# Patient Record
Sex: FEMALE | Race: WHITE | Employment: OTHER
[De-identification: names, ages, dates, MRNs, and addresses within clinical notes are randomized per-mention and may not be internally consistent; named-entity substitution may affect disease eponyms.]

---

## 2017-10-01 ENCOUNTER — HEALTH MAINTENANCE LETTER (OUTPATIENT)
Age: 57
End: 2017-10-01

## 2018-12-06 ENCOUNTER — TRANSFERRED RECORDS (OUTPATIENT)
Dept: HEALTH INFORMATION MANAGEMENT | Facility: CLINIC | Age: 58
End: 2018-12-06

## 2019-12-11 ENCOUNTER — TRANSFERRED RECORDS (OUTPATIENT)
Dept: HEALTH INFORMATION MANAGEMENT | Facility: CLINIC | Age: 59
End: 2019-12-11

## 2019-12-11 LAB
CREAT SERPL-MCNC: 0.8 MG/DL (ref 0.57–1.11)
GFR SERPL CREATININE-BSD FRML MDRD: >60 ML/MIN/1.73M2

## 2019-12-13 ENCOUNTER — TRANSFERRED RECORDS (OUTPATIENT)
Dept: HEALTH INFORMATION MANAGEMENT | Facility: CLINIC | Age: 59
End: 2019-12-13

## 2019-12-20 NOTE — TELEPHONE ENCOUNTER
Action    Action Taken December 20, 2019  Call to PT and she verified all records at [facility]     RECORDS STATUS - ALL OTHER DIAGNOSIS      RECORDS RECEIVED FROM: Massiel   DATE RECEIVED: Care Everywhere   NOTES STATUS DETAILS   OFFICE NOTE from referring provider     OFFICE NOTE from medical oncologist     DISCHARGE SUMMARY from hospital     DISCHARGE REPORT from the ER     OPERATIVE REPORT     MEDICATION LIST     CLINICAL TRIAL TREATMENTS TO DATE     LABS     PATHOLOGY REPORTS     ANYTHING RELATED TO DIAGNOSIS Slides requested 12/20/19  Received 1/6/20 [facility] Case: [id number]   GENONOMIC TESTING     TYPE:     IMAGING (NEED IMAGES & REPORT)     CT SCANS Requested 12/20/19  Merged to PACS 12/27/19 [facility] 12/13/19; 12/11/19; 12/6/2018; 8/1/17   MRI Requested 12/20/19  Merged to PACS 12/27/19 [facility]  12/18/19; 12/7/17; 8/1/17   NM bone Scan whole body Requested 12/20/19  Merged to PACS 12/27/19 [facility] 12/13/19; 8/1/17;   MAMMO Requested 12/20/19  Merged to PACS 12/27/19 9/8/18; 9/7/18   ULTRASOUND     PET

## 2020-01-03 ENCOUNTER — TRANSFERRED RECORDS (OUTPATIENT)
Dept: HEALTH INFORMATION MANAGEMENT | Facility: CLINIC | Age: 60
End: 2020-01-03

## 2020-01-06 ENCOUNTER — PRE VISIT (OUTPATIENT)
Dept: ONCOLOGY | Facility: CLINIC | Age: 60
End: 2020-01-06

## 2020-01-06 ENCOUNTER — ONCOLOGY VISIT (OUTPATIENT)
Dept: ONCOLOGY | Facility: CLINIC | Age: 60
End: 2020-01-06
Attending: PSYCHIATRY & NEUROLOGY
Payer: COMMERCIAL

## 2020-01-06 ENCOUNTER — TUMOR CONFERENCE (OUTPATIENT)
Dept: ONCOLOGY | Facility: CLINIC | Age: 60
End: 2020-01-06

## 2020-01-06 VITALS
OXYGEN SATURATION: 96 % | HEART RATE: 71 BPM | DIASTOLIC BLOOD PRESSURE: 80 MMHG | BODY MASS INDEX: 34.78 KG/M2 | TEMPERATURE: 98.3 F | WEIGHT: 189 LBS | SYSTOLIC BLOOD PRESSURE: 130 MMHG | HEIGHT: 62 IN

## 2020-01-06 DIAGNOSIS — D43.3 HEMANGIOPERICYTOMA OF CNS, GRADE II (H): Primary | ICD-10-CM

## 2020-01-06 DIAGNOSIS — R41.3 MEMORY LOSS: ICD-10-CM

## 2020-01-06 DIAGNOSIS — C78.7 METASTATIC CANCER TO LIVER (H): ICD-10-CM

## 2020-01-06 DIAGNOSIS — R41.89 COGNITIVE CHANGES: ICD-10-CM

## 2020-01-06 PROCEDURE — G0463 HOSPITAL OUTPT CLINIC VISIT: HCPCS | Mod: ZF

## 2020-01-06 PROCEDURE — 99205 OFFICE O/P NEW HI 60 MIN: CPT | Mod: ZP | Performed by: PSYCHIATRY & NEUROLOGY

## 2020-01-06 ASSESSMENT — PAIN SCALES - GENERAL: PAINLEVEL: NO PAIN (0)

## 2020-01-06 ASSESSMENT — MIFFLIN-ST. JEOR: SCORE: 1380.55

## 2020-01-06 NOTE — NURSING NOTE
"Oncology Rooming Note    January 6, 2020 7:05 AM   Leidy Ascencio is a 59 year old female who presents for:    Chief Complaint   Patient presents with     New Patient     UMP NEW; HAMGIOPERICYTIC MENINGIOMA     Initial Vitals: /80   Pulse 71   Temp 98.3  F (36.8  C) (Oral)   Ht 1.575 m (5' 2\")   Wt 85.7 kg (189 lb)   SpO2 96%   BMI 34.57 kg/m   Estimated body mass index is 34.57 kg/m  as calculated from the following:    Height as of this encounter: 1.575 m (5' 2\").    Weight as of this encounter: 85.7 kg (189 lb). Body surface area is 1.94 meters squared.  No Pain (0) Comment: Data Unavailable   No LMP recorded. Patient has had a hysterectomy.  Allergies reviewed: Yes   Medications reviewed: Yes    Medications: Medication refills not needed today.  Pharmacy name entered into [employer]: [facility] [place] - [address] [address]    Clinical concerns: No new concerns.  Dr. Zhang was notified.      Rebekah Everett, [facility]              "

## 2020-01-06 NOTE — LETTER
RE: Leidy Ascencio  [address] [place] [address]     Dear Colleague,    Thank you for referring your patient, Leidy Ascencio, to the [facility]. Please see a copy of my visit note below.    NEURO-ONCOLOGY INITIAL VISIT  Jan 6, 2020    CHIEF COMPLAINT: Ms. 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 She has not had any cancer-directed therapy to date. She is presenting to this initial clinic visit as a self-referral for evaluation and recommendations on treatment.     Accompanying her to this visit is Main ().       HISTORY OF PRESENT ILLNESS  A summary of the patient s oncologic history is as follows;   -2015 PRESENTATION: Headaches.  -2015 MR brain imaging with a right occipital mass   -4/22/2015 SURGERY: Resection by Dr. Lujan   PATHOLOGY: Hemangiopericytoma, grade 2.   -2015 CT chest, abdomen and pelvis negative for malignancy.  -2015 PET MRI negative for malignancy.   Conservative management per Dr. Herman Mayberry.    -8/1/2017 CT chest abdomen and pelvis and bone scan negative for malignancy.   -12/7/2017 MRB with no evidence of tumor recurrence.  -12/6/2018 MRB with no evidence of tumor recurrence.    -12/11/2019 MRB with a new nodule of enhancement within the superior sagittal sinus, concerning for tumor recurrence. Stable appearance of the right parieto-occipital craniotomy site.  -12/13/2019 Bone scan with no convincing evidence of skeletal metastases.  -12/18/2019 MR liver with a 9 mm lesion in the lateral aspect of the junction of segments 7 and 8 of the liver shows homogeneous delayed enhancement and is new since 2015. This could represent a metastatic lesion.     -1/6/2020 NEURO-ONC: Referral to radiation oncology and GI oncology surgery. Signed consent for next generation sequencing.   -1/14/2020 Planned GammaKnife to lesion (1 fraction).     Today in clinic;    -Priyanka does not complain of any neurological symptoms. Endorses a slight increase in fatigue.   -Denies any headaches, weakness, changes in sensation, abnormalities with vision or impairment in cognitive ability.  -Denies any episodes concerning for a seizure, including unexplained episodes of loss of consciousness, unexplained falls, unexplained loss of bowel/ bladder control or tongue biting, unexplained bruising/ myalgia, periods of loss of time, or witnessed shaking/ jerking movements.   -Off all steroids.  -Mood is good, on Zoloft.     REVIEW OF SYSTEMS  A comprehensive ROS negative except as in HPI.      MEDICATIONS   Current Outpatient Medications   Medication Sig Dispense Refill     ALBUTEROL 108 (90 BASE) MCG/ACT inhaler   2     fluticasone (FLONASE) 50 MCG/ACT nasal spray Spray 2 sprays into both nostrils daily       loratadine (CLARITIN) 10 MG tablet Take 10 mg by mouth daily       pravastatin (PRAVACHOL) 20 MG tablet Take 1 tablet (20 mg) by mouth daily 90 tablet 1     sertraline (ZOLOFT) 100 MG tablet Take 1 tablet (100 mg) by mouth daily 90 tablet 1     valACYclovir (VALTREX) 1000 mg tablet Take 2 tablets (2,000 mg) by mouth 2 times daily 12 tablet 6     VITAMIN D, CHOLECALCIFEROL, PO Take 1,000 Units by mouth daily       DRUG ALLERGIES   Allergies   Allergen Reactions     Atorvastatin      Penicillins      Sulfa Drugs        IMMUNIZATIONS   Immunization History   Administered Date(s) Administered     HepB 12/17/1997, 01/27/1998, 07/27/1998     Influenza (intradermal) 11/06/2006, 12/24/2008, 10/12/2009, 11/23/2010, 08/20/2012     Influenza Vaccine IM > 6 months Valent IIV4 10/21/2013, 11/03/2014, 10/13/2015, 09/19/2016     TD (ADULT, 7+) 12/08/1997     Tdap (Adacel,Boostrix) 01/03/2008     Tdap (Adult) Unspecified Formulation 01/03/2008, 06/05/2017     Zoster vaccine recombinant adjuvanted (SHINGRIX) 08/20/2018     PAST MEDICAL HISTORY   Past Medical History:   Diagnosis Date     Cancer (H)      "brain tumor     Depressive disorder      Hyperlipemia      PAST SURGICAL HISTORY   Past Surgical History:   Procedure Laterality Date     C EXCIS INFRATENT MENINGIOMA       GYN SURGERY      3 cesections     HYSTERECTOMY VAGINAL, BILATERAL SALPINGO-OOPHERECTOMY, COMBINED  1988    hysterectomy + ovaries      SOCIAL HISTORY   History   Smoking Status     Former Smoker     Packs/day: 1.00     Years: 27.00     Types: Cigarettes     Quit date: 1/19/2003   Smokeless Tobacco     Not on file    Social History    Substance and Sexual Activity      Alcohol use: Yes        Alcohol/week: 0.0 standard drinks     History   Drug Use No   . 2 daughter, 1 son.   Employment: [employer]; [employer] managers.      FAMILY HISTORY   Family History   Problem Relation Age of Onset     Coronary Artery Disease Mother      Arthritis Father      Alzheimer Disease Maternal Grandmother      Other Cancer Maternal Grandmother         lung     Other Cancer Paternal Grandfather         meloma       PHYSICAL EXAMINATION  /80   Pulse 71   Temp 98.3  F (36.8  C) (Oral)   Ht 1.575 m (5' 2\")   Wt 85.7 kg (189 lb)   SpO2 96%   BMI 34.57 kg/m     Wt Readings from Last 2 Encounters:   01/06/20 85.7 kg (189 lb)   10/19/16 81.6 kg (180 lb)      Ht Readings from Last 2 Encounters:   01/06/20 1.575 m (5' 2\")   10/04/16 1.575 m (5' 2\")     KPS: 90    -Generally well appearing.  -Throat: No oral thrush.  -Respiratory: Normal breath sounds, no audible wheezing.   -Skin: No rashes. Healed head incision.  -Hematologic/ lymphatic: No abnormal bruising. No leg swelling.  -Psychiatric: Normal mood and affect. Pleasant, talkative.  -Neurologic:   MENTAL STATUS:     Alert, oriented to date.    Recall: Immediate 3/3, delayed 3/3.    Speech fluent.    Comprehension intact to multi-step commands.   Good right-left orientation.     CRANIAL NERVES:     Discs flat on fundoscopy.    Pupils are equal, round, reactive to light.     Extraocular " movements full, patient denies diplopia.     Left inferior homonymous quadrantanopia.     Facial sensation intact to light touch.   Symmetric facial movements.   Hearing intact.   Palate moves symmetrically.     Sternocleidomastoid and trapezius strength intact.    Tongue midline.  MOTOR:    Normal and symmetric tone.   No pronation or drift. No orbiting.   Able to rise from a chair without use of arms.   On toe/ heel walk, equal distance from floor to heels/ toes.   SENSATION:    Intact to light touch throughout.  COORDINATION:   Intact finger-nose with eyes open and closed.   REFLEXES:    Normal and symmetric.    No Hoffmans.   No grasp.    GAIT:  Walks without assistance.   Good speed. Normal stride length and heel strike. Normal turns. Normal arm swing.   Able to toe, heel walk. Able to tandem walk.       MEDICAL RECORDS  Obtained and personally reviewed all available outside medical records in addition to reviewing any records available in our electronic system.     LABS  Personally reviewed all available lab results.     IMAGING  Personally reviewed MR brain imaging. The contrast enhancing lesion at the sagittal sinus appears to have been growing in size over the past 2 years. Also reviewed liver MRI.     Imaging and case reviewed and discussed at Brain Tumor Conference.       IMPRESSION  For the 60 minute appointment, more than 50% of the encounter was spent discussing in detail the nature of this tumor, providing emotional support, answering questions pertaining to my recommendations, and devising the treatment plan as outlined below.     Grade II hemangiopericytomas are considered malignant tumors. Standard treatment options include surgery and radiation therapy. As discussed at Brain Tumor Conference today, resection is not a good option. For radiation therapy, since the lesion has not been previously treated, fractionate radiation was recommended over SRS. Will refer to radiation oncology to discuss this  treatment option with Priyanka. For treatment-refractory tumors, Pazopanib, a broad-spectrum tyrosine kinase inhibitor, has been used with noted efficacy. As extracranial metastatic disease can occur, most commonly in bone, lung, and liver, the MR liver imaging is concerning. For the liver lesion, discussed case with Dr. Phan Fierro, surgical oncology, and will place a referral. Consented Priyanka for next generation sequencing and can consider sending pathology for mutational analysis.     PROBLEM LIST  Hemangiopericytoma, grade II  Liver lesion  Depressed mood    PLAN  -CANCER-DIRECTED THERAPY-  -As above; Discussed case at Brain Tumor Conference today.     -STEROIDS-  -Currently off dexamethasone.  -Dose may increase while undergoing radiation.    -SEIZURE MANAGEMENT-  -While this patient is at increased risk of having seizures, given the lack of seizure history, there is no indication to prescribe an antiepileptic at this time.     -Quality of life/ MOOD/ FATIGUE-  -Continue to monitor mood as untreated/ undertreated depression can worsen fatigue, dysorexia, and quality of life.  -Continue Zoloft.     -MEMORY LOSS/ WORD FINDING ISSUES-  -Referral to Dr. Phan Wright for neuro-psych testing.     Return to clinic pending treatment plan.     In the meantime, Priyanka knows to call with questions or concerns or to report new complaints and can be seen sooner if needed.    Lauren Zhang MD  Neuro-oncology

## 2020-01-06 NOTE — TUMOR CONFERENCE
Tumor Conference Information  Tumor Conference:  Brain  Specialties Present:  Medical oncology, Pathology, Radiology, Radiation oncology, Surgery  Patient Status:  A new patient  Pathology:  Not Discussed  Treatment to Date:  Surgical intervention(s)  Clinical Trial Eligibility:  Not discussed  Recommended Plan:  Post-operative radiation therapy, Referral to (see comment), Biopsy (Comment: plan for post-op radiation to recurrent brain lesion - will refer to rad onc here for second opinion; also refer to see Dr. Fierro to discuss liver biopsy and possible ablation to concerning liver lesion)  Did the review exceed 30 minutes?:  did not           Documentation / Disclaimer Cancer Tumor Board Note  Cancer tumor board recommendations do not override what is determined to be reasonable care and treatment, which is dependent on the circumstances of a patient's case; the patient's medical, social, and personal concerns; and the clinical judgment of the oncologist [physician].

## 2020-01-06 NOTE — PROGRESS NOTES
NEURO-ONCOLOGY INITIAL VISIT  Jan 6, 2020    CHIEF COMPLAINT: Ms. 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 She has not had any cancer-directed therapy to date. She is presenting to this initial clinic visit as a self-referral for evaluation and recommendations on treatment.     Accompanying her to this visit is Main ().       HISTORY OF PRESENT ILLNESS  A summary of the patient s oncologic history is as follows;   -2015 PRESENTATION: Headaches.  -2015 MR brain imaging with a right occipital mass   -4/22/2015 SURGERY: Resection by Dr. Lujan   PATHOLOGY: Hemangiopericytoma, grade 2.   -2015 CT chest, abdomen and pelvis negative for malignancy.  -2015 PET MRI negative for malignancy.   Conservative management per Dr. Herman Mayberry.    -8/1/2017 CT chest abdomen and pelvis and bone scan negative for malignancy.   -12/7/2017 MRB with no evidence of tumor recurrence.  -12/6/2018 MRB with no evidence of tumor recurrence.    -12/11/2019 MRB with a new nodule of enhancement within the superior sagittal sinus, concerning for tumor recurrence. Stable appearance of the right parieto-occipital craniotomy site.  -12/13/2019 Bone scan with no convincing evidence of skeletal metastases.  -12/18/2019 MR liver with a 9 mm lesion in the lateral aspect of the junction of segments 7 and 8 of the liver shows homogeneous delayed enhancement and is new since 2015. This could represent a metastatic lesion.     -1/6/2020 NEURO-ONC: Referral to radiation oncology and GI oncology surgery. Signed consent for next generation sequencing.   -1/14/2020 Planned GammaKnife to lesion (1 fraction).     Today in clinic;   -Priyanka does not complain of any neurological symptoms. Endorses a slight increase in fatigue.   -Denies any headaches, weakness, changes in sensation, abnormalities with vision or impairment in cognitive  ability.  -Denies any episodes concerning for a seizure, including unexplained episodes of loss of consciousness, unexplained falls, unexplained loss of bowel/ bladder control or tongue biting, unexplained bruising/ myalgia, periods of loss of time, or witnessed shaking/ jerking movements.   -Off all steroids.  -Mood is good, on Zoloft.     REVIEW OF SYSTEMS  A comprehensive ROS negative except as in HPI.      MEDICATIONS   Current Outpatient Medications   Medication Sig Dispense Refill     ALBUTEROL 108 (90 BASE) MCG/ACT inhaler   2     fluticasone (FLONASE) 50 MCG/ACT nasal spray Spray 2 sprays into both nostrils daily       loratadine (CLARITIN) 10 MG tablet Take 10 mg by mouth daily       pravastatin (PRAVACHOL) 20 MG tablet Take 1 tablet (20 mg) by mouth daily 90 tablet 1     sertraline (ZOLOFT) 100 MG tablet Take 1 tablet (100 mg) by mouth daily 90 tablet 1     valACYclovir (VALTREX) 1000 mg tablet Take 2 tablets (2,000 mg) by mouth 2 times daily 12 tablet 6     VITAMIN D, CHOLECALCIFEROL, PO Take 1,000 Units by mouth daily       DRUG ALLERGIES   Allergies   Allergen Reactions     Atorvastatin      Penicillins      Sulfa Drugs        IMMUNIZATIONS   Immunization History   Administered Date(s) Administered     HepB 12/17/1997, 01/27/1998, 07/27/1998     Influenza (intradermal) 11/06/2006, 12/24/2008, 10/12/2009, 11/23/2010, 08/20/2012     Influenza Vaccine IM > 6 months Valent IIV4 10/21/2013, 11/03/2014, 10/13/2015, 09/19/2016     TD (ADULT, 7+) 12/08/1997     Tdap (Adacel,Boostrix) 01/03/2008     Tdap (Adult) Unspecified Formulation 01/03/2008, 06/05/2017     Zoster vaccine recombinant adjuvanted (SHINGRIX) 08/20/2018     PAST MEDICAL HISTORY   Past Medical History:   Diagnosis Date     Cancer (H)     brain tumor     Depressive disorder      Hyperlipemia      PAST SURGICAL HISTORY   Past Surgical History:   Procedure Laterality Date     C EXCIS INFRATENT MENINGIOMA       GYN SURGERY      3 cesections      "HYSTERECTOMY VAGINAL, BILATERAL SALPINGO-OOPHERECTOMY, COMBINED  1988    hysterectomy + ovaries      SOCIAL HISTORY   History   Smoking Status     Former Smoker     Packs/day: 1.00     Years: 27.00     Types: Cigarettes     Quit date: 1/19/2003   Smokeless Tobacco     Not on file    Social History    Substance and Sexual Activity      Alcohol use: Yes        Alcohol/week: 0.0 standard drinks     History   Drug Use No   . 2 daughter, 1 son.   Employment: [employer]; [employer] managers.      FAMILY HISTORY   Family History   Problem Relation Age of Onset     Coronary Artery Disease Mother      Arthritis Father      Alzheimer Disease Maternal Grandmother      Other Cancer Maternal Grandmother         lung     Other Cancer Paternal Grandfather         meloma       PHYSICAL EXAMINATION  /80   Pulse 71   Temp 98.3  F (36.8  C) (Oral)   Ht 1.575 m (5' 2\")   Wt 85.7 kg (189 lb)   SpO2 96%   BMI 34.57 kg/m    Wt Readings from Last 2 Encounters:   01/06/20 85.7 kg (189 lb)   10/19/16 81.6 kg (180 lb)      Ht Readings from Last 2 Encounters:   01/06/20 1.575 m (5' 2\")   10/04/16 1.575 m (5' 2\")     KPS: 90    -Generally well appearing.  -Throat: No oral thrush.  -Respiratory: Normal breath sounds, no audible wheezing.   -Skin: No rashes. Healed head incision.  -Hematologic/ lymphatic: No abnormal bruising. No leg swelling.  -Psychiatric: Normal mood and affect. Pleasant, talkative.  -Neurologic:   MENTAL STATUS:     Alert, oriented to date.    Recall: Immediate 3/3, delayed 3/3.    Speech fluent.    Comprehension intact to multi-step commands.   Good right-left orientation.     CRANIAL NERVES:     Discs flat on fundoscopy.    Pupils are equal, round, reactive to light.     Extraocular movements full, patient denies diplopia.     Left inferior homonymous quadrantanopia.     Facial sensation intact to light touch.   Symmetric facial movements.   Hearing intact.   Palate moves symmetrically.  "    Sternocleidomastoid and trapezius strength intact.    Tongue midline.  MOTOR:    Normal and symmetric tone.   No pronation or drift. No orbiting.   Able to rise from a chair without use of arms.   On toe/ heel walk, equal distance from floor to heels/ toes.   SENSATION:    Intact to light touch throughout.  COORDINATION:   Intact finger-nose with eyes open and closed.   REFLEXES:    Normal and symmetric.    No Hoffmans.   No grasp.    GAIT:  Walks without assistance.   Good speed. Normal stride length and heel strike. Normal turns. Normal arm swing.   Able to toe, heel walk. Able to tandem walk.       MEDICAL RECORDS  Obtained and personally reviewed all available outside medical records in addition to reviewing any records available in our electronic system.     LABS  Personally reviewed all available lab results.     IMAGING  Personally reviewed MR brain imaging. The contrast enhancing lesion at the sagittal sinus appears to have been growing in size over the past 2 years. Also reviewed liver MRI.     Imaging and case reviewed and discussed at Brain Tumor Conference.       IMPRESSION  For the 60 minute appointment, more than 50% of the encounter was spent discussing in detail the nature of this tumor, providing emotional support, answering questions pertaining to my recommendations, and devising the treatment plan as outlined below.     Grade II hemangiopericytomas are considered malignant tumors. Standard treatment options include surgery and radiation therapy. As discussed at Brain Tumor Conference today, resection is not a good option. For radiation therapy, since the lesion has not been previously treated, fractionate radiation was recommended over SRS. Will refer to radiation oncology to discuss this treatment option with Priyanka. For treatment-refractory tumors, Pazopanib, a broad-spectrum tyrosine kinase inhibitor, has been used with noted efficacy. As extracranial metastatic disease can occur, most  commonly in bone, lung, and liver, the MR liver imaging is concerning. For the liver lesion, discussed case with Dr. Phan Fierro, surgical oncology, and will place a referral. Consented Priyanka for next generation sequencing and can consider sending pathology for mutational analysis.     PROBLEM LIST  Hemangiopericytoma, grade II  Liver lesion  Depressed mood    PLAN  -CANCER-DIRECTED THERAPY-  -As above; Discussed case at Brain Tumor Conference today.     -STEROIDS-  -Currently off dexamethasone.  -Dose may increase while undergoing radiation.    -SEIZURE MANAGEMENT-  -While this patient is at increased risk of having seizures, given the lack of seizure history, there is no indication to prescribe an antiepileptic at this time.     -Quality of life/ MOOD/ FATIGUE-  -Continue to monitor mood as untreated/ undertreated depression can worsen fatigue, dysorexia, and quality of life.  -Continue Zoloft.     -MEMORY LOSS/ WORD FINDING ISSUES-  -Referral to Dr. Phan Wright for neuro-psych testing.     Return to clinic pending treatment plan.     In the meantime, Priyanka knows to call with questions or concerns or to report new complaints and can be seen sooner if needed.    Lauren Zhang MD  Neuro-oncology

## 2020-01-06 NOTE — PATIENT INSTRUCTIONS
Agree with need for radiation oncology.   I will discuss your case at Brain Tumor Conference today. If there is a differing opinion, I will call and let you know.   I will inquire about management of the liver lesion with the GI oncologist.     Referral to Dr. Phan Wright for neuro-psych testing.     Signed consent for next generation sequencing.     Return to clinic in 7/2020 + repeat imaging.     Lauren Zhang MD  Neuro-oncology  1/6/2020

## 2020-01-07 ENCOUNTER — TELEPHONE (OUTPATIENT)
Dept: SURGERY | Facility: CLINIC | Age: 60
End: 2020-01-07

## 2020-01-07 ASSESSMENT — ENCOUNTER SYMPTOMS
EYE IRRITATION: 0
NAUSEA: 0
SORE THROAT: 0
PANIC: 1
SINUS CONGESTION: 1
DOUBLE VISION: 0
HALLUCINATIONS: 0
SINUS PAIN: 1
EYE WATERING: 1
COUGH: 1
SHORTNESS OF BREATH: 0
MYALGIAS: 1
SNORES LOUDLY: 1
VOMITING: 0
BOWEL INCONTINENCE: 0
BLOOD IN STOOL: 0
FATIGUE: 1
TROUBLE SWALLOWING: 1
CONSTIPATION: 0
POLYDIPSIA: 0
MUSCLE CRAMPS: 0
EYE PAIN: 0
HOARSE VOICE: 0
NIGHT SWEATS: 1
ABDOMINAL PAIN: 0
ALTERED TEMPERATURE REGULATION: 1
WEIGHT LOSS: 0
SMELL DISTURBANCE: 0
WEIGHT GAIN: 0
MUSCLE WEAKNESS: 1
DECREASED CONCENTRATION: 1
DIARRHEA: 1
HEARTBURN: 0
COUGH DISTURBING SLEEP: 0
WHEEZING: 0
SPUTUM PRODUCTION: 0
JAUNDICE: 0
POSTURAL DYSPNEA: 0
INCREASED ENERGY: 1
BLOATING: 0
DYSPNEA ON EXERTION: 1
EYE REDNESS: 0
DECREASED APPETITE: 0
HEMOPTYSIS: 0
NECK PAIN: 0
TASTE DISTURBANCE: 0
RECTAL PAIN: 0
STIFFNESS: 1
NECK MASS: 0
FEVER: 0
POLYPHAGIA: 0
ARTHRALGIAS: 1
NERVOUS/ANXIOUS: 1
INSOMNIA: 1
JOINT SWELLING: 0
DEPRESSION: 1

## 2020-01-07 NOTE — TELEPHONE ENCOUNTER
Surgical Oncology RN Care Coordination Note:     Message sent to scheduling to contact patient with appointment info with Dr. Fierro on 1/10/2020 at 930 am.     Noni Richardson, RN, BSN  Care Coordinator   [phone]

## 2020-01-07 NOTE — TELEPHONE ENCOUNTER
[facility] Call Center    Phone Message    May a detailed message be left on voicemail: yes    Reason for Call: Other: Please review epic referral for consult      Action Taken: Message routed to:  [facility] ([facility] surg onc

## 2020-01-08 PROCEDURE — 00000346 ZZHCL STATISTIC REVIEW OUTSIDE SLIDES TC 88321: Performed by: PSYCHIATRY & NEUROLOGY

## 2020-01-08 NOTE — TELEPHONE ENCOUNTER
RECORDS STATUS - ALL OTHER DIAGNOSIS      RECORDS RECEIVED FROM: [facility] - Internal Referral   DATE RECEIVED: 1/8/20   NOTES STATUS DETAILS   OFFICE NOTE from referring provider Lauren Talamantes MD - OV 1/6/20    Tumor Conference: 1/6/20    [facility] Radiation - 1/3/20   OFFICE NOTE from medical oncologist Stefan Zhang   DISCHARGE SUMMARY from hospital     DISCHARGE REPORT from the ER     OPERATIVE REPORT [facility] - [facility] 4/22/15: Right Occipital Craniotomy    MEDICATION LIST [facility]   CLINICAL TRIAL TREATMENTS TO DATE     LABS     PATHOLOGY REPORTS [facility] Slides from [facility] rec'd 1/6/20   ANYTHING RELATED TO DIAGNOSIS Epic/ 12/19/19   GENONOMIC TESTING     TYPE:     IMAGING (NEED IMAGES & REPORT)     NM Whole Body Scan PACS 12/18/19 - [facility], Report in CE   CT SCANS PACS 12/13/19 -  - [facility], Report in CE   MRI PACS 12/18/19, 12/11/19  - [facility], Report in CE   MAMMO     ULTRASOUND     PET

## 2020-01-10 ENCOUNTER — OFFICE VISIT (OUTPATIENT)
Dept: RADIATION ONCOLOGY | Facility: CLINIC | Age: 60
End: 2020-01-10
Attending: PSYCHIATRY & NEUROLOGY
Payer: COMMERCIAL

## 2020-01-10 ENCOUNTER — TELEPHONE (OUTPATIENT)
Dept: SURGERY | Facility: CLINIC | Age: 60
End: 2020-01-10

## 2020-01-10 ENCOUNTER — PRE VISIT (OUTPATIENT)
Dept: SURGERY | Facility: CLINIC | Age: 60
End: 2020-01-10

## 2020-01-10 ENCOUNTER — OFFICE VISIT (OUTPATIENT)
Dept: SURGERY | Facility: CLINIC | Age: 60
End: 2020-01-10
Attending: SURGERY
Payer: COMMERCIAL

## 2020-01-10 VITALS — WEIGHT: 190 LBS | DIASTOLIC BLOOD PRESSURE: 76 MMHG | SYSTOLIC BLOOD PRESSURE: 120 MMHG | BODY MASS INDEX: 34.75 KG/M2

## 2020-01-10 VITALS
SYSTOLIC BLOOD PRESSURE: 117 MMHG | BODY MASS INDEX: 35.24 KG/M2 | DIASTOLIC BLOOD PRESSURE: 75 MMHG | HEART RATE: 79 BPM | OXYGEN SATURATION: 96 % | TEMPERATURE: 98.1 F | RESPIRATION RATE: 16 BRPM | WEIGHT: 191.5 LBS | HEIGHT: 62 IN

## 2020-01-10 DIAGNOSIS — D43.3 HEMANGIOPERICYTOMA OF CNS, GRADE II (H): ICD-10-CM

## 2020-01-10 DIAGNOSIS — C78.7 METASTATIC CANCER TO LIVER (H): ICD-10-CM

## 2020-01-10 DIAGNOSIS — D43.3 HEMANGIOPERICYTOMA OF CNS, GRADE II (H): Primary | ICD-10-CM

## 2020-01-10 LAB — COPATH REPORT: NORMAL

## 2020-01-10 PROCEDURE — G0463 HOSPITAL OUTPT CLINIC VISIT: HCPCS | Performed by: RADIOLOGY

## 2020-01-10 PROCEDURE — G0463 HOSPITAL OUTPT CLINIC VISIT: HCPCS | Mod: 27

## 2020-01-10 ASSESSMENT — LIFESTYLE VARIABLES: SUBSTANCE_ABUSE: 0

## 2020-01-10 ASSESSMENT — PAIN SCALES - GENERAL: PAINLEVEL: NO PAIN (0)

## 2020-01-10 ASSESSMENT — ENCOUNTER SYMPTOMS
BRUISES/BLEEDS EASILY: 0
BACK PAIN: 0
NERVOUS/ANXIOUS: 1
MEMORY LOSS: 0
MYALGIAS: 0
BLOOD IN STOOL: 0
NAUSEA: 0
ABDOMINAL PAIN: 0
INSOMNIA: 1
COUGH: 0
FREQUENCY: 0
SHORTNESS OF BREATH: 0
HALLUCINATIONS: 0
ORTHOPNEA: 0
HEMOPTYSIS: 0
TREMORS: 0
BLURRED VISION: 0
EYE PAIN: 0
SORE THROAT: 0
SEIZURES: 1
NECK PAIN: 1
SPUTUM PRODUCTION: 0
HEADACHES: 1
SPEECH CHANGE: 0
CLAUDICATION: 0
WEAKNESS: 0
LOSS OF CONSCIOUSNESS: 0
CONSTIPATION: 0
SINUS PAIN: 0
FEVER: 0
POLYDIPSIA: 0
DEPRESSION: 1
HEARTBURN: 0
STRIDOR: 0
VOMITING: 0
EYE DISCHARGE: 0
PALPITATIONS: 0
PHOTOPHOBIA: 0
DYSURIA: 0
FLANK PAIN: 0
WHEEZING: 0
FALLS: 0
HEMATURIA: 0
WEIGHT LOSS: 0
FOCAL WEAKNESS: 0
SENSORY CHANGE: 0
DOUBLE VISION: 0
TINGLING: 0
DIARRHEA: 0
DIZZINESS: 0
PND: 0
EYE REDNESS: 0
CHILLS: 0
DIAPHORESIS: 0

## 2020-01-10 ASSESSMENT — MIFFLIN-ST. JEOR: SCORE: 1391.89

## 2020-01-10 NOTE — LETTER
1/10/2020       RE: Leidy Ascencio  [address] [place] [address]     Dear Colleague,    Thank you for referring your patient, Leidy Ascencio, to the [facility]. Please see a copy of my visit note below.    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      HISTORY OF PRESENT ILLNESS:  Ms. Ascencio is a 59-year-old female who is cared for by Dr. Lauren Zhang from Neuro[facility]Oncology.  She had a right parietooccipital hemangiopericytoma diagnosed in 2015.  She had not had any cancer-directed therapy to date.  In 12/2019, she had an MRI of the brain which showed a new nodule concerning for tumor recurrence.  She subsequently underwent a bone scan as well as an MRI of the liver.  The bone scan did not reveal any evidence of convincing disease.  However, the liver did show a 9 mm lesion in the lateral aspect of the junction of segments 7 and 8 which is new since 2015, which was concerning for metastatic lesion.  I was asked to see Ms. Ascencio for consideration of liver-directed therapy.      Ms. Ascencio is otherwise doing well and has no specific GI complaints for me today.      EXAMINATION:  Deferred today.      I reviewed Ms. Ascencio's imaging with her and her  today.  She has a lesion in the liver which is certainly approachable by minimally invasive means.  Given the rarity of this tumor, the benefit of liver-directed therapy is somewhat uncertain.  Therefore, I recommended laparoscopic ultrasound-guided microwave ablation as a low risk option which is very successful in controlling other tumor types, particularly given that this tumor measures only approximately 1 cm in size.      I discussed the risks of surgery including but not limited to bleeding, infection, unintentional injury to other organs.  Overall, I think she would tolerate this procedure very well.      Ms. Ascencio was in agreement and wishes to move forward.  We will get her on the operating room  schedule in the coming weeks for a planned laparoscopic ultrasound-guided microwave ablation of liver tumor.      A total of 20 minutes was spent on this case; more than half that time was in face-to-face time with Ms. Ascencio and her .     Again, thank you for allowing me to participate in the care of your patient.      Sincerely,    Phan Fierro MD

## 2020-01-10 NOTE — PROGRESS NOTES
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      HISTORY OF PRESENT ILLNESS:  Ms. Ascencio is a 59-year-old female who is cared for by Dr. Lauren Zhang from Neuro[facility].  She had a right parietooccipital hemangiopericytoma diagnosed in 2015.  She had not had any cancer-directed therapy to date.  In 12/2019, she had an MRI of the brain which showed a new nodule concerning for tumor recurrence.  She subsequently underwent a bone scan as well as an MRI of the liver.  The bone scan did not reveal any evidence of convincing disease.  However, the liver did show a 9 mm lesion in the lateral aspect of the junction of segments 7 and 8 which is new since 2015, which was concerning for metastatic lesion.  I was asked to see Ms. Ascencio for consideration of liver-directed therapy.      Ms. Ascencio is otherwise doing well and has no specific GI complaints for me today.      EXAMINATION:  Deferred today.      I reviewed Ms. Ascencio's imaging with her and her  today.  She has a lesion in the liver which is certainly approachable by minimally invasive means.  Given the rarity of this tumor, the benefit of liver-directed therapy is somewhat uncertain.  Therefore, I recommended laparoscopic ultrasound-guided microwave ablation as a low risk option which is very successful in controlling other tumor types, particularly given that this tumor measures only approximately 1 cm in size.      I discussed the risks of surgery including but not limited to bleeding, infection, unintentional injury to other organs.  Overall, I think she would tolerate this procedure very well.      Ms. Ascencio was in agreement and wishes to move forward.  We will get her on the operating room schedule in the coming weeks for a planned laparoscopic ultrasound-guided microwave ablation of liver tumor.      A total of 20 minutes was spent on this case; more than half that time was in face-to-face time with Ms. Ascencio and  her .

## 2020-01-10 NOTE — TELEPHONE ENCOUNTER
I scheduled surgery for this patient with Dr. Fierro on 1/15 at [facility].   Scheduled per orders.    2/10:   MRI: at 1 PM  Post-op: Vadim at 2:30 PM    I called the patient and was able to confirm the scheduled dates.     I sent a surgery packet to them via [employer], per their preference. This contains education and directions for the surgery.     They are aware that they will EITHER receive a call  ~2 days prior to the scheduled procedure and will be given an exact arrival/start time OR get their exact times at their appointment with PAC. They are also aware that more education regarding the surgery will be provided at their consult.

## 2020-01-10 NOTE — LETTER
1/10/2020      RE: Leidy Ascencio  [address] [place] [address]       Attending addendum:   I saw and examined the patient with the resident and agree with the documented plan of care.    Ms. Ascencio is a 59 year old female with a history of a grade II hemangiopericytoma of the right parieto-occipital lobe s/p surgical resection in 2015. She now presents with recurrent, unresectable disease within the previous surgical cavity along the sagittal sinus as well as a suspected hepatic metastasis.    She has previously been seen by Dr. Song for consideration of radiotherapy for her CNS disease and is scheduled to undergo treatment next week. I concurred with the plan for CNS-directed radiotherapy as well as ablative therapy to the suspected hepatic metastasis. She was provided with my contact information and instructed to follow-up with me on an as-needed basis.    Giles Lang MD/PhD    Dept of Radiation Oncology  [facility]             Department of Radiation Oncology  [address] [address] [phone]       Consultation Note    Name: Leidy Ascencio MRN: [id number]   : 1960   Date of Service: Daljit 10, 2020 Referring: Dr. Lauren Zhang / neuro-oncology     Reason for consultation: Consideration of radiotherapy for management of a recurrent grade 2 intracranial hemangiopericytoma.     History of Present Illness   Ms. Ascencio is a 59 year old female with a diagnosis of recurrent grade II right parieto-occipital hemangiopericytoma status post gross total resection in . She now presents with imaging evidence of recurrent disease.    Ms. Ascencio's oncologic history started back in early  when she presented with headaches. She was worked up with an MRI of the brain in 2015 which demonstrated a 3.3 x 3.7 x 4.4 cm right parieto-occipital mass. She underwent a gross total resection of the tumor by Dr. Lujan  on 2015. The pathology ([id number]) was consistent with grade 2 hemangiopericytoma. She had a staging CT chest, abdomen and pelvis which was negative for metastatic disease.     Ms. Ascencio received routine surveillance with her 2019 brain MRI demonstrating a new enhancing nodule within the superior sagittal sinus measuring 1.3 x 1.4 x 1.5 cm, concerning for tumor recurrence. In addition, she had a MRI of the liver on  which demonstrated a new 9 mm lesion in the lateral aspect of the segment 7 and 8 junction, concerning for metastatic disease.     On interview today, she is doing very well. She denies any acute alarming neurologic symptoms.  She denied headaches, nausea, vomiting, visual or auditory changes, weakness, numbness, loss of urinary or bowel control, urinary or bowel symptoms, weight or appetite changes. She was seen by Dr. Fierro earlier this morning for further evaluation of her liver lesion. The decision was made to proceed with ultrasound-guided microwave ablation. She has also met with Dr. Song with the plan to proceed with salvage radiotherapy to the recurrent intracranial tumor.    Past Medical History:  Depressive disorder  Hyperlipidemia    Past Surgical History:  Vaginal hysterectomy   section x3    Chemotherapy History:  None     Radiation History:  None    Pregnant: No     Implanted Cardiac Devices: No    Medications:  Albuterol  Flonase  Loratadine  Pravastatin  Vitamin D  Valtrex  Zoloft    Allergies:  Atorvastatin  Penicillins  Several drugs    Social History:  Tobacco: Former smoker, 27 years, quit on 2003  Alcohol: Yes  Employment: Works in [place] for [employer]    Family History:  Maternal grandmother: Lung cancer  Paternal grandfather: Multiple myeloma    Review of Systems   A 10-point review of systems was performed. Pertinent findings are noted in the HPI.    Physical Exam   ECOG Status: 0    Vitals:  /76   Wt 86.2 kg (190 lb)   BMI  34.75 kg/m     Gen: 59 year old female seated comfortably in an examination chair in NAD  Head: NC/AT  Eyes: PERRL, EOMI, sclera anicteric  Ears: No external auricular lesions  Nose/sinus: No rhinorrhea or epistaxis  Oral cavity/oropharynx: MMM, no visible oral cavity lesions  Pulmonary: No wheezing, stridor or respiratory distress  Musculoskeletal: Normal bulk and tone  Skin: Normal color and turgor  Neuro: A/Ox3, CN II-XII intact, normal gait    Imaging/Path/Labs   Imaging:   MRI BRAIN 12/11/19:  Impression: A new nodule of enhancement within the superior sagittal sinus compatible with tumor recurrence measuring 1.3 x 1.4 x 1.5 cm.  Stable appearance of the right parieto-occipital craniotomy site.    Path ([id number]): grade 2 hemangiopericytoma.    Assessment    Ms. Ascencio is a 59 year old female with a recurrent grade 2 hemangiopericytoma.     Plan   We discussed at length with the patient and her  the rationale for the use of radiotherapy in the treatment of her disease including conventionally fractionated radiotherapy and stereotactic radiosurgery. We discussed the logistics, rationale, and side effects associated with radiotherapy. She has already met with Dr. Song and has undergone a simulation for radiotherapy planning. She is scheduled to start treatment on 1/14/2020. We told the patient that we agree with this plan. In regard to the concerning liver lesion, she has met with Dr. Fierro with the plan to proceed with biopsy and ablative therapy.    The patient was seen and discussed with staff, Dr. Lang.     Ty Guardado MD  PGY-3 Resident, Radiation Oncology  [facility]          HPI    INITIAL PATIENT ASSESSMENT    Diagnosis: Cancer    Prior radiation therapy: None    Prior chemotherapy: None    Prior hormonal therapy:No    Pain Eval:  Denies    Psychosocial  Living arrangements: Lives with   Fall Risk: independent   referral needs: Not  needed    Advanced Directive: No  Implantable Cardiac Device? No    Nurse face-to-face time: Level 5:  over 15 min face to face time  Review of Systems   Constitutional: Positive for malaise/fatigue. Negative for chills, diaphoresis, fever and weight loss.   HENT: Positive for hearing loss. Negative for congestion, ear discharge, ear pain, nosebleeds, sinus pain, sore throat and tinnitus.    Eyes: Negative for blurred vision, double vision, photophobia, pain, discharge and redness.   Respiratory: Negative for cough, hemoptysis, sputum production, shortness of breath, wheezing and stridor.    Cardiovascular: Negative for chest pain, palpitations, orthopnea, claudication, leg swelling and PND.   Gastrointestinal: Negative for abdominal pain, blood in stool, constipation, diarrhea, heartburn, melena, nausea and vomiting.   Genitourinary: Negative for dysuria, flank pain, frequency, hematuria and urgency.   Musculoskeletal: Positive for neck pain. Negative for back pain, falls, joint pain and myalgias.   Skin: Negative for itching and rash.   Neurological: Positive for seizures and headaches. Negative for dizziness, tingling, tremors, sensory change, speech change, focal weakness, loss of consciousness and weakness.   Endo/Heme/Allergies: Negative for environmental allergies and polydipsia. Does not bruise/bleed easily.   Psychiatric/Behavioral: Positive for depression. Negative for hallucinations, memory loss, substance abuse and suicidal ideas. The patient is nervous/anxious and has insomnia.            Giles Lang MD

## 2020-01-10 NOTE — PROGRESS NOTES
Attending addendum:   I saw and examined the patient with the resident and agree with the documented plan of care.    Ms. Ascencio is a 59 year old female with a history of a grade II hemangiopericytoma of the right parieto-occipital lobe s/p surgical resection in 2015. She now presents with recurrent, unresectable disease within the previous surgical cavity along the sagittal sinus as well as a suspected hepatic metastasis.    She has previously been seen by Dr. Song for consideration of radiotherapy for her CNS disease and is scheduled to undergo treatment next week. I concurred with the plan for CNS-directed radiotherapy as well as ablative therapy to the suspected hepatic metastasis. She was provided with my contact information and instructed to follow-up with me on an as-needed basis.    Giles Lang MD/PhD    Dept of Radiation Oncology  [facility]             Department of Radiation Oncology  [address] [address]  [phone]       Consultation Note    Name: Leidy Ascencio MRN: [id number]   : 1960   Date of Service: Daljit 10, 2020 Referring: Dr. Lauren Zhang / neuro-oncology     Reason for consultation: Consideration of radiotherapy for management of a recurrent grade 2 intracranial hemangiopericytoma.     History of Present Illness   Ms. Ascencio is a 59 year old female with a diagnosis of recurrent grade II right parieto-occipital hemangiopericytoma status post gross total resection in . She now presents with imaging evidence of recurrent disease.    Ms. Ascencio's oncologic history started back in early  when she presented with headaches. She was worked up with an MRI of the brain in 2015 which demonstrated a 3.3 x 3.7 x 4.4 cm right parieto-occipital mass. She underwent a gross total resection of the tumor by Dr. Lujan on 2015. The pathology ([id number]) was consistent with grade 2  hemangiopericytoma. She had a staging CT chest, abdomen and pelvis which was negative for metastatic disease.     Ms. Ascencio received routine surveillance with her 2019 brain MRI demonstrating a new enhancing nodule within the superior sagittal sinus measuring 1.3 x 1.4 x 1.5 cm, concerning for tumor recurrence. In addition, she had a MRI of the liver on  which demonstrated a new 9 mm lesion in the lateral aspect of the segment 7 and 8 junction, concerning for metastatic disease.     On interview today, she is doing very well. She denies any acute alarming neurologic symptoms.  She denied headaches, nausea, vomiting, visual or auditory changes, weakness, numbness, loss of urinary or bowel control, urinary or bowel symptoms, weight or appetite changes. She was seen by Dr. Fierro earlier this morning for further evaluation of her liver lesion. The decision was made to proceed with ultrasound-guided microwave ablation. She has also met with Dr. Song with the plan to proceed with salvage radiotherapy to the recurrent intracranial tumor.    Past Medical History:  Depressive disorder  Hyperlipidemia    Past Surgical History:  Vaginal hysterectomy   section x3    Chemotherapy History:  None     Radiation History:  None    Pregnant: No     Implanted Cardiac Devices: No    Medications:  Albuterol  Flonase  Loratadine  Pravastatin  Vitamin D  Valtrex  Zoloft    Allergies:  Atorvastatin  Penicillins  Several drugs    Social History:  Tobacco: Former smoker, 27 years, quit on 2003  Alcohol: Yes  Employment: Works in [employer] for [employer]    Family History:  Maternal grandmother: Lung cancer  Paternal grandfather: Multiple myeloma    Review of Systems   A 10-point review of systems was performed. Pertinent findings are noted in the HPI.    Physical Exam   ECOG Status: 0    Vitals:  /76   Wt 86.2 kg (190 lb)   BMI 34.75 kg/m    Gen: 59 year old female seated comfortably in an examination  chair in NAD  Head: NC/AT  Eyes: PERRL, EOMI, sclera anicteric  Ears: No external auricular lesions  Nose/sinus: No rhinorrhea or epistaxis  Oral cavity/oropharynx: MMM, no visible oral cavity lesions  Pulmonary: No wheezing, stridor or respiratory distress  Musculoskeletal: Normal bulk and tone  Skin: Normal color and turgor  Neuro: A/Ox3, CN II-XII intact, normal gait    Imaging/Path/Labs   Imaging:   MRI BRAIN 12/11/19:  Impression: A new nodule of enhancement within the superior sagittal sinus compatible with tumor recurrence measuring 1.3 x 1.4 x 1.5 cm.  Stable appearance of the right parieto-occipital craniotomy site.    Path ([id number]): grade 2 hemangiopericytoma.    Assessment    Ms. Ascencio is a 59 year old female with a recurrent grade 2 hemangiopericytoma.     Plan   We discussed at length with the patient and her  the rationale for the use of radiotherapy in the treatment of her disease including conventionally fractionated radiotherapy and stereotactic radiosurgery. We discussed the logistics, rationale, and side effects associated with radiotherapy. She has already met with Dr. Song and has undergone a simulation for radiotherapy planning. She is scheduled to start treatment on 1/14/2020. We told the patient that we agree with this plan. In regard to the concerning liver lesion, she has met with Dr. Fierro with the plan to proceed with biopsy and ablative therapy.    The patient was seen and discussed with staff, Dr. Lang.     Ty Guardado MD  PGY-3 Resident, Radiation Oncology  [facility]

## 2020-01-10 NOTE — PROGRESS NOTES
HPI    INITIAL PATIENT ASSESSMENT    Diagnosis: Cancer    Prior radiation therapy: None    Prior chemotherapy: None    Prior hormonal therapy:No    Pain Eval:  Denies    Psychosocial  Living arrangements: Lives with   Fall Risk: independent   referral needs: Not needed    Advanced Directive: No  Implantable Cardiac Device? No    Nurse face-to-face time: Level 5:  over 15 min face to face time  Review of Systems   Constitutional: Positive for malaise/fatigue. Negative for chills, diaphoresis, fever and weight loss.   HENT: Positive for hearing loss. Negative for congestion, ear discharge, ear pain, nosebleeds, sinus pain, sore throat and tinnitus.    Eyes: Negative for blurred vision, double vision, photophobia, pain, discharge and redness.   Respiratory: Negative for cough, hemoptysis, sputum production, shortness of breath, wheezing and stridor.    Cardiovascular: Negative for chest pain, palpitations, orthopnea, claudication, leg swelling and PND.   Gastrointestinal: Negative for abdominal pain, blood in stool, constipation, diarrhea, heartburn, melena, nausea and vomiting.   Genitourinary: Negative for dysuria, flank pain, frequency, hematuria and urgency.   Musculoskeletal: Positive for neck pain. Negative for back pain, falls, joint pain and myalgias.   Skin: Negative for itching and rash.   Neurological: Positive for seizures and headaches. Negative for dizziness, tingling, tremors, sensory change, speech change, focal weakness, loss of consciousness and weakness.   Endo/Heme/Allergies: Negative for environmental allergies and polydipsia. Does not bruise/bleed easily.   Psychiatric/Behavioral: Positive for depression. Negative for hallucinations, memory loss, substance abuse and suicidal ideas. The patient is nervous/anxious and has insomnia.

## 2020-01-10 NOTE — NURSING NOTE
"Oncology Rooming Note    January 10, 2020 8:46 AM   Leidy Ascencio is a 59 year old female who presents for:    Chief Complaint   Patient presents with     New Patient     NEW HEMANGIOPERICYTOMA; VITALS TAKEN      Initial Vitals: /75   Pulse 79   Temp 98.1  F (36.7  C) (Oral)   Resp 16   Ht 1.575 m (5' 2\")   Wt 86.9 kg (191 lb 8 oz)   SpO2 96%   BMI 35.03 kg/m   Estimated body mass index is 35.03 kg/m  as calculated from the following:    Height as of this encounter: 1.575 m (5' 2\").    Weight as of this encounter: 86.9 kg (191 lb 8 oz). Body surface area is 1.95 meters squared.  No Pain (0) Comment: Data Unavailable   No LMP recorded. Patient has had a hysterectomy.  Allergies reviewed: Yes  Medications reviewed: Yes    Medications: Medication refills not needed today.  Pharmacy name entered into [facility]: [facility] [place] [place] - [address] [address]    Clinical concerns: No new concerns today  Dr Fierro was NOT notified.      Loli Campo              "

## 2020-01-13 ENCOUNTER — DOCUMENTATION ONLY (OUTPATIENT)
Dept: ONCOLOGY | Facility: CLINIC | Age: 60
End: 2020-01-13

## 2020-01-13 NOTE — PROGRESS NOTES
Form Request Documentation    Date Received in Clinic:  1/6/20  Name/Type of Form: [employer], critical illness claim  Date Completed: 1/13/2020  Copy Mailed to patient: Yes on 1/13/2020  Disposition of Form: [facility] at [phone] on 1/13/2020

## 2020-01-14 ENCOUNTER — ANESTHESIA EVENT (OUTPATIENT)
Dept: SURGERY | Facility: CLINIC | Age: 60
End: 2020-01-14
Payer: COMMERCIAL

## 2020-01-14 ENCOUNTER — TRANSFERRED RECORDS (OUTPATIENT)
Dept: HEALTH INFORMATION MANAGEMENT | Facility: CLINIC | Age: 60
End: 2020-01-14

## 2020-01-14 RX ORDER — ASPIRIN 81 MG/1
81 TABLET ORAL AT BEDTIME
COMMUNITY
End: 2022-08-25

## 2020-01-14 NOTE — OR NURSING
Sheila Denise PA-C Kramer, Marie, RN; Melanie Navarro; Rowan Wright, SUSANNE; Phan Fierro MD             Yes, keep surgery as scheduled.     Sheila    Previous Messages      ----- Message -----   From: Geetha Mcnair RN   Sent: 1/14/2020  11:21 AM CST   To: Sheila Denise PA-C, Phan Fierro MD, *   Subject: RE: Aspirin use                                   Sheila,   Could you respond to this?  I assume its fine.   Terrence Iniguez   ----- Message -----   From: Melanie Navarro   Sent: 1/14/2020  11:13 AM CST   To: Phan Fierro MD, Rowan Wright, RN, *   Subject: RE: Aspirin use                                   Are we still set for surgery tomorrow, 1/15?     Melanie   ----- Message -----   From: Rowan Wright RN   Sent: 1/14/2020   8:25 AM CST   To: Phan Fierro MD, Noni Richardson RN, *   Subject: Aspirin use                                       Hello all,     I just completed the pre-op phone call for this patient and she reported that she has been taking baby aspirin for quite a long time. It was not on her med list. Her last dose was 1/13/20.     Any contraindications for surgery tomorrow 1/15/20?     Please advise     Thank-you

## 2020-01-15 ENCOUNTER — ANESTHESIA (OUTPATIENT)
Dept: SURGERY | Facility: CLINIC | Age: 60
End: 2020-01-15
Payer: COMMERCIAL

## 2020-01-15 ENCOUNTER — HOSPITAL ENCOUNTER (OUTPATIENT)
Facility: CLINIC | Age: 60
Discharge: HOME | End: 2020-01-15
Attending: SURGERY | Admitting: SURGERY
Payer: COMMERCIAL

## 2020-01-15 VITALS
OXYGEN SATURATION: 98 % | HEART RATE: 73 BPM | BODY MASS INDEX: 34.69 KG/M2 | SYSTOLIC BLOOD PRESSURE: 118 MMHG | DIASTOLIC BLOOD PRESSURE: 71 MMHG | TEMPERATURE: 98.4 F | RESPIRATION RATE: 20 BRPM | WEIGHT: 188.49 LBS | HEIGHT: 62 IN

## 2020-01-15 DIAGNOSIS — C78.7 METASTATIC CANCER TO LIVER (H): ICD-10-CM

## 2020-01-15 DIAGNOSIS — D43.3 HEMANGIOPERICYTOMA OF CNS, GRADE II (H): ICD-10-CM

## 2020-01-15 LAB
ABO + RH BLD: NORMAL
ABO + RH BLD: NORMAL
BLD GP AB SCN SERPL QL: NORMAL
BLOOD BANK CMNT PATIENT-IMP: NORMAL
BLOOD BANK CMNT PATIENT-IMP: NORMAL
GLUCOSE BLDC GLUCOMTR-MCNC: 114 MG/DL (ref 70–99)
SPECIMEN EXP DATE BLD: NORMAL

## 2020-01-15 PROCEDURE — 36000070 ZZH SURGERY LEVEL 5 EA 15 ADDTL MIN - UMMC: Performed by: SURGERY

## 2020-01-15 PROCEDURE — 71000014 ZZH RECOVERY PHASE 1 LEVEL 2 FIRST HR: Performed by: SURGERY

## 2020-01-15 PROCEDURE — 36415 COLL VENOUS BLD VENIPUNCTURE: CPT | Performed by: ANESTHESIOLOGY

## 2020-01-15 PROCEDURE — 86850 RBC ANTIBODY SCREEN: CPT | Performed by: ANESTHESIOLOGY

## 2020-01-15 PROCEDURE — 25800030 ZZH RX IP 258 OP 636: Performed by: NURSE ANESTHETIST, CERTIFIED REGISTERED

## 2020-01-15 PROCEDURE — 88307 TISSUE EXAM BY PATHOLOGIST: CPT | Performed by: SURGERY

## 2020-01-15 PROCEDURE — 25000128 H RX IP 250 OP 636: Performed by: SURGERY

## 2020-01-15 PROCEDURE — 25000132 ZZH RX MED GY IP 250 OP 250 PS 637: Performed by: STUDENT IN AN ORGANIZED HEALTH CARE EDUCATION/TRAINING PROGRAM

## 2020-01-15 PROCEDURE — 25000128 H RX IP 250 OP 636: Performed by: ANESTHESIOLOGY

## 2020-01-15 PROCEDURE — 25000125 ZZHC RX 250: Performed by: NURSE ANESTHETIST, CERTIFIED REGISTERED

## 2020-01-15 PROCEDURE — C1888 ENDOVAS NON-CARDIAC ABL CATH: HCPCS | Performed by: SURGERY

## 2020-01-15 PROCEDURE — 25000132 ZZH RX MED GY IP 250 OP 250 PS 637: Performed by: NURSE ANESTHETIST, CERTIFIED REGISTERED

## 2020-01-15 PROCEDURE — 25000125 ZZHC RX 250: Performed by: STUDENT IN AN ORGANIZED HEALTH CARE EDUCATION/TRAINING PROGRAM

## 2020-01-15 PROCEDURE — 88313 SPECIAL STAINS GROUP 2: CPT | Performed by: SURGERY

## 2020-01-15 PROCEDURE — 86901 BLOOD TYPING SEROLOGIC RH(D): CPT | Performed by: ANESTHESIOLOGY

## 2020-01-15 PROCEDURE — 25800030 ZZH RX IP 258 OP 636: Performed by: STUDENT IN AN ORGANIZED HEALTH CARE EDUCATION/TRAINING PROGRAM

## 2020-01-15 PROCEDURE — 25000128 H RX IP 250 OP 636: Performed by: NURSE ANESTHETIST, CERTIFIED REGISTERED

## 2020-01-15 PROCEDURE — 86905 BLOOD TYPING RBC ANTIGENS: CPT | Performed by: ANESTHESIOLOGY

## 2020-01-15 PROCEDURE — 25000128 H RX IP 250 OP 636: Performed by: STUDENT IN AN ORGANIZED HEALTH CARE EDUCATION/TRAINING PROGRAM

## 2020-01-15 PROCEDURE — 71000027 ZZH RECOVERY PHASE 2 EACH 15 MINS: Performed by: SURGERY

## 2020-01-15 PROCEDURE — 82962 GLUCOSE BLOOD TEST: CPT

## 2020-01-15 PROCEDURE — 37000008 ZZH ANESTHESIA TECHNICAL FEE, 1ST 30 MIN: Performed by: SURGERY

## 2020-01-15 PROCEDURE — 36000068 ZZH SURGERY LEVEL 5 1ST 30 MIN - UMMC: Performed by: SURGERY

## 2020-01-15 PROCEDURE — 40000171 ZZH STATISTIC PRE-PROCEDURE ASSESSMENT III: Performed by: SURGERY

## 2020-01-15 PROCEDURE — 76940 US GUIDE TISSUE ABLATION: CPT

## 2020-01-15 PROCEDURE — 27210794 ZZH OR GENERAL SUPPLY STERILE: Performed by: SURGERY

## 2020-01-15 PROCEDURE — 37000009 ZZH ANESTHESIA TECHNICAL FEE, EACH ADDTL 15 MIN: Performed by: SURGERY

## 2020-01-15 PROCEDURE — 71000015 ZZH RECOVERY PHASE 1 LEVEL 2 EA ADDTL HR: Performed by: SURGERY

## 2020-01-15 PROCEDURE — 76499 UNLISTED DX RADIOGRAPHIC PX: CPT

## 2020-01-15 PROCEDURE — 25000125 ZZHC RX 250: Performed by: SURGERY

## 2020-01-15 PROCEDURE — 86900 BLOOD TYPING SEROLOGIC ABO: CPT | Performed by: ANESTHESIOLOGY

## 2020-01-15 PROCEDURE — 25000565 ZZH ISOFLURANE, EA 15 MIN: Performed by: SURGERY

## 2020-01-15 RX ORDER — ONDANSETRON 4 MG/1
4 TABLET, ORALLY DISINTEGRATING ORAL EVERY 30 MIN PRN
Status: DISCONTINUED | OUTPATIENT
Start: 2020-01-15 | End: 2020-01-15 | Stop reason: HOSPADM

## 2020-01-15 RX ORDER — FENTANYL CITRATE 50 UG/ML
25-50 INJECTION, SOLUTION INTRAMUSCULAR; INTRAVENOUS
Status: DISCONTINUED | OUTPATIENT
Start: 2020-01-15 | End: 2020-01-15 | Stop reason: HOSPADM

## 2020-01-15 RX ORDER — DEXAMETHASONE SODIUM PHOSPHATE 4 MG/ML
INJECTION, SOLUTION INTRA-ARTICULAR; INTRALESIONAL; INTRAMUSCULAR; INTRAVENOUS; SOFT TISSUE PRN
Status: DISCONTINUED | OUTPATIENT
Start: 2020-01-15 | End: 2020-01-15

## 2020-01-15 RX ORDER — LABETALOL 20 MG/4 ML (5 MG/ML) INTRAVENOUS SYRINGE
10
Status: DISCONTINUED | OUTPATIENT
Start: 2020-01-15 | End: 2020-01-15 | Stop reason: HOSPADM

## 2020-01-15 RX ORDER — EPHEDRINE SULFATE 50 MG/ML
INJECTION, SOLUTION INTRAMUSCULAR; INTRAVENOUS; SUBCUTANEOUS PRN
Status: DISCONTINUED | OUTPATIENT
Start: 2020-01-15 | End: 2020-01-15

## 2020-01-15 RX ORDER — CLINDAMYCIN PHOSPHATE 900 MG/50ML
900 INJECTION, SOLUTION INTRAVENOUS SEE ADMIN INSTRUCTIONS
Status: DISCONTINUED | OUTPATIENT
Start: 2020-01-15 | End: 2020-01-15 | Stop reason: HOSPADM

## 2020-01-15 RX ORDER — ROSUVASTATIN CALCIUM 10 MG/1
10 TABLET, COATED ORAL DAILY
COMMUNITY
End: 2021-03-01

## 2020-01-15 RX ORDER — CEFAZOLIN SODIUM 1 G/3ML
1 INJECTION, POWDER, FOR SOLUTION INTRAMUSCULAR; INTRAVENOUS SEE ADMIN INSTRUCTIONS
Status: CANCELLED | OUTPATIENT
Start: 2020-01-15

## 2020-01-15 RX ORDER — NALOXONE HYDROCHLORIDE 0.4 MG/ML
.1-.4 INJECTION, SOLUTION INTRAMUSCULAR; INTRAVENOUS; SUBCUTANEOUS
Status: DISCONTINUED | OUTPATIENT
Start: 2020-01-15 | End: 2020-01-15 | Stop reason: HOSPADM

## 2020-01-15 RX ORDER — ONDANSETRON 2 MG/ML
4 INJECTION INTRAMUSCULAR; INTRAVENOUS EVERY 30 MIN PRN
Status: DISCONTINUED | OUTPATIENT
Start: 2020-01-15 | End: 2020-01-15 | Stop reason: HOSPADM

## 2020-01-15 RX ORDER — FLUMAZENIL 0.1 MG/ML
0.2 INJECTION, SOLUTION INTRAVENOUS
Status: DISCONTINUED | OUTPATIENT
Start: 2020-01-15 | End: 2020-01-15 | Stop reason: HOSPADM

## 2020-01-15 RX ORDER — ALBUTEROL SULFATE 90 UG/1
AEROSOL, METERED RESPIRATORY (INHALATION) PRN
Status: DISCONTINUED | OUTPATIENT
Start: 2020-01-15 | End: 2020-01-15

## 2020-01-15 RX ORDER — CEFAZOLIN SODIUM 2 G/100ML
2 INJECTION, SOLUTION INTRAVENOUS
Status: CANCELLED | OUTPATIENT
Start: 2020-01-15

## 2020-01-15 RX ORDER — LIDOCAINE 40 MG/G
CREAM TOPICAL
Status: DISCONTINUED | OUTPATIENT
Start: 2020-01-15 | End: 2020-01-15 | Stop reason: HOSPADM

## 2020-01-15 RX ORDER — CLINDAMYCIN PHOSPHATE 900 MG/50ML
900 INJECTION, SOLUTION INTRAVENOUS
Status: COMPLETED | OUTPATIENT
Start: 2020-01-15 | End: 2020-01-15

## 2020-01-15 RX ORDER — MEPERIDINE HYDROCHLORIDE 25 MG/ML
12.5 INJECTION INTRAMUSCULAR; INTRAVENOUS; SUBCUTANEOUS
Status: DISCONTINUED | OUTPATIENT
Start: 2020-01-15 | End: 2020-01-15 | Stop reason: HOSPADM

## 2020-01-15 RX ORDER — OXYCODONE HYDROCHLORIDE 5 MG/1
5-10 TABLET ORAL EVERY 4 HOURS PRN
Qty: 10 TABLET | Refills: 0 | Status: SHIPPED | OUTPATIENT
Start: 2020-01-15 | End: 2020-02-10

## 2020-01-15 RX ORDER — BUPIVACAINE HYDROCHLORIDE 2.5 MG/ML
INJECTION, SOLUTION EPIDURAL; INFILTRATION; INTRACAUDAL PRN
Status: DISCONTINUED | OUTPATIENT
Start: 2020-01-15 | End: 2020-01-15

## 2020-01-15 RX ORDER — LIDOCAINE HYDROCHLORIDE 20 MG/ML
INJECTION, SOLUTION INFILTRATION; PERINEURAL PRN
Status: DISCONTINUED | OUTPATIENT
Start: 2020-01-15 | End: 2020-01-15

## 2020-01-15 RX ORDER — SODIUM CHLORIDE, SODIUM LACTATE, POTASSIUM CHLORIDE, CALCIUM CHLORIDE 600; 310; 30; 20 MG/100ML; MG/100ML; MG/100ML; MG/100ML
INJECTION, SOLUTION INTRAVENOUS CONTINUOUS PRN
Status: DISCONTINUED | OUTPATIENT
Start: 2020-01-15 | End: 2020-01-15

## 2020-01-15 RX ORDER — OXYCODONE HYDROCHLORIDE 5 MG/1
5-10 TABLET ORAL
Status: COMPLETED | OUTPATIENT
Start: 2020-01-15 | End: 2020-01-15

## 2020-01-15 RX ORDER — SODIUM CHLORIDE, SODIUM LACTATE, POTASSIUM CHLORIDE, CALCIUM CHLORIDE 600; 310; 30; 20 MG/100ML; MG/100ML; MG/100ML; MG/100ML
INJECTION, SOLUTION INTRAVENOUS CONTINUOUS
Status: DISCONTINUED | OUTPATIENT
Start: 2020-01-15 | End: 2020-01-15 | Stop reason: HOSPADM

## 2020-01-15 RX ORDER — ONDANSETRON 2 MG/ML
INJECTION INTRAMUSCULAR; INTRAVENOUS PRN
Status: DISCONTINUED | OUTPATIENT
Start: 2020-01-15 | End: 2020-01-15

## 2020-01-15 RX ORDER — PROPOFOL 10 MG/ML
INJECTION, EMULSION INTRAVENOUS PRN
Status: DISCONTINUED | OUTPATIENT
Start: 2020-01-15 | End: 2020-01-15

## 2020-01-15 RX ORDER — ALBUTEROL SULFATE 0.83 MG/ML
2.5 SOLUTION RESPIRATORY (INHALATION) EVERY 4 HOURS PRN
Status: DISCONTINUED | OUTPATIENT
Start: 2020-01-15 | End: 2020-01-15 | Stop reason: HOSPADM

## 2020-01-15 RX ORDER — ESMOLOL HYDROCHLORIDE 10 MG/ML
INJECTION INTRAVENOUS PRN
Status: DISCONTINUED | OUTPATIENT
Start: 2020-01-15 | End: 2020-01-15

## 2020-01-15 RX ORDER — HYDRALAZINE HYDROCHLORIDE 20 MG/ML
2.5-5 INJECTION INTRAMUSCULAR; INTRAVENOUS EVERY 10 MIN PRN
Status: DISCONTINUED | OUTPATIENT
Start: 2020-01-15 | End: 2020-01-15 | Stop reason: HOSPADM

## 2020-01-15 RX ORDER — ACETAMINOPHEN 325 MG/1
650 TABLET ORAL
Status: DISCONTINUED | OUTPATIENT
Start: 2020-01-15 | End: 2020-01-15 | Stop reason: HOSPADM

## 2020-01-15 RX ORDER — HYDROMORPHONE HYDROCHLORIDE 1 MG/ML
.3-.5 INJECTION, SOLUTION INTRAMUSCULAR; INTRAVENOUS; SUBCUTANEOUS EVERY 10 MIN PRN
Status: DISCONTINUED | OUTPATIENT
Start: 2020-01-15 | End: 2020-01-15 | Stop reason: HOSPADM

## 2020-01-15 RX ADMIN — DEXMEDETOMIDINE HYDROCHLORIDE 40 MCG: 100 INJECTION, SOLUTION INTRAVENOUS at 09:22

## 2020-01-15 RX ADMIN — PHENYLEPHRINE HYDROCHLORIDE 100 MCG: 10 INJECTION INTRAVENOUS at 10:08

## 2020-01-15 RX ADMIN — OXYCODONE HYDROCHLORIDE 5 MG: 5 TABLET ORAL at 14:02

## 2020-01-15 RX ADMIN — FENTANYL CITRATE 50 MCG: 50 INJECTION, SOLUTION INTRAMUSCULAR; INTRAVENOUS at 10:30

## 2020-01-15 RX ADMIN — ALBUTEROL SULFATE 2 PUFF: 90 AEROSOL, METERED RESPIRATORY (INHALATION) at 09:48

## 2020-01-15 RX ADMIN — SUGAMMADEX 200 MG: 100 INJECTION, SOLUTION INTRAVENOUS at 10:58

## 2020-01-15 RX ADMIN — SODIUM CHLORIDE, POTASSIUM CHLORIDE, SODIUM LACTATE AND CALCIUM CHLORIDE: 600; 310; 30; 20 INJECTION, SOLUTION INTRAVENOUS at 09:44

## 2020-01-15 RX ADMIN — BUPIVACAINE HYDROCHLORIDE 60 ML: 2.5 INJECTION, SOLUTION EPIDURAL; INFILTRATION; INTRACAUDAL; PERINEURAL at 09:22

## 2020-01-15 RX ADMIN — LIDOCAINE HYDROCHLORIDE 80 MG: 20 INJECTION, SOLUTION INFILTRATION; PERINEURAL at 09:57

## 2020-01-15 RX ADMIN — FENTANYL CITRATE 50 MCG: 50 INJECTION, SOLUTION INTRAMUSCULAR; INTRAVENOUS at 11:56

## 2020-01-15 RX ADMIN — Medication 5 MG: at 10:17

## 2020-01-15 RX ADMIN — FENTANYL CITRATE 100 MCG: 50 INJECTION, SOLUTION INTRAMUSCULAR; INTRAVENOUS at 09:56

## 2020-01-15 RX ADMIN — PROPOFOL 140 MG: 10 INJECTION, EMULSION INTRAVENOUS at 09:57

## 2020-01-15 RX ADMIN — ESMOLOL HYDROCHLORIDE 10 MG: 10 INJECTION, SOLUTION INTRAVENOUS at 10:33

## 2020-01-15 RX ADMIN — MIDAZOLAM 1 MG: 1 INJECTION INTRAMUSCULAR; INTRAVENOUS at 09:48

## 2020-01-15 RX ADMIN — PHENYLEPHRINE HYDROCHLORIDE 100 MCG: 10 INJECTION INTRAVENOUS at 10:12

## 2020-01-15 RX ADMIN — HYDROMORPHONE HYDROCHLORIDE 0.5 MG: 1 INJECTION, SOLUTION INTRAMUSCULAR; INTRAVENOUS; SUBCUTANEOUS at 12:20

## 2020-01-15 RX ADMIN — ONDANSETRON 4 MG: 2 INJECTION INTRAMUSCULAR; INTRAVENOUS at 10:51

## 2020-01-15 RX ADMIN — Medication 5 MG: at 09:58

## 2020-01-15 RX ADMIN — PROPOFOL 60 MG: 10 INJECTION, EMULSION INTRAVENOUS at 11:00

## 2020-01-15 RX ADMIN — HYDROMORPHONE HYDROCHLORIDE 0.5 MG: 1 INJECTION, SOLUTION INTRAMUSCULAR; INTRAVENOUS; SUBCUTANEOUS at 12:02

## 2020-01-15 RX ADMIN — PHENYLEPHRINE HYDROCHLORIDE 100 MCG: 10 INJECTION INTRAVENOUS at 09:59

## 2020-01-15 RX ADMIN — ENOXAPARIN SODIUM 40 MG: 40 INJECTION SUBCUTANEOUS at 09:36

## 2020-01-15 RX ADMIN — FENTANYL CITRATE 50 MCG: 50 INJECTION, SOLUTION INTRAMUSCULAR; INTRAVENOUS at 09:23

## 2020-01-15 RX ADMIN — FENTANYL CITRATE 50 MCG: 50 INJECTION, SOLUTION INTRAMUSCULAR; INTRAVENOUS at 11:51

## 2020-01-15 RX ADMIN — DEXAMETHASONE SODIUM PHOSPHATE 2 MG: 4 INJECTION, SOLUTION INTRA-ARTICULAR; INTRALESIONAL; INTRAMUSCULAR; INTRAVENOUS; SOFT TISSUE at 09:24

## 2020-01-15 RX ADMIN — HYDROMORPHONE HYDROCHLORIDE 0.5 MG: 1 INJECTION, SOLUTION INTRAMUSCULAR; INTRAVENOUS; SUBCUTANEOUS at 12:35

## 2020-01-15 RX ADMIN — OXYCODONE HYDROCHLORIDE 5 MG: 5 TABLET ORAL at 13:12

## 2020-01-15 RX ADMIN — FENTANYL CITRATE 50 MCG: 50 INJECTION, SOLUTION INTRAMUSCULAR; INTRAVENOUS at 10:23

## 2020-01-15 RX ADMIN — FENTANYL CITRATE 50 MCG: 50 INJECTION, SOLUTION INTRAMUSCULAR; INTRAVENOUS at 12:47

## 2020-01-15 RX ADMIN — ROCURONIUM BROMIDE 50 MG: 10 INJECTION INTRAVENOUS at 09:58

## 2020-01-15 RX ADMIN — ACETAMINOPHEN 650 MG: 325 TABLET, FILM COATED ORAL at 13:28

## 2020-01-15 RX ADMIN — MIDAZOLAM 1 MG: 1 INJECTION INTRAMUSCULAR; INTRAVENOUS at 09:23

## 2020-01-15 RX ADMIN — CLINDAMYCIN PHOSPHATE 900 MG: 900 INJECTION, SOLUTION INTRAVENOUS at 10:08

## 2020-01-15 ASSESSMENT — MIFFLIN-ST. JEOR: SCORE: 1378.25

## 2020-01-15 ASSESSMENT — PAIN DESCRIPTION - DESCRIPTORS: DESCRIPTORS: SORE

## 2020-01-15 NOTE — OR NURSING
PABLO Andrade at bedside in PACU. Will place sign out note when able. Patient can continue to phase II recovery.

## 2020-01-15 NOTE — BRIEF OP NOTE
[facility], [place]    Brief Operative Note    Pre-operative diagnosis: Hemangiopericytoma of CNS, grade II [D48.1]  Metastatic cancer to liver (H) [C78.7]  Post-operative diagnosis Same as pre-operative diagnosis    Procedure: Procedure(s):  Laparoscopic ultrasound guided percutaneous microwave abalation of tumor x1; ultrasound guided Liver Biopsy x 4; Hepatic Ultrasound Intraoperatively  Laparoscopy diagnostic (general)  Surgeon: Surgeon(s) and Role:     * Phan Fierro MD - Primary     * Isaias Coelho MD - Resident - Assisting  Anesthesia: Combined General with Block   Estimated blood loss: 5 mL  Drains: None  Specimens:   ID Type Source Tests Collected by Time Destination   A : Liver mass Segment 7/8 Biopsy Liver SURGICAL PATHOLOGY EXAM Phan Fierro MD 1/15/2020 10:41 AM      Findings:   Laparoscopic biopsy and microwave ablation of segment 7/8 liver lesion.  Complications: None.  Implants: None    Isaias Coelho, PGY-3  General Surgery

## 2020-01-15 NOTE — ANESTHESIA POSTPROCEDURE EVALUATION
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       Anesthesia Type:  Not documented  General    Reportable Event: NO     PAIN: Uncomplicated   Sign Out status: Comfortable, Well controlled pain     PONV: No PONV   Sign Out status:  No Nausea or Vomiting     Neuro/Psych: Uneventful perioperative course   Sign Out Status: Preoperative baseline; Age appropriate mentation     Airway/Resp.: Uneventful perioperative course   Sign Out Status: Non labored breathing, age appropriate RR; Resp. Status within EXPECTED Parameters     CV: Uneventful perioperative course   Sign Out status: Appropriate BP and perfusion indices; Appropriate HR/Rhythm     Disposition:   Sign Out in:  PACU  Disposition:  Phase II; Home  Recovery Course: Uneventful  Follow-Up: Not required     Comments/Narrative:  No noted anesthetic complications.  Patient satisfied with anesthetic.             Last Anesthesia Record Vitals:  CRNA VITALS  1/15/2020 1039 - 1/15/2020 1139      1/15/2020             Resp Rate (set):  10          Last PACU Vitals:  Vitals Value Taken Time   /62 1/15/2020  1:50 PM   Temp 36.7  C (98  F) 1/15/2020  1:15 PM   Pulse 76 1/15/2020  1:50 PM   Resp 18 1/15/2020  1:30 PM   SpO2 93 % 1/15/2020  1:52 PM   Temp src     NIBP     Pulse     SpO2     Resp     Temp     Ht Rate     Temp 2     Vitals shown include unvalidated device data.      Electronically Signed By: Hermann Andrade MD, January 15, 2020, 3:02 PM

## 2020-01-15 NOTE — DISCHARGE INSTRUCTIONS
REMEMBER: SAME DAY SURGERY IS NOT SAME DAY RECOVERY. TAKE IT EASY, GET PLENTY OF REST, AND HAVE SOMEONE WITH YOU FOR 24 HOURS. FOLLOW THESE INSTRUCTIONS AND PLEASE DO NOT HESITATE TO CALL WITH ANY QUESTIONS.   [facility], [place]  Same-Day Surgery   Adult Discharge Orders & Instructions     For 24 hours after surgery    1. Get plenty of rest.  A responsible adult must stay with you for at least 24 hours after you leave the hospital.   2. Do not drive or use heavy equipment.  If you have weakness or tingling, don't drive or use heavy equipment until this feeling goes away.  3. Do not drink alcohol.  4. Avoid strenuous or risky activities.  Ask for help when climbing stairs.   5. You may feel lightheaded.  IF so, sit for a few minutes before standing.  Have someone help you get up.   6. If you have nausea (feel sick to your stomach): Drink only clear liquids such as apple juice, ginger ale, broth or 7-Up.  Rest may also help.  Be sure to drink enough fluids.  Move to a regular diet as you feel able.  7. You may have a slight fever. Call the doctor if your fever is over 100 F (37.7 C) (taken under the tongue) or lasts longer than 24 hours.  8. You may have a dry mouth, a sore throat, muscle aches or trouble sleeping.  These should go away after 24 hours.  9. Do not make important or legal decisions.   Call your doctor for any of the followin.  Signs of infection (fever, growing tenderness at the surgery site, a large amount of drainage or bleeding, severe pain, foul-smelling drainage, redness, swelling).    2. It has been over 8 to 10 hours since surgery and you are still not able to urinate (pass water).    To contact a doctor, call Dr. Fierro's office at [phone] (clinic) or:      [phone] and ask for the resident on call for Surgery/Oncology (answered 24 hours a day)      Emergency Department: [facility]: [phone]

## 2020-01-15 NOTE — ANESTHESIA CARE TRANSFER NOTE
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    Anesthesia Type:   General     Note:  Airway :Face Mask  Patient transferred to:PACU  Comments: Sleepy, awakens easily to verbal stimuli. Stable, report to RN. Handoff Report: Identifed the Patient, Identified the Reponsible Provider, Reviewed the pertinent medical history, Discussed the surgical course, Reviewed Intra-OP anesthesia mangement and issues during anesthesia, Set expectations for post-procedure period and Allowed opportunity for questions and acknowledgement of understanding      Vitals: (Last set prior to Anesthesia Care Transfer)    CRNA VITALS  1/15/2020 1039 - 1/15/2020 1115      1/15/2020             Resp Rate (set):  10                Electronically Signed By: CRISTA Friedman CRNA  January 15, 2020  11:15 AM

## 2020-01-15 NOTE — OP NOTE
Procedure Date: 01/15/2020      SURGEON:  Phan Fierro MD      FIRST ASSISTANT:  Isaias Coelho MD      PREOPERATIVE DIAGNOSIS:  Metastatic hemangiopericytoma to the liver.      POSTOPERATIVE DIAGNOSIS:  Metastatic hemangiopericytoma to the liver.      PROCEDURES:   1.  Diagnostic laparoscopy.   2.  Intraoperative hepatic ultrasound.   3.  Ultrasound-guided liver biopsy x 4.   4.  Ultrasound-guided percutaneous microwave ablation of liver tumor x 1.      ANESTHESIA:  General.      ESTIMATED BLOOD LOSS:  Less than 10 mL.      SPECIMENS:  Liver mass segment 7/8.      COMPLICATIONS:  None.      DESCRIPTION OF PROCEDURE:  Ms. Ascencio was brought to the operating room, placed supine on the operating table, put to sleep by Anesthesia, prepped and draped sterilely and pause for the cause was performed and was accurate.  We entered the abdomen through a supraumbilical incision using the Veress technique without difficulty and placed a 5 mm trocar in that position.  We inspected the serosal surfaces of the abdomen and did not find any evidence of peritoneal metastases.  We then placed a 12 mm port in the patient's right lateral position and an additional 5 mm port in the left mid abdomen.  We performed an intraoperative ultrasound of the liver which revealed findings of hyperechogenicity consistent with fatty liver disease.  We were able to fairly easily identify the lesion of concern seen on preoperative MRI in segment 7/8.  This mass was suspicious for hemangiopericytoma metastases but had not been previously biopsied and, therefore, we did perform an ultrasound-guided core biopsy of the lesion with 4 separate biopsies being taken.  These were sent for permanent pathologic evaluation.  Following the biopsies, we used a 25 cm LK probe and used ultrasound guidance to percutaneously access this lesion and we performed a 95 watt 5-minute burn which had excellent micro bubbling around the tumor which measured less than 1 cm  in size.  The ablation needle was removed.  Bovie electrocautery was used to coagulate a small area of bleeding on the surface of the liver after which hemostasis was excellent.  The 12 mm port site fascia was closed using a #1 Vicryl suture.  The abdomen was then desufflated and the 5 mm ports were removed.  The skin incisions were irrigated and the skin was closed using 4-0 Monocryl followed by Dermabond.  The patient tolerated the procedure well, was extubated in the operating room and transferred to recovery room in stable condition.  I was present throughout the entire procedure as described.         SEBASTIAN RIVAS MD             D: 01/15/2020   T: 01/15/2020   MT: EULALIO      Name:     BENTON ZALDIVAR   MRN:      [id number]        Account:        [id number]   :      1960           Procedure Date: 01/15/2020      Document: [id number]

## 2020-01-15 NOTE — ANESTHESIA PROCEDURE NOTES
Peripheral Nerve Block Procedure Note  Date/Time: 1/15/2020 9:15 AM    Staff:     Anesthesiologist:  Chase Maldonado MD    Resident/CRNA:  Celestino Calderon MD    Block performed by resident/CRNA in the presence of a teaching physician    Location: Pre-op  Procedure Start/Stop TImes:      1/15/2020 9:00 AM     1/15/2020 9:32 AM    Correct Patient: Yes      Correct Position: Yes      Correct Site: Yes      Correct Procedure: Yes      Correct Laterality:  Yes    Site Marked:  Yes  Procedure details:     Procedure:  TAP    ASA:  3    Laterality:  Bilateral    Position:  Supine    Sterile Prep: chloraprep, mask and sterile gloves      Needle:  Short bevel    Needle gauge:  21    Needle length (mm):  110    Catheter threaded easily: Yes      Ultrasound: Yes      Ultrasound used to identify targeted nerve, plexus, or vascular structure and placed a needle adjacent to it      Permanent Image entered into patiient's record      Abnormal pain on injection: No      Blood Aspirated: No      Paresthesias:  No    Bleeding at site: No      Bolus via:  Needle    Infusion Method:  Single Shot    Blood aspirated via catheter: No      Complications:  None  Assessment/Narrative:      Bilateral subcostal TAP block

## 2020-01-15 NOTE — ANESTHESIA PREPROCEDURE EVALUATION
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 Pt has had prior anesthetic. Type: Regional and General           ROS/MED HX    ENT/Pulmonary:     (+)Intermittent asthma Treatment: Inhaler prn,  , . .    Neurologic:       Cardiovascular:     (+) Dyslipidemia, ----. : . . . :. .       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 Rate: Normal     Resp. Effort: Normal      CV: Rhythm: Regular  Rate: Age appropriate  Heart: Normal Sounds  Edema: None   Comments:      Dental: Normal Dentition                Labs from [facility]  reviewed, in H&P    LABS:  CBC: No results found for: WBC, HGB, HCT, PLT  BMP:   Lab Results   Component Value Date    POTASSIUM 4.3  "06/30/2015    CR 0.80 12/11/2019    CR 0.73 06/30/2015     (H) 01/19/2016     (A) 06/30/2015     COAGS: No results found for: PTT, INR, FIBR  POC:   Lab Results   Component Value Date     (H) 01/15/2020     OTHER:   Lab Results   Component Value Date    A1C 5.9 02/03/2016    ALBUMIN 4.4 02/03/2016    PROTTOTAL 7.8 02/03/2016    ALT 32 02/03/2016    AST 23 02/03/2016    ALKPHOS 86 02/03/2016    BILITOTAL 0.4 02/03/2016    TSH 1.54 06/30/2015        Preop Vitals    BP Readings from Last 3 Encounters:   01/15/20 139/88   01/10/20 120/76   01/10/20 117/75    Pulse Readings from Last 3 Encounters:   01/15/20 67   01/10/20 79   01/06/20 71      Resp Readings from Last 3 Encounters:   01/15/20 16   01/10/20 16    SpO2 Readings from Last 3 Encounters:   01/15/20 95%   01/10/20 96%   01/06/20 96%      Temp Readings from Last 1 Encounters:   01/15/20 36.8  C (98.3  F) (Oral)    Ht Readings from Last 1 Encounters:   01/15/20 1.575 m (5' 2\")      Wt Readings from Last 1 Encounters:   01/15/20 85.5 kg (188 lb 7.9 oz)    Estimated body mass index is 34.48 kg/m  as calculated from the following:    Height as of this encounter: 1.575 m (5' 2\").    Weight as of this encounter: 85.5 kg (188 lb 7.9 oz).     LDA:        Assessment:   ASA SCORE: 3    H&P: History and physical reviewed and following examination; no interval change.    NPO Status: NPO Appropriate     Plan:   Anes. Type:  General   Pre-Medication: None   Induction:  IV (Standard)   Airway: ETT; Oral   Access/Monitoring: PIV; 2nd PIV   Maintenance: Balanced     Postop Plan:   Postop Pain: Opioids  Postop Sedation/Airway: Not planned  Disposition: Outpatient     PONV Management:   Adult Risk Factors: Female, Postop Opioids   Prevention: Ondansetron, Dexamethasone     CONSENT: Direct conversation   Plan and risks discussed with: Patient   Blood Products: Consented (ALL Blood Products)       Comments for " Plan/Consent:  ______________________________________________________________________  I discussed the risks and benefits of general anesthesia with the patient.  Questions were sought and answered.      Hermann Andrade MD  Attending Anesthesiologist                   Hermann Andrade MD

## 2020-01-15 NOTE — OR NURSING
TAP procedure consent signed; patient placed on cardiac monitor, NIPB, and oximetry probe.  All questions addressed.

## 2020-01-17 ENCOUNTER — CARE COORDINATION (OUTPATIENT)
Dept: GASTROENTEROLOGY | Facility: CLINIC | Age: 60
End: 2020-01-17

## 2020-01-17 ENCOUNTER — TELEPHONE (OUTPATIENT)
Dept: SURGERY | Facility: CLINIC | Age: 60
End: 2020-01-17

## 2020-01-17 DIAGNOSIS — G89.18 ACUTE POST-OPERATIVE PAIN: Primary | ICD-10-CM

## 2020-01-17 LAB — COPATH REPORT: NORMAL

## 2020-01-17 RX ORDER — OXYCODONE HYDROCHLORIDE 5 MG/1
5-10 TABLET ORAL EVERY 4 HOURS PRN
Qty: 20 TABLET | Refills: 0 | Status: SHIPPED | OUTPATIENT
Start: 2020-01-17 | End: 2020-02-10

## 2020-01-17 NOTE — PROGRESS NOTES
"RN Care Coordination Post Operative Note  Surgical Oncology    Called patient in f/u to recent     Support: Patient able to care for self independently    Health Status:    Fevers or Chills:  no    Nausea no  Vomiting no  Diet: regular    Pain: 5 on a scale of one to ten  Description: incisional  Medication: Oxycodone one and Ibuprofen 800mg every 6 hours  Relief:\"some\"  I took the last pain pill now    Incision: clean dry and intact without edema    Bowels: formed brown stool    Activity/Restrictions:  \"Will take a shower this morning.\"    Discussion:  Talked with Dr. Fierro.  Will have chief resident refill prescription for 20 Oxycodone.      Post op visit has been scheduled and patient is aware.  Patient has our contact information and I have asked that they call with any further questions or concerns.    Plan:  [place] [facility]     Geetha GARCIAN, HNBC, STAR-T  RN Care Coordinator  Surgical Oncology  Ph: [phone]  FAX: [phone]        "

## 2020-02-06 ASSESSMENT — ENCOUNTER SYMPTOMS
NERVOUS/ANXIOUS: 1
NAIL CHANGES: 0
DEPRESSION: 1
TINGLING: 0
SPEECH CHANGE: 0
POOR WOUND HEALING: 0
SKIN CHANGES: 0
DOUBLE VISION: 0
TREMORS: 0
PANIC: 1
SEIZURES: 0
WEAKNESS: 0
DIZZINESS: 0
NUMBNESS: 1
DISTURBANCES IN COORDINATION: 0
INSOMNIA: 1
HEADACHES: 1
EYE PAIN: 0
EYE REDNESS: 0
PARALYSIS: 0
DECREASED CONCENTRATION: 1
MEMORY LOSS: 0
EYE WATERING: 1
EYE IRRITATION: 0
LOSS OF CONSCIOUSNESS: 0

## 2020-02-10 ENCOUNTER — HOSPITAL ENCOUNTER (OUTPATIENT)
Dept: MRI IMAGING | Facility: CLINIC | Age: 60
Discharge: HOME | End: 2020-02-10
Attending: SURGERY | Admitting: SURGERY
Payer: COMMERCIAL

## 2020-02-10 ENCOUNTER — OFFICE VISIT (OUTPATIENT)
Dept: SURGERY | Facility: CLINIC | Age: 60
End: 2020-02-10
Attending: SURGERY
Payer: COMMERCIAL

## 2020-02-10 VITALS
OXYGEN SATURATION: 100 % | RESPIRATION RATE: 16 BRPM | SYSTOLIC BLOOD PRESSURE: 120 MMHG | HEART RATE: 88 BPM | WEIGHT: 193 LBS | BODY MASS INDEX: 35.3 KG/M2 | TEMPERATURE: 98.7 F | DIASTOLIC BLOOD PRESSURE: 80 MMHG

## 2020-02-10 DIAGNOSIS — D43.3 HEMANGIOPERICYTOMA OF CNS, GRADE II (H): ICD-10-CM

## 2020-02-10 DIAGNOSIS — C78.7 METASTATIC CANCER TO LIVER (H): ICD-10-CM

## 2020-02-10 DIAGNOSIS — D43.3 HEMANGIOPERICYTOMA OF CNS, GRADE II (H): Primary | ICD-10-CM

## 2020-02-10 PROCEDURE — 74183 MRI ABD W/O CNTR FLWD CNTR: CPT

## 2020-02-10 PROCEDURE — G0463 HOSPITAL OUTPT CLINIC VISIT: HCPCS | Mod: 25

## 2020-02-10 PROCEDURE — A9585 GADOBUTROL INJECTION: HCPCS | Performed by: SURGERY

## 2020-02-10 PROCEDURE — 25500064 ZZH RX 255 OP 636: Performed by: SURGERY

## 2020-02-10 RX ORDER — GADOBUTROL 604.72 MG/ML
10 INJECTION INTRAVENOUS ONCE
Status: COMPLETED | OUTPATIENT
Start: 2020-02-10 | End: 2020-02-10

## 2020-02-10 RX ADMIN — GADOBUTROL 8.5 ML: 604.72 INJECTION INTRAVENOUS at 12:24

## 2020-02-10 ASSESSMENT — PAIN SCALES - GENERAL: PAINLEVEL: NO PAIN (0)

## 2020-02-10 NOTE — LETTER
2/10/2020     RE: Leidy Ascencio  [address] [place] [address]     Dear Colleague,    Thank you for referring your patient, Leidy Ascencio, to the [facility]. Please see a copy of my visit note below.    Looks great  Incisions healed  Path was benign hemangioma - reviewed with patient    Follow-up with medical Oncology  No further Follow-up with me unless problem or concern in future    Again, thank you for allowing me to participate in the care of your patient.      Sincerely,    Phan Fierro MD

## 2020-02-10 NOTE — PROGRESS NOTES
Looks great  Incisions healed  Path was benign hemangioma - reviewed with patient    Follow-up with medical Oncology  No further Follow-up with me unless problem or concern in future

## 2020-02-13 DIAGNOSIS — D48.19 HEMANGIOPERICYTOMA: Primary | ICD-10-CM

## 2020-02-23 ENCOUNTER — HEALTH MAINTENANCE LETTER (OUTPATIENT)
Age: 60
End: 2020-02-23

## 2020-02-27 NOTE — PROGRESS NOTES
NEURO-ONCOLOGY VISIT  Feb 28, 2020    CHIEF COMPLAINT: Ms. 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 She completed GammaKnife treatment in 1/2020. She is currently managed on imaging surveillance.     She is presenting in follow-up for continued evaluation and recommendations on management accompanied by Main ().       HISTORY OF PRESENT ILLNESS  -Priyanka does not complain of any new neurological symptoms.   -She is continuing to experience headaches; Occurring daily. Located in th right temporal region, dull and throbbing in nature, severity of pain ranges from 3-7 lasting for 30 minutes to hours/ all day. Taking ibuprofen, but this dose not work. Essential oils work. No squeezing pain. Mild neck pain. + photo- and phonophobia. Pain worsens with activity.   -Endorses continued slight increase in fatigue. Energy has improved over the past month.   -Stable visional field cut.   -Arthritis in the finger tips of both hands.   -Denies any weakness, changes in sensation, or impairment in cognitive ability. Continues to work.   -Denies any episodes concerning for a seizure.  -Off all steroids.  -Mood is good, on Zoloft.     REVIEW OF SYSTEMS  A comprehensive ROS negative except as in HPI.      MEDICATIONS   Current Outpatient Medications   Medication Sig Dispense Refill     ALBUTEROL 108 (90 BASE) MCG/ACT inhaler Inhale 1-2 puffs into the lungs every 4 hours as needed   2     aspirin 81 MG EC tablet Take 81 mg by mouth At Bedtime       fluticasone (FLONASE) 50 MCG/ACT nasal spray Spray 2 sprays into both nostrils daily       loratadine (CLARITIN) 10 MG tablet Take 10 mg by mouth daily       rosuvastatin (CRESTOR) 10 MG tablet Take 10 mg by mouth daily       sertraline (ZOLOFT) 100 MG tablet Take 1 tablet (100 mg) by mouth daily 90 tablet 1     valACYclovir (VALTREX) 1000 mg tablet Take 2 tablets (2,000 mg)  by mouth 2 times daily (Patient taking differently: Take 2,000 mg by mouth 2 times daily as needed ) 12 tablet 6     VITAMIN D, CHOLECALCIFEROL, PO Take 1,000 Units by mouth daily       DRUG ALLERGIES   Allergies   Allergen Reactions     Atorvastatin      Penicillins      Sulfa Drugs      Sulfamethoxazole-Trimethoprim Rash       ONCOLOGIC HISTORY  -2015 PRESENTATION: Headaches.  -2015 MR brain imaging with a right occipital mass   -4/22/2015 SURGERY: Resection by Dr. Lujan   PATHOLOGY: Hemangiopericytoma, grade 2.   -2015 CT chest, abdomen and pelvis negative for malignancy.  -2015 PET MRI negative for malignancy.   Conservative management per Dr. Herman Mayberry.    -8/1/2017 CT chest abdomen and pelvis and bone scan negative for malignancy.   -12/7/2017 MRB with no evidence of tumor recurrence.  -12/6/2018 MRB with no evidence of tumor recurrence.    -12/11/2019 MRB with a new nodule of enhancement within the superior sagittal sinus, concerning for tumor recurrence. Stable appearance of the right parieto-occipital craniotomy site.  -12/13/2019 Bone scan with no convincing evidence of skeletal metastases.  -12/18/2019 MR liver with a 9 mm lesion in the lateral aspect of the junction of segments 7 and 8 of the liver shows homogeneous delayed enhancement and is new since 2015. This could represent a metastatic lesion.     -1/6/2020 NEURO-ONC: Referral to radiation oncology and GI oncology surgery. Signed consent for next generation sequencing.   -1/14/2020 RADS: GammaKnife to lesion (1 fraction).   -1/15/2020 LIVER LESION:  Laparoscopic ultrasound-guided microwave ablation of liver tumor by Dr. Phan Fierro.   PATHOLOGY: Cavernous hemangioma of the liver, benign:    -No metastatic hemangiopericytoma or other malignancy identified    -Underlying steatohepatitis, no significant fibrosis   -2/28/2020 NEURO-ONC/ MRB: Clinically stable. Headaches with migraine features; increasing Zoloft. Imaging with positive response to  "radiation.     SOCIAL HISTORY   Tobacco use: Former smoker; quit in 2003. 27 pack years.  Alcohol use: Social use.   Drug use: Denies marijuana use.  . 2 daughter, 1 son.   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        PHYSICAL EXAMINATION  /82   Pulse 74   Temp 97.7  F (36.5  C) (Oral)   Resp 14   Ht 1.575 m (5' 2\")   Wt 88.9 kg (196 lb)   SpO2 96%   BMI 35.85 kg/m    Wt Readings from Last 2 Encounters:   02/28/20 88.9 kg (196 lb)   02/10/20 87.5 kg (193 lb)      Ht Readings from Last 2 Encounters:   02/28/20 1.575 m (5' 2\")   01/15/20 1.575 m (5' 2\")     KPS: 90    -Generally well appearing.  -Respiratory: Normal breath sounds, no audible wheezing.   -Skin: No rashes. Healed head incision.  -Hematologic/ lymphatic: No abnormal bruising. No leg swelling.  -Psychiatric: Normal mood and affect. Pleasant, talkative.  -Neurologic:   MENTAL STATUS:     Alert, oriented to date.    Recall: Intact.    Speech fluent.    Comprehension intact to multi-step commands.   Good right-left orientation.     CRANIAL NERVES:     Pupils are equal, round, reactive to light.     Extraocular movements full, patient denies diplopia.     Left inferior homonymous quadrantanopia.     Facial sensation intact to light touch.   Symmetric facial movements.   Hearing intact.   Palate moves symmetrically.     Tongue midline.  MOTOR:    No pronation or drift. No orbiting.   Able to rise from a chair without use of arms.   On toe/ heel walk, equal distance from floor to heels/ toes.   SENSATION:    Intact to light touch throughout.  COORDINATION:   Intact finger-nose with eyes open and closed.   GAIT:  Walks without assistance.   Good speed. Normal stride length and heel strike. Normal turns. Normal arm swing.   Able to toe, heel walk. Able to tandem walk.       MEDICAL RECORDS  Obtained and personally reviewed all available outside medical records in addition to reviewing any records available in our electronic " system.     LABS  Personally reviewed all available lab results.     IMAGING  Personally reviewed MR brain imaging from today. The contrast enhancing lesion at the sagittal sinus appears stable to slightly decreased (by 2mm) as compared to pre-radiation imaging. No new lesions.     Imaging shown to Priyanka and Main.     Imaging and case reviewed and discussed at Brain Tumor Conference.       IMPRESSION  Clinic was spent discussing in detail the nature of this tumor, providing emotional support, answering questions pertaining to my recommendations, and devising the treatment plan as outlined below.     Grade II hemangiopericytomas are considered malignant tumors. Standard treatment options include surgery and radiation therapy. With no good surgical option, Priyanka completed stereotactic radiation therapy. Imaging from today with no new concerns with positive treatment effect from the radiation. Will repeat imaging in 3 months.     For treatment-refractory tumors, Pazopanib, a broad-spectrum tyrosine kinase inhibitor, has been used with noted efficacy. Of note, Priyanka has been consented for next generation sequencing. At disease recurrence, can consider sending pathology for mutational analysis to potentially identify targeted theray options.     Extracranial metastatic disease can occur; most commonly in bone, lung, and liver. Prior imaging of the MR liver showed a lesion. Priyanka was referred to Dr. Phan Fierro with surgical oncology. Dr. Fierro performed a  laparoscopic ultrasound-guided microwave ablation of liver tumor on 1/15/2020. Pathology was consistent with a benign cavernous hemangioma with no metastatic hemangiopericytoma or other malignancy identified. The plan will be to continue annual systemic surveillance imaging. As these tumors have great variably on PET, will scan with CT C/A/P with contrast.     PROBLEM LIST  Hemangiopericytoma, grade II  Liver lesion  Depressed mood    PLAN  -CANCER-DIRECTED  THERAPY-  -As above; Continue imaging surveillance. Repeat MR brain imaging in 3 months.     -LIVER LESION-  -Treated.   -Will continue with systemic surveillance imaging with a CT c/a/p with contrast annually starting in late-fall 2020.     -STEROIDS-  -Currently off dexamethasone.    -SEIZURE MANAGEMENT-  -While this patient is at increased risk of having seizures, given the lack of seizure history, there is no indication to prescribe an antiepileptic at this time.     -Quality of life/ MOOD/ FATIGUE-  -Continue to monitor mood as untreated/ undertreated depression can worsen fatigue, dysorexia, and quality of life.  -On Zoloft.     -HEADACHES-  -Migrainous features of throbbing pain, high severity, photo- and phonophobia, and worsening pain with activity.   -Increasing Zoloft.   -Encouraged remaining hydrated.   -With > 15 headache days/ month, if pain is not improved, can consider a referral for Botox.     -MEMORY LOSS/ WORD FINDING ISSUES-  -Evaluation by Dr. Phan Wright for neuro-psych testing scheduled for 3/2020.     Return to clinic in late May/ early June 2020 to review repeat imaging.     In the meantime, Priyanka knows to call with questions or concerns or to report new complaints and can be seen sooner if needed.    Lauren Zhang MD  Neuro-oncology

## 2020-02-27 NOTE — PATIENT INSTRUCTIONS
Imaging reviewed; Positive response to radiation.   Repeat imaging in 3 months.    Increase Zoloft to 150mg daily x 1 week. If headaches not improved, can further increase to 200mg daily.   Remain well hydrated to combat migraine headaches.    Call the clinic if headaches are not improved by late March.     Neuropsych testing scheduled for March.     Liver lesion with benign pathology.   Body imaging surveillance to be arranged annually; next due in late fall 2020.     Return to clinic in 6/2020 to review new imaging.     Lauren Zhang MD  Neuro-oncology  2/28/2020

## 2020-02-28 ENCOUNTER — ANCILLARY PROCEDURE (OUTPATIENT)
Dept: MRI IMAGING | Facility: CLINIC | Age: 60
End: 2020-02-28
Attending: PSYCHIATRY & NEUROLOGY
Payer: COMMERCIAL

## 2020-02-28 ENCOUNTER — ONCOLOGY VISIT (OUTPATIENT)
Dept: ONCOLOGY | Facility: CLINIC | Age: 60
End: 2020-02-28
Attending: PSYCHIATRY & NEUROLOGY
Payer: COMMERCIAL

## 2020-02-28 VITALS
OXYGEN SATURATION: 96 % | BODY MASS INDEX: 36.07 KG/M2 | RESPIRATION RATE: 14 BRPM | WEIGHT: 196 LBS | SYSTOLIC BLOOD PRESSURE: 132 MMHG | HEIGHT: 62 IN | TEMPERATURE: 97.7 F | DIASTOLIC BLOOD PRESSURE: 82 MMHG | HEART RATE: 74 BPM

## 2020-02-28 DIAGNOSIS — D48.19 HEMANGIOPERICYTOMA: ICD-10-CM

## 2020-02-28 DIAGNOSIS — D48.19 HEMANGIOPERICYTOMA: Primary | ICD-10-CM

## 2020-02-28 PROCEDURE — G0463 HOSPITAL OUTPT CLINIC VISIT: HCPCS | Mod: ZF

## 2020-02-28 PROCEDURE — 99215 OFFICE O/P EST HI 40 MIN: CPT | Mod: ZP | Performed by: PSYCHIATRY & NEUROLOGY

## 2020-02-28 RX ORDER — GADOBUTROL 604.72 MG/ML
10 INJECTION INTRAVENOUS ONCE
Status: COMPLETED | OUTPATIENT
Start: 2020-02-28 | End: 2020-02-28

## 2020-02-28 RX ADMIN — GADOBUTROL 9 ML: 604.72 INJECTION INTRAVENOUS at 07:37

## 2020-02-28 ASSESSMENT — MIFFLIN-ST. JEOR: SCORE: 1412.3

## 2020-02-28 ASSESSMENT — PAIN SCALES - GENERAL: PAINLEVEL: NO PAIN (0)

## 2020-02-28 NOTE — DISCHARGE INSTRUCTIONS
MRI Contrast Discharge Instructions    The IV contrast you received today will pass out of your body in your  urine. This will happen in the next 24 hours. You will not feel this process.  Your urine will not change color.    Drink at least 4 extra glasses of water or juice today (unless your doctor  has restricted your fluids). This reduces the stress on your kidneys.  You may take your regular medicines.    If you are on dialysis: It is best to have dialysis today.    If you have a reaction: Most reactions happen right away. If you have  any new symptoms after leaving the hospital (such as hives or swelling),  call your hospital at the correct number below. Or call your family doctor.  If you have breathing distress or wheezing, call 911.    Special instructions: ***    I have read and understand the above information.    Signature:______________________________________ Date:___________    Staff:__________________________________________ Date:___________     Time:__________    [facility] Departments:    ___[place]: [phone]  ___[facility]: [phone]  ___[place]: [phone] ___[facility]: [phone]  ___[facility]: [phone]  ___[place] [facility]: [phone]  ___[phone]  ___[facility]: [phone]  ___[phone]

## 2020-02-28 NOTE — NURSING NOTE
"Oncology Rooming Note    February 28, 2020 10:12 AM   Leidy Ascencio is a 59 year old female who presents for:    Chief Complaint   Patient presents with     Oncology Clinic Visit     Return - Hemangiopericytoma of CNS, grade II      Initial Vitals: Pulse 74   Temp 97.7  F (36.5  C) (Oral)   Resp 14   Ht 1.575 m (5' 2\")   Wt 88.9 kg (196 lb)   SpO2 96%   BMI 35.85 kg/m   Estimated body mass index is 35.85 kg/m  as calculated from the following:    Height as of this encounter: 1.575 m (5' 2\").    Weight as of this encounter: 88.9 kg (196 lb). Body surface area is 1.97 meters squared.  No Pain (0) Comment: Data Unavailable   No LMP recorded. Patient has had a hysterectomy.  Allergies reviewed: Yes  Medications reviewed: Yes    Medications: Medication refills not needed today.  Pharmacy name entered into "[employer]":    [facility] [place] - [address] [place] [address] [facility] [place], [place] - [phone]    Clinical concerns: Patient has no new concerns.         Geovany Coelho              "

## 2020-02-28 NOTE — LETTER
RE: Leidy Ascencio  [address] [place] [address]     Dear Colleague,    Thank you for referring your patient, Leidy Ascencio, to the [facility]. Please see a copy of my visit note below.    NEURO-ONCOLOGY VISIT  Feb 28, 2020    CHIEF COMPLAINT: Ms. 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 She completed GammaKnife treatment in 1/2020. She is currently managed on imaging surveillance.     She is presenting in follow-up for continued evaluation and recommendations on management accompanied by Main ().       HISTORY OF PRESENT ILLNESS  -Priyanka does not complain of any new neurological symptoms.   -She is continuing to experience headaches; Occurring daily. Located in th right temporal region, dull and throbbing in nature, severity of pain ranges from 3-7 lasting for 30 minutes to hours/ all day. Taking ibuprofen, but this dose not work. Essential oils work. No squeezing pain. Mild neck pain. + photo- and phonophobia. Pain worsens with activity.   -Endorses continued slight increase in fatigue. Energy has improved over the past month.   -Stable visional field cut.   -Arthritis in the finger tips of both hands.   -Denies any weakness, changes in sensation, or impairment in cognitive ability. Continues to work.   -Denies any episodes concerning for a seizure.  -Off all steroids.  -Mood is good, on Zoloft.     REVIEW OF SYSTEMS  A comprehensive ROS negative except as in HPI.      MEDICATIONS   Current Outpatient Medications   Medication Sig Dispense Refill     ALBUTEROL 108 (90 BASE) MCG/ACT inhaler Inhale 1-2 puffs into the lungs every 4 hours as needed   2     aspirin 81 MG EC tablet Take 81 mg by mouth At Bedtime       fluticasone (FLONASE) 50 MCG/ACT nasal spray Spray 2 sprays into both nostrils daily       loratadine (CLARITIN) 10 MG tablet Take 10 mg by mouth daily        rosuvastatin (CRESTOR) 10 MG tablet Take 10 mg by mouth daily       sertraline (ZOLOFT) 100 MG tablet Take 1 tablet (100 mg) by mouth daily 90 tablet 1     valACYclovir (VALTREX) 1000 mg tablet Take 2 tablets (2,000 mg) by mouth 2 times daily (Patient taking differently: Take 2,000 mg by mouth 2 times daily as needed ) 12 tablet 6     VITAMIN D, CHOLECALCIFEROL, PO Take 1,000 Units by mouth daily       DRUG ALLERGIES   Allergies   Allergen Reactions     Atorvastatin      Penicillins      Sulfa Drugs      Sulfamethoxazole-Trimethoprim Rash       ONCOLOGIC HISTORY  -2015 PRESENTATION: Headaches.  -2015 MR brain imaging with a right occipital mass   -4/22/2015 SURGERY: Resection by Dr. Lujan   PATHOLOGY: Hemangiopericytoma, grade 2.   -2015 CT chest, abdomen and pelvis negative for malignancy.  -2015 PET MRI negative for malignancy.   Conservative management per Dr. Herman Mayberry.    -8/1/2017 CT chest abdomen and pelvis and bone scan negative for malignancy.   -12/7/2017 MRB with no evidence of tumor recurrence.  -12/6/2018 MRB with no evidence of tumor recurrence.    -12/11/2019 MRB with a new nodule of enhancement within the superior sagittal sinus, concerning for tumor recurrence. Stable appearance of the right parieto-occipital craniotomy site.  -12/13/2019 Bone scan with no convincing evidence of skeletal metastases.  -12/18/2019 MR liver with a 9 mm lesion in the lateral aspect of the junction of segments 7 and 8 of the liver shows homogeneous delayed enhancement and is new since 2015. This could represent a metastatic lesion.     -1/6/2020 NEURO-ONC: Referral to radiation oncology and GI oncology surgery. Signed consent for next generation sequencing.   -1/14/2020 RADS: GammaKnife to lesion (1 fraction).   -1/15/2020 LIVER LESION:  Laparoscopic ultrasound-guided microwave ablation of liver tumor by Dr. Phan Fierro.   PATHOLOGY: Cavernous hemangioma of the liver, benign:    -No metastatic hemangiopericytoma or  "other malignancy identified    -Underlying steatohepatitis, no significant fibrosis   -2/28/2020 NEURO-ONC/ MRB: Clinically stable. Headaches with migraine features; increasing Zoloft. Imaging with positive response to radiation.     SOCIAL HISTORY   Tobacco use: Former smoker; quit in 2003. 27 pack years.  Alcohol use: Social use.   Drug use: Denies marijuana use.  . 2 daughter, 1 son.   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        PHYSICAL EXAMINATION  /82   Pulse 74   Temp 97.7  F (36.5  C) (Oral)   Resp 14   Ht 1.575 m (5' 2\")   Wt 88.9 kg (196 lb)   SpO2 96%   BMI 35.85 kg/m     Wt Readings from Last 2 Encounters:   02/28/20 88.9 kg (196 lb)   02/10/20 87.5 kg (193 lb)      Ht Readings from Last 2 Encounters:   02/28/20 1.575 m (5' 2\")   01/15/20 1.575 m (5' 2\")     KPS: 90    -Generally well appearing.  -Respiratory: Normal breath sounds, no audible wheezing.   -Skin: No rashes. Healed head incision.  -Hematologic/ lymphatic: No abnormal bruising. No leg swelling.  -Psychiatric: Normal mood and affect. Pleasant, talkative.  -Neurologic:   MENTAL STATUS:     Alert, oriented to date.    Recall: Intact.    Speech fluent.    Comprehension intact to multi-step commands.   Good right-left orientation.     CRANIAL NERVES:     Pupils are equal, round, reactive to light.     Extraocular movements full, patient denies diplopia.     Left inferior homonymous quadrantanopia.     Facial sensation intact to light touch.   Symmetric facial movements.   Hearing intact.   Palate moves symmetrically.     Tongue midline.  MOTOR:    No pronation or drift. No orbiting.   Able to rise from a chair without use of arms.   On toe/ heel walk, equal distance from floor to heels/ toes.   SENSATION:    Intact to light touch throughout.  COORDINATION:   Intact finger-nose with eyes open and closed.   GAIT:  Walks without assistance.   Good speed. Normal stride length and heel strike. Normal turns. " Normal arm swing.   Able to toe, heel walk. Able to tandem walk.       MEDICAL RECORDS  Obtained and personally reviewed all available outside medical records in addition to reviewing any records available in our electronic system.     LABS  Personally reviewed all available lab results.     IMAGING  Personally reviewed MR brain imaging from today. The contrast enhancing lesion at the sagittal sinus appears stable to slightly decreased (by 2mm) as compared to pre-radiation imaging. No new lesions.     Imaging shown to Marlin.     Imaging and case reviewed and discussed at Brain Tumor Conference.       IMPRESSION  Clinic was spent discussing in detail the nature of this tumor, providing emotional support, answering questions pertaining to my recommendations, and devising the treatment plan as outlined below.     Grade II hemangiopericytomas are considered malignant tumors. Standard treatment options include surgery and radiation therapy. With no good surgical option, Priyanka completed stereotactic radiation therapy. Imaging from today with no new concerns with positive treatment effect from the radiation. Will repeat imaging in 3 months.     For treatment-refractory tumors, Pazopanib, a broad-spectrum tyrosine kinase inhibitor, has been used with noted efficacy. Of note, Priyanka has been consented for next generation sequencing. At disease recurrence, can consider sending pathology for mutational analysis to potentially identify targeted theray options.     Extracranial metastatic disease can occur; most commonly in bone, lung, and liver. Prior imaging of the MR liver showed a lesion. Priyanka was referred to Dr. Phan Fierro with surgical oncology. Dr. Fierro performed a  laparoscopic ultrasound-guided microwave ablation of liver tumor on 1/15/2020. Pathology was consistent with a benign cavernous hemangioma with no metastatic hemangiopericytoma or other malignancy identified. The plan will be to continue annual  systemic surveillance imaging. As these tumors have great variably on PET, will scan with CT C/A/P with contrast.     PROBLEM LIST  Hemangiopericytoma, grade II  Liver lesion  Depressed mood    PLAN  -CANCER-DIRECTED THERAPY-  -As above; Continue imaging surveillance. Repeat MR brain imaging in 3 months.     -LIVER LESION-  -Treated.   -Will continue with systemic surveillance imaging with a CT c/a/p with contrast annually starting in late-fall 2020.     -STEROIDS-  -Currently off dexamethasone.    -SEIZURE MANAGEMENT-  -While this patient is at increased risk of having seizures, given the lack of seizure history, there is no indication to prescribe an antiepileptic at this time.     -Quality of life/ MOOD/ FATIGUE-  -Continue to monitor mood as untreated/ undertreated depression can worsen fatigue, dysorexia, and quality of life.  -On Zoloft.     -HEADACHES-  -Migrainous features of throbbing pain, high severity, photo- and phonophobia, and worsening pain with activity.   -Increasing Zoloft.   -Encouraged remaining hydrated.   -With > 15 headache days/ month, if pain is not improved, can consider a referral for Botox.     -MEMORY LOSS/ WORD FINDING ISSUES-  -Evaluation by Dr. Phan Wright for neuro-psych testing scheduled for 3/2020.     Return to clinic in late May/ early June 2020 to review repeat imaging.     In the meantime, Priyanka knows to call with questions or concerns or to report new complaints and can be seen sooner if needed.    Lauren Zhang MD  Neuro-oncology

## 2020-03-02 ENCOUNTER — TUMOR CONFERENCE (OUTPATIENT)
Dept: ONCOLOGY | Facility: CLINIC | Age: 60
End: 2020-03-02
Payer: COMMERCIAL

## 2020-03-06 ENCOUNTER — OFFICE VISIT (OUTPATIENT)
Dept: NEUROPSYCHOLOGY | Facility: CLINIC | Age: 60
End: 2020-03-06
Attending: PSYCHIATRY & NEUROLOGY
Payer: COMMERCIAL

## 2020-03-06 DIAGNOSIS — C71.9 MALIGNANT NEOPLASM OF BRAIN, UNSPECIFIED LOCATION (H): Primary | ICD-10-CM

## 2020-03-06 DIAGNOSIS — F09 MENTAL DISORDER DUE TO GENERAL MEDICAL CONDITION: ICD-10-CM

## 2020-03-06 DIAGNOSIS — R41.842 VISUOSPATIAL DEFICIT: ICD-10-CM

## 2020-03-06 DIAGNOSIS — F41.9 ANXIETY: ICD-10-CM

## 2020-03-06 NOTE — NURSING NOTE
Pt was seen for neuropsychological evaluation at the request of Dr. Lauren Zhang for the purposes of diagnostic clarification and treatment planning. 162 minutes of test administration and scoring were provided by this writer. Please see Dr. Phan Wright's report for a full interpretation of the findings.    Sid Mason  Psychometrist

## 2020-03-06 NOTE — TUMOR CONFERENCE
Tumor Conference Information  Tumor Conference:  Brain  Specialties Present:  Medical oncology, Radiology, Radiation oncology, Surgery  Patient Status:  A current patient  Pathology:  Not Discussed  Treatment to Date:  Adjuvant chemotherapy, Chemoradiation, Surgical intervention(s)  Clinical Trial Eligibility:  Not discussed  Recommended Plan:  Observation (see comment) (Comment: Imaging stable. Follow up and MRI in 3 months)  Did the review exceed 30 minutes?:  did not           Documentation / Disclaimer Cancer Tumor Board Note  Cancer tumor board recommendations do not override what is determined to be reasonable care and treatment, which is dependent on the circumstances of a patient's case; the patient's medical, social, and personal concerns; and the clinical judgment of the oncologist [physician].

## 2020-03-09 NOTE — PROGRESS NOTES
Name: Leidy Ascencio   MR#: [id number]  YOB: 1960  Date of Exam: 03/06/2020    Neuropsychology Laboratory  [address]  [phone]    NEUROPSYCHOLOGICAL EVALUATION    IDENTIFYING INFORMATION  Leidy Ascencio is a 59 year old, right handed, , with 10 years of formal education + GED. She was accompanied to the evaluation by her , Main.     BACKGROUND INFORMATION / INTERVIEW FINDINGS    Records indicate that Ms. Ascencio developed a headache in 2015. An MRI of her brain documented a right occipital mass. 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 An MRI of her liver in December, 2019 documented a 9 mm lesion in the lateral aspect of the junction of segments seven and eight of the liver that was possibly felt to be representative of a metastatic lesion. In January, 2020, she underwent one fraction of gamma knife to her brain lesion. A laparoscopic ultrasound-guided microwave ablation of her liver tumor, was also completed in January, with pathology consistent with a cavernous hemangioma. The most recent MRI of her brain on February 28, 2020 documented  Enhancing lesion within the superior sagittal sinus medial to the right parietal occipital resection bed, unchanged from 12/11/2019 but enlarged since 2018. This is consistent with local recurrence of hemangiopericytoma.  Her other medical history includes insomnia, depression, left inferior quadrantanopia, recurrent cold sores, plantar fasciitis, vitamin D deficiency, and atopic rhinitis. Concerns have been expressed about her cognition, and in particular with memory. The current evaluation was requested by Dr. Lauren Zhang, in this context.    On interview, Miss Ascencio reported that she initially had headaches, which brought her in for medical evaluation in 2015. She stated that she is currently under  observation for her brain lesion. She reported that she developed the quadrantanopia prior to the surgery, but it was pointed out to her when she sought medical attention. She stated that she is not sure if the quadrantanopia was worsened by the surgery. She indicated that these visual issues have been monitored serially, and have not worsened on exam.    Regarding cognition, Ms. Ascencio reported that she began noticing changes in her thinking a couple of weeks after the 2015 surgery. She said that she went back to work, and began noticing these issues. She stated that she has troubles with word finding and spelling. She also noted that her thinking is slower. She stated that others may not even notice the issues, but she is able to identify them. She reported that these cognitive issues have been stable in severity. She noted that if she is looking up a name at work, she may have to go back and forth a few times in order to remember the name. Her  reported that her thinking does seem to be a little bit slower, but he otherwise denied having identified changes in her thinking.    With respect to mental health, Ms. Ascencio reported that her mood is pretty good. She noted some ups and downs. She stated that she was first treated for depression about 10 years ago. She noted that these issues were precipitated by some addiction struggles for a family member. She indicated that she is still taking an antidepressant medication. She noted that she has learned to cope with her family member s addiction issues better than she did in the past. She otherwise denied mental health diagnoses or treatments. She denied suicidal ideation or past suicide attempts.    With regard to other medical background, Ms. Ascencio denied prior TBI, stroke, or seizure. She stated that her sleep comes and goes, and she noted that she has been tired lately. She stated that there was a time last week when she slept for almost 2 days straight. She  indicated that she averages seven hours of sleep per night, and slept normally the night before the exam. She did note that she is sometimes rested when she wakes up. She naps more now than she used to. She reported that she sometimes has arthritis in her knees and hands, but denied pain at the time of the interview. Per records, her current medications include albuterol, aspirin, fluticasone, loratadine, rosuvastatin, sertraline, valacyclovir, and vitamin D. She stated that she drinks a glass of wine and daily basis, but otherwise denied substance use.    Regarding family medical history, Ms. Ascencio reported that her father had Alzheimer s disease. She also noted that her paternal grandmother had mental health issues.    Ms. Ascencio lives at home with her , her daughter, her son-in-law, and two of her grandchildren. She manages her own basic and instrumental daily activities. She and her  share meal preparation responsibility. She drives. By way of background, the patient and her  have been  for 39 years. They have two daughters and one son. They have 8 grandchildren. Regarding educational background, she completed the 10th grade, and then later earned her GED. She stated that she was an average student. She did note that she had a stutter and speech therapy for a couple of years in elementary school. Professionally, she works as a  on a full-time basis for the [employer]. 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 She reported that her work is going well, and stated that her supervisor has been very supportive. She noted that some of her colleagues have helped her put in little  workarounds  for some of her cognitive issues.    BEHAVIORAL OBSERVATIONS  Ms. Ascencio was pleasant and cooperative with the exam. Her speech was normal. Her comprehension was normal. Her thought processes were notable for mild  carelessness, and mild slowing. Her mood was mildly anxious with congruent affect. She had mildly reduced confidence in her ability on testing. Her effort was good. The current results are felt to be an accurate representation of her cognitive functioning.     RESULTS OF EXAM  Her performances on standardized measures of neuropsychological functioning were as follows.    She was fully oriented to time, place, and various aspects of personal information. She was able to provide the names of the five most recent presidents in order. Performance on a measure of single word reading was low average. She obtained passing scores on stand-alone and embedded metrics cognitive performance validity. Auditory attention for digits was high average. Mental calculations were average. Visual attention for spatial sequences was superior. Learning of words in a list format was low average. Delayed recall of list words was average. Percent retention of list words was average. Delayed recognition of list words was high average. Learning of simple geometric shapes and their spatial locations was average. Delayed recall of the shapes and their locations was average. Percent retention of the shapes was normal. Delayed recognition of the shapes was low average. Line bisection was broadly normal, with one error suggesting left-sided visual inattention. Letter cancellation was notable for one omission error, with the omitted letter in the center of the page. Visuospatial judgments for variably oriented lines were average. Visual problem-solving with blocks was superior. Nonverbal reasoning for incomplete matrices was average. Her drawing of a complicated geometric figure was borderline impaired, and was notable for a piecemeal approach with slight inattention to the figure s details. Comprehension of phrases and short stories was average. Verbal associative fluency was average. Semantic verbal fluency was average. Naming to confrontation was  performed in the average to high average range. Verbal abstract reasoning was average. Speeded visual sequencing under focused attention was average. A similar measure with a divided attention component was average. Speeded word reading was borderline impaired. Speeded color naming was average. Speeded inhibition of an over-learned response was performed in the low average to average range. Speeded matching and cancellation was average. Speeded visuomotor coding was low average. Speeded fine motor dexterity was low average, bilaterally.    She endorsed items consistent with minimal symptoms of depression, and mild symptoms of anxiety on self-report measures.    IMPRESSIONS  Ms. Ascencio demonstrated a pattern of subtle weaknesses and variability that is largely consistent with the location of her lesion. Of course, she has the already documented left inferior quadrantanopia. In additiona, in the current exam, mild weaknesses were noted in aspects of speeded visual processing, and general cognitive speed. The remainder of her cognitive abilities were intact and performed in keeping with her above average range cognitive baseline. She is also reporting mild symptoms of anxiety. It may be the case that these anxiety symptoms contributed to some degree of variability in her cognition.    RECOMMENDATIONS  Preliminary results and recommendations were provided to the patient over the telephone on March 9, 2020, and all questions were answered.    1. In spite of treatment, she remains mildly anxious. Continued treatment of her mental health is recommended.    2. In the workplace setting, she will benefit from being provided with additional time to complete her work.    3. Follow-up neuropsychological evaluation is recommended in one year in order to assess and update recommendations as appropriate. The current results can be used as a baseline at that time.    Phan Wright, Ph.D., L.P., ABPP-CN   /  Licensed Psychologist [id number]  Department of Rehabilitation Medicine  Division of Adult Neuropsychology  [facility]    Time spent:  One unit (40 minutes) neurobehavioral status exam including interview and clinical assessment by licensed and board-certified neuropsychologist (CPT 52878). One unit (60 minutes) neuropsychological testing evaluation by licensed and board-certified neuropsychologist, including integration of patient data, interpretation of standardized test results and clinical data, clinical decision-making, treatment planning, and report, first hour (CPT 19604). Two unit(s) (100 minutes) of neuropsychological testing evaluation by licensed and board-certified neuropsychologist, including integration of patient data, interpretation of standardized test results and clinical data, clinical decision-making, treatment planning, and report, subsequent hours (CPT 46966). One unit (30 minutes) of psychological and neuropsychological test administration and scoring by technician, first 30 minutes (CPT 67842). Four units (132 minutes) psychological or neuropsychological test administration and scoring by technician, subsequent 30 minutes (CPT 80424). Diagnoses: C71.9, R41.842, F06.8, F41.9.

## 2020-03-09 NOTE — PROGRESS NOTES
Name: Leidy Ascencio MRN: [id number]  : 1960 RANDLE: 2020  Staff: BRITTA Tech: SH Age: 59  Sex: Female Hand: Right Educ: -  Vision: 20/30 ?with correction / ?without correction    ORIENTATION     Time  0     Place       Personal info          Presidents     WAIS-IV   WILMAR: (117)          WMI: 108    PSI: 94       Raw SSa     Similarities  23 9     Block Design  55 15     Matrix Reasoning 16 11     Digit Span  30 12 RDS= 11     Arithmetic  15 11     Symbol Search  30 11     Coding  46 7    KEN-O    Raw  T %ile     Copy    29     2-5     Time to Copy  233        WRAT4   SS %ile Grade Equiv.     Word Reading  81 10 7.5       COWAT (FAS)   Raw: 42  T: 54  Animals   Raw:  17 T-score:  48    BOSTON NAMING TEST   Raw: 55  %ile 62-79    COMPLEX IDEATIONAL MATERIAL   Raw:  T: 46    TRAILS  Raw  Err %ile    A 41  0 28   B 88  0 34-52    STROOP Raw %ile   Word 64 7   Color  60 28-41       Color/Word  28 14-28         REBECCA   Raw: 18 %ile: 26-32    GROOVED PEGBOARD    Raw  T Drops   RH  82  41 0   LH 89  41 1    BDI-II  Raw:  9 Interpretation: minimal    VÍCTOR  Raw:  8 Interpretation: mild    WMS-III  Raw SS / Z        Spatial Span 19 14    HVLT-R     Trial 1 2 3 Total      5 8 10  23  Raw T     Total Learning (1-3) 23 39     Delayed Recall  10 51     Percent Retention 100 55     Recognition Hits/FP 12/0 60    BVMT-R     Trial 1 2 3 Total      8 6 9  23  Raw T / %ile     Total Learning (1-3) 23 51     Delayed Recall  9 52     Percent Retention 100 >16th     Recognition Hits/FP 5/0 11th-16th    LINE BISECTION   # Missed Lines: 0 Errors: R: 0 L: 1    LETTER CANCELLATION   Time: 124  Err: 1    DCT E-score: 6

## 2020-03-13 ENCOUNTER — MYC MEDICAL ADVICE (OUTPATIENT)
Dept: ONCOLOGY | Facility: CLINIC | Age: 60
End: 2020-03-13

## 2020-03-13 DIAGNOSIS — G44.229 CHRONIC TENSION-TYPE HEADACHE, NOT INTRACTABLE: Primary | ICD-10-CM

## 2020-03-13 NOTE — TELEPHONE ENCOUNTER
Called pt to discuss symptomatic mychart: she stated she has daily headaches where it feels like a band squeezing her head. She has more sensitivity at the back of her head she contributes to radiation. Has intermittent sharp pains that occurs every couple of hours, at random spots on her head and not consistent. She has not noticed relief whether she is lying down or up. If she moves too fast sometimes the room does spin but this only lasts a few seconds. Denied any nausea, vomiting, vision changes, neck pain, ringing in ears. She increased her zoloft to 150 mg for one week and now on 2nd week of taking 200 mg daily. Also takes Excedrin Migraine 2 tabs around mid-morning and Ibuprofen 600 mg around 5-6 pm.     Did relay to pt that Dr. Zhang noted a referral to botox if she was having more than 15 headaches a month, pt stated she is willing to try anything. Paged Dr. Zhang for any other advice. Per Dr. Zhang: will refer to Headache clinic, if pt is told first available apt is more than 4-6 weeks ago she should call back and let us know. Above information relayed to pt, she verbalized understanding.

## 2020-05-11 ENCOUNTER — MYC MEDICAL ADVICE (OUTPATIENT)
Dept: ONCOLOGY | Facility: CLINIC | Age: 60
End: 2020-05-11

## 2020-05-11 DIAGNOSIS — F33.0 MAJOR DEPRESSIVE DISORDER, RECURRENT EPISODE, MILD (H): ICD-10-CM

## 2020-05-11 RX ORDER — SERTRALINE HYDROCHLORIDE 100 MG/1
100 TABLET, FILM COATED ORAL DAILY
Qty: 90 TABLET | Refills: 1 | Status: SHIPPED | OUTPATIENT
Start: 2020-05-11 | End: 2020-05-19

## 2020-05-11 NOTE — TELEPHONE ENCOUNTER
"Dr Zhang's 2/28 visit notes state  \"-Quality of life/ MOOD/ FATIGUE-  -Continue to monitor mood as untreated/ undertreated depression can worsen fatigue, dysorexia, and quality of life.  -On Zoloft.      -HEADACHES-  -Migrainous features of throbbing pain, high severity, photo- and phonophobia, and worsening pain with activity.   -Increasing Zoloft.   -Encouraged remaining hydrated.   -With > 15 headache days/ month, if pain is not improved, can consider a referral for Botox. \"    Our record show this was Rx'd in 2016 by another provider. Routing to Dr Zhang.  "

## 2020-05-19 DIAGNOSIS — F33.0 MAJOR DEPRESSIVE DISORDER, RECURRENT EPISODE, MILD (H): ICD-10-CM

## 2020-05-19 RX ORDER — SERTRALINE HYDROCHLORIDE 100 MG/1
100 TABLET, FILM COATED ORAL DAILY
Qty: 180 TABLET | Refills: 1 | Status: SHIPPED | OUTPATIENT
Start: 2020-05-19 | End: 2021-03-01

## 2020-05-31 NOTE — PROGRESS NOTES
"Leidy Ascencio is a 59 year old female who is being evaluated via a billable video visit.      The patient has been notified of following:     \"This video visit will be conducted via a call between you and your physician/provider. We have found that certain health care needs can be provided without the need for an in-person physical exam.  This service lets us provide the care you need with a video conversation.  If a prescription is necessary we can send it directly to your pharmacy.  If lab work is needed we can place an order for that and you can then stop by our lab to have the test done at a later time.    Video visits are billed at different rates depending on your insurance coverage.  Please reach out to your insurance provider with any questions.    If during the course of the call the physician/provider feels a video visit is not appropriate, you will not be charged for this service.\"    Patient has given verbal consent for Video visit? Yes    How would you like to obtain your AVS? MyChart    Patient would like the video invitation sent by: Send to e-mail at: [email or web address]    Will anyone else be joining your video visit? No     Secondary Video Option (Doximity), send text message to:  [phone]    I have reviewed and updated the patient's allergies and medication list.    Concerns: Patient has no new concerns.    Refills: None      Geovany Coelho, GURU    _____________________________________________________________    NEURO-ONCOLOGY VISIT  Jun 1, 2020    CHIEF COMPLAINT: Ms. 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 She completed GammaKnife treatment in 1/2020. She is currently managed on imaging surveillance.     I met with Priyanka today for this follow-up visit.       HISTORY OF PRESENT ILLNESS  -Priyanka does not have any new neurological symptoms.   -Headaches are actually less frequent and " less severe; Occurring 1-2x a week. Triggered by a lot of screen time. Now with dull and throbbing throughout the head, severity of about a 3. Taking ibuprofen as needed. Improved with increase in Zoloft to 150mg.   -Energy is good, but about 1 day of week is very fatigued. Difficulty falling asleep most nights and she does use electronics right before bed. Interested in melatonin.   -Stable visional field cut.   -Arthritis in the finger tips of both hands still present; uses ibuprofen as needed.    -Denies any weakness, changes in sensation, or impairment in cognitive ability. Continues to work, now from home.   -Denies any episodes concerning for a seizure.  -Mood is good, on Zoloft.     REVIEW OF SYSTEMS  A comprehensive ROS negative except as in HPI.      MEDICATIONS   Current Outpatient Medications   Medication Sig Dispense Refill     ALBUTEROL 108 (90 BASE) MCG/ACT inhaler Inhale 1-2 puffs into the lungs every 4 hours as needed   2     aspirin 81 MG EC tablet Take 81 mg by mouth At Bedtime       fluticasone (FLONASE) 50 MCG/ACT nasal spray Spray 2 sprays into both nostrils daily       loratadine (CLARITIN) 10 MG tablet Take 10 mg by mouth daily       rosuvastatin (CRESTOR) 10 MG tablet Take 10 mg by mouth daily       sertraline (ZOLOFT) 100 MG tablet Take 1 tablet (100 mg) by mouth daily Take 1-2 tablets (100-200mg) by mouth daily 180 tablet 1     valACYclovir (VALTREX) 1000 mg tablet Take 2 tablets (2,000 mg) by mouth 2 times daily (Patient taking differently: Take 2,000 mg by mouth 2 times daily as needed ) 12 tablet 6     VITAMIN D, CHOLECALCIFEROL, PO Take 1,000 Units by mouth daily       DRUG ALLERGIES   Allergies   Allergen Reactions     Atorvastatin      Penicillins      Sulfa Drugs      Sulfamethoxazole-Trimethoprim Rash       ONCOLOGIC HISTORY  -2015 PRESENTATION: Headaches.  -2015 MR brain imaging with a right occipital mass   -4/22/2015 SURGERY: Resection by Dr. Lujan   PATHOLOGY:  Hemangiopericytoma, grade 2.   -2015 CT chest, abdomen and pelvis negative for malignancy.  -2015 PET MRI negative for malignancy.   Conservative management per Dr. Herman Mayberry.    -8/1/2017 CT chest abdomen and pelvis and bone scan negative for malignancy.   -12/7/2017 MRB with no evidence of tumor recurrence.  -12/6/2018 MRB with no evidence of tumor recurrence.    -12/11/2019 MRB with a new nodule of enhancement within the superior sagittal sinus, concerning for tumor recurrence. Stable appearance of the right parieto-occipital craniotomy site.  -12/13/2019 Bone scan with no convincing evidence of skeletal metastases.  -12/18/2019 MR liver with a 9 mm lesion in the lateral aspect of the junction of segments 7 and 8 of the liver shows homogeneous delayed enhancement and is new since 2015. This could represent a metastatic lesion.     -1/6/2020 NEURO-ONC: Referral to radiation oncology and GI oncology surgery. Signed consent for next generation sequencing.   -1/14/2020 RADS: GammaKnife to lesion (1 fraction).   -1/15/2020 LIVER LESION:  Laparoscopic ultrasound-guided microwave ablation of liver tumor by Dr. Phan Fierro.   PATHOLOGY: Cavernous hemangioma of the liver, benign:    -No metastatic hemangiopericytoma or other malignancy identified    -Underlying steatohepatitis, no significant fibrosis    -No follow-up needed.  -2/28/2020 NEURO-ONC/ MRB: Clinically stable. Headaches with migraine features; increasing Zoloft. Imaging with positive response to radiation.   -3/9/2020 Evaluated by Dr. Wright; Neuro-psych testing with mild weaknesses were noted in aspects of speeded visual processing, and general cognitive speed. Repeat in 3/2021.  -6/1/2020 NEURO-ONC/ MRB: Clinically well; for insomnia trial of melatonin. Imaging with positive treatment effect.     SOCIAL HISTORY   Tobacco use: Former smoker; quit in 2003. 27 pack years.  Alcohol use: Social use.   Drug use: Denies marijuana use.  . 2 daughter, 1  son.   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        PHYSICAL EXAMINATION  -Generally well appearing. Good energy today.   -Respiratory: No audible wheezing.   -Skin: No facial rashes. Healed head incision.  -Psychiatric: Normal mood and affect. Pleasant, talkative. Laughing a lot.   -Neurologic:   MENTAL STATUS:     Alert, oriented to date.    Recall: Intact.    Speech fluent.    Comprehension intact to multi-step commands.     CRANIAL NERVES:     Pupils are equal, round, reactive to light.     Extraocular movements full, patient denies diplopia.     Left inferior homonymous quadrantanopia.     Symmetric facial movements.   Hearing intact.   With normal phonation, no dysfunction of the palate or tongue.   MOTOR:    Antigravity in arms.     The rest of a comprehensive physical examination is deferred due to PHE (public health emergency) video visit restrictions.        MEDICAL RECORDS  Obtained and personally reviewed all available outside medical records in addition to reviewing any records available in our electronic system.     LABS  Personally reviewed all available lab results.     IMAGING  Personally reviewed MR brain imaging from today. The contrast enhancing lesion at the sagittal sinus appears to be slightly decreased in size as compared to post-radiation imaging. No new lesions. No increase in T2 FLAIR changes about the cavity.    Imaging discussed with Marlin.     Imaging and case will be reviewed and discussed at Brain Tumor Conference later today.       IMPRESSION  Clinic was spent discussing in detail the nature of this tumor in light of repeat imaging. This was in addition to providing emotional support, answering questions pertaining to my recommendations, and devising the treatment plan as outlined below.     Grade II hemangiopericytomas are considered malignant tumors. Standard treatment options include surgery and radiation therapy. With no good surgical option, Priyanka  completed stereotactic radiation therapy in 1/2020. Imaging from today was again with no new concerns and continued positive treatment effect from the radiation was noted. Will repeat imaging in 3-4 months.     For radiation-refractory tumors, Pazopanib, a broad-spectrum tyrosine kinase inhibitor, has been used with noted efficacy. Of note, Priyanka has been consented for next generation sequencing. At disease recurrence, can consider sending pathology for mutational analysis to potentially identify targeted theray options.     Extracranial metastatic disease can occur; most commonly in bone, lung, and liver. Prior imaging of the MR liver showed a lesion. Priyanka was referred to Dr. Phan Fierro with surgical oncology. Dr. Fierro performed a  laparoscopic ultrasound-guided microwave ablation of liver tumor on 1/15/2020. Pathology was consistent with a benign cavernous hemangioma with no metastatic hemangiopericytoma or other malignancy identified. The plan will be to continue annual systemic surveillance imaging. As these tumors have great variably on PET, will scan with CT C/A/P with contrast.     Clinically, Priyanka is doing well with a reduction in the frequency and severity of headaches with an increase in Zoloft. Notes some daytime fatigue with difficulty in falling asleep at night. I recommended no devices 1 hour prior to bedtime and a trial of melatonin.     PROBLEM LIST  Hemangiopericytoma, grade II  Liver lesion  Depressed mood    PLAN  -CANCER-DIRECTED THERAPY-  -As above; Continue imaging surveillance. Repeat MR brain imaging in 3 months.     -LIVER LESION-  -Treated. No lesion-specific follow-up needed.   -Will continue with systemic surveillance imaging with a CT c/a/p with contrast annually at next visit.     -STEROIDS-  -Currently off dexamethasone.    -SEIZURE MANAGEMENT-  -While this patient is at increased risk of having seizures, given the lack of seizure history, there is no indication to prescribe an  antiepileptic at this time.     -Quality of life/ MOOD/ FATIGUE-  -Continue to monitor mood as untreated/ undertreated depression can worsen fatigue, dysorexia, and quality of life.  -Continue Zoloft 150mg daily.   -Insomnia/ daytime fatigue; trial of melatonin and no devices 1 hour prior to bedtime.    -HEADACHES-  -Less severe and frequent; Migrainous features of throbbing pain, high severity, photo- and phonophobia, and worsening pain with activity.   -Continue Zoloft.   -Encouraged remaining hydrated.   -With > 15 headache days/ month, if pain is not improved, can consider a referral for Botox.     -MEMORY LOSS/ WORD FINDING ISSUES-  -Evaluated by Dr. Phan Wright in 3/2020. Neuro-psych testing with mild weaknesses were noted in aspects of speeded visual processing, and general cognitive speed.  -Recommended repeating testing in 3/2021.     Return to clinic in 9/2020 to review repeat imaging.     In the meantime, Priyanka knows to call with questions or concerns or to report new complaints and can be seen sooner if needed.    Lauren Zhang MD  Neuro-oncology      Video-Visit Details  Type of service:  Video Visit    Video Start Time: 11:16 AM  Video End Time: 11:36 AM    Originating Location (pt. Location): Home    Distant Location (provider location):  [facility]     Platform used for Video Visit: [facility]    Lauren Zhang MD

## 2020-05-31 NOTE — PATIENT INSTRUCTIONS
Imaging reviewed; Positive response to radiation.   Repeat imaging in 3 months.    Body imaging surveillance due at next visit.     Continue Zoloft at 150mg daily.     Melatonin trial: Start at 3mg by mouth taken 30-45 minutes prior to bedtime, then increase by 3mg weekly as needed for insomnia/ sleep disturbance to a max dose of 12mg.   No devices 1 hour prior to bedtime     Return to clinic in mid-9/2020 to review new imaging.     Laruen Zhang MD  Neuro-oncology  06/01/20

## 2020-06-01 ENCOUNTER — VIRTUAL VISIT (OUTPATIENT)
Dept: ONCOLOGY | Facility: CLINIC | Age: 60
End: 2020-06-01
Attending: PSYCHIATRY & NEUROLOGY
Payer: COMMERCIAL

## 2020-06-01 ENCOUNTER — ANCILLARY PROCEDURE (OUTPATIENT)
Dept: MRI IMAGING | Facility: CLINIC | Age: 60
End: 2020-06-01
Attending: PSYCHIATRY & NEUROLOGY
Payer: COMMERCIAL

## 2020-06-01 DIAGNOSIS — D43.3 HEMANGIOPERICYTOMA OF CNS, GRADE II (H): Primary | ICD-10-CM

## 2020-06-01 DIAGNOSIS — C78.7 METASTATIC CANCER TO LIVER (H): ICD-10-CM

## 2020-06-01 DIAGNOSIS — D48.19 HEMANGIOPERICYTOMA: ICD-10-CM

## 2020-06-01 PROCEDURE — 40001009 ZZH VIDEO/TELEPHONE VISIT; NO CHARGE

## 2020-06-01 PROCEDURE — 99214 OFFICE O/P EST MOD 30 MIN: CPT | Mod: 95 | Performed by: PSYCHIATRY & NEUROLOGY

## 2020-06-01 RX ORDER — GADOBUTROL 604.72 MG/ML
10 INJECTION INTRAVENOUS ONCE
Status: COMPLETED | OUTPATIENT
Start: 2020-06-01 | End: 2020-06-01

## 2020-06-01 RX ADMIN — GADOBUTROL 9 ML: 604.72 INJECTION INTRAVENOUS at 08:36

## 2020-06-01 NOTE — LETTER
"    6/1/2020         RE: Leidy Ascencio  [address] [place] [address]        Dear Colleague,    Thank you for referring your patient, Leidy Ascencio, to the [facility]. Please see a copy of my visit note below.    Leidy Ascencio is a 59 year old female who is being evaluated via a billable video visit.      The patient has been notified of following:     \"This video visit will be conducted via a call between you and your physician/provider. We have found that certain health care needs can be provided without the need for an in-person physical exam.  This service lets us provide the care you need with a video conversation.  If a prescription is necessary we can send it directly to your pharmacy.  If lab work is needed we can place an order for that and you can then stop by our lab to have the test done at a later time.    Video visits are billed at different rates depending on your insurance coverage.  Please reach out to your insurance provider with any questions.    If during the course of the call the physician/provider feels a video visit is not appropriate, you will not be charged for this service.\"    Patient has given verbal consent for Video visit? Yes    How would you like to obtain your AVS? MyChart    Patient would like the video invitation sent by: Send to e-mail at: [email or web address]    Will anyone else be joining your video visit? No     Secondary Video Option (Doximity), send text message to:  [phone]    I have reviewed and updated the patient's allergies and medication list.    Concerns: Patient has no new concerns.    Refills: None      Geovany Coelho, GURU    _____________________________________________________________    NEURO-ONCOLOGY VISIT  Jun 1, 2020    CHIEF COMPLAINT: Ms. 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 She completed GammaKnife treatment in " 1/2020. She is currently managed on imaging surveillance.     I met with Priyanka today for this follow-up visit.       HISTORY OF PRESENT ILLNESS  -Priyanka does not have any new neurological symptoms.   -Headaches are actually less frequent and less severe; Occurring 1-2x a week. Triggered by a lot of screen time. Now with dull and throbbing throughout the head, severity of about a 3. Taking ibuprofen as needed. Improved with increase in Zoloft to 150mg.   -Energy is good, but about 1 day of week is very fatigued. Difficulty falling asleep most nights and she does use electronics right before bed. Interested in melatonin.   -Stable visional field cut.   -Arthritis in the finger tips of both hands still present; uses ibuprofen as needed.    -Denies any weakness, changes in sensation, or impairment in cognitive ability. Continues to work, now from home.   -Denies any episodes concerning for a seizure.  -Mood is good, on Zoloft.     REVIEW OF SYSTEMS  A comprehensive ROS negative except as in HPI.      MEDICATIONS   Current Outpatient Medications   Medication Sig Dispense Refill     ALBUTEROL 108 (90 BASE) MCG/ACT inhaler Inhale 1-2 puffs into the lungs every 4 hours as needed   2     aspirin 81 MG EC tablet Take 81 mg by mouth At Bedtime       fluticasone (FLONASE) 50 MCG/ACT nasal spray Spray 2 sprays into both nostrils daily       loratadine (CLARITIN) 10 MG tablet Take 10 mg by mouth daily       rosuvastatin (CRESTOR) 10 MG tablet Take 10 mg by mouth daily       sertraline (ZOLOFT) 100 MG tablet Take 1 tablet (100 mg) by mouth daily Take 1-2 tablets (100-200mg) by mouth daily 180 tablet 1     valACYclovir (VALTREX) 1000 mg tablet Take 2 tablets (2,000 mg) by mouth 2 times daily (Patient taking differently: Take 2,000 mg by mouth 2 times daily as needed ) 12 tablet 6     VITAMIN D, CHOLECALCIFEROL, PO Take 1,000 Units by mouth daily       DRUG ALLERGIES   Allergies   Allergen Reactions     Atorvastatin       Penicillins      Sulfa Drugs      Sulfamethoxazole-Trimethoprim Rash       ONCOLOGIC HISTORY  -2015 PRESENTATION: Headaches.  -2015 MR brain imaging with a right occipital mass   -4/22/2015 SURGERY: Resection by Dr. Lujan   PATHOLOGY: Hemangiopericytoma, grade 2.   -2015 CT chest, abdomen and pelvis negative for malignancy.  -2015 PET MRI negative for malignancy.   Conservative management per Dr. Herman Mayberry.    -8/1/2017 CT chest abdomen and pelvis and bone scan negative for malignancy.   -12/7/2017 MRB with no evidence of tumor recurrence.  -12/6/2018 MRB with no evidence of tumor recurrence.    -12/11/2019 MRB with a new nodule of enhancement within the superior sagittal sinus, concerning for tumor recurrence. Stable appearance of the right parieto-occipital craniotomy site.  -12/13/2019 Bone scan with no convincing evidence of skeletal metastases.  -12/18/2019 MR liver with a 9 mm lesion in the lateral aspect of the junction of segments 7 and 8 of the liver shows homogeneous delayed enhancement and is new since 2015. This could represent a metastatic lesion.     -1/6/2020 NEURO-ONC: Referral to radiation oncology and GI oncology surgery. Signed consent for next generation sequencing.   -1/14/2020 RADS: GammaKnife to lesion (1 fraction).   -1/15/2020 LIVER LESION:  Laparoscopic ultrasound-guided microwave ablation of liver tumor by Dr. Phan Fierro.   PATHOLOGY: Cavernous hemangioma of the liver, benign:    -No metastatic hemangiopericytoma or other malignancy identified    -Underlying steatohepatitis, no significant fibrosis    -No follow-up needed.  -2/28/2020 NEURO-ONC/ MRB: Clinically stable. Headaches with migraine features; increasing Zoloft. Imaging with positive response to radiation.   -3/9/2020 Evaluated by Dr. Wright; Neuro-psych testing with mild weaknesses were noted in aspects of speeded visual processing, and general cognitive speed. Repeat in 3/2021.  -6/1/2020 NEURO-ONC/ MRB: Clinically well;  for insomnia trial of melatonin. Imaging with positive treatment effect.     SOCIAL HISTORY   Tobacco use: Former smoker; quit in 2003. 27 pack years.  Alcohol use: Social use.   Drug use: Denies marijuana use.  . 2 daughter, 1 son.   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        PHYSICAL EXAMINATION  -Generally well appearing. Good energy today.   -Respiratory: No audible wheezing.   -Skin: No facial rashes. Healed head incision.  -Psychiatric: Normal mood and affect. Pleasant, talkative. Laughing a lot.   -Neurologic:   MENTAL STATUS:     Alert, oriented to date.    Recall: Intact.    Speech fluent.    Comprehension intact to multi-step commands.     CRANIAL NERVES:     Pupils are equal, round, reactive to light.     Extraocular movements full, patient denies diplopia.     Left inferior homonymous quadrantanopia.     Symmetric facial movements.   Hearing intact.   With normal phonation, no dysfunction of the palate or tongue.   MOTOR:    Antigravity in arms.     The rest of a comprehensive physical examination is deferred due to PHE (public health emergency) video visit restrictions.        MEDICAL RECORDS  Obtained and personally reviewed all available outside medical records in addition to reviewing any records available in our electronic system.     LABS  Personally reviewed all available lab results.     IMAGING  Personally reviewed MR brain imaging from today. The contrast enhancing lesion at the sagittal sinus appears to be slightly decreased in size as compared to post-radiation imaging. No new lesions. No increase in T2 FLAIR changes about the cavity.    Imaging discussed with Marlin.     Imaging and case will be reviewed and discussed at Brain Tumor Conference later today.       IMPRESSION  Clinic was spent discussing in detail the nature of this tumor in light of repeat imaging. This was in addition to providing emotional support, answering questions pertaining to my  recommendations, and devising the treatment plan as outlined below.     Grade II hemangiopericytomas are considered malignant tumors. Standard treatment options include surgery and radiation therapy. With no good surgical option, Priyanka completed stereotactic radiation therapy in 1/2020. Imaging from today was again with no new concerns and continued positive treatment effect from the radiation was noted. Will repeat imaging in 3-4 months.     For radiation-refractory tumors, Pazopanib, a broad-spectrum tyrosine kinase inhibitor, has been used with noted efficacy. Of note, Priyanka has been consented for next generation sequencing. At disease recurrence, can consider sending pathology for mutational analysis to potentially identify targeted theray options.     Extracranial metastatic disease can occur; most commonly in bone, lung, and liver. Prior imaging of the MR liver showed a lesion. Priyanka was referred to Dr. Pahn Fierro with surgical oncology. Dr. Fierro performed a  laparoscopic ultrasound-guided microwave ablation of liver tumor on 1/15/2020. Pathology was consistent with a benign cavernous hemangioma with no metastatic hemangiopericytoma or other malignancy identified. The plan will be to continue annual systemic surveillance imaging. As these tumors have great variably on PET, will scan with CT C/A/P with contrast.     Clinically, Priyanka is doing well with a reduction in the frequency and severity of headaches with an increase in Zoloft. Notes some daytime fatigue with difficulty in falling asleep at night. I recommended no devices 1 hour prior to bedtime and a trial of melatonin.     PROBLEM LIST  Hemangiopericytoma, grade II  Liver lesion  Depressed mood    PLAN  -CANCER-DIRECTED THERAPY-  -As above; Continue imaging surveillance. Repeat MR brain imaging in 3 months.     -LIVER LESION-  -Treated. No lesion-specific follow-up needed.   -Will continue with systemic surveillance imaging with a CT c/a/p with  contrast annually at next visit.     -STEROIDS-  -Currently off dexamethasone.    -SEIZURE MANAGEMENT-  -While this patient is at increased risk of having seizures, given the lack of seizure history, there is no indication to prescribe an antiepileptic at this time.     -Quality of life/ MOOD/ FATIGUE-  -Continue to monitor mood as untreated/ undertreated depression can worsen fatigue, dysorexia, and quality of life.  -Continue Zoloft 150mg daily.   -Insomnia/ daytime fatigue; trial of melatonin and no devices 1 hour prior to bedtime.    -HEADACHES-  -Less severe and frequent; Migrainous features of throbbing pain, high severity, photo- and phonophobia, and worsening pain with activity.   -Continue Zoloft.   -Encouraged remaining hydrated.   -With > 15 headache days/ month, if pain is not improved, can consider a referral for Botox.     -MEMORY LOSS/ WORD FINDING ISSUES-  -Evaluated by Dr. Phan Wright in 3/2020. Neuro-psych testing with mild weaknesses were noted in aspects of speeded visual processing, and general cognitive speed.  -Recommended repeating testing in 3/2021.     Return to clinic in 9/2020 to review repeat imaging.     In the meantime, Priyanka knows to call with questions or concerns or to report new complaints and can be seen sooner if needed.    Lauren Zhang MD  Neuro-oncology      Video-Visit Details  Type of service:  Video Visit    Video Start Time: 11:16 AM  Video End Time: 11:36 AM    Originating Location (pt. Location): Home    Distant Location (provider location):  [facility]     Platform used for Video Visit: [facility]    Lauren Zhang MD

## 2020-06-01 NOTE — DISCHARGE INSTRUCTIONS
MRI Contrast Discharge Instructions    The IV contrast you received today will pass out of your body in your  urine. This will happen in the next 24 hours. You will not feel this process.  Your urine will not change color.    Drink at least 4 extra glasses of water or juice today (unless your doctor  has restricted your fluids). This reduces the stress on your kidneys.  You may take your regular medicines.    If you are on dialysis: It is best to have dialysis today.    If you have a reaction: Most reactions happen right away. If you have  any new symptoms after leaving the hospital (such as hives or swelling),  call your hospital at the correct number below. Or call your family doctor.  If you have breathing distress or wheezing, call 911.    Special instructions: ***    I have read and understand the above information.    Signature:______________________________________ Date:___________    Staff:__________________________________________ Date:___________     Time:__________    [facility] Departments:    ___[place]: [phone]  ___[facility]: [phone]  ___[place]: [phone] ___[facility]: [phone]  ___[facility]: [phone]  ___[facility]: [phone]  ___[phone]  ___[facility]: [phone]  ___[phone]

## 2020-06-08 ENCOUNTER — TUMOR CONFERENCE (OUTPATIENT)
Dept: ONCOLOGY | Facility: CLINIC | Age: 60
End: 2020-06-08
Payer: COMMERCIAL

## 2020-07-14 ENCOUNTER — VIRTUAL VISIT (OUTPATIENT)
Dept: FAMILY MEDICINE | Facility: OTHER | Age: 60
End: 2020-07-14
Payer: COMMERCIAL

## 2020-07-14 DIAGNOSIS — Z20.822 SUSPECTED COVID-19 VIRUS INFECTION: Primary | ICD-10-CM

## 2020-07-14 PROCEDURE — 99421 OL DIG E/M SVC 5-10 MIN: CPT | Performed by: PHYSICIAN ASSISTANT

## 2020-07-14 NOTE — PROGRESS NOTES
"Date: 2020 07:53:45  Clinician: Star Sanchez  Clinician NPI: [id number]  Patient: Leidy Ascencio  Patient : 1960  Patient Address: [address] [place], [place] [address]  Patient Phone: [phone]  Visit Protocol: URI  Patient Summary:  Leidy is a 60 year old ( : 1960 ) female who initiated a Visit for COVID-19 (Coronavirus) evaluation and screening. When asked the question \"Please sign me up to receive news, health information and promotions from [employer].\", Leidy responded \"Yes\".    Leidy states her symptoms started 1-2 days ago.   Her symptoms consist of rhinitis, malaise, a headache, a sore throat, myalgia, facial pain or pressure, a cough, and nasal congestion. She is experiencing mild difficulty breathing with activities but can speak normally in full sentences. Leidy also feels feverish.   Symptom details     Nasal secretions: The color of her mucus is clear.    Cough: Leidy coughs every 5-10 minutes and her cough is more bothersome at night. Phlegm comes into her throat when she coughs. She does not believe her cough is caused by post-nasal drip. The color of the phlegm is yellow and clear.     Sore throat: Leidy reports having moderate throat pain (4-6 on a 10 point pain scale), does not have exudate on her tonsils, and can swallow liquids. She is not sure if the lymph nodes in her neck are enlarged. A rash has not appeared on the skin since the sore throat started.     Temperature: Her current temperature is 98.5 degrees Fahrenheit.     Facial pain or pressure: The facial pain or pressure does not feel worse when bending or leaning forward.     Headache: She states the headache is moderate (4-6 on a 10 point pain scale).      Leiyd denies having wheezing, nausea, teeth pain, ageusia, diarrhea, vomiting, ear pain, chills, and anosmia. She also denies having recent facial or sinus surgery in the past 60 days, taking antibiotic medication in the past month, and having a sinus infection " within the past year.   Precipitating events  Within the past week, Leidy has not been exposed to someone with strep throat. She has not recently been exposed to someone with influenza. Leidy has been in close contact with the following high risk individuals: immunocompromised people and adults 65 or older.   Pertinent COVID-19 (Coronavirus) information  In the past 14 days, Leidy has not worked in a congregate living setting.   She does not work or volunteer as healthcare worker or a  and does not work or volunteer in a healthcare facility.   Leidy also has not lived in a congregate living setting in the past 14 days. She does not live with a healthcare worker.   Leidy has not had a close contact with a laboratory-confirmed COVID-19 patient within 14 days of symptom onset.   Pertinent medical history  Leidy does not get yeast infections when she takes antibiotics.   Leidy does not need a return to work/school note.   Weight: 185 lbs   Leidy does not smoke or use smokeless tobacco.   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    MEDICATIONS: Vitamin D3 oral, rosuvastatin oral, Zoloft oral, Loratadine-D oral, ALLERGIES: Penicillins  Clinician Response:  Dear Leidy,   Your symptoms show that you may have coronavirus (COVID-19). This illness can cause fever, cough and trouble breathing. Many people get a mild case and get better on their own. Some people can get very sick.  What should I do?  We would like to test you for this virus.   1. Please call [phone] to schedule your visit. Explain that you were referred by [facility] to have a COVID-19 test. Be  "ready to share your [facility] visit ID number.  The following will serve as your written order for this COVID Test, ordered by me, for the indication of suspected COVID [Z20.828]: The test will be ordered in [employer], our electronic health record, after you are scheduled. It will show as ordered and authorized by Darrion Monae MD.  Order: COVID-19 (Coronavirus) PCR for SYMPTOMATIC testing from [facility].      2. When it's time for your COVID test:  Stay at least 6 feet away from others. (If someone will drive you to your test, stay in the backseat, as far away from the  as you can.)   Cover your mouth and nose with a mask, tissue or washcloth.  Go straight to the testing site. Don't make any stops on the way there or back.      3.Starting now: Stay home and away from others (self-isolate) until:   You've had no fever---and no medicine that reduces fever---for 3 full days (72 hours). And...   Your other symptoms have gotten better. For example, your cough or breathing has improved. And...   At least 10 days have passed since your symptoms started.       During this time, don't leave the house except for testing or medical care.   Stay in your own room, even for meals. Use your own bathroom if you can.   Stay away from others in your home. No hugging, kissing or shaking hands. No visitors.  Don't go to work, school or anywhere else.    Clean \"high touch\" surfaces often (doorknobs, counters, handles, etc.). Use a household cleaning spray or wipes. You'll find a full list of  on the EPA website: www.epa.gov/pesticide-registration/list-n-disinfectants-use-against-sars-cov-2.   Cover your mouth and nose with a mask, tissue or washcloth to avoid spreading germs.  Wash your hands and face often. Use soap and water.  Caregivers in these groups are at risk for severe illness due to COVID-19:  o People 65 years and older  o People who live in a nursing home or long-term care facility  o People with chronic disease (lung, " heart, cancer, diabetes, kidney, liver, immunologic)  o People who have a weakened immune system, including those who:   Are in cancer treatment  Take medicine that weakens the immune system, such as corticosteroids  Had a bone marrow or organ transplant  Have an immune deficiency  Have poorly controlled HIV or AIDS  Are obese (body mass index of 40 or higher)  Smoke regularly   o Caregivers should wear gloves while washing dishes, handling laundry and cleaning bedrooms and bathrooms.  o Use caution when washing and drying laundry: Don't shake dirty laundry, and use the warmest water setting that you can.  o For more tips, go to www.cdc.gov/coronavirus/2019-ncov/downloads/10Things.pdf.    4.Sign up for [employer]. We know it's scary to hear that you might have COVID-19. We want to track your symptoms to make sure you're okay over the next 2 weeks. Please look for an email from [employer]---this is a free, online program that we'll use to keep in touch. To sign up, follow the link in the email. Learn more at [email or web address]  How can I take care of myself?   Get lots of rest. Drink extra fluids (unless a doctor has told you not to).   Take Tylenol (acetaminophen) for fever or pain. If you have liver or kidney problems, ask your family doctor if it's okay to take Tylenol.   Adults can take either:    650 mg (two 325 mg pills) every 4 to 6 hours, or...   1,000 mg (two 500 mg pills) every 8 hours as needed.    Note: Don't take more than 3,000 mg in one day. Acetaminophen is found in many medicines (both prescribed and over-the-counter medicines). Read all labels to be sure you don't take too much.   For children, check the Tylenol bottle for the right dose. The dose is based on the child's age or weight.    If you have other health problems (like cancer, heart failure, an organ transplant or severe kidney disease): Call your specialty clinic if you don't feel better in the next 2 days.       Know  when to call 911. Emergency warning signs include:    Trouble breathing or shortness of breath Pain or pressure in the chest that doesn't go away Feeling confused like you haven't felt before, or not being able to wake up Bluish-colored lips or face.  Where can I get more information?   [facility] -- About COVID-19: [email or web address]   CDC -- What to Do If You're Sick: www.cdc.gov/coronavirus/2019-ncov/about/steps-when-sick.html   CDC -- Ending Home Isolation: www.cdc.gov/coronavirus/2019-ncov/hcp/disposition-in-home-patients.html   [facility] -- Caring for Someone: www.cdc.gov/coronavirus/2019-ncov/if-you-are-sick/care-for-someone.html   [facility] -- Interim Guidance for Hospital Discharge to Home: www.[facility].[place]/diseases/coronavirus/hcp/hospdischarge.pdf   [facility] clinical trials (COVID-19 research studies): clinicalaffairs.[facility]-clinical-trials    Below are the COVID-19 hotlines at the [place] Department of Health ([facility]). Interpreters are available.    For health questions: Call 292-169-9847 or 1-383.903.3211 (7 a.m. to 7 p.m.) For questions about schools and childcare: Call 363-769-1323 or 1-352.977.9853 (7 a.m. to 7 p.m.)    Diagnosis: Acute upper respiratory infection, unspecified  Diagnosis ICD: J06.9

## 2020-07-15 DIAGNOSIS — Z20.822 SUSPECTED COVID-19 VIRUS INFECTION: ICD-10-CM

## 2020-07-15 PROCEDURE — U0003 INFECTIOUS AGENT DETECTION BY NUCLEIC ACID (DNA OR RNA); SEVERE ACUTE RESPIRATORY SYNDROME CORONAVIRUS 2 (SARS-COV-2) (CORONAVIRUS DISEASE [COVID-19]), AMPLIFIED PROBE TECHNIQUE, MAKING USE OF HIGH THROUGHPUT TECHNOLOGIES AS DESCRIBED BY CMS-2020-01-R: HCPCS | Performed by: FAMILY MEDICINE

## 2020-07-15 NOTE — LETTER
July 20, 2020        Leidy Ascencio  [address] [place] [address]    This letter provides a written record that you were tested for COVID-19 on 07/15/20.       Your result was negative. This means that we didn t find the virus that causes COVID-19 in your sample. A test may show negative when you do actually have the virus. This can happen when the virus is in the early stages of infection, before you feel illness symptoms.    If you have symptoms   Stay home and away from others (self-isolate) until you meet ALL of the guidelines below:    You ve had no fever--and no medicine that reduces fever--for 3 full days (72 hours). And      Your other symptoms have gotten better. For example, your cough or breathing has improved. And     At least 10 days have passed since your symptoms started.    During this time:    Stay home. Don t go to work, school or anywhere else.     Stay in your own room, including for meals. Use your own bathroom if you can.    Stay away from others in your home. No hugging, kissing or shaking hands. No visitors.    Clean  high touch  surfaces often (doorknobs, counters, handles, etc.). Use a household cleaning spray or wipes. You can find a full list on the EPA website at www.epa.gov/pesticide-registration/list-n-disinfectants-use-against-sars-cov-2.    Cover your mouth and nose with a mask, tissue or washcloth to avoid spreading germs.    Wash your hands and face often with soap and water.    Going back to work  Check with your employer for any guidelines to follow for going back to work.    Employers: This document serves as formal notice that your employee tested negative for COVID-19, as of the testing date shown above.

## 2020-07-16 LAB
SARS-COV-2 RNA SPEC QL NAA+PROBE: NOT DETECTED
SPECIMEN SOURCE: NORMAL

## 2020-07-17 ENCOUNTER — VIRTUAL VISIT (OUTPATIENT)
Dept: FAMILY MEDICINE | Facility: OTHER | Age: 60
End: 2020-07-17

## 2020-07-17 ENCOUNTER — OFFICE VISIT (OUTPATIENT)
Dept: FAMILY MEDICINE | Facility: CLINIC | Age: 60
End: 2020-07-17
Payer: COMMERCIAL

## 2020-07-17 VITALS — OXYGEN SATURATION: 96 % | TEMPERATURE: 98.2 F | HEART RATE: 66 BPM

## 2020-07-17 DIAGNOSIS — J32.9 SINUSITIS, UNSPECIFIED CHRONICITY, UNSPECIFIED LOCATION: Primary | ICD-10-CM

## 2020-07-17 PROCEDURE — 99203 OFFICE O/P NEW LOW 30 MIN: CPT | Performed by: FAMILY MEDICINE

## 2020-07-17 RX ORDER — BENZONATATE 200 MG/1
200 CAPSULE ORAL 3 TIMES DAILY PRN
Qty: 30 CAPSULE | Refills: 0 | Status: SHIPPED | OUTPATIENT
Start: 2020-07-17 | End: 2020-12-07

## 2020-07-17 RX ORDER — CETIRIZINE HYDROCHLORIDE 10 MG/1
10 TABLET ORAL DAILY
Qty: 20 TABLET | Refills: 0 | Status: SHIPPED | OUTPATIENT
Start: 2020-07-17 | End: 2020-12-11

## 2020-07-17 RX ORDER — AZITHROMYCIN 250 MG/1
TABLET, FILM COATED ORAL
Qty: 6 TABLET | Refills: 0 | Status: SHIPPED | OUTPATIENT
Start: 2020-07-17 | End: 2020-07-22

## 2020-07-17 RX ORDER — AZELASTINE 1 MG/ML
1 SPRAY, METERED NASAL 2 TIMES DAILY
Qty: 1 BOTTLE | Refills: 1 | Status: SHIPPED | OUTPATIENT
Start: 2020-07-17 | End: 2021-03-01

## 2020-07-17 NOTE — PROGRESS NOTES
Subjective     Leidy Ascencio is a 60 year old female who presents to clinic today for the following health issues:    HPI       Acute Illness   Acute illness concerns: Cough,Sinus congestion,  Onset: Sunday    Fever: no     Chills/Sweats: YES- some sweats    Headache (location?): YES    Sinus Pressure:YES    Conjunctivitis:  YES- Bilateral redness and watering    Ear Pain: No But heard a loud popping sound from her RT ear    Rhinorrhea: YES    Congestion: YES    Sore Throat: YES     Cough: YES    Wheeze: no     Decreased Appetite: YES    Nausea: no     Vomiting: no     Diarrhea:  no     Dysuria/Freq.: no     Fatigue/Achiness: YES    Sick/Strep Exposure: YES- grandson had cold sx last week but has now resolved     Therapies Tried and outcome: OTC cold and Flu medication. Tylenol and ibu. Vitamin D as well as benadryl        Patient Active Problem List   Diagnosis     Advanced directives, counseling/discussion     Recurrent cold sores     Other insomnia     Atopic rhinitis     Major depressive disorder, recurrent episode, mild (H)     Plantar fasciitis     Vitamin D deficiency     Quadrant hemianopsia, left     Hemangiopericytoma of CNS, grade II     Metastatic cancer to liver (H)     Past Surgical History:   Procedure Laterality Date     C EXCIS INFRATENT MENINGIOMA       GYN SURGERY      3 cesections     HYSTERECTOMY VAGINAL, BILATERAL SALPINGO-OOPHERECTOMY, COMBINED  1988    hysterectomy + ovaries      LAPAROSCOPIC ABLATION LIVER TUMOR N/A 1/15/2020    Procedure: Laparoscopic ultrasound guided percutaneous microwave abalation of tumor x1; ultrasound guided Liver Biopsy x 4; Hepatic Ultrasound Intraoperatively;  Surgeon: Phan Fierro MD;  Location: [facility] OR     LAPAROSCOPY DIAGNOSTIC (GENERAL)  1/15/2020    Procedure: Laparoscopy diagnostic (general);  Surgeon: Phan Fierro MD;  Location: [facility] OR       Social History     Tobacco Use     Smoking status: Former Smoker     Packs/day: 1.00     Years: 27.00     Pack  years: 27.00     Types: Cigarettes     Last attempt to quit: 2003     Years since quittin.5     Smokeless tobacco: Never Used   Substance Use Topics     Alcohol use: Yes     Alcohol/week: 0.0 standard drinks     Family History   Problem Relation Age of Onset     Coronary Artery Disease Mother      Arthritis Father      Alzheimer Disease Maternal Grandmother      Other Cancer Maternal Grandmother         lung     Other Cancer Paternal Grandfather         meloma         Current Outpatient Medications   Medication Sig Dispense Refill     ALBUTEROL 108 (90 BASE) MCG/ACT inhaler Inhale 1-2 puffs into the lungs every 4 hours as needed   2     aspirin 81 MG EC tablet Take 81 mg by mouth At Bedtime       azelastine (ASTELIN) 0.1 % nasal spray Spray 1 spray into both nostrils 2 times daily 1 Bottle 1     azithromycin (ZITHROMAX) 250 MG tablet Take 2 tablets (500 mg) by mouth daily for 1 day, THEN 1 tablet (250 mg) daily for 4 days. 6 tablet 0     benzonatate (TESSALON) 200 MG capsule Take 1 capsule (200 mg) by mouth 3 times daily as needed for cough 30 capsule 0     cetirizine (ZYRTEC) 10 MG tablet Take 1 tablet (10 mg) by mouth daily 20 tablet 0     fluticasone (FLONASE) 50 MCG/ACT nasal spray Spray 2 sprays into both nostrils daily       loratadine (CLARITIN) 10 MG tablet Take 10 mg by mouth daily       rosuvastatin (CRESTOR) 10 MG tablet Take 10 mg by mouth daily       sertraline (ZOLOFT) 100 MG tablet Take 1 tablet (100 mg) by mouth daily Take 1-2 tablets (100-200mg) by mouth daily 180 tablet 1     valACYclovir (VALTREX) 1000 mg tablet Take 2 tablets (2,000 mg) by mouth 2 times daily (Patient taking differently: Take 2,000 mg by mouth 2 times daily as needed ) 12 tablet 6     VITAMIN D, CHOLECALCIFEROL, PO Take 1,000 Units by mouth daily       Allergies   Allergen Reactions     Atorvastatin      Penicillins      Sulfa Drugs      Sulfamethoxazole-Trimethoprim Rash     Recent Labs   Lab Test 19  02/03/16  1358 01/19/16  0930 06/30/15   A1C  --  5.9  --  5.9   LDL  --   --  119* 153   HDL  --   --  37* 37   TRIG  --   --  379* 339   ALT  --  32  --  33   CR 0.80  --   --  0.73   GFRESTIMATED >60  --   --  >60   GFRESTBLACK >60  --   --  >60   POTASSIUM  --   --   --  4.3   TSH  --   --   --  1.54      BP Readings from Last 3 Encounters:   02/28/20 132/82   02/10/20 120/80   01/15/20 118/71    Wt Readings from Last 3 Encounters:   02/28/20 88.9 kg (196 lb)   02/10/20 87.5 kg (193 lb)   01/15/20 85.5 kg (188 lb 7.9 oz)                    Reviewed and updated as needed this visit by Provider  Tobacco  Allergies  Meds  Problems  Med Hx  Surg Hx  Fam Hx         Review of Systems   Constitutional, HEENT, cardiovascular, pulmonary, gi and gu systems are negative, except as otherwise noted.      Objective    Pulse 66   Temp 98.2  F (36.8  C)   SpO2 96%   There is no height or weight on file to calculate BMI.  Physical Exam  Vitals signs and nursing note reviewed.   Constitutional:       General: She is not in acute distress.     Appearance: She is not ill-appearing, toxic-appearing or diaphoretic.   HENT:      Head: Normocephalic and atraumatic.      Nose: Nose normal.   Cardiovascular:      Rate and Rhythm: Normal rate.      Pulses: Normal pulses.   Pulmonary:      Effort: Pulmonary effort is normal.   Neurological:      Mental Status: She is alert.                    Assessment & Plan     1. Sinusitis, unspecified chronicity, unspecified location  Patient presented with cough and sinus pressure.she denies fever . She denies exposure to COVDI 19  Associated symptoms include sore throat, congestion and runny nose.  Patient reports she had a history of recurrent sinus infection and she believes her current symptoms are similar to previous episodes.  She was tested negative for COVID-19 on 07/15/2020  Due to COVID 19 pandemic and her current symptoms I was fully gowned .    I had a long discussion with the  patient about her condition , diagnosis treatment options. We discussed diffenetial diagnosis, and expected course.   The risks, benefits, alternatives and side effects have been discussed and are understood by the patient. There are no medical contraindications, the patient agrees to treatment, and has the capacity to do so. The patient understands to call 911 or come to the nearest ED if life threatening or urgent symptoms present.  I recommend the following :    - azithromycin (ZITHROMAX) 250 MG tablet; Take 2 tablets (500 mg) by mouth daily for 1 day, THEN 1 tablet (250 mg) daily for 4 days.  Dispense: 6 tablet; Refill: 0  - cetirizine (ZYRTEC) 10 MG tablet; Take 1 tablet (10 mg) by mouth daily  Dispense: 20 tablet; Refill: 0  - benzonatate (TESSALON) 200 MG capsule; Take 1 capsule (200 mg) by mouth 3 times daily as needed for cough  Dispense: 30 capsule; Refill: 0  - azelastine (ASTELIN) 0.1 % nasal spray; Spray 1 spray into both nostrils 2 times daily  Dispense: 1 Bottle; Refill: 1                   Return in about 4 weeks (around 8/14/2020), or if symptoms worsen or fail to improve, for In-Clinic Visit.    Nelson Pulliam MD  [facility]

## 2020-07-17 NOTE — PROGRESS NOTES
"Date: 2020 09:11:34  Clinician: Shiraz Ross  Clinician NPI: [id number]  Patient: Leidy Ascencio  Patient : 1960  Patient Address: [address] [place], [place] [address]  Patient Phone: [phone]  Visit Protocol: URI  Patient Summary:  Leidy is a 60 year old ( : 1960 ) female who initiated a Visit for cold, sinus infection, or influenza. When asked the question \"Please sign me up to receive news, health information and promotions from [employer].\", Leidy responded \"No\".    Leidy states her symptoms started gradually 7-9 days ago.   Her symptoms consist of rhinitis, malaise, a headache, a sore throat, facial pain or pressure, a cough, and nasal congestion. She is experiencing difficulty breathing due to nasal congestion but she is not short of breath.   Symptom details     Nasal secretions: The color of her mucus is green, yellow, and clear.    Cough: Leidy coughs every 5-10 minutes and her cough is more bothersome at night. Phlegm comes into her throat when she coughs. She believes her cough is caused by post-nasal drip. The color of the phlegm is green, yellow, and clear.     Sore throat: Leidy reports having moderate throat pain (4-6 on a 10 point pain scale), does not have exudate on her tonsils, and can swallow liquids. She is not sure if the lymph nodes in her neck are enlarged. A rash has not appeared on the skin since the sore throat started.     Facial pain or pressure: The facial pain or pressure feels worse when bending over or leaning forward.     Headache: She states the headache is moderate (4-6 on a 10 point pain scale).      Leidy denies having wheezing, nausea, teeth pain, ageusia, diarrhea, vomiting, ear pain, chills, myalgias, anosmia, and fever. She also denies having recent facial or sinus surgery in the past 60 days, taking antibiotic medication in the past month, and double sickening (worsening symptoms after initial improvement).   Precipitating events  Within the past " week, Leidy has not been exposed to someone with strep throat. She has not recently been exposed to someone with influenza. Leidy has been in close contact with the following high risk individuals: adults 65 or older.   Pertinent COVID-19 (Coronavirus) information  In the past 14 days, Leidy has not worked in a congregate living setting.   She does not work or volunteer as healthcare worker or a  and does not work or volunteer in a healthcare facility.   Leidy also has not lived in a congregate living setting in the past 14 days. She does not live with a healthcare worker.   Leidy has not had a close contact with a laboratory-confirmed COVID-19 patient within 14 days of symptom onset.   Pertinent medical history  Leidy had 1 sinus infection within the past year.   Leidy does not get yeast infections when she takes antibiotics.   Leidy does not need a return to work/school note.   Weight: 185 lbs   Leidy does not smoke or use smokeless tobacco.   Additional information as reported by the patient (free text): I can taste the infection in the back of my throat. My eyes hurt and my ears popped ( popped so much it made me jump ) so a little ear pain now   Weight: 185 lbs    MEDICATIONS: Vitamin D3 oral, rosuvastatin oral, Zoloft oral, Loratadine-D oral, ALLERGIES: Penicillins, Sulfa (Sulfonamide Antibiotics), Bactrim  Clinician Response:  Dear Leidy,   Your symptoms show that you may have coronavirus (COVID-19). This illness can cause fever, cough and trouble breathing. Many people get a mild case and get better on their own. Some people can get very sick.  What should I do?  We would like to test you for this virus.   1. Please call [phone] to schedule your visit. Explain that you were referred by [facility] to have a COVID-19 test. Be ready to share your [facility] visit ID number.  The following will serve as your written order for this COVID Test, ordered by me, for the indication of suspected COVID  "[[id number]]: The test will be ordered in [facility], our electronic health record, after you are scheduled. It will show as ordered and authorized by Darrion Monae MD.  Order: COVID-19 (Coronavirus) PCR for SYMPTOMATIC testing from [facility].      2. When it's time for your COVID test:  Stay at least 6 feet away from others. (If someone will drive you to your test, stay in the backseat, as far away from the  as you can.)   Cover your mouth and nose with a mask, tissue or washcloth.  Go straight to the testing site. Don't make any stops on the way there or back.      3.Starting now: Stay home and away from others (self-isolate) until:   You've had no fever---and no medicine that reduces fever---for 3 full days (72 hours). And...   Your other symptoms have gotten better. For example, your cough or breathing has improved. And...   At least 10 days have passed since your symptoms started.       During this time, don't leave the house except for testing or medical care.   Stay in your own room, even for meals. Use your own bathroom if you can.   Stay away from others in your home. No hugging, kissing or shaking hands. No visitors.  Don't go to work, school or anywhere else.    Clean \"high touch\" surfaces often (doorknobs, counters, handles, etc.). Use a household cleaning spray or wipes. You'll find a full list of  on the EPA website: www.epa.gov/pesticide-registration/list-n-disinfectants-use-against-sars-cov-2.   Cover your mouth and nose with a mask, tissue or washcloth to avoid spreading germs.  Wash your hands and face often. Use soap and water.  Caregivers in these groups are at risk for severe illness due to COVID-19:  o People 65 years and older  o People who live in a nursing home or long-term care facility  o People with chronic disease (lung, heart, cancer, diabetes, kidney, liver, immunologic)  o People who have a weakened immune system, including those who:   Are in cancer treatment  Take medicine that " weakens the immune system, such as corticosteroids  Had a bone marrow or organ transplant  Have an immune deficiency  Have poorly controlled HIV or AIDS  Are obese (body mass index of 40 or higher)  Smoke regularly   o Caregivers should wear gloves while washing dishes, handling laundry and cleaning bedrooms and bathrooms.  o Use caution when washing and drying laundry: Don't shake dirty laundry, and use the warmest water setting that you can.  o For more tips, go to www.cdc.gov/coronavirus/2019-ncov/downloads/10Things.pdf.    4.Sign up for [facility]. We know it's scary to hear that you might have COVID-19. We want to track your symptoms to make sure you're okay over the next 2 weeks. Please look for an email from [employer]---this is a free, online program that we'll use to keep in touch. To sign up, follow the link in the email. Learn more at [email or web address]  How can I take care of myself?   Get lots of rest. Drink extra fluids (unless a doctor has told you not to).   Take Tylenol (acetaminophen) for fever or pain. If you have liver or kidney problems, ask your family doctor if it's okay to take Tylenol.   Adults can take either:    650 mg (two 325 mg pills) every 4 to 6 hours, or...   1,000 mg (two 500 mg pills) every 8 hours as needed.    Note: Don't take more than 3,000 mg in one day. Acetaminophen is found in many medicines (both prescribed and over-the-counter medicines). Read all labels to be sure you don't take too much.   For children, check the Tylenol bottle for the right dose. The dose is based on the child's age or weight.    If you have other health problems (like cancer, heart failure, an organ transplant or severe kidney disease): Call your specialty clinic if you don't feel better in the next 2 days.       Know when to call 911. Emergency warning signs include:    Trouble breathing or shortness of breath Pain or pressure in the chest that doesn't go away Feeling confused like  you haven't felt before, or not being able to wake up Bluish-colored lips or face.  Where can I get more information?   [facility] -- About COVID-19: [email or web address]   CDC -- What to Do If You're Sick: www.cdc.gov/coronavirus/2019-ncov/about/steps-when-sick.html   CDC -- Ending Home Isolation: www.cdc.gov/coronavirus/2019-ncov/hcp/disposition-in-home-patients.html   CDC -- Caring for Someone: www.cdc.gov/coronavirus/2019-ncov/if-you-are-sick/care-for-someone.html   [facility] -- Interim Guidance for Hospital Discharge to Home: www.[facility].mn./diseases/coronavirus/hcp/hospdischarge.pdf   [facility] clinical trials (COVID-19 research studies): clinicalaffairs.[facility]-clinical-trials    Below are the COVID-19 hotlines at the [place] Department of Health ([facility]). Interpreters are available.    For health questions: Call 479-375-1734 or 1-492.581.6033 (7 a.m. to 7 p.m.) For questions about schools and childcare: Call 660-267-1117 or 1-748.340.3787 (7 a.m. to 7 p.m.)    COVID-19 (Coronavirus) General Information  Because there is currently no vaccine to prevent infection, the best way to protect yourself is to avoid being exposed to this virus. Common symptoms of COVID-19 include but are not limited to fever, cough, and shortness of breath. These symptoms appear 2-14 days after you are exposed to the virus that causes COVID-19. Click here for more information from the CDC on how to protect yourself.  If you are sick with COVID-19 or suspect you are infected with the virus that causes COVID-19, follow the steps here from the CDC to help prevent the disease from spreading to people in your home and community.  Click here for general information from the CDC on testing.  If you develop any of these emergency warning signs for COVID-19, get medical attention immediately:     Trouble breathing    Persistent pain or pressure in the chest    New confusion or inability to arouse    Bluish  lips or face      Call your doctor or clinic before going in. Call 911 if you have a medical emergency and notify the  you have or think you may have COVID-19.  For more detailed and up to date information on COVID-19 (Coronavirus), please visit the CDC website.   Diagnosis: Unspecified disturbances of smell and taste  Diagnosis ICD: R43.9

## 2020-09-13 NOTE — PATIENT INSTRUCTIONS
Brain imaging reviewed; no concerns.   Repeat imaging in 3 months.    Body imaging surveillance; CT with no concerns.  Repeat in 9/2021.     Continue Zoloft at 150mg daily.   Trial of melatonin.     Return to clinic in 12/2020 to review new imaging.     Lauren Zhang MD  Neuro-oncology  09/14/20

## 2020-09-13 NOTE — PROGRESS NOTES
"Leidy Ascencio is a 60 year old female who is being evaluated via a billable video visit.      The patient has been notified of following:     \"This video visit will be conducted via a call between you and your physician/provider. We have found that certain health care needs can be provided without the need for an in-person physical exam.  This service lets us provide the care you need with a video conversation.  If a prescription is necessary we can send it directly to your pharmacy.  If lab work is needed we can place an order for that and you can then stop by our lab to have the test done at a later time.    Video visits are billed at different rates depending on your insurance coverage.  Please reach out to your insurance provider with any questions.    If during the course of the call the physician/provider feels a video visit is not appropriate, you will not be charged for this service.\"    Patient has given verbal consent for Video visit? Yes  How would you like to obtain your AVS? MyChart  If you are dropped from the video visit, the video invite should be resent to: Send to e-mail at: [email or web address]  Will anyone else be joining your video visit? No    Patient had no vitals to report.   0/10 on pain scale.     No refills needed.     Rebekah Everett [facility]      Video-Visit Details  Type of service:  Video Visit    Video Start Time: 3:38 PM  Video End Time: 4:02 PM    Originating Location (pt. Location): Home    Distant Location (provider location):  [facility]     Platform used for Video Visit: Froilan Zhang MD    _____________________________________________________________    NEURO-ONCOLOGY VISIT  Sep 14, 2020    CHIEF COMPLAINT: Ms. 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 She completed GammaKnife treatment in 1/2020. She is currently managed on imaging " "surveillance.     I met with Priyanka today for this follow-up visit.     HISTORY OF PRESENT ILLNESS  -Priyanka does not have any major changes to report or new neurological symptoms.   -Headaches continue to improve; less frequent and less severe. Occurring 1 event every couple weeks. Triggered by screen time + working from home. Continue to be dull and throbbing throughout the head, predominantly near the site of the tumor/ resection. Severity of 4-5, but describes this as very minimal (she says better than previous when talking to Dr. Zhang, which she rated as a 3 at last visit). Taking ibuprofen as needed, but infrequently. Started taking CBD oil daily a couple months. Continues on Zoloft 150mg and thinks her mood is pretty good.  -Energy is \"a little low\" and thinks she hasn't fully bounced back since GammaKnife treatment. On a day at home, won't get worn out, but if she goes out for any reason, she just feels mentally fatigued and will have to lay down for ~1hr before being ready to go again. Continues to have some difficulty w/ falling asleep at night despite this. She does not use electronics right before bed. Has not started trial of melatonin, but still planning on it.  -Stable visional field cut. She just got new glasses at her recent eye appointment.   -Arthritis in the finger tips of both hands still present; uses ibuprofen as needed. CBD has especially helped with this. She does note that she seems to drop things frequently, which she attributes to her arthritis. Denies any acute change in hand strength or significant asymmetry as well as no numbness or changes in sensation.   -Endorses no impairment in cognitive ability. Continues to work, now from home.   -No reported episodes concerning for a seizure.    REVIEW OF SYSTEMS  A comprehensive ROS was performed and negative except as in HPI.      MEDICATIONS   Current Outpatient Medications   Medication Sig Dispense Refill     ALBUTEROL 108 (90 BASE) MCG/ACT " inhaler Inhale 1-2 puffs into the lungs every 4 hours as needed   2     aspirin 81 MG EC tablet Take 81 mg by mouth At Bedtime       azelastine (ASTELIN) 0.1 % nasal spray Spray 1 spray into both nostrils 2 times daily 1 Bottle 1     benzonatate (TESSALON) 200 MG capsule Take 1 capsule (200 mg) by mouth 3 times daily as needed for cough 30 capsule 0     cetirizine (ZYRTEC) 10 MG tablet Take 1 tablet (10 mg) by mouth daily 20 tablet 0     fluticasone (FLONASE) 50 MCG/ACT nasal spray Spray 2 sprays into both nostrils daily       loratadine (CLARITIN) 10 MG tablet Take 10 mg by mouth daily       rosuvastatin (CRESTOR) 10 MG tablet Take 10 mg by mouth daily       sertraline (ZOLOFT) 100 MG tablet Take 1 tablet (100 mg) by mouth daily Take 1-2 tablets (100-200mg) by mouth daily 180 tablet 1     valACYclovir (VALTREX) 1000 mg tablet Take 2 tablets (2,000 mg) by mouth 2 times daily (Patient taking differently: Take 2,000 mg by mouth 2 times daily as needed ) 12 tablet 6     VITAMIN D, CHOLECALCIFEROL, PO Take 1,000 Units by mouth daily       DRUG ALLERGIES   Allergies   Allergen Reactions     Atorvastatin      Penicillins      Sulfa Drugs      Sulfamethoxazole-Trimethoprim Rash       ONCOLOGIC HISTORY  -2015 PRESENTATION: Headaches.  -2015 MR brain imaging with a right occipital mass   -4/22/2015 SURGERY: Resection by Dr. Lujan   PATHOLOGY: Hemangiopericytoma, grade 2.   -2015 CT chest, abdomen and pelvis negative for malignancy.  -2015 PET MRI negative for malignancy.   Conservative management per Dr. Herman Mayberry.    -8/1/2017 CT chest abdomen and pelvis and bone scan negative for malignancy.   -12/7/2017 MRB with no evidence of tumor recurrence.  -12/6/2018 MRB with no evidence of tumor recurrence.    -12/11/2019 MRB with a new nodule of enhancement within the superior sagittal sinus, concerning for tumor recurrence. Stable appearance of the right parieto-occipital craniotomy site.  -12/13/2019 Bone scan with no  convincing evidence of skeletal metastases.  -12/18/2019 MR liver with a 9 mm lesion in the lateral aspect of the junction of segments 7 and 8 of the liver shows homogeneous delayed enhancement and is new since 2015. This could represent a metastatic lesion.     -1/6/2020 NEURO-ONC: Referral to radiation oncology and GI oncology surgery. Signed consent for next generation sequencing.   -1/14/2020 RADS: GammaKnife to lesion (1 fraction).   -1/15/2020 LIVER LESION:  Laparoscopic ultrasound-guided microwave ablation of liver tumor by Dr. Phan Fierro.   PATHOLOGY: Cavernous hemangioma of the liver, benign:    -No metastatic hemangiopericytoma or other malignancy identified    -Underlying steatohepatitis, no significant fibrosis    -No follow-up needed.  -2/28/2020 NEURO-ONC/ MRB: Clinically stable. Headaches with migraine features; increasing Zoloft. Imaging with positive response to radiation.   -3/9/2020 Evaluated by Dr. Wright; Neuro-psych testing with mild weaknesses were noted in aspects of speeded visual processing, and general cognitive speed. Repeat in 3/2021.  -6/1/2020 NEURO-ONC/ MRB: Clinically well; for insomnia trial of melatonin. Imaging with positive treatment effect.   -6/1/2020 NEURO-ONC/ MRB: Clinically well. Imaging with no concerns.   -9/14/2020 NEURO-ONC/ MRB: Clinically well. Imaging of MRI and CT CAP with no concerns.    SOCIAL HISTORY   Tobacco use: Former smoker; quit in 2003. 27 pack years.  Alcohol use: Social use.   Drug use: Denies marijuana use.  . 2 daughter, 1 son.   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        PHYSICAL EXAMINATION  -Generally well appearing. Good energy today.   -Respiratory: No audible wheezing.   -Skin: No facial rashes. Healed head incision.  -Psychiatric: Normal mood and affect. Pleasant, talkative. Laughing a lot.   -Neurologic:   MENTAL STATUS:     Alert, oriented to date and situation.   Recall: Intact.    Speech fluent.    Comprehension  intact to multi-step commands.     CRANIAL NERVES:     Pupils are equal, round.    Extraocular movements full, patient denies diplopia.    Left inferior homonymous quadrantanopia not able to be appreciated w/ video visit.   Symmetric facial movements.   Hearing intact.   With normal phonation, no dysfunction tongue. Midline tongue w/ full lateral motion.   Shoulder shrug intact.  MOTOR:   Antigravity in arms. No abnormal movements. No pronator drift. Rapid hand movements symmetric.   COORDINATION: Blinded F-N intact b/l.  GAIT: Able to rise from seated position w/o aid of arms. Normal posture + station.    The rest of a comprehensive physical examination is deferred due to PHE (public health emergency) video visit restrictions.        MEDICAL RECORDS  Obtained and personally reviewed all available outside medical records in addition to reviewing any records available in our electronic system.     LABS  Personally reviewed all available lab results.     IMAGING  Personally reviewed MR brain imaging from today. The contrast enhancing lesion at the sagittal sinus appears to be slightly decreased in size as compared to post-radiation imaging. No new lesions. No increase in T2 FLAIR changes about the cavity.      Additionally, CT a/p with no concerns.       Imaging discussed with Priyanka.     Imaging and case was reviewed and discussed at Brain Tumor Conference.       IMPRESSION  Clinic was spent discussing in detail the nature of her cancer in light of repeat imaging. This was in addition to providing emotional support, answering questions pertaining to my recommendations, and devising the plan as outlined below.     Grade II hemangiopericytomas are considered malignant tumors. Standard treatment options include surgery and radiation therapy. With no good surgical option, Priyanka completed stereotactic radiation therapy in 1/2020. Imaging from today was again with no new concerns. Will repeat brain imaging in 3 months.      For radiation-refractory tumors, Pazopanib, a broad-spectrum tyrosine kinase inhibitor, has been used with noted efficacy. Of note, Priyanka has been consented for next generation sequencing. At disease recurrence, can consider sending pathology for mutational analysis to potentially identify targeted theray options.     Extracranial metastatic disease can occur; most commonly in bone, lung, and liver. Prior imaging of the MR liver showed a lesion. Priyanka was referred to Dr. Phan Fierro with surgical oncology. Dr. Fierro performed a  laparoscopic ultrasound-guided microwave ablation of liver tumor on 1/15/2020. Pathology was consistent with a benign cavernous hemangioma with no metastatic hemangiopericytoma or other malignancy identified. She is currently monitored on annual systemic surveillance imaging with a CT C/A/P with contrast. Imaging from today showed no concerns.     Clinically, Priyanka is doing well with no new complaints. CBD has been helpful for joint pain and headaches continue to improve; regarding dropping things, will continue to follow for subjective sensory or motor concerns. Priyanka is planning to begin trial of Melatonin shortly to see if there is improvement in sleep with less daytime fatigue.     PROBLEM LIST  Hemangiopericytoma, grade II  Liver lesion  Depressed mood  Sleep issues  Headache    PLAN  -CANCER-DIRECTED THERAPY-  -As above; Continue imaging surveillance. Repeat MR brain imaging in 3 months.     -LIVER LESION-  -Treated. No lesion-specific follow-up needed.   -CT c/a/p with no concerns in 9/2020. Will continue with annual systemic surveillance imaging.     -STEROIDS-  -Currently off dexamethasone.    -SEIZURE MANAGEMENT-  -While this patient is at increased risk of having seizures, given the lack of seizure history, there is no indication to prescribe an antiepileptic at this time.     -Quality of life/ MOOD/ FATIGUE-  -Continue to monitor mood as untreated/ undertreated  depression can worsen fatigue, dysorexia, and quality of life.  -Continue Zoloft 150mg daily.   -CBD trial started per patient for arthritic pain.   -Planning to start a trial of melatonin.    -HEADACHES-  -Less severe and frequent; Migrainous features of throbbing pain, high severity, photo- and phonophobia, and worsening pain with activity.   -Continue Zoloft.   -Encouraged remaining hydrated.   -With > 15 headache days/ month, if pain is not improved, can consider a referral for Botox.     -MEMORY LOSS/ WORD FINDING ISSUES-  -Evaluated by Dr. Phan Wright in 3/2020. Neuro-psych testing with mild weaknesses were noted in aspects of speeded visual processing, and general cognitive speed.  -Recommended repeating testing in 3/2021.     -B/L HAND SYMPTOMS-  -Dropping things frequently, more of a chronic process starting months-year range that she has noted more dropping recently. In conjunction w/ joint pain in hands. Denies any numbness or paresthesias or overt weakness to indicate peripheral neuropathy as primary etiology. Not c/w site of intracranial pathology given symmetric nature and lack of consistent/static symptoms.  -Continue to follow clinically.  -Consider OT / PT    Return to clinic in 12/2020 to review repeat imaging.     In the meantime, Priyanka knows to call with questions or concerns or to report new complaints and can be seen sooner if needed.    Patient was also evaluated and examined by Dr. Miguel A Quijano, neurology resident, while under my direct supervision.     Lauren Zhang MD  Neuro-oncology

## 2020-09-14 ENCOUNTER — TUMOR CONFERENCE (OUTPATIENT)
Dept: ONCOLOGY | Facility: CLINIC | Age: 60
End: 2020-09-14

## 2020-09-14 ENCOUNTER — ANCILLARY PROCEDURE (OUTPATIENT)
Dept: CT IMAGING | Facility: CLINIC | Age: 60
End: 2020-09-14
Attending: PSYCHIATRY & NEUROLOGY
Payer: COMMERCIAL

## 2020-09-14 ENCOUNTER — ANCILLARY PROCEDURE (OUTPATIENT)
Dept: MRI IMAGING | Facility: CLINIC | Age: 60
End: 2020-09-14
Attending: PSYCHIATRY & NEUROLOGY
Payer: COMMERCIAL

## 2020-09-14 ENCOUNTER — VIRTUAL VISIT (OUTPATIENT)
Dept: ONCOLOGY | Facility: CLINIC | Age: 60
End: 2020-09-14
Attending: PSYCHIATRY & NEUROLOGY
Payer: COMMERCIAL

## 2020-09-14 DIAGNOSIS — D43.3 HEMANGIOPERICYTOMA OF CNS, GRADE II (H): Primary | ICD-10-CM

## 2020-09-14 DIAGNOSIS — D43.3 HEMANGIOPERICYTOMA OF CNS, GRADE II (H): ICD-10-CM

## 2020-09-14 DIAGNOSIS — C78.7 METASTATIC CANCER TO LIVER (H): ICD-10-CM

## 2020-09-14 PROCEDURE — 40001009 ZZH VIDEO/TELEPHONE VISIT; NO CHARGE

## 2020-09-14 PROCEDURE — 99214 OFFICE O/P EST MOD 30 MIN: CPT | Mod: 95 | Performed by: PSYCHIATRY & NEUROLOGY

## 2020-09-14 RX ORDER — GADOBUTROL 604.72 MG/ML
10 INJECTION INTRAVENOUS ONCE
Status: COMPLETED | OUTPATIENT
Start: 2020-09-14 | End: 2020-09-14

## 2020-09-14 RX ORDER — IOPAMIDOL 755 MG/ML
120 INJECTION, SOLUTION INTRAVASCULAR ONCE
Status: COMPLETED | OUTPATIENT
Start: 2020-09-14 | End: 2020-09-14

## 2020-09-14 RX ADMIN — GADOBUTROL 9 ML: 604.72 INJECTION INTRAVENOUS at 11:48

## 2020-09-14 RX ADMIN — IOPAMIDOL 120 ML: 755 INJECTION, SOLUTION INTRAVASCULAR at 11:18

## 2020-09-14 NOTE — LETTER
"9/14/2020     RE: Leidy Ascencio  [address] [place] [address]    Dear Colleague,    Thank you for referring your patient, Leidy Ascencio, to the [facility]. Please see a copy of my visit note below.    Leidy Ascencio is a 60 year old female who is being evaluated via a billable video visit.      The patient has been notified of following:     \"This video visit will be conducted via a call between you and your physician/provider. We have found that certain health care needs can be provided without the need for an in-person physical exam.  This service lets us provide the care you need with a video conversation.  If a prescription is necessary we can send it directly to your pharmacy.  If lab work is needed we can place an order for that and you can then stop by our lab to have the test done at a later time.    Video visits are billed at different rates depending on your insurance coverage.  Please reach out to your insurance provider with any questions.    If during the course of the call the physician/provider feels a video visit is not appropriate, you will not be charged for this service.\"    Patient has given verbal consent for Video visit? Yes  How would you like to obtain your AVS? MyChart  If you are dropped from the video visit, the video invite should be resent to: Send to e-mail at: [email or web address]  Will anyone else be joining your video visit? No    Patient had no vitals to report.   0/10 on pain scale.     No refills needed.     [facility]      Video-Visit Details  Type of service:  Video Visit    Video Start Time: 3:38 PM  Video End Time: 4:02 PM    Originating Location (pt. Location): Home    Distant Location (provider location):  [facility]     Platform used for Video Visit: Froilan Zhang MD    _____________________________________________________________    NEURO-ONCOLOGY VISIT  Sep 14, 2020    CHIEF COMPLAINT: Ms. 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 She completed GammaKnife treatment in 1/2020. She is currently managed on imaging surveillance.     I met with Priyanka today for this follow-up visit.     HISTORY OF PRESENT ILLNESS  -Priyanka does not have any major changes to report or new neurological symptoms.   -Headaches continue to improve; less frequent and less severe. Occurring 1 event every couple weeks. Triggered by screen time + working from home. Continue to be dull and throbbing throughout the head, predominantly near the site of the tumor/ resection. Severity of 4-5, but describes this as very minimal (she says better than previous when talking to Dr. Zhang, which she rated as a 3 at last visit). Taking ibuprofen as needed, but infrequently. Started taking CBD oil daily a couple months. Continues on Zoloft 150mg and thinks her mood is pretty good.  -Energy is \"a little low\" and thinks she hasn't fully bounced back since GammaKnife treatment. On a day at home, won't get worn out, but if she goes out for any reason, she just feels mentally fatigued and will have to lay down for ~1hr before being ready to go again. Continues to have some difficulty w/ falling asleep at night despite this. She does not use electronics right before bed. Has not started trial of melatonin, but still planning on it.  -Stable visional field cut. She just got new glasses at her recent eye appointment.   -Arthritis in the finger tips of both hands still present; uses ibuprofen as needed. CBD has especially helped with this. She does note that she seems to drop things frequently, which she attributes to her arthritis. Denies any acute change in hand strength or significant asymmetry as well as no numbness or changes in sensation.   -Endorses no impairment in cognitive ability. Continues to work, now from home.   -No reported episodes " concerning for a seizure.    REVIEW OF SYSTEMS  A comprehensive ROS was performed and negative except as in HPI.      MEDICATIONS   Current Outpatient Medications   Medication Sig Dispense Refill     ALBUTEROL 108 (90 BASE) MCG/ACT inhaler Inhale 1-2 puffs into the lungs every 4 hours as needed   2     aspirin 81 MG EC tablet Take 81 mg by mouth At Bedtime       azelastine (ASTELIN) 0.1 % nasal spray Spray 1 spray into both nostrils 2 times daily 1 Bottle 1     benzonatate (TESSALON) 200 MG capsule Take 1 capsule (200 mg) by mouth 3 times daily as needed for cough 30 capsule 0     cetirizine (ZYRTEC) 10 MG tablet Take 1 tablet (10 mg) by mouth daily 20 tablet 0     fluticasone (FLONASE) 50 MCG/ACT nasal spray Spray 2 sprays into both nostrils daily       loratadine (CLARITIN) 10 MG tablet Take 10 mg by mouth daily       rosuvastatin (CRESTOR) 10 MG tablet Take 10 mg by mouth daily       sertraline (ZOLOFT) 100 MG tablet Take 1 tablet (100 mg) by mouth daily Take 1-2 tablets (100-200mg) by mouth daily 180 tablet 1     valACYclovir (VALTREX) 1000 mg tablet Take 2 tablets (2,000 mg) by mouth 2 times daily (Patient taking differently: Take 2,000 mg by mouth 2 times daily as needed ) 12 tablet 6     VITAMIN D, CHOLECALCIFEROL, PO Take 1,000 Units by mouth daily       DRUG ALLERGIES   Allergies   Allergen Reactions     Atorvastatin      Penicillins      Sulfa Drugs      Sulfamethoxazole-Trimethoprim Rash       ONCOLOGIC HISTORY  -2015 PRESENTATION: Headaches.  -2015 MR brain imaging with a right occipital mass   -4/22/2015 SURGERY: Resection by Dr. Lujan   PATHOLOGY: Hemangiopericytoma, grade 2.   -2015 CT chest, abdomen and pelvis negative for malignancy.  -2015 PET MRI negative for malignancy.   Conservative management per Dr. Herman Mayberry.    -8/1/2017 CT chest abdomen and pelvis and bone scan negative for malignancy.   -12/7/2017 MRB with no evidence of tumor recurrence.  -12/6/2018 MRB with no evidence of tumor  recurrence.    -12/11/2019 MRB with a new nodule of enhancement within the superior sagittal sinus, concerning for tumor recurrence. Stable appearance of the right parieto-occipital craniotomy site.  -12/13/2019 Bone scan with no convincing evidence of skeletal metastases.  -12/18/2019 MR liver with a 9 mm lesion in the lateral aspect of the junction of segments 7 and 8 of the liver shows homogeneous delayed enhancement and is new since 2015. This could represent a metastatic lesion.     -1/6/2020 NEURO-ONC: Referral to radiation oncology and GI oncology surgery. Signed consent for next generation sequencing.   -1/14/2020 RADS: GammaKnife to lesion (1 fraction).   -1/15/2020 LIVER LESION:  Laparoscopic ultrasound-guided microwave ablation of liver tumor by Dr. Phan Fierro.   PATHOLOGY: Cavernous hemangioma of the liver, benign:    -No metastatic hemangiopericytoma or other malignancy identified    -Underlying steatohepatitis, no significant fibrosis    -No follow-up needed.  -2/28/2020 NEURO-ONC/ MRB: Clinically stable. Headaches with migraine features; increasing Zoloft. Imaging with positive response to radiation.   -3/9/2020 Evaluated by Dr. Wright; Neuro-psych testing with mild weaknesses were noted in aspects of speeded visual processing, and general cognitive speed. Repeat in 3/2021.  -6/1/2020 NEURO-ONC/ MRB: Clinically well; for insomnia trial of melatonin. Imaging with positive treatment effect.   -6/1/2020 NEURO-ONC/ MRB: Clinically well. Imaging with no concerns.   -9/14/2020 NEURO-ONC/ MRB: Clinically well. Imaging of MRI and CT CAP with no concerns.    SOCIAL HISTORY   Tobacco use: Former smoker; quit in 2003. 27 pack years.  Alcohol use: Social use.   Drug use: Denies marijuana use.  . 2 daughter, 1 son.   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        PHYSICAL EXAMINATION  -Generally well appearing. Good energy today.   -Respiratory: No audible wheezing.   -Skin: No facial  rashes. Healed head incision.  -Psychiatric: Normal mood and affect. Pleasant, talkative. Laughing a lot.   -Neurologic:   MENTAL STATUS:     Alert, oriented to date and situation.   Recall: Intact.    Speech fluent.    Comprehension intact to multi-step commands.     CRANIAL NERVES:     Pupils are equal, round.    Extraocular movements full, patient denies diplopia.    Left inferior homonymous quadrantanopia not able to be appreciated w/ video visit.   Symmetric facial movements.   Hearing intact.   With normal phonation, no dysfunction tongue. Midline tongue w/ full lateral motion.   Shoulder shrug intact.  MOTOR:   Antigravity in arms. No abnormal movements. No pronator drift. Rapid hand movements symmetric.   COORDINATION: Blinded F-N intact b/l.  GAIT: Able to rise from seated position w/o aid of arms. Normal posture + station.    The rest of a comprehensive physical examination is deferred due to PHE (public health emergency) video visit restrictions.        MEDICAL RECORDS  Obtained and personally reviewed all available outside medical records in addition to reviewing any records available in our electronic system.     LABS  Personally reviewed all available lab results.     IMAGING  Personally reviewed MR brain imaging from today. The contrast enhancing lesion at the sagittal sinus appears to be slightly decreased in size as compared to post-radiation imaging. No new lesions. No increase in T2 FLAIR changes about the cavity.      Additionally, CT a/p with no concerns.       Imaging discussed with Priyanka.     Imaging and case was reviewed and discussed at Brain Tumor Conference.       IMPRESSION  Clinic was spent discussing in detail the nature of her cancer in light of repeat imaging. This was in addition to providing emotional support, answering questions pertaining to my recommendations, and devising the plan as outlined below.     Grade II hemangiopericytomas are considered malignant tumors. Standard  treatment options include surgery and radiation therapy. With no good surgical option, Priyanka completed stereotactic radiation therapy in 1/2020. Imaging from today was again with no new concerns. Will repeat brain imaging in 3 months.     For radiation-refractory tumors, Pazopanib, a broad-spectrum tyrosine kinase inhibitor, has been used with noted efficacy. Of note, Priyanka has been consented for next generation sequencing. At disease recurrence, can consider sending pathology for mutational analysis to potentially identify targeted theray options.     Extracranial metastatic disease can occur; most commonly in bone, lung, and liver. Prior imaging of the MR liver showed a lesion. Priyanka was referred to Dr. Phan Fierro with surgical oncology. Dr. Fierro performed a  laparoscopic ultrasound-guided microwave ablation of liver tumor on 1/15/2020. Pathology was consistent with a benign cavernous hemangioma with no metastatic hemangiopericytoma or other malignancy identified. She is currently monitored on annual systemic surveillance imaging with a CT C/A/P with contrast. Imaging from today showed no concerns.     Clinically, Priyanka is doing well with no new complaints. CBD has been helpful for joint pain and headaches continue to improve; regarding dropping things, will continue to follow for subjective sensory or motor concerns. Priyanka is planning to begin trial of Melatonin shortly to see if there is improvement in sleep with less daytime fatigue.     PROBLEM LIST  Hemangiopericytoma, grade II  Liver lesion  Depressed mood  Sleep issues  Headache    PLAN  -CANCER-DIRECTED THERAPY-  -As above; Continue imaging surveillance. Repeat MR brain imaging in 3 months.     -LIVER LESION-  -Treated. No lesion-specific follow-up needed.   -CT c/a/p with no concerns in 9/2020. Will continue with annual systemic surveillance imaging.     -STEROIDS-  -Currently off dexamethasone.    -SEIZURE MANAGEMENT-  -While this patient is at  increased risk of having seizures, given the lack of seizure history, there is no indication to prescribe an antiepileptic at this time.     -Quality of life/ MOOD/ FATIGUE-  -Continue to monitor mood as untreated/ undertreated depression can worsen fatigue, dysorexia, and quality of life.  -Continue Zoloft 150mg daily.   -CBD trial started per patient for arthritic pain.   -Planning to start a trial of melatonin.    -HEADACHES-  -Less severe and frequent; Migrainous features of throbbing pain, high severity, photo- and phonophobia, and worsening pain with activity.   -Continue Zoloft.   -Encouraged remaining hydrated.   -With > 15 headache days/ month, if pain is not improved, can consider a referral for Botox.     -MEMORY LOSS/ WORD FINDING ISSUES-  -Evaluated by Dr. Phan Wright in 3/2020. Neuro-psych testing with mild weaknesses were noted in aspects of speeded visual processing, and general cognitive speed.  -Recommended repeating testing in 3/2021.     -B/L HAND SYMPTOMS-  -Dropping things frequently, more of a chronic process starting months-year range that she has noted more dropping recently. In conjunction w/ joint pain in hands. Denies any numbness or paresthesias or overt weakness to indicate peripheral neuropathy as primary etiology. Not c/w site of intracranial pathology given symmetric nature and lack of consistent/static symptoms.  -Continue to follow clinically.  -Consider OT / PT    Return to clinic in 12/2020 to review repeat imaging.     In the meantime, Priyanka knows to call with questions or concerns or to report new complaints and can be seen sooner if needed.    Patient was also evaluated and examined by Dr. Miguel A Quijano, neurology resident, while under my direct supervision.     Lauren Zhang MD  Neuro-oncology

## 2020-09-14 NOTE — TUMOR CONFERENCE
Tumor Conference Information  Tumor Conference:  Brain  Specialties Present:  Medical oncology, Pathology, Radiology, Radiation oncology, Surgery  Patient Status:  A current patient  Pathology:  Not Discussed  Treatment to Date:  Surgical intervention(s), Adjuvant radiation  Clinical Trial Eligibility:  Not discussed  Recommended Plan:  Observation (see comment) (Comment: reviewed imaging - grossly stable; repeat imaging and follow up in 3-4 months)  Did the review exceed 30 minutes?:  did not           Documentation / Disclaimer Cancer Tumor Board Note  Cancer tumor board recommendations do not override what is determined to be reasonable care and treatment, which is dependent on the circumstances of a patient's case; the patient's medical, social, and personal concerns; and the clinical judgment of the oncologist [physician].

## 2020-09-23 NOTE — PROGRESS NOTES
Subjective     Leidy Ascencio is a 60 year old female who presents to clinic today for the following health issues:    HPI       Depression and Anxiety Follow-Up    How are you doing with your depression since your last visit? No change    How are you doing with your anxiety since your last visit?  Worsened     Are you having other symptoms that might be associated with depression or anxiety? No    Have you had a significant life event? OTHER: brain tumor and radiation     Do you have any concerns with your use of alcohol or other drugs? No    Social History     Tobacco Use     Smoking status: Former Smoker     Packs/day: 1.00     Years: 27.00     Pack years: 27.00     Types: Cigarettes     Last attempt to quit: 2003     Years since quittin.6     Smokeless tobacco: Never Used   Substance Use Topics     Alcohol use: Yes     Alcohol/week: 0.0 standard drinks     Drug use: No     PHQ 2016 10/4/2016 2020   PHQ-9 Total Score 6 4 4   Q9: Thoughts of better off dead/self-harm past 2 weeks Not at all Not at all Not at all     ARTEM-7 SCORE 2020   Total Score 4     Last PHQ-9 2020   1.  Little interest or pleasure in doing things 1   2.  Feeling down, depressed, or hopeless 0   3.  Trouble falling or staying asleep, or sleeping too much 1   4.  Feeling tired or having little energy 1   5.  Poor appetite or overeating 1   6.  Feeling bad about yourself 0   7.  Trouble concentrating 0   8.  Moving slowly or restless 0   Q9: Thoughts of better off dead/self-harm past 2 weeks 0   PHQ-9 Total Score 4   Difficulty at work, home, or with people Not difficult at all     ARTEM-7  2020   1. Feeling nervous, anxious, or on edge 1   2. Not being able to stop or control worrying 1   3. Worrying too much about different things 1   4. Trouble relaxing 1   5. Being so restless that it is hard to sit still 0   6. Becoming easily annoyed or irritable 0   7. Feeling afraid, as if something awful might happen 0  "  ARTEM-7 Total Score 4   If you checked any problems, how difficult have they made it for you to do your work, take care of things at home, or get along with other people? Not difficult at all       Suicide Assessment Five-step Evaluation and Treatment (SAFE-T)      ALLERGIES  Would like refill on allergy medications      Pt with elevated cholesterol. Started on crestor by previous physician. Has not had cholesterol rechecked  Looks like mainly her triglycerides were elevated  Pt with depression on zoloft. It is working well. She will call when needs refill  Pt with brain tumor, managed by oncology. Has been 5 years  Pt with seasonal allergies on antihistamines and nasal sprays. Will call when needs refill  Pt with cold sore. Uses valtrex intermittently    Review of Systems   Constitutional, HEENT, cardiovascular, pulmonary, gi and gu systems are negative, except as otherwise noted.      Objective    /75   Pulse 71   Temp 98.4  F (36.9  C) (Oral)   Ht 1.575 m (5' 2\")   Wt 89.8 kg (198 lb)   BMI 36.21 kg/m    Body mass index is 36.21 kg/m .  Physical Exam   GENERAL: healthy, alert and no distress  RESP: lungs clear to auscultation - no rales, rhonchi or wheezes  CV: regular rate and rhythm, normal S1 S2, no S3 or S4, no murmur, click or rub, no peripheral edema and peripheral pulses strong    No results found for this or any previous visit (from the past 24 hour(s)).        Assessment & Plan     Elevated cholesterol  Recheck, mainly triglycerides  - Lipid panel reflex to direct LDL Fasting    Major depressive disorder, recurrent episode, mild (H)  On zoloft, stable, she will call for refill    Hemangiopericytoma of CNS, grade II  Per oncology    Recurrent cold sores  Refill valtrex as needed    Need for prophylactic vaccination and inoculation against influenza  given  - INFLUENZA QUAD, RECOMBINANT, P-FREE (RIV4) (FLUBLOCK) [60352]  - Vaccine Administration, Initial [63126]    Allergic rhinitis, unspecified " "seasonality, unspecified trigger  Refill antihistamines as needed       BMI:   Estimated body mass index is 36.21 kg/m  as calculated from the following:    Height as of this encounter: 1.575 m (5' 2\").    Weight as of this encounter: 89.8 kg (198 lb).   Weight management plan: Discussed healthy diet and exercise guidelines            No follow-ups on file.    Karen Hendricks MD  [facility]    "

## 2020-09-24 ENCOUNTER — OFFICE VISIT (OUTPATIENT)
Dept: FAMILY MEDICINE | Facility: CLINIC | Age: 60
End: 2020-09-24
Payer: COMMERCIAL

## 2020-09-24 VITALS
HEART RATE: 71 BPM | HEIGHT: 62 IN | SYSTOLIC BLOOD PRESSURE: 116 MMHG | WEIGHT: 198 LBS | DIASTOLIC BLOOD PRESSURE: 75 MMHG | BODY MASS INDEX: 36.44 KG/M2 | TEMPERATURE: 98.4 F

## 2020-09-24 DIAGNOSIS — F33.0 MAJOR DEPRESSIVE DISORDER, RECURRENT EPISODE, MILD (H): ICD-10-CM

## 2020-09-24 DIAGNOSIS — J30.9 ALLERGIC RHINITIS, UNSPECIFIED SEASONALITY, UNSPECIFIED TRIGGER: ICD-10-CM

## 2020-09-24 DIAGNOSIS — Z23 NEED FOR PROPHYLACTIC VACCINATION AND INOCULATION AGAINST INFLUENZA: ICD-10-CM

## 2020-09-24 DIAGNOSIS — B00.1 RECURRENT COLD SORES: ICD-10-CM

## 2020-09-24 DIAGNOSIS — E78.00 ELEVATED CHOLESTEROL: Primary | ICD-10-CM

## 2020-09-24 DIAGNOSIS — D43.3 HEMANGIOPERICYTOMA OF CNS, GRADE II (H): ICD-10-CM

## 2020-09-24 PROBLEM — C78.7 METASTATIC CANCER TO LIVER (H): Status: RESOLVED | Noted: 2020-01-06 | Resolved: 2020-09-24

## 2020-09-24 LAB
CHOLEST SERPL-MCNC: 180 MG/DL
HDLC SERPL-MCNC: 48 MG/DL
LDLC SERPL CALC-MCNC: 85 MG/DL
NONHDLC SERPL-MCNC: 132 MG/DL
TRIGL SERPL-MCNC: 237 MG/DL

## 2020-09-24 PROCEDURE — 99214 OFFICE O/P EST MOD 30 MIN: CPT | Mod: 25 | Performed by: FAMILY MEDICINE

## 2020-09-24 PROCEDURE — 80061 LIPID PANEL: CPT | Performed by: FAMILY MEDICINE

## 2020-09-24 PROCEDURE — 90682 RIV4 VACC RECOMBINANT DNA IM: CPT | Performed by: FAMILY MEDICINE

## 2020-09-24 PROCEDURE — 36415 COLL VENOUS BLD VENIPUNCTURE: CPT | Performed by: FAMILY MEDICINE

## 2020-09-24 PROCEDURE — 90471 IMMUNIZATION ADMIN: CPT | Performed by: FAMILY MEDICINE

## 2020-09-24 ASSESSMENT — ANXIETY QUESTIONNAIRES
2. NOT BEING ABLE TO STOP OR CONTROL WORRYING: SEVERAL DAYS
7. FEELING AFRAID AS IF SOMETHING AWFUL MIGHT HAPPEN: NOT AT ALL
5. BEING SO RESTLESS THAT IT IS HARD TO SIT STILL: NOT AT ALL
IF YOU CHECKED OFF ANY PROBLEMS ON THIS QUESTIONNAIRE, HOW DIFFICULT HAVE THESE PROBLEMS MADE IT FOR YOU TO DO YOUR WORK, TAKE CARE OF THINGS AT HOME, OR GET ALONG WITH OTHER PEOPLE: NOT DIFFICULT AT ALL
1. FEELING NERVOUS, ANXIOUS, OR ON EDGE: SEVERAL DAYS
GAD7 TOTAL SCORE: 4
6. BECOMING EASILY ANNOYED OR IRRITABLE: NOT AT ALL
3. WORRYING TOO MUCH ABOUT DIFFERENT THINGS: SEVERAL DAYS

## 2020-09-24 ASSESSMENT — MIFFLIN-ST. JEOR: SCORE: 1416.37

## 2020-09-24 ASSESSMENT — PATIENT HEALTH QUESTIONNAIRE - PHQ9
SUM OF ALL RESPONSES TO PHQ QUESTIONS 1-9: 4
5. POOR APPETITE OR OVEREATING: SEVERAL DAYS

## 2020-09-25 ASSESSMENT — ANXIETY QUESTIONNAIRES: GAD7 TOTAL SCORE: 4

## 2020-12-03 ENCOUNTER — MYC MEDICAL ADVICE (OUTPATIENT)
Dept: FAMILY MEDICINE | Facility: CLINIC | Age: 60
End: 2020-12-03

## 2020-12-03 DIAGNOSIS — M25.561 CHRONIC PAIN OF RIGHT KNEE: Primary | ICD-10-CM

## 2020-12-03 DIAGNOSIS — G89.29 CHRONIC PAIN OF RIGHT KNEE: Primary | ICD-10-CM

## 2020-12-04 NOTE — PROGRESS NOTES
Sports Medicine Clinic Visit    PCP: Karma, [facility]    CC: Patient presents with:  Right Knee - Pain      HPI:  Leidy Ascencio is a 60 year old female who is seen in consultation at the request of Dr. Karen Hendricks.   She notes right knee pain that began over 3 years ago. She notes she had a steroid injection ~ 3 years ago with good relief.  She notes the pain comes and goes. The knee feels like the knee may lock up however hasn't. She has had locking before. She notes pain diffusely over the knee. She rates the pain at a 8/10 at its worst and a 0/10 currently.  Symptoms are relieved with past steroid injection. Symptoms are worsened by down stairs, kneeling, standing, prolonged walking, and turning. She endorses tightness in the leg, swelling,  popping and feelings of instability.  She denies locking, numbness and tingling.  Other treatment has included cold compresses, heat and CBD salve. She notes difficulty with getting up from a kneeling position.   She would like a steroid injection today.      She works as a .     Review of Systems:  Musculoskeletal: as above  Remainder of review of systems is negative including constitutional, eyes, ENT, CV, pulmonary, GI, , endocrine, skin, hematologic, and neurologic except as noted in HPI or medical history.    History reviewed. No pertinent past surgical/medical/family/social history other than as mentioned in HPI.    Patient Active Problem List   Diagnosis     Advanced directives, counseling/discussion     Recurrent cold sores     Other insomnia     Atopic rhinitis     Major depressive disorder, recurrent episode, mild (H)     Plantar fasciitis     Vitamin D deficiency     Quadrant hemianopsia, left     Hemangiopericytoma of CNS, grade II     Past Medical History:   Diagnosis Date     Cancer (H)     brain tumor     Depressive disorder      Hyperlipemia      Past Surgical History:   Procedure Laterality Date     C EXCIS INFRATENT  MENINGIOMA       GYN SURGERY      3 cesections     HYSTERECTOMY VAGINAL, BILATERAL SALPINGO-OOPHERECTOMY, COMBINED      hysterectomy + ovaries      LAPAROSCOPIC ABLATION LIVER TUMOR N/A 1/15/2020    Procedure: Laparoscopic ultrasound guided percutaneous microwave abalation of tumor x1; ultrasound guided Liver Biopsy x 4; Hepatic Ultrasound Intraoperatively;  Surgeon: Phan Fierro MD;  Location: [facility] OR     LAPAROSCOPY DIAGNOSTIC (GENERAL)  1/15/2020    Procedure: Laparoscopy diagnostic (general);  Surgeon: Phan Fierro MD;  Location: [facility] OR     Family History   Problem Relation Age of Onset     Coronary Artery Disease Mother      Arthritis Father      Alzheimer Disease Maternal Grandmother      Other Cancer Maternal Grandmother         lung     Other Cancer Paternal Grandfather         meloma     Social History     Socioeconomic History     Marital status:      Spouse name: Not on file     Number of children: Not on file     Years of education: Not on file     Highest education level: Not on file   Occupational History     Not on file   Social Needs     Financial resource strain: Not on file     Food insecurity     Worry: Not on file     Inability: Not on file     Transportation needs     Medical: Not on file     Non-medical: Not on file   Tobacco Use     Smoking status: Former Smoker     Packs/day: 1.00     Years: 27.00     Pack years: 27.00     Types: Cigarettes     Quit date: 2003     Years since quittin.8     Smokeless tobacco: Never Used   Substance and Sexual Activity     Alcohol use: Yes     Alcohol/week: 0.0 standard drinks     Drug use: No     Sexual activity: Yes     Partners: Male     Birth control/protection: Female Surgical   Lifestyle     Physical activity     Days per week: Not on file     Minutes per session: Not on file     Stress: Not on file   Relationships     Social connections     Talks on phone: Not on file     Gets together: Not on file     Attends Taoist  "service: Not on file     Active member of club or organization: Not on file     Attends meetings of clubs or organizations: Not on file     Relationship status: Not on file     Intimate partner violence     Fear of current or ex partner: Not on file     Emotionally abused: Not on file     Physically abused: Not on file     Forced sexual activity: Not on file   Other Topics Concern     Parent/sibling w/ CABG, MI or angioplasty before 65F 55M? Not Asked   Social History Narrative     Not on file           Current Outpatient Medications   Medication     fluticasone (FLONASE) 50 MCG/ACT nasal spray     loratadine (CLARITIN) 10 MG tablet     rosuvastatin (CRESTOR) 10 MG tablet     sertraline (ZOLOFT) 100 MG tablet     VITAMIN D, CHOLECALCIFEROL, PO     ALBUTEROL 108 (90 BASE) MCG/ACT inhaler     aspirin 81 MG EC tablet     azelastine (ASTELIN) 0.1 % nasal spray     cetirizine (ZYRTEC) 10 MG tablet     valACYclovir (VALTREX) 1000 mg tablet     No current facility-administered medications for this visit.      Allergies   Allergen Reactions     Atorvastatin      Penicillins      Sulfa Drugs      Sulfamethoxazole-Trimethoprim Rash         Objective:  /76   Ht 1.575 m (5' 2\")   Wt 90.3 kg (199 lb)   BMI 36.40 kg/m      General: Alert and in no distress    Head: Normocephalic, atraumatic  Eyes: no scleral icterus or conjunctival erythema   Skin: no erythema, petechiae, or jaundice  CV: regular rhythm by palpation, 2+ distal pulses  Resp: normal respiratory effort without conversational dyspnea   Psych: normal mood and affect    Gait: Non-antalgic, appropriate coordination and balance   Neuro: Motor strength and sensation as noted below    Musculoskeletal:    Bilateral Knee exam    Inspection:  No erythema, ecchymosis, warmth, or edema    Palpation: Tender over the right anterior and medial knee    Knee ROM: Full seated active knee extension and flexion bilaterally    Strength:    Left:  5/5 hip " flexion/abduction/adduction, 5/5 knee extension/flexion, 5/5 ankle dorsiflexion/plantarflexion  Right:  5/5 hip flexion/abduction/adduction, 5/5 knee extension/flexion, 5/5 ankle dorsiflexion/plantarflexion    Sensation:  Intact to light touch in the bilateral lower extremities    Radiology:  X-rays ordered and independent visualization of images performed and reviewed with Priyanka.    Recent Results (from the past 744 hour(s))   XR Knee Standing AP Bilat [place] Bilat Lat Right    Narrative    KNEE STANDING AP BILATERAL, SUNRISE BILATERAL, LATERAL RIGHT   12/7/2020 2:41 PM     HISTORY: Chronic pain of right knee.    COMPARISON: 10/4/2016.      Impression    IMPRESSION: Three views the right knee show no acute bony or soft  tissue abnormality. Mild to moderate medial joint space narrowing has  progressed slightly. No joint space effusion. Two views of the left  knee are included and are unremarkable.         Large Joint Injection/Arthocentesis: R knee joint    Date/Time: 12/7/2020 2:42 PM  Performed by: Lauren Edmonds MD  Authorized by: Lauren Edmonds MD     Indications:  Pain  Needle Size:  25 G  Guidance: landmark guided    Approach:  Anterolateral  Location:  Knee      Medications:  40 mg triamcinolone 40 MG/ML  Medications comment:  4ml 0.5% bupivicaine  NDC:77354-710-35  Lot: [id number]  8/31/21          Outcome:  Tolerated well, no immediate complications  Procedure discussed: discussed risks, benefits, and alternatives    Consent Given by:  Patient          Assessment:  1. Chronic pain of right knee    2. Primary osteoarthritis of right knee        Plan:  Discussed the assessment with the patient and developed a plan together:  -Steroid injection performed today.  Take it easy over the next few days. Keep in mind that the steroid may take up to 3 days to start working and up to 2 weeks to reach maximal effect.    -Ice for 15-20 minutes as needed for soreness and swelling.  -Patient's  preferred over the counter medications as needed for soreness.    -Follow up as needed if symptoms fail to improve or worsen.  Please call with questions or concerns.        Phoebe Edmonds MD, CAQ Sports Medicine  [facility]

## 2020-12-07 ENCOUNTER — ANCILLARY PROCEDURE (OUTPATIENT)
Dept: GENERAL RADIOLOGY | Facility: OTHER | Age: 60
End: 2020-12-07
Attending: PHYSICAL MEDICINE & REHABILITATION
Payer: COMMERCIAL

## 2020-12-07 ENCOUNTER — OFFICE VISIT (OUTPATIENT)
Dept: ORTHOPEDICS | Facility: OTHER | Age: 60
End: 2020-12-07
Attending: FAMILY MEDICINE
Payer: COMMERCIAL

## 2020-12-07 VITALS
BODY MASS INDEX: 36.62 KG/M2 | SYSTOLIC BLOOD PRESSURE: 126 MMHG | HEIGHT: 62 IN | WEIGHT: 199 LBS | DIASTOLIC BLOOD PRESSURE: 76 MMHG

## 2020-12-07 DIAGNOSIS — G89.29 CHRONIC PAIN OF RIGHT KNEE: ICD-10-CM

## 2020-12-07 DIAGNOSIS — M25.561 CHRONIC PAIN OF RIGHT KNEE: Primary | ICD-10-CM

## 2020-12-07 DIAGNOSIS — M25.561 CHRONIC PAIN OF RIGHT KNEE: ICD-10-CM

## 2020-12-07 DIAGNOSIS — G89.29 CHRONIC PAIN OF RIGHT KNEE: Primary | ICD-10-CM

## 2020-12-07 DIAGNOSIS — M17.11 PRIMARY OSTEOARTHRITIS OF RIGHT KNEE: ICD-10-CM

## 2020-12-07 PROCEDURE — 99244 OFF/OP CNSLTJ NEW/EST MOD 40: CPT | Mod: 25 | Performed by: PHYSICAL MEDICINE & REHABILITATION

## 2020-12-07 PROCEDURE — 73562 X-RAY EXAM OF KNEE 3: CPT | Mod: RT | Performed by: RADIOLOGY

## 2020-12-07 PROCEDURE — 20610 DRAIN/INJ JOINT/BURSA W/O US: CPT | Mod: RT | Performed by: PHYSICAL MEDICINE & REHABILITATION

## 2020-12-07 RX ORDER — TRIAMCINOLONE ACETONIDE 40 MG/ML
40 INJECTION, SUSPENSION INTRA-ARTICULAR; INTRAMUSCULAR
Status: DISCONTINUED | OUTPATIENT
Start: 2020-12-07 | End: 2024-09-06

## 2020-12-07 RX ADMIN — TRIAMCINOLONE ACETONIDE 40 MG: 40 INJECTION, SUSPENSION INTRA-ARTICULAR; INTRAMUSCULAR at 14:42

## 2020-12-07 ASSESSMENT — MIFFLIN-ST. JEOR: SCORE: 1420.91

## 2020-12-07 NOTE — PATIENT INSTRUCTIONS
-Steroid injection performed today.  Take it easy over the next few days. Keep in mind that the steroid may take up to 3 days to start working and up to 2 weeks to reach maximal effect.    -Ice for 15-20 minutes as needed for soreness and swelling.  -Patient's preferred over the counter medications as needed for soreness.    -Follow up as needed if symptoms fail to improve or worsen.  Please call with questions or concerns.

## 2020-12-07 NOTE — LETTER
12/7/2020         RE: Leidy Ascencio  [address] [place] [address]        Dear Colleague,    Thank you for referring your patient, Leidy Ascencio, to the [facility] [place]. Please see a copy of my visit note below.    Sports Medicine Clinic Visit    PCP: Karma [facility] [place]    CC: Patient presents with:  Right Knee - Pain      HPI:  Leidy Ascencio is a 60 year old female who is seen in consultation at the request of Dr. Karen Hendricks.   She notes right knee pain that began over 3 years ago. She notes she had a steroid injection ~ 3 years ago with good relief.  She notes the pain comes and goes. The knee feels like the knee may lock up however hasn't. She has had locking before. She notes pain diffusely over the knee. She rates the pain at a 8/10 at its worst and a 0/10 currently.  Symptoms are relieved with past steroid injection. Symptoms are worsened by down stairs, kneeling, standing, prolonged walking, and turning. She endorses tightness in the leg, swelling,  popping and feelings of instability.  She denies locking, numbness and tingling.  Other treatment has included cold compresses, heat and CBD salve. She notes difficulty with getting up from a kneeling position.   She would like a steroid injection today.      She works as a .     Review of Systems:  Musculoskeletal: as above  Remainder of review of systems is negative including constitutional, eyes, ENT, CV, pulmonary, GI, , endocrine, skin, hematologic, and neurologic except as noted in HPI or medical history.    History reviewed. No pertinent past surgical/medical/family/social history other than as mentioned in HPI.    Patient Active Problem List   Diagnosis     Advanced directives, counseling/discussion     Recurrent cold sores     Other insomnia     Atopic rhinitis     Major depressive disorder, recurrent episode, mild (H)     Plantar fasciitis     Vitamin D deficiency      Quadrant hemianopsia, left     Hemangiopericytoma of CNS, grade II     Past Medical History:   Diagnosis Date     Cancer (H)     brain tumor     Depressive disorder      Hyperlipemia      Past Surgical History:   Procedure Laterality Date     C EXCIS INFRATENT MENINGIOMA       GYN SURGERY      3 cesections     HYSTERECTOMY VAGINAL, BILATERAL SALPINGO-OOPHERECTOMY, COMBINED      hysterectomy + ovaries      LAPAROSCOPIC ABLATION LIVER TUMOR N/A 1/15/2020    Procedure: Laparoscopic ultrasound guided percutaneous microwave abalation of tumor x1; ultrasound guided Liver Biopsy x 4; Hepatic Ultrasound Intraoperatively;  Surgeon: Phan Fierro MD;  Location: [facility] OR     LAPAROSCOPY DIAGNOSTIC (GENERAL)  1/15/2020    Procedure: Laparoscopy diagnostic (general);  Surgeon: Phan Fierro MD;  Location: [facility] OR     Family History   Problem Relation Age of Onset     Coronary Artery Disease Mother      Arthritis Father      Alzheimer Disease Maternal Grandmother      Other Cancer Maternal Grandmother         lung     Other Cancer Paternal Grandfather         meloma     Social History     Socioeconomic History     Marital status:      Spouse name: Not on file     Number of children: Not on file     Years of education: Not on file     Highest education level: Not on file   Occupational History     Not on file   Social Needs     Financial resource strain: Not on file     Food insecurity     Worry: Not on file     Inability: Not on file     Transportation needs     Medical: Not on file     Non-medical: Not on file   Tobacco Use     Smoking status: Former Smoker     Packs/day: 1.00     Years: 27.00     Pack years: 27.00     Types: Cigarettes     Quit date: 2003     Years since quittin.8     Smokeless tobacco: Never Used   Substance and Sexual Activity     Alcohol use: Yes     Alcohol/week: 0.0 standard drinks     Drug use: No     Sexual activity: Yes     Partners: Male     Birth control/protection: Female  "Surgical   Lifestyle     Physical activity     Days per week: Not on file     Minutes per session: Not on file     Stress: Not on file   Relationships     Social connections     Talks on phone: Not on file     Gets together: Not on file     Attends Advent service: Not on file     Active member of club or organization: Not on file     Attends meetings of clubs or organizations: Not on file     Relationship status: Not on file     Intimate partner violence     Fear of current or ex partner: Not on file     Emotionally abused: Not on file     Physically abused: Not on file     Forced sexual activity: Not on file   Other Topics Concern     Parent/sibling w/ CABG, MI or angioplasty before 65F 55M? Not Asked   Social History Narrative     Not on file           Current Outpatient Medications   Medication     fluticasone (FLONASE) 50 MCG/ACT nasal spray     loratadine (CLARITIN) 10 MG tablet     rosuvastatin (CRESTOR) 10 MG tablet     sertraline (ZOLOFT) 100 MG tablet     VITAMIN D, CHOLECALCIFEROL, PO     ALBUTEROL 108 (90 BASE) MCG/ACT inhaler     aspirin 81 MG EC tablet     azelastine (ASTELIN) 0.1 % nasal spray     cetirizine (ZYRTEC) 10 MG tablet     valACYclovir (VALTREX) 1000 mg tablet     No current facility-administered medications for this visit.      Allergies   Allergen Reactions     Atorvastatin      Penicillins      Sulfa Drugs      Sulfamethoxazole-Trimethoprim Rash         Objective:  /76   Ht 1.575 m (5' 2\")   Wt 90.3 kg (199 lb)   BMI 36.40 kg/m      General: Alert and in no distress    Head: Normocephalic, atraumatic  Eyes: no scleral icterus or conjunctival erythema   Skin: no erythema, petechiae, or jaundice  CV: regular rhythm by palpation, 2+ distal pulses  Resp: normal respiratory effort without conversational dyspnea   Psych: normal mood and affect    Gait: Non-antalgic, appropriate coordination and balance   Neuro: Motor strength and sensation as noted " below    Musculoskeletal:    Bilateral Knee exam    Inspection:  No erythema, ecchymosis, warmth, or edema    Palpation: Tender over the right anterior and medial knee    Knee ROM: Full seated active knee extension and flexion bilaterally    Strength:    Left:  5/5 hip flexion/abduction/adduction, 5/5 knee extension/flexion, 5/5 ankle dorsiflexion/plantarflexion  Right:  5/5 hip flexion/abduction/adduction, 5/5 knee extension/flexion, 5/5 ankle dorsiflexion/plantarflexion    Sensation:  Intact to light touch in the bilateral lower extremities    Radiology:  X-rays ordered and independent visualization of images performed and reviewed with Priyanka.    Recent Results (from the past 744 hour(s))   XR Knee Standing AP Bilat [place] Bilat Lat Right    Narrative    KNEE STANDING AP BILATERAL, SUNRISE BILATERAL, LATERAL RIGHT   12/7/2020 2:41 PM     HISTORY: Chronic pain of right knee.    COMPARISON: 10/4/2016.      Impression    IMPRESSION: Three views the right knee show no acute bony or soft  tissue abnormality. Mild to moderate medial joint space narrowing has  progressed slightly. No joint space effusion. Two views of the left  knee are included and are unremarkable.         Large Joint Injection/Arthocentesis: R knee joint    Date/Time: 12/7/2020 2:42 PM  Performed by: Lauren Edmonds MD  Authorized by: Lauren Edmonds MD     Indications:  Pain  Needle Size:  25 G  Guidance: landmark guided    Approach:  Anterolateral  Location:  Knee      Medications:  40 mg triamcinolone 40 MG/ML  Medications comment:  4ml 0.5% bupivicaine  NDC:57275-448-85  Lot: [id number]  8/31/21          Outcome:  Tolerated well, no immediate complications  Procedure discussed: discussed risks, benefits, and alternatives    Consent Given by:  Patient          Assessment:  1. Chronic pain of right knee    2. Primary osteoarthritis of right knee        Plan:  Discussed the assessment with the patient and developed a plan  together:  -Steroid injection performed today.  Take it easy over the next few days. Keep in mind that the steroid may take up to 3 days to start working and up to 2 weeks to reach maximal effect.    -Ice for 15-20 minutes as needed for soreness and swelling.  -Patient's preferred over the counter medications as needed for soreness.    -Follow up as needed if symptoms fail to improve or worsen.  Please call with questions or concerns.        Phoebe Edmonds MD, [facility] Sports Medicine  [facility]      Again, thank you for allowing me to participate in the care of your patient.        Sincerely,        Lauren Edmonds MD

## 2020-12-11 ENCOUNTER — ANCILLARY PROCEDURE (OUTPATIENT)
Dept: MRI IMAGING | Facility: CLINIC | Age: 60
End: 2020-12-11
Attending: PSYCHIATRY & NEUROLOGY
Payer: COMMERCIAL

## 2020-12-11 DIAGNOSIS — D43.3 HEMANGIOPERICYTOMA OF CNS, GRADE II (H): ICD-10-CM

## 2020-12-11 PROCEDURE — 70553 MRI BRAIN STEM W/O & W/DYE: CPT | Mod: GC | Performed by: RADIOLOGY

## 2020-12-11 PROCEDURE — A9585 GADOBUTROL INJECTION: HCPCS | Performed by: RADIOLOGY

## 2020-12-11 RX ORDER — GADOBUTROL 604.72 MG/ML
10 INJECTION INTRAVENOUS ONCE
Status: COMPLETED | OUTPATIENT
Start: 2020-12-11 | End: 2020-12-11

## 2020-12-11 RX ADMIN — GADOBUTROL 9 ML: 604.72 INJECTION INTRAVENOUS at 12:44

## 2020-12-11 NOTE — PATIENT INSTRUCTIONS
Brain imaging reviewed; no concerns.   Repeat imaging in 3 months. If stable in March, will increase interval to every 6 months.     Repeat neuro-psych testing due in March; referral placed.     Return to clinic in 3/2021 to review new imaging.     Lauren Zhang MD  Neuro-oncology  12/14/20

## 2020-12-11 NOTE — PROGRESS NOTES
"Leidy Ascencio is a 60 year old female who is being evaluated via a billable video visit.      The patient has been notified of following:     \"This video visit will be conducted via a call between you and your physician/provider. We have found that certain health care needs can be provided without the need for an in-person physical exam.  This service lets us provide the care you need with a video conversation.  If a prescription is necessary we can send it directly to your pharmacy.  If lab work is needed we can place an order for that and you can then stop by our lab to have the test done at a later time.    Video visits are billed at different rates depending on your insurance coverage.  Please reach out to your insurance provider with any questions.    If during the course of the call the physician/provider feels a video visit is not appropriate, you will not be charged for this service.\"    Patient has given verbal consent for Video visit? Yes  How would you like to obtain your AVS? MyChart  If you are dropped from the video visit, the video invite should be resent to: Text to cell phone: [phone]  Will anyone else be joining your video visit? No    Nichole HESTER      Video-Visit Details  Type of service:  Video Visit    Video Start Time: 11:17 AM  Video End Time: 11:38 AM    Originating Location (pt. Location): Home    Distant Location (provider location):  [facility]     Platform used for Video Visit: Froilan Zhang MD    _____________________________________________________________    NEURO-ONCOLOGY VISIT  Dec 14, 2020    CHIEF COMPLAINT: Ms. 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 She completed GammaKnife treatment in 1/2020. She is currently managed on imaging surveillance.     I met with Priyanka today for this follow-up visit.     HISTORY OF " "PRESENT ILLNESS  -Priyanka does not have any major changes to report or new neurological symptoms.   -Headaches continue to improve; less frequent and less severe. Occurring now 1-2 event(s)/ month. Triggered by screen time + working from home. Continue to be dull and throbbing throughout the head. Severity of 4/ 10 or minimal. Taking ibuprofen as needed, but infrequently. Infrequent use of CBD oil.   -Continues on Zoloft 150mg. Mood is good. Her dad  of a stroke a few weeks ago and she is coping fairly well with this.   -Energy remains \"low\". Less active. She had a shot in her knee for meniscus tear + arthritis. With less pain, she is hoping she can be more active again.  -Stable visional field cut.  -Denies any acute change in hand strength or significant asymmetry. No falls when walking. No numbness or changes in sensation.   -Thinking and concentration is generally good; needing to take a lot of notes. Continues to work from home.   -No reported episodes concerning for a seizure.    REVIEW OF SYSTEMS  A comprehensive ROS was performed and negative except as in HPI.      MEDICATIONS   Current Outpatient Medications   Medication Sig Dispense Refill     ALBUTEROL 108 (90 BASE) MCG/ACT inhaler Inhale 1-2 puffs into the lungs every 4 hours as needed   2     aspirin 81 MG EC tablet Take 81 mg by mouth At Bedtime       azelastine (ASTELIN) 0.1 % nasal spray Spray 1 spray into both nostrils 2 times daily 1 Bottle 1     fluticasone (FLONASE) 50 MCG/ACT nasal spray Spray 2 sprays into both nostrils daily       loratadine (CLARITIN) 10 MG tablet Take 10 mg by mouth daily       rosuvastatin (CRESTOR) 10 MG tablet Take 10 mg by mouth daily       sertraline (ZOLOFT) 100 MG tablet Take 1 tablet (100 mg) by mouth daily Take 1-2 tablets (100-200mg) by mouth daily 180 tablet 1     VITAMIN D, CHOLECALCIFEROL, PO Take 1,000 Units by mouth daily       DRUG ALLERGIES   Allergies   Allergen Reactions     Atorvastatin      " Penicillins      Sulfa Drugs      Sulfamethoxazole-Trimethoprim Rash       ONCOLOGIC HISTORY  -2015 PRESENTATION: Headaches.  -2015 MR brain imaging with a right occipital mass   -4/22/2015 SURGERY: Resection by Dr. Lujan   PATHOLOGY: Hemangiopericytoma, grade 2.   -2015 CT chest, abdomen and pelvis negative for malignancy.  -2015 PET MRI negative for malignancy.   Conservative management per Dr. Herman Mayberry.    -8/1/2017 CT chest abdomen and pelvis and bone scan negative for malignancy.   -12/7/2017 MRB with no evidence of tumor recurrence.  -12/6/2018 MRB with no evidence of tumor recurrence.    -12/11/2019 MRB with a new nodule of enhancement within the superior sagittal sinus, concerning for tumor recurrence. Stable appearance of the right parieto-occipital craniotomy site.  -12/13/2019 Bone scan with no convincing evidence of skeletal metastases.  -12/18/2019 MR liver with a 9 mm lesion in the lateral aspect of the junction of segments 7 and 8 of the liver shows homogeneous delayed enhancement and is new since 2015. This could represent a metastatic lesion.     -1/6/2020 NEURO-ONC: Referral to radiation oncology and GI oncology surgery. Signed consent for next generation sequencing.   -1/14/2020 RADS: GammaKnife to lesion (1 fraction).   -1/15/2020 LIVER LESION:  Laparoscopic ultrasound-guided microwave ablation of liver tumor by Dr. Phan Fierro.   PATHOLOGY: Cavernous hemangioma of the liver, benign:    -No metastatic hemangiopericytoma or other malignancy identified    -Underlying steatohepatitis, no significant fibrosis    -No follow-up needed.  -2/28/2020 NEURO-ONC/ MRB: Clinically stable. Headaches with migraine features; increasing Zoloft. Imaging with positive response to radiation.   -3/9/2020 Evaluated by Dr. Wright; Neuro-psych testing with mild weaknesses were noted in aspects of speeded visual processing, and general cognitive speed. Repeat in 3/2021.  -6/1/2020 NEURO-ONC/ MRB: Clinically well;  for insomnia trial of melatonin. Imaging with positive treatment effect.   -6/1/2020 NEURO-ONC/ MRB: Clinically well. Imaging with no concerns.   -9/14/2020 NEURO-ONC/ MRB: Clinically well. Imaging of MRI and CT CAP with no concerns.  -12/14/2020 NEURO-ONC/ MRB: Clinically well. Imaging of MRI with no concerns.    SOCIAL HISTORY   Tobacco use: Former smoker; quit in 2003. 27 pack years.  Alcohol use: Social use.   Drug use: Denies.  . 2 daughter, 1 son.   Employment: [employer]; [employer] and [employer].        PHYSICAL EXAMINATION  KPS 90  -Generally well appearing. Good energy today.   -Respiratory: No audible wheezing.   -Skin: No facial rashes.  -Psychiatric: Normal mood and affect. Pleasant, talkative.  -Neurologic:   MENTAL STATUS:     Alert, oriented to date and situation.   Recall: Intact.    Speech fluent.    Comprehension intact to multi-step commands.     CRANIAL NERVES:     Pupils are equal, round.    Extraocular movements full, patient denies diplopia.    Left inferior homonymous quadrantanopia.   Symmetric facial movements.   Hearing intact.   With normal phonation, no dysfunction tongue. Midline tongue w/ full lateral motion.   Shoulder shrug intact.  MOTOR: Antigravity in arms. No pronator drift.  COORDINATION: Intact to finger-nose with eyes closed.    The rest of a comprehensive physical examination is deferred due to PHE (public health emergency) video visit restrictions.        MEDICAL RECORDS  Obtained and personally reviewed all available outside medical records in addition to reviewing any records available in our electronic system.     LABS  Personally reviewed all available lab results.     IMAGING  Personally reviewed MR brain imaging from last week. The contrast enhancing lesion at the sagittal sinus appears to be slightly decreased in size as compared to post-radiation imaging and from imaging in September. No new lesions. No increase in T2 FLAIR changes about the  cavity.    Imaging discussed with Priyanka.       IMPRESSION  Clinic was spent discussing in detail the nature of her cancer in light of repeat imaging. This was in addition to answering questions pertaining to my recommendations and devising the plan as outlined below.     Grade II hemangiopericytomas are considered malignant tumors. Standard treatment options include surgery and radiation therapy. With no good surgical option, Priyanka completed stereotactic radiation therapy in 1/2020. Recent imaging was again with no new concerns. Will repeat brain imaging in 3 months. If imaging is stable in March, will increase interval to every 6 months.     For radiation-refractory tumors, Pazopanib, a broad-spectrum tyrosine kinase inhibitor, has been used with noted efficacy. Of note, Priyanka has been consented for next generation sequencing. At disease recurrence, can consider sending pathology for mutational analysis to potentially identify targeted theray options.     Extracranial metastatic disease can occur; most commonly in bone, lung, and liver. Prior imaging of the MR liver showed a lesion. Priyanka was referred to Dr. Phan Fierro with surgical oncology. Dr. Fierro performed a  laparoscopic ultrasound-guided microwave ablation of liver tumor on 1/15/2020. Pathology was consistent with a benign cavernous hemangioma with no metastatic hemangiopericytoma or other malignancy identified. She is currently monitored on annual systemic surveillance imaging with a CT C/A/P with contrast. Imaging from September 2020 showed no concerns.     Clinically, Priyanka is doing well with no new complaints. Headaches are continuing to decrease in frequency. Her energy is low, but she is attributing this to a low activity level, which will hopefully improve with less pain limiting immobility in her knee. In terms of cognition and memory, Priyanka feels that things are stable there too and work is going well. She is agreeable to repeat neuro-psych  testing as recommended come March 2021.     PROBLEM LIST  Hemangiopericytoma, grade II  Liver lesion  Depressed mood  Sleep issues  Headache    PLAN  -CANCER-DIRECTED THERAPY-  -As above; Continue imaging surveillance. Repeat MR brain imaging in 3 months.     -LIVER LESION-  -Treated. No lesion-specific follow-up needed.   -CT c/a/p with no concerns in 9/2020. Will continue with annual systemic surveillance imaging.     -STEROIDS-  -Currently off dexamethasone.    -SEIZURE MANAGEMENT-  -While this patient is at increased risk of having seizures, given the lack of seizure history, there is no indication to prescribe an antiepileptic at this time.     -Quality of life/ MOOD/ FATIGUE-  -Continue to monitor mood as untreated/ undertreated depression can worsen fatigue, dysorexia, and quality of life.  -Continue Zoloft 150mg daily.   -CBD oil as needed for joint pain.    -HEADACHES-  -Less severe and frequent; Migrainous features of throbbing pain, high severity, photo- and phonophobia, and worsening pain with activity.   -Continue Zoloft.     -MEMORY LOSS/ WORD FINDING ISSUES-  -Evaluated by Dr. Phan Wright in 3/2020. Neuro-psych testing with mild weaknesses were noted in aspects of speeded visual processing, and general cognitive speed.  -Recommended repeating testing in 3/2021; placing referral today.    Return to clinic in 3/2021 to review repeat imaging.     In the meantime, Priyanka knows to call with questions or concerns or to report new complaints and can be seen sooner if needed.    Lauren Zhang MD  Neuro-oncology

## 2020-12-11 NOTE — DISCHARGE INSTRUCTIONS
MRI Contrast Discharge Instructions    The IV contrast you received today will pass out of your body in your  urine. This will happen in the next 24 hours. You will not feel this process.  Your urine will not change color.    Drink at least 4 extra glasses of water or juice today (unless your doctor  has restricted your fluids). This reduces the stress on your kidneys.  You may take your regular medicines.    If you are on dialysis: It is best to have dialysis today.    If you have a reaction: Most reactions happen right away. If you have  any new symptoms after leaving the hospital (such as hives or swelling),  call your hospital at the correct number below. Or call your family doctor.  If you have breathing distress or wheezing, call 911.    Special instructions: ***    I have read and understand the above information.    Signature:______________________________________ Date:___________    Staff:__________________________________________ Date:___________     Time:__________    [place] [facility] Departments:    ___[place]: [phone]  ___[facility]: [phone]  ___[place]: [phone] ___[facility]: [phone]  ___[facility]: [phone]  ___[facility]: [phone]  ___[phone]  ___[facility]: [phone]  ___[phone]

## 2020-12-14 ENCOUNTER — VIRTUAL VISIT (OUTPATIENT)
Dept: ONCOLOGY | Facility: CLINIC | Age: 60
End: 2020-12-14
Attending: PSYCHIATRY & NEUROLOGY
Payer: COMMERCIAL

## 2020-12-14 ENCOUNTER — TUMOR CONFERENCE (OUTPATIENT)
Dept: ONCOLOGY | Facility: CLINIC | Age: 60
End: 2020-12-14

## 2020-12-14 DIAGNOSIS — R41.3 MEMORY CHANGES: ICD-10-CM

## 2020-12-14 DIAGNOSIS — D43.3 HEMANGIOPERICYTOMA OF CNS, GRADE II (H): Primary | ICD-10-CM

## 2020-12-14 DIAGNOSIS — R41.89 COGNITIVE CHANGES: ICD-10-CM

## 2020-12-14 PROCEDURE — 999N001193 HC VIDEO/TELEPHONE VISIT; NO CHARGE

## 2020-12-14 PROCEDURE — 99214 OFFICE O/P EST MOD 30 MIN: CPT | Mod: 95 | Performed by: PSYCHIATRY & NEUROLOGY

## 2020-12-14 NOTE — LETTER
"    12/14/2020         RE: Leidy Ascencio  [address] [place] [address]        Dear Colleague,    Thank you for referring your patient, Leidy Ascencio, to the [facility]. Please see a copy of my visit note below.    Leidy Ascencio is a 60 year old female who is being evaluated via a billable video visit.      The patient has been notified of following:     \"This video visit will be conducted via a call between you and your physician/provider. We have found that certain health care needs can be provided without the need for an in-person physical exam.  This service lets us provide the care you need with a video conversation.  If a prescription is necessary we can send it directly to your pharmacy.  If lab work is needed we can place an order for that and you can then stop by our lab to have the test done at a later time.    Video visits are billed at different rates depending on your insurance coverage.  Please reach out to your insurance provider with any questions.    If during the course of the call the physician/provider feels a video visit is not appropriate, you will not be charged for this service.\"    Patient has given verbal consent for Video visit? Yes  How would you like to obtain your AVS? MyChart  If you are dropped from the video visit, the video invite should be resent to: Text to cell phone: [phone]  Will anyone else be joining your video visit? Iram HESTER      Video-Visit Details  Type of service:  Video Visit    Video Start Time: 11:17 AM  Video End Time: 11:38 AM    Originating Location (pt. Location): Home    Distant Location (provider location):  [facility]     Platform used for Video Visit: Froilan Zhang MD    _____________________________________________________________    NEURO-ONCOLOGY VISIT  Dec 14, 2020    CHIEF COMPLAINT: Ms. Leiyd Ascencio (Debbie) is a 60 year old right-handed woman with a " "right occipital hemangiopericytoma (grade II), diagnosed following resection on 2015. She completed GammaKnife treatment in 2020. She is currently managed on imaging surveillance.     I met with Priyanka today for this follow-up visit.     HISTORY OF PRESENT ILLNESS  -Priyanka does not have any major changes to report or new neurological symptoms.   -Headaches continue to improve; less frequent and less severe. Occurring now 1-2 event(s)/ month. Triggered by screen time + working from home. Continue to be dull and throbbing throughout the head. Severity of 4/ 10 or minimal. Taking ibuprofen as needed, but infrequently. Infrequent use of CBD oil.   -Continues on Zoloft 150mg. Mood is good. Her dad  of a stroke a few weeks ago and she is coping fairly well with this.   -Energy remains \"low\". Less active. She had a shot in her knee for meniscus tear + arthritis. With less pain, she is hoping she can be more active again.  -Stable visional field cut.  -Denies any acute change in hand strength or significant asymmetry. No falls when walking. No numbness or changes in sensation.   -Thinking and concentration is generally good; needing to take a lot of notes. Continues to work from home.   -No reported episodes concerning for a seizure.    REVIEW OF SYSTEMS  A comprehensive ROS was performed and negative except as in HPI.      MEDICATIONS   Current Outpatient Medications   Medication Sig Dispense Refill     ALBUTEROL 108 (90 BASE) MCG/ACT inhaler Inhale 1-2 puffs into the lungs every 4 hours as needed   2     aspirin 81 MG EC tablet Take 81 mg by mouth At Bedtime       azelastine (ASTELIN) 0.1 % nasal spray Spray 1 spray into both nostrils 2 times daily 1 Bottle 1     fluticasone (FLONASE) 50 MCG/ACT nasal spray Spray 2 sprays into both nostrils daily       loratadine (CLARITIN) 10 MG tablet Take 10 mg by mouth daily       rosuvastatin (CRESTOR) 10 MG tablet Take 10 mg by mouth daily       sertraline (ZOLOFT) 100 MG " tablet Take 1 tablet (100 mg) by mouth daily Take 1-2 tablets (100-200mg) by mouth daily 180 tablet 1     VITAMIN D, CHOLECALCIFEROL, PO Take 1,000 Units by mouth daily       DRUG ALLERGIES   Allergies   Allergen Reactions     Atorvastatin      Penicillins      Sulfa Drugs      Sulfamethoxazole-Trimethoprim Rash       ONCOLOGIC HISTORY  -2015 PRESENTATION: Headaches.  -2015 MR brain imaging with a right occipital mass   -4/22/2015 SURGERY: Resection by Dr. Lujan   PATHOLOGY: Hemangiopericytoma, grade 2.   -2015 CT chest, abdomen and pelvis negative for malignancy.  -2015 PET MRI negative for malignancy.   Conservative management per Dr. Herman Mayberry.    -8/1/2017 CT chest abdomen and pelvis and bone scan negative for malignancy.   -12/7/2017 MRB with no evidence of tumor recurrence.  -12/6/2018 MRB with no evidence of tumor recurrence.    -12/11/2019 MRB with a new nodule of enhancement within the superior sagittal sinus, concerning for tumor recurrence. Stable appearance of the right parieto-occipital craniotomy site.  -12/13/2019 Bone scan with no convincing evidence of skeletal metastases.  -12/18/2019 MR liver with a 9 mm lesion in the lateral aspect of the junction of segments 7 and 8 of the liver shows homogeneous delayed enhancement and is new since 2015. This could represent a metastatic lesion.     -1/6/2020 NEURO-ONC: Referral to radiation oncology and GI oncology surgery. Signed consent for next generation sequencing.   -1/14/2020 RADS: GammaKnife to lesion (1 fraction).   -1/15/2020 LIVER LESION:  Laparoscopic ultrasound-guided microwave ablation of liver tumor by Dr. Phan Fierro.   PATHOLOGY: Cavernous hemangioma of the liver, benign:    -No metastatic hemangiopericytoma or other malignancy identified    -Underlying steatohepatitis, no significant fibrosis    -No follow-up needed.  -2/28/2020 NEURO-ONC/ MRB: Clinically stable. Headaches with migraine features; increasing Zoloft. Imaging with positive  response to radiation.   -3/9/2020 Evaluated by Dr. Wright; Neuro-psych testing with mild weaknesses were noted in aspects of speeded visual processing, and general cognitive speed. Repeat in 3/2021.  -6/1/2020 NEURO-ONC/ MRB: Clinically well; for insomnia trial of melatonin. Imaging with positive treatment effect.   -6/1/2020 NEURO-ONC/ MRB: Clinically well. Imaging with no concerns.   -9/14/2020 NEURO-ONC/ MRB: Clinically well. Imaging of MRI and CT CAP with no concerns.  -12/14/2020 NEURO-ONC/ MRB: Clinically well. Imaging of MRI with no concerns.    SOCIAL HISTORY   Tobacco use: Former smoker; quit in 2003. 27 pack years.  Alcohol use: Social use.   Drug use: Denies.  . 2 daughter, 1 son.   Employment: [employer]; [employer] managers.        PHYSICAL EXAMINATION  KPS 90  -Generally well appearing. Good energy today.   -Respiratory: No audible wheezing.   -Skin: No facial rashes.  -Psychiatric: Normal mood and affect. Pleasant, talkative.  -Neurologic:   MENTAL STATUS:     Alert, oriented to date and situation.   Recall: Intact.    Speech fluent.    Comprehension intact to multi-step commands.     CRANIAL NERVES:     Pupils are equal, round.    Extraocular movements full, patient denies diplopia.    Left inferior homonymous quadrantanopia.   Symmetric facial movements.   Hearing intact.   With normal phonation, no dysfunction tongue. Midline tongue w/ full lateral motion.   Shoulder shrug intact.  MOTOR: Antigravity in arms. No pronator drift.  COORDINATION: Intact to finger-nose with eyes closed.    The rest of a comprehensive physical examination is deferred due to PHE (public health emergency) video visit restrictions.        MEDICAL RECORDS  Obtained and personally reviewed all available outside medical records in addition to reviewing any records available in our electronic system.     LABS  Personally reviewed all available lab results.     IMAGING  Personally reviewed MR brain imaging  from last week. The contrast enhancing lesion at the sagittal sinus appears to be slightly decreased in size as compared to post-radiation imaging and from imaging in September. No new lesions. No increase in T2 FLAIR changes about the cavity.    Imaging discussed with Priyanka.       IMPRESSION  Clinic was spent discussing in detail the nature of her cancer in light of repeat imaging. This was in addition to answering questions pertaining to my recommendations and devising the plan as outlined below.     Grade II hemangiopericytomas are considered malignant tumors. Standard treatment options include surgery and radiation therapy. With no good surgical option, Priyanka completed stereotactic radiation therapy in 1/2020. Recent imaging was again with no new concerns. Will repeat brain imaging in 3 months. If imaging is stable in March, will increase interval to every 6 months.     For radiation-refractory tumors, Pazopanib, a broad-spectrum tyrosine kinase inhibitor, has been used with noted efficacy. Of note, Priyanka has been consented for next generation sequencing. At disease recurrence, can consider sending pathology for mutational analysis to potentially identify targeted theray options.     Extracranial metastatic disease can occur; most commonly in bone, lung, and liver. Prior imaging of the MR liver showed a lesion. Priyanka was referred to Dr. Phan Fierro with surgical oncology. Dr. Fierro performed a  laparoscopic ultrasound-guided microwave ablation of liver tumor on 1/15/2020. Pathology was consistent with a benign cavernous hemangioma with no metastatic hemangiopericytoma or other malignancy identified. She is currently monitored on annual systemic surveillance imaging with a CT C/A/P with contrast. Imaging from September 2020 showed no concerns.     Clinically, Priyanka is doing well with no new complaints. Headaches are continuing to decrease in frequency. Her energy is low, but she is attributing this to a low  activity level, which will hopefully improve with less pain limiting immobility in her knee. In terms of cognition and memory, Priyanka feels that things are stable there too and work is going well. She is agreeable to repeat neuro-psych testing as recommended come March 2021.     PROBLEM LIST  Hemangiopericytoma, grade II  Liver lesion  Depressed mood  Sleep issues  Headache    PLAN  -CANCER-DIRECTED THERAPY-  -As above; Continue imaging surveillance. Repeat MR brain imaging in 3 months.     -LIVER LESION-  -Treated. No lesion-specific follow-up needed.   -CT c/a/p with no concerns in 9/2020. Will continue with annual systemic surveillance imaging.     -STEROIDS-  -Currently off dexamethasone.    -SEIZURE MANAGEMENT-  -While this patient is at increased risk of having seizures, given the lack of seizure history, there is no indication to prescribe an antiepileptic at this time.     -Quality of life/ MOOD/ FATIGUE-  -Continue to monitor mood as untreated/ undertreated depression can worsen fatigue, dysorexia, and quality of life.  -Continue Zoloft 150mg daily.   -CBD oil as needed for joint pain.    -HEADACHES-  -Less severe and frequent; Migrainous features of throbbing pain, high severity, photo- and phonophobia, and worsening pain with activity.   -Continue Zoloft.     -MEMORY LOSS/ WORD FINDING ISSUES-  -Evaluated by Dr. Phan Wright in 3/2020. Neuro-psych testing with mild weaknesses were noted in aspects of speeded visual processing, and general cognitive speed.  -Recommended repeating testing in 3/2021; placing referral today.    Return to clinic in 3/2021 to review repeat imaging.     In the meantime, Priyanka knows to call with questions or concerns or to report new complaints and can be seen sooner if needed.    Lauren Zhang MD  Neuro-oncology

## 2020-12-14 NOTE — TUMOR CONFERENCE
Tumor Conference Information  Tumor Conference: Brain  Specialties Present: Medical oncology, Pathology, Radiology, Radiation oncology, Surgery  Patient Status: A current patient  Pathology: Not Discussed  Treatment to Date: Surgical intervention(s), Adjuvant radiation  Clinical Trial Eligibility: Not discussed  Recommended Plan: Observation (see comment) (Comment: reviewed imaging - stable; follow up with MRI in 3 months)  Did the review exceed 30 minutes?: did not           Documentation / Disclaimer Cancer Tumor Board Note  Cancer tumor board recommendations do not override what is determined to be reasonable care and treatment, which is dependent on the circumstances of a patient's case; the patient's medical, social, and personal concerns; and the clinical judgment of the oncologist [physician].

## 2021-02-25 NOTE — PROGRESS NOTES
Assessment & Plan     Major depressive disorder, recurrent episode, mild (H)  Well controlled on meds, follow-up in one year, sooner with concerns  - sertraline (ZOLOFT) 100 MG tablet; Take 1 tablet (100 mg) by mouth daily Take 1-2 tablets (100-200mg) by mouth daily    Mild intermittent asthma, unspecified whether complicated  Add flovent due to ACT/uncontrolled symptoms. Wean flovent as symptoms improve, follow-up if not helpful  Rinse mouth after use  - PROAIR  (90 Base) MCG/ACT inhaler; Inhale 1-2 puffs into the lungs every 4 hours as needed for shortness of breath / dyspnea  - fluticasone (FLOVENT HFA) 110 MCG/ACT inhaler; Inhale 2 puffs into the lungs 2 times daily    Seasonal allergic rhinitis due to other allergic trigger  Refill as is working well  - azelastine (ASTELIN) 0.1 % nasal spray; Spray 1 spray into both nostrils 2 times daily    Elevated cholesterol  Refill and check labs  - rosuvastatin (CRESTOR) 10 MG tablet; Take 1 tablet (10 mg) by mouth daily  - Lipid panel reflex to direct LDL Fasting    Encounter for screening mammogram for breast cancer  scheduled  - *MA Screening Digital Bilateral; Future    Screening for diabetes mellitus    - Basic metabolic panel                 No follow-ups on file.    Karen Hendricks MD  [facility]    Tiffanie Mathew is a 60 year old who presents for the following health issues     HPI       Depression and Anxiety Follow-Up    How are you doing with your depression since your last visit? No change    How are you doing with your anxiety since your last visit?  Varies     Are you having other symptoms that might be associated with depression or anxiety? No    Have you had a significant life event? No     Do you have any concerns with your use of alcohol or other drugs? No    Social History     Tobacco Use     Smoking status: Former Smoker     Packs/day: 1.00     Years: 27.00     Pack years: 27.00     Types: Cigarettes      Quit date: 2003     Years since quittin.1     Smokeless tobacco: Never Used   Substance Use Topics     Alcohol use: Yes     Alcohol/week: 0.0 standard drinks     Drug use: No     PHQ 10/4/2016 2020 3/1/2021   PHQ-9 Total Score 4 4 6   Q9: Thoughts of better off dead/self-harm past 2 weeks Not at all Not at all Not at all     ARTEM-7 SCORE 2020 3/1/2021   Total Score 4 2     Last PHQ-9 3/1/2021   1.  Little interest or pleasure in doing things 1   2.  Feeling down, depressed, or hopeless 1   3.  Trouble falling or staying asleep, or sleeping too much 1   4.  Feeling tired or having little energy 1   5.  Poor appetite or overeating 2   6.  Feeling bad about yourself 0   7.  Trouble concentrating 0   8.  Moving slowly or restless 0   Q9: Thoughts of better off dead/self-harm past 2 weeks 0   PHQ-9 Total Score 6   Difficulty at work, home, or with people Not difficult at all     ARTEM-7  3/1/2021   1. Feeling nervous, anxious, or on edge 1   2. Not being able to stop or control worrying 0   3. Worrying too much about different things 0   4. Trouble relaxing 1   5. Being so restless that it is hard to sit still 0   6. Becoming easily annoyed or irritable 0   7. Feeling afraid, as if something awful might happen 0   ARTEM-7 Total Score 2   If you checked any problems, how difficult have they made it for you to do your work, take care of things at home, or get along with other people? Not difficult at all       Suicide Assessment Five-step Evaluation and Treatment (SAFE-T)    Asthma Follow-Up    Was ACT completed today?  Yes    ACT Total Scores 3/1/2021   ACT TOTAL SCORE (Goal Greater than or Equal to 20) 16   In the past 12 months, how many times did you visit the emergency room for your asthma without being admitted to the hospital? 0   In the past 12 months, how many times were you hospitalized overnight because of your asthma? 0       How many days per week do you miss taking your asthma controller  "medication?  I do not have an asthma controller medication    Please describe any recent triggers for your asthma: cold air    Have you had any Emergency Room Visits, Urgent Care Visits, or Hospital Admissions since your last office visit?  No     Pt with depression on zoloft. It it working well. No concerns  Please see [id number] and [id number] for details    Pt with seasonal allergies. She takes otc antihistamines, flonase and astelin to control the congestion  Still with sob and symptoms not well controlled per ACT. She is using her albuterol daily due to sob  Will add flovent 2 puffs to see if helps improve her symptoms, she can start weaning down the dose once her symptoms are controlled. She will follow-up if not helpful    Pt with elevated cholesterol on statin. Needing labwork and refills    Pt due for mammogram and is agreeable          Review of Systems   Constitutional, HEENT, cardiovascular, pulmonary, gi and gu systems are negative, except as otherwise noted.      Objective    /84   Pulse 66   Temp 98  F (36.7  C) (Oral)   Ht 1.575 m (5' 2\")   Wt 89.8 kg (198 lb)   BMI 36.21 kg/m    Body mass index is 36.21 kg/m .  Physical Exam   GENERAL: healthy, alert and no distress  NECK: no adenopathy, no asymmetry, masses, or scars and thyroid normal to palpation  RESP: lungs clear to auscultation - no rales, rhonchi or wheezes  CV: regular rate and rhythm, normal S1 S2, no S3 or S4, no murmur, click or rub, no peripheral edema and peripheral pulses strong  MS: no gross musculoskeletal defects noted, no edema    No results found for this or any previous visit (from the past 24 hour(s)).            "

## 2021-03-01 ENCOUNTER — OFFICE VISIT (OUTPATIENT)
Dept: FAMILY MEDICINE | Facility: CLINIC | Age: 61
End: 2021-03-01
Payer: COMMERCIAL

## 2021-03-01 ENCOUNTER — TELEPHONE (OUTPATIENT)
Dept: FAMILY MEDICINE | Facility: CLINIC | Age: 61
End: 2021-03-01

## 2021-03-01 VITALS
BODY MASS INDEX: 36.44 KG/M2 | DIASTOLIC BLOOD PRESSURE: 84 MMHG | HEART RATE: 66 BPM | WEIGHT: 198 LBS | SYSTOLIC BLOOD PRESSURE: 132 MMHG | HEIGHT: 62 IN | TEMPERATURE: 98 F

## 2021-03-01 DIAGNOSIS — Z12.31 ENCOUNTER FOR SCREENING MAMMOGRAM FOR BREAST CANCER: ICD-10-CM

## 2021-03-01 DIAGNOSIS — J45.20 MILD INTERMITTENT ASTHMA, UNSPECIFIED WHETHER COMPLICATED: ICD-10-CM

## 2021-03-01 DIAGNOSIS — Z13.1 SCREENING FOR DIABETES MELLITUS: ICD-10-CM

## 2021-03-01 DIAGNOSIS — E78.00 ELEVATED CHOLESTEROL: ICD-10-CM

## 2021-03-01 DIAGNOSIS — J45.20 MILD INTERMITTENT ASTHMA WITHOUT COMPLICATION: Primary | ICD-10-CM

## 2021-03-01 DIAGNOSIS — J30.89 SEASONAL ALLERGIC RHINITIS DUE TO OTHER ALLERGIC TRIGGER: ICD-10-CM

## 2021-03-01 DIAGNOSIS — F33.0 MAJOR DEPRESSIVE DISORDER, RECURRENT EPISODE, MILD (H): Primary | ICD-10-CM

## 2021-03-01 LAB
ANION GAP SERPL CALCULATED.3IONS-SCNC: 4 MMOL/L (ref 3–14)
BUN SERPL-MCNC: 18 MG/DL (ref 7–30)
CALCIUM SERPL-MCNC: 9.4 MG/DL (ref 8.5–10.1)
CHLORIDE SERPL-SCNC: 106 MMOL/L (ref 94–109)
CHOLEST SERPL-MCNC: 208 MG/DL
CO2 SERPL-SCNC: 29 MMOL/L (ref 20–32)
CREAT SERPL-MCNC: 0.72 MG/DL (ref 0.52–1.04)
GFR SERPL CREATININE-BSD FRML MDRD: 89 ML/MIN/{1.73_M2}
GLUCOSE SERPL-MCNC: 117 MG/DL (ref 70–99)
HDLC SERPL-MCNC: 47 MG/DL
LDLC SERPL CALC-MCNC: 90 MG/DL
NONHDLC SERPL-MCNC: 161 MG/DL
POTASSIUM SERPL-SCNC: 3.9 MMOL/L (ref 3.4–5.3)
SODIUM SERPL-SCNC: 139 MMOL/L (ref 133–144)
TRIGL SERPL-MCNC: 353 MG/DL

## 2021-03-01 PROCEDURE — 36415 COLL VENOUS BLD VENIPUNCTURE: CPT | Performed by: FAMILY MEDICINE

## 2021-03-01 PROCEDURE — 99214 OFFICE O/P EST MOD 30 MIN: CPT | Performed by: FAMILY MEDICINE

## 2021-03-01 PROCEDURE — 80061 LIPID PANEL: CPT | Performed by: FAMILY MEDICINE

## 2021-03-01 PROCEDURE — 80048 BASIC METABOLIC PNL TOTAL CA: CPT | Performed by: FAMILY MEDICINE

## 2021-03-01 RX ORDER — SERTRALINE HYDROCHLORIDE 100 MG/1
100 TABLET, FILM COATED ORAL DAILY
Qty: 180 TABLET | Refills: 3 | Status: SHIPPED | OUTPATIENT
Start: 2021-03-01 | End: 2022-07-08

## 2021-03-01 RX ORDER — LACTOBACILLUS RHAMNOSUS GG 10B CELL
1 CAPSULE ORAL 2 TIMES DAILY
COMMUNITY
End: 2023-07-11

## 2021-03-01 RX ORDER — FLUTICASONE PROPIONATE 110 UG/1
2 AEROSOL, METERED RESPIRATORY (INHALATION) 2 TIMES DAILY
Qty: 12 G | Refills: 4 | Status: SHIPPED | OUTPATIENT
Start: 2021-03-01 | End: 2022-08-25

## 2021-03-01 RX ORDER — AZELASTINE 1 MG/ML
1 SPRAY, METERED NASAL 2 TIMES DAILY
Qty: 30 ML | Refills: 11 | Status: SHIPPED | OUTPATIENT
Start: 2021-03-01 | End: 2022-11-02

## 2021-03-01 RX ORDER — ALBUTEROL SULFATE 90 UG/1
1-2 AEROSOL, METERED RESPIRATORY (INHALATION) EVERY 4 HOURS PRN
Qty: 1 INHALER | Refills: 11 | Status: SHIPPED | OUTPATIENT
Start: 2021-03-01 | End: 2023-07-11

## 2021-03-01 RX ORDER — ALBUTEROL SULFATE 90 UG/1
2 AEROSOL, METERED RESPIRATORY (INHALATION) EVERY 6 HOURS
Qty: 1 INHALER | Refills: 11 | Status: SHIPPED | OUTPATIENT
Start: 2021-03-01 | End: 2022-08-25

## 2021-03-01 RX ORDER — ROSUVASTATIN CALCIUM 10 MG/1
10 TABLET, COATED ORAL DAILY
Qty: 90 TABLET | Refills: 3 | Status: SHIPPED | OUTPATIENT
Start: 2021-03-01 | End: 2022-05-08

## 2021-03-01 ASSESSMENT — ANXIETY QUESTIONNAIRES
1. FEELING NERVOUS, ANXIOUS, OR ON EDGE: SEVERAL DAYS
IF YOU CHECKED OFF ANY PROBLEMS ON THIS QUESTIONNAIRE, HOW DIFFICULT HAVE THESE PROBLEMS MADE IT FOR YOU TO DO YOUR WORK, TAKE CARE OF THINGS AT HOME, OR GET ALONG WITH OTHER PEOPLE: NOT DIFFICULT AT ALL
7. FEELING AFRAID AS IF SOMETHING AWFUL MIGHT HAPPEN: NOT AT ALL
GAD7 TOTAL SCORE: 2
2. NOT BEING ABLE TO STOP OR CONTROL WORRYING: NOT AT ALL
3. WORRYING TOO MUCH ABOUT DIFFERENT THINGS: NOT AT ALL
6. BECOMING EASILY ANNOYED OR IRRITABLE: NOT AT ALL
5. BEING SO RESTLESS THAT IT IS HARD TO SIT STILL: NOT AT ALL

## 2021-03-01 ASSESSMENT — PATIENT HEALTH QUESTIONNAIRE - PHQ9
5. POOR APPETITE OR OVEREATING: SEVERAL DAYS
SUM OF ALL RESPONSES TO PHQ QUESTIONS 1-9: 6

## 2021-03-01 ASSESSMENT — MIFFLIN-ST. JEOR: SCORE: 1416.37

## 2021-03-01 NOTE — LETTER
My Asthma Action Plan    Name: Leidy Ascencio   YOB: 1960  Date: 3/1/2021   My doctor: Karen Hendricks MD   My clinic: [facility]        My Rescue Medicine:   Albuterol inhaler (Proair/Ventolin/Proventil HFA)  2-4 puffs EVERY 4 HOURS as needed. Use a spacer if recommended by your provider.   My Asthma Severity:   Intermittent / Exercise Induced  Know your asthma triggers: Patient is unaware of triggers  cold air          GREEN ZONE   Good Control    I feel good    No cough or wheeze    Can work, sleep and play without asthma symptoms       Take your asthma control medicine every day.     1. If exercise triggers your asthma, take your rescue medication    15 minutes before exercise or sports, and    During exercise if you have asthma symptoms  2. Spacer to use with inhaler: If you have a spacer, make sure to use it with your inhaler             YELLOW ZONE Getting Worse  I have ANY of these:    I do not feel good    Cough or wheeze    Chest feels tight    Wake up at night   1. Keep taking your Green Zone medications  2. Start taking your rescue medicine:    every 20 minutes for up to 1 hour. Then every 4 hours for 24-48 hours.  3. If you stay in the Yellow Zone for more than 12-24 hours, contact your doctor.  4. If you do not return to the Green Zone in 12-24 hours or you get worse, start taking your oral steroid medicine if prescribed by your provider.           RED ZONE Medical Alert - Get Help  I have ANY of these:    I feel awful    Medicine is not helping    Breathing getting harder    Trouble walking or talking    Nose opens wide to breathe       1. Take your rescue medicine NOW  2. If your provider has prescribed an oral steroid medicine, start taking it NOW  3. Call your doctor NOW  4. If you are still in the Red Zone after 20 minutes and you have not reached your doctor:    Take your rescue medicine again and    Call 911 or go to the emergency room right away    See  your regular doctor within 2 weeks of an Emergency Room or Urgent Care visit for follow-up treatment.          Annual Reminders:  Meet with Asthma Educator,  Flu Shot in the Fall, consider Pneumonia Vaccination for patients with asthma (aged 19 and older).    Pharmacy:    [facility] [place] - [address] [place] [address] [facility] [place], [place] - [address] [place] [address] [phone]    Electronically signed by Karen Hendricks MD   Date: 03/01/21                    Asthma Triggers  How To Control Things That Make Your Asthma Worse    Triggers are things that make your asthma worse.  Look at the list below to help you find your triggers and   what you can do about them. You can help prevent asthma flare-ups by staying away from your triggers.      Trigger                                                          What you can do   Cigarette Smoke  Tobacco smoke can make asthma worse. Do not allow smoking in your home, car or around you.  Be sure no one smokes at a child s day care or school.  If you smoke, ask your health care provider for ways to help you quit.  Ask family members to quit too.  Ask your health care provider for a referral to Quit Plan to help you quit smoking, or call 6-682-407-PLAN.     Colds, Flu, Bronchitis  These are common triggers of asthma. Wash your hands often.  Don t touch your eyes, nose or mouth.  Get a flu shot every year.     Dust Mites  These are tiny bugs that live in cloth or carpet. They are too small to see. Wash sheets and blankets in hot water every week.   Encase pillows and mattress in dust mite proof covers.  Avoid having carpet if you can. If you have carpet, vacuum weekly.   Use a dust mask and HEPA vacuum.   Pollen and Outdoor Mold  Some people are allergic to trees, grass, or weed pollen, or molds. Try to keep your windows closed.  Limit time out doors when pollen count is high.   Ask you health care provider about  taking medicine during allergy season.     Animal Dander  Some people are allergic to skin flakes, urine or saliva from pets with fur or feathers. Keep pets with fur or feathers out of your home.    If you can t keep the pet outdoors, then keep the pet out of your bedroom.  Keep the bedroom door closed.  Keep pets off cloth furniture and away from stuffed toys.     Mice, Rats, and Cockroaches  Some people are allergic to the waste from these pests.   Cover food and garbage.  Clean up spills and food crumbs.  Store grease in the refrigerator.   Keep food out of the bedroom.   Indoor Mold  This can be a trigger if your home has high moisture. Fix leaking faucets, pipes, or other sources of water.   Clean moldy surfaces.  Dehumidify basement if it is damp and smelly.   Smoke, Strong Odors, and Sprays  These can reduce air quality. Stay away from strong odors and sprays, such as perfume, powder, hair spray, paints, smoke incense, paint, cleaning products, candles and new carpet.   Exercise or Sports  Some people with asthma have this trigger. Be active!  Ask your doctor about taking medicine before sports or exercise to prevent symptoms.    Warm up for 5-10 minutes before and after sports or exercise.     Other Triggers of Asthma  Cold air:  Cover your nose and mouth with a scarf.  Sometimes laughing or crying can be a trigger.  Some medicines and food can trigger asthma.

## 2021-03-02 ASSESSMENT — ASTHMA QUESTIONNAIRES: ACT_TOTALSCORE: 16

## 2021-03-02 ASSESSMENT — ANXIETY QUESTIONNAIRES: GAD7 TOTAL SCORE: 2

## 2021-03-11 NOTE — PATIENT INSTRUCTIONS
Brain imaging reviewed; no concerns.   Repeat imaging in 6 months.     Repeat neuro-psych testing in April as scheduled.     Repeat CT of body in 6 months.  Please call with clinic with any new body pains or worsening issues with breathing.      Return to clinic in 9/2021 to review new imaging.     Lauren Zhang MD  Neuro-oncology

## 2021-03-11 NOTE — PROGRESS NOTES
"Priyanka is a 60 year old who is being evaluated via a billable video visit.      How would you like to obtain your AVS? MyChart  If the video visit is dropped, the invitation should be resent by: Send to e-mail at: [email or web address]  Will anyone else be joining your video visit? No    Suze Huang CMA on 3/12/2021 at 2:16 PM      Video-Visit Details  Type of service:  Video Visit    Video Start Time: 2:42 PM  Video End Time: 3:01 PM    Originating Location (pt. Location): Home    Distant Location (provider location):  [facility]     Platform used for Video Visit: [employer]    _____________________________________________________________    NEURO-ONCOLOGY VISIT  Mar 12, 2021    CHIEF COMPLAINT: Ms. 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 She completed GammaKnife treatment in 1/2020. She is currently managed on imaging surveillance.     I met with Priyanka today for this follow-up visit.     HISTORY OF PRESENT ILLNESS  -Priyanka does not have any major changes to report or new neurological symptoms.   -No headaches recently.   -Continues on Zoloft 150mg. Mood is good.   -Energy remains \"low\". Trying to be more active with the warmer weather. Knee is less painful following the steroid injection.   -Stable visional field cut.  -Denies any acute changes in strength. No falls when walking. No numbness or changes in sensation.   -Thinking and concentration is generally good; needing to take a lot of notes in the setting of mild short-term memory issues. Continues to work from home.   -No reported episodes concerning for a seizure.  -No abdominal pain. Mild, continued issues with difficulty breathing; inhaler helps, uses infrequently. No bony pain.   -Going to get COVID vaccine today.     REVIEW OF SYSTEMS  A comprehensive ROS was performed and negative except as in HPI.      MEDICATIONS "   Current Outpatient Medications   Medication Sig Dispense Refill     albuterol (PROAIR HFA/PROVENTIL HFA/VENTOLIN HFA) 108 (90 Base) MCG/ACT inhaler Inhale 2 puffs into the lungs every 6 hours 1 Inhaler 11     azelastine (ASTELIN) 0.1 % nasal spray Spray 1 spray into both nostrils 2 times daily 30 mL 11     fluticasone (FLONASE) 50 MCG/ACT nasal spray Spray 2 sprays into both nostrils daily       fluticasone (FLOVENT HFA) 110 MCG/ACT inhaler Inhale 2 puffs into the lungs 2 times daily 12 g 4     lactobacillus rhamnosus, GG, (CULTURELL) capsule Take 1 capsule by mouth 2 times daily       loratadine (CLARITIN) 10 MG tablet Take 10 mg by mouth daily       PROAIR  (90 Base) MCG/ACT inhaler Inhale 1-2 puffs into the lungs every 4 hours as needed for shortness of breath / dyspnea 1 Inhaler 11     rosuvastatin (CRESTOR) 10 MG tablet Take 1 tablet (10 mg) by mouth daily 90 tablet 3     sertraline (ZOLOFT) 100 MG tablet Take 1 tablet (100 mg) by mouth daily Take 1-2 tablets (100-200mg) by mouth daily 180 tablet 3     VITAMIN D, CHOLECALCIFEROL, PO Take 1,000 Units by mouth daily       aspirin 81 MG EC tablet Take 81 mg by mouth At Bedtime       DRUG ALLERGIES   Allergies   Allergen Reactions     Atorvastatin      Penicillins      Sulfa Drugs      Sulfamethoxazole-Trimethoprim Rash       ONCOLOGIC HISTORY  -2015 PRESENTATION: Headaches.  -2015 MR brain imaging with a right occipital mass   -4/22/2015 SURGERY: Resection by Dr. Lujan   PATHOLOGY: Hemangiopericytoma, grade 2.   -2015 CT chest, abdomen and pelvis negative for malignancy.  -2015 PET MRI negative for malignancy.   Conservative management per Dr. Herman Mayberry.    -8/1/2017 CT chest abdomen and pelvis and bone scan negative for malignancy.   -12/7/2017 MRB with no evidence of tumor recurrence.  -12/6/2018 MRB with no evidence of tumor recurrence.    -12/11/2019 MRB with a new nodule of enhancement within the superior sagittal sinus, concerning for tumor  recurrence. Stable appearance of the right parieto-occipital craniotomy site.  -12/13/2019 Bone scan with no convincing evidence of skeletal metastases.  -12/18/2019 MR liver with a 9 mm lesion in the lateral aspect of the junction of segments 7 and 8 of the liver shows homogeneous delayed enhancement and is new since 2015. This could represent a metastatic lesion.     -1/6/2020 NEURO-ONC: Referral to radiation oncology and GI oncology surgery. Signed consent for next generation sequencing.   -1/14/2020 RADS: GammaKnife to lesion (1 fraction).   -1/15/2020 LIVER LESION:  Laparoscopic ultrasound-guided microwave ablation of liver tumor by Dr. Phan Fierro.   PATHOLOGY: Cavernous hemangioma of the liver, benign:    -No metastatic hemangiopericytoma or other malignancy identified    -Underlying steatohepatitis, no significant fibrosis    -No follow-up needed.  -2/28/2020 NEURO-ONC/ MRB: Clinically stable. Headaches with migraine features; increasing Zoloft. Imaging with positive response to radiation.   -3/9/2020 Evaluated by Dr. Wright; Neuro-psych testing with mild weaknesses were noted in aspects of speeded visual processing, and general cognitive speed. Repeat in 3/2021.  -6/1/2020 NEURO-ONC/ MRB: Clinically well; for insomnia trial of melatonin. Imaging with positive treatment effect.   -6/1/2020 NEURO-ONC/ MRB: Clinically well. Imaging with no concerns.   -9/14/2020 NEURO-ONC/ MRB: Clinically well. Imaging of MRI brain and CT CAP with no concerns.  -12/14/2020 NEURO-ONC/ MRB: Clinically well. MR imaging of the brain with no concerns.  -3/12/2021 NEURO-ONC/ MRB: Clinically well. MRI of the brain with no concerns.    SOCIAL HISTORY   Tobacco use: Former smoker; quit in 2003. 27 pack years.  Alcohol use: Social use.   Drug use: Denies.  . 2 daughter, 1 son.   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        PHYSICAL EXAMINATION    -Generally well appearing. Good energy today.    -Respiratory: No audible wheezing.   -Skin: No facial rashes.  -Psychiatric: Normal mood and affect. Pleasant, talkative.  -Neurologic:   MENTAL STATUS:     Alert, oriented.   Recall: Intact.    Speech fluent.    Comprehension intact.     CRANIAL NERVES:     Pupils are equal, round.    Extraocular movements full, patient denies diplopia.    Left inferior homonymous quadrantanopia.   Symmetric facial movements.   Hearing intact.   With normal phonation, no dysfunction tongue.  MOTOR: Antigravity in arms. No pronator drift.  SENSATION: Intact throughout.   COORDINATION: Intact to finger-nose with eyes closed.    The rest of a comprehensive physical examination is deferred due to PHE (public health emergency) video visit restrictions.        MEDICAL RECORDS  Obtained and personally reviewed all available outside medical records in addition to reviewing any records available in our electronic system.     LABS  Personally reviewed all available lab results.     IMAGING  Personally reviewed MR brain imaging from today and compared to prior imaging. The contrast enhancing lesion at the sagittal sinus has decreased in size as compared to post-radiation imaging in 2/2020, but is largely stable compared to the last scan. No new lesions. No increase in T2 FLAIR changes about the cavity.    Imaging shown to Priyanka.       IMPRESSION  Clinic was spent discussing in detail the nature of her cancer in light of repeat imaging. This was in addition to answering questions pertaining to my recommendations and devising the plan as outlined below.     Grade II hemangiopericytomas are considered malignant tumors. Standard treatment options include surgery and radiation therapy. With no good surgical option, Priyanka completed stereotactic radiation therapy in 1/2020. Recent imaging was again with no new concerns. With repeat imaging remaining stable 1 year after completion of therapy, will increase interval to every 6 months. Per NCCN  guidelines, imaging should be repeated every 3-6 months for the first 5 years (at least through 1/2025) and then, annually thereafter.      For radiation-refractory tumors, Pazopanib, a broad-spectrum tyrosine kinase inhibitor, has been used with noted efficacy. Of note, Priyanka has consented to next generation sequencing. At disease recurrence, can consider sending pathology for mutational analysis to potentially identify targeted theray options.     Extracranial metastatic disease can occur; most commonly in bone, lung, and liver. Prior imaging of the MR liver showed a lesion. Priyanka was referred to Dr. Phan Fierro with surgical oncology. Dr. Fierro performed a  laparoscopic ultrasound-guided microwave ablation of liver tumor on 1/15/2020. Pathology was consistent with a benign cavernous hemangioma with no metastatic hemangiopericytoma or other malignancy identified. She is currently monitored on annual systemic surveillance imaging with a CT C/A/P with contrast. Imaging from September 2020 showed no concerns. On HPI today, no bony or abdominal pain, but intermittent RANDLE. I instructed Priyanka to notify me if this worsens.     Clinically, Priyanka is doing well with no new complaints. Headaches has resolved. Her energy is low, but she is attributing this to a low activity level, which will hopefully improve with less pain limiting immobility in her knee and with the weather warming. In terms of cognition and memory, Priyanka feels that things are stable there too and work is going well. She is scheduled for repeat neuro-psych testing on 4/30/2021.     PROBLEM LIST  Hemangiopericytoma, grade II  Liver lesion  Depressed mood  Sleep issues  Headache    PLAN  -CANCER-DIRECTED THERAPY-  -As above; Continue imaging surveillance. Repeat MR brain imaging in 6 months.     -LIVER LESION-  -Treated. No lesion-specific follow-up needed.   -CT c/a/p with no concerns in 9/2020. Will continue with annual systemic surveillance imaging.    -Instructed to call with clinic with any new body pains or worsening issues with breathing.      -STEROIDS-  -Currently off dexamethasone.    -SEIZURE MANAGEMENT-  -While this patient is at increased risk of having seizures, given the lack of seizure history, there is no indication to prescribe an antiepileptic at this time.     -Quality of life/ MOOD/ FATIGUE-  -Continue to monitor mood as untreated/ undertreated depression can worsen fatigue, dysorexia, and quality of life.  -Continue Zoloft 150mg daily.     -MEMORY LOSS/ WORD FINDING ISSUES-  -Evaluated by Dr. Phan Wright in 3/2020. Neuro-psych testing with mild weaknesses were noted in aspects of speeded visual processing, and general cognitive speed.  -Repeat testing scheduled for 4/2021.    Return to clinic in 9/2021 to review repeat imaging.     In the meantime, Priyanka knows to call with questions or concerns or to report new complaints and can be seen sooner if needed.    Lauren Zhang MD  Neuro-oncology

## 2021-03-12 ENCOUNTER — ANCILLARY PROCEDURE (OUTPATIENT)
Dept: MRI IMAGING | Facility: CLINIC | Age: 61
End: 2021-03-12
Attending: PSYCHIATRY & NEUROLOGY
Payer: COMMERCIAL

## 2021-03-12 ENCOUNTER — VIRTUAL VISIT (OUTPATIENT)
Dept: ONCOLOGY | Facility: CLINIC | Age: 61
End: 2021-03-12
Attending: PSYCHIATRY & NEUROLOGY
Payer: COMMERCIAL

## 2021-03-12 DIAGNOSIS — D43.3 HEMANGIOPERICYTOMA OF CNS, GRADE II (H): Primary | ICD-10-CM

## 2021-03-12 DIAGNOSIS — C80.0 DISSEMINATED CANCER (H): ICD-10-CM

## 2021-03-12 DIAGNOSIS — D43.3 HEMANGIOPERICYTOMA OF CNS, GRADE II (H): ICD-10-CM

## 2021-03-12 PROCEDURE — 999N001193 HC VIDEO/TELEPHONE VISIT; NO CHARGE

## 2021-03-12 PROCEDURE — 99214 OFFICE O/P EST MOD 30 MIN: CPT | Mod: 95 | Performed by: PSYCHIATRY & NEUROLOGY

## 2021-03-12 PROCEDURE — A9585 GADOBUTROL INJECTION: HCPCS | Performed by: STUDENT IN AN ORGANIZED HEALTH CARE EDUCATION/TRAINING PROGRAM

## 2021-03-12 PROCEDURE — 70553 MRI BRAIN STEM W/O & W/DYE: CPT | Performed by: STUDENT IN AN ORGANIZED HEALTH CARE EDUCATION/TRAINING PROGRAM

## 2021-03-12 RX ORDER — GADOBUTROL 604.72 MG/ML
10 INJECTION INTRAVENOUS ONCE
Status: COMPLETED | OUTPATIENT
Start: 2021-03-12 | End: 2021-03-12

## 2021-03-12 RX ADMIN — GADOBUTROL 9 ML: 604.72 INJECTION INTRAVENOUS at 10:04

## 2021-03-12 NOTE — DISCHARGE INSTRUCTIONS
MRI Contrast Discharge Instructions    The IV contrast you received today will pass out of your body in your  urine. This will happen in the next 24 hours. You will not feel this process.  Your urine will not change color.    Drink at least 4 extra glasses of water or juice today (unless your doctor  has restricted your fluids). This reduces the stress on your kidneys.  You may take your regular medicines.    If you are on dialysis: It is best to have dialysis today.    If you have a reaction: Most reactions happen right away. If you have  any new symptoms after leaving the hospital (such as hives or swelling),  call your hospital at the correct number below. Or call your family doctor.  If you have breathing distress or wheezing, call 911.    Special instructions: ***    I have read and understand the above information.    Signature:______________________________________ Date:___________    Staff:__________________________________________ Date:___________     Time:__________    [facility] [facility] Departments:    ___[place]: [phone]  ___[facility]: [phone]  ___[place]: [phone] ___[facility]: [phone]  ___[facility]: [phone]  ___[facility]: [phone]  ___[phone]  ___[facility]: [phone]  ___[phone]

## 2021-03-12 NOTE — LETTER
"    3/12/2021         RE: Leidy Ascencio  [address] [place] [address]        Dear Colleague,    Thank you for referring your patient, Leidy Ascencio, to the [facility]. Please see a copy of my visit note below.    Priyanka is a 60 year old who is being evaluated via a billable video visit.      How would you like to obtain your AVS? MyChart  If the video visit is dropped, the invitation should be resent by: Send to e-mail at: [email or web address]  Will anyone else be joining your video visit? No    Suze Huang CMA on 3/12/2021 at 2:16 PM      Video-Visit Details  Type of service:  Video Visit    Video Start Time: 2:42 PM  Video End Time: 3:01 PM    Originating Location (pt. Location): Home    Distant Location (provider location):  [facility]     Platform used for Video Visit: [employer]    _____________________________________________________________    NEURO-ONCOLOGY VISIT  Mar 12, 2021    CHIEF COMPLAINT: Ms. 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 She completed GammaKnife treatment in 1/2020. She is currently managed on imaging surveillance.     I met with Priyanka today for this follow-up visit.     HISTORY OF PRESENT ILLNESS  -Priyanka does not have any major changes to report or new neurological symptoms.   -No headaches recently.   -Continues on Zoloft 150mg. Mood is good.   -Energy remains \"low\". Trying to be more active with the warmer weather. Knee is less painful following the steroid injection.   -Stable visional field cut.  -Denies any acute changes in strength. No falls when walking. No numbness or changes in sensation.   -Thinking and concentration is generally good; needing to take a lot of notes in the setting of mild short-term memory issues. Continues to work from home.   -No reported episodes concerning for a seizure.  -No " abdominal pain. Mild, continued issues with difficulty breathing; inhaler helps, uses infrequently. No bony pain.   -Going to get COVID vaccine today.     REVIEW OF SYSTEMS  A comprehensive ROS was performed and negative except as in HPI.      MEDICATIONS   Current Outpatient Medications   Medication Sig Dispense Refill     albuterol (PROAIR HFA/PROVENTIL HFA/VENTOLIN HFA) 108 (90 Base) MCG/ACT inhaler Inhale 2 puffs into the lungs every 6 hours 1 Inhaler 11     azelastine (ASTELIN) 0.1 % nasal spray Spray 1 spray into both nostrils 2 times daily 30 mL 11     fluticasone (FLONASE) 50 MCG/ACT nasal spray Spray 2 sprays into both nostrils daily       fluticasone (FLOVENT HFA) 110 MCG/ACT inhaler Inhale 2 puffs into the lungs 2 times daily 12 g 4     lactobacillus rhamnosus, GG, (CULTURELL) capsule Take 1 capsule by mouth 2 times daily       loratadine (CLARITIN) 10 MG tablet Take 10 mg by mouth daily       PROAIR  (90 Base) MCG/ACT inhaler Inhale 1-2 puffs into the lungs every 4 hours as needed for shortness of breath / dyspnea 1 Inhaler 11     rosuvastatin (CRESTOR) 10 MG tablet Take 1 tablet (10 mg) by mouth daily 90 tablet 3     sertraline (ZOLOFT) 100 MG tablet Take 1 tablet (100 mg) by mouth daily Take 1-2 tablets (100-200mg) by mouth daily 180 tablet 3     VITAMIN D, CHOLECALCIFEROL, PO Take 1,000 Units by mouth daily       aspirin 81 MG EC tablet Take 81 mg by mouth At Bedtime       DRUG ALLERGIES   Allergies   Allergen Reactions     Atorvastatin      Penicillins      Sulfa Drugs      Sulfamethoxazole-Trimethoprim Rash       ONCOLOGIC HISTORY  -2015 PRESENTATION: Headaches.  -2015 MR brain imaging with a right occipital mass   -4/22/2015 SURGERY: Resection by Dr. Lujan   PATHOLOGY: Hemangiopericytoma, grade 2.   -2015 CT chest, abdomen and pelvis negative for malignancy.  -2015 PET MRI negative for malignancy.   Conservative management per Dr. Herman Mayberry.    -8/1/2017 CT chest abdomen and pelvis and  bone scan negative for malignancy.   -12/7/2017 MRB with no evidence of tumor recurrence.  -12/6/2018 MRB with no evidence of tumor recurrence.    -12/11/2019 MRB with a new nodule of enhancement within the superior sagittal sinus, concerning for tumor recurrence. Stable appearance of the right parieto-occipital craniotomy site.  -12/13/2019 Bone scan with no convincing evidence of skeletal metastases.  -12/18/2019 MR liver with a 9 mm lesion in the lateral aspect of the junction of segments 7 and 8 of the liver shows homogeneous delayed enhancement and is new since 2015. This could represent a metastatic lesion.     -1/6/2020 NEURO-ONC: Referral to radiation oncology and GI oncology surgery. Signed consent for next generation sequencing.   -1/14/2020 RADS: GammaKnife to lesion (1 fraction).   -1/15/2020 LIVER LESION:  Laparoscopic ultrasound-guided microwave ablation of liver tumor by Dr. Phan Fierro.   PATHOLOGY: Cavernous hemangioma of the liver, benign:    -No metastatic hemangiopericytoma or other malignancy identified    -Underlying steatohepatitis, no significant fibrosis    -No follow-up needed.  -2/28/2020 NEURO-ONC/ MRB: Clinically stable. Headaches with migraine features; increasing Zoloft. Imaging with positive response to radiation.   -3/9/2020 Evaluated by Dr. Wright; Neuro-psych testing with mild weaknesses were noted in aspects of speeded visual processing, and general cognitive speed. Repeat in 3/2021.  -6/1/2020 NEURO-ONC/ MRB: Clinically well; for insomnia trial of melatonin. Imaging with positive treatment effect.   -6/1/2020 NEURO-ONC/ MRB: Clinically well. Imaging with no concerns.   -9/14/2020 NEURO-ONC/ MRB: Clinically well. Imaging of MRI brain and CT CAP with no concerns.  -12/14/2020 NEURO-ONC/ MRB: Clinically well. MR imaging of the brain with no concerns.  -3/12/2021 NEURO-ONC/ MRB: Clinically well. MRI of the brain with no concerns.    SOCIAL HISTORY   Tobacco use: Former smoker; quit  in 2003. 27 pack years.  Alcohol use: Social use.   Drug use: Denies.  . 2 daughter, 1 son.   Employment: [employer]; [employer] managers.        PHYSICAL EXAMINATION    -Generally well appearing. Good energy today.   -Respiratory: No audible wheezing.   -Skin: No facial rashes.  -Psychiatric: Normal mood and affect. Pleasant, talkative.  -Neurologic:   MENTAL STATUS:     Alert, oriented.   Recall: Intact.    Speech fluent.    Comprehension intact.     CRANIAL NERVES:     Pupils are equal, round.    Extraocular movements full, patient denies diplopia.    Left inferior homonymous quadrantanopia.   Symmetric facial movements.   Hearing intact.   With normal phonation, no dysfunction tongue.  MOTOR: Antigravity in arms. No pronator drift.  SENSATION: Intact throughout.   COORDINATION: Intact to finger-nose with eyes closed.    The rest of a comprehensive physical examination is deferred due to PHE (public health emergency) video visit restrictions.        MEDICAL RECORDS  Obtained and personally reviewed all available outside medical records in addition to reviewing any records available in our electronic system.     LABS  Personally reviewed all available lab results.     IMAGING  Personally reviewed MR brain imaging from today and compared to prior imaging. The contrast enhancing lesion at the sagittal sinus has decreased in size as compared to post-radiation imaging in 2/2020, but is largely stable compared to the last scan. No new lesions. No increase in T2 FLAIR changes about the cavity.    Imaging shown to Priyanka.       IMPRESSION  Clinic was spent discussing in detail the nature of her cancer in light of repeat imaging. This was in addition to answering questions pertaining to my recommendations and devising the plan as outlined below.     Grade II hemangiopericytomas are considered malignant tumors. Standard treatment options include surgery and radiation therapy. With no good surgical  option, Priyanka completed stereotactic radiation therapy in 1/2020. Recent imaging was again with no new concerns. With repeat imaging remaining stable 1 year after completion of therapy, will increase interval to every 6 months. Per NCCN guidelines, imaging should be repeated every 3-6 months for the first 5 years (at least through 1/2025) and then, annually thereafter.      For radiation-refractory tumors, Pazopanib, a broad-spectrum tyrosine kinase inhibitor, has been used with noted efficacy. Of note, Priyanka has consented to next generation sequencing. At disease recurrence, can consider sending pathology for mutational analysis to potentially identify targeted theray options.     Extracranial metastatic disease can occur; most commonly in bone, lung, and liver. Prior imaging of the MR liver showed a lesion. Priyanka was referred to Dr. Phan Fierro with surgical oncology. Dr. Fierro performed a  laparoscopic ultrasound-guided microwave ablation of liver tumor on 1/15/2020. Pathology was consistent with a benign cavernous hemangioma with no metastatic hemangiopericytoma or other malignancy identified. She is currently monitored on annual systemic surveillance imaging with a CT C/A/P with contrast. Imaging from September 2020 showed no concerns. On HPI today, no bony or abdominal pain, but intermittent RANDLE. I instructed Priyanka to notify me if this worsens.     Clinically, Priyanka is doing well with no new complaints. Headaches has resolved. Her energy is low, but she is attributing this to a low activity level, which will hopefully improve with less pain limiting immobility in her knee and with the weather warming. In terms of cognition and memory, Priyanka feels that things are stable there too and work is going well. She is scheduled for repeat neuro-psych testing on 4/30/2021.     PROBLEM LIST  Hemangiopericytoma, grade II  Liver lesion  Depressed mood  Sleep issues  Headache    PLAN  -CANCER-DIRECTED THERAPY-  -As  above; Continue imaging surveillance. Repeat MR brain imaging in 6 months.     -LIVER LESION-  -Treated. No lesion-specific follow-up needed.   -CT c/a/p with no concerns in 9/2020. Will continue with annual systemic surveillance imaging.   -Instructed to call with clinic with any new body pains or worsening issues with breathing.      -STEROIDS-  -Currently off dexamethasone.    -SEIZURE MANAGEMENT-  -While this patient is at increased risk of having seizures, given the lack of seizure history, there is no indication to prescribe an antiepileptic at this time.     -Quality of life/ MOOD/ FATIGUE-  -Continue to monitor mood as untreated/ undertreated depression can worsen fatigue, dysorexia, and quality of life.  -Continue Zoloft 150mg daily.     -MEMORY LOSS/ WORD FINDING ISSUES-  -Evaluated by Dr. Phan Wright in 3/2020. Neuro-psych testing with mild weaknesses were noted in aspects of speeded visual processing, and general cognitive speed.  -Repeat testing scheduled for 4/2021.    Return to clinic in 9/2021 to review repeat imaging.     In the meantime, Priyanka knows to call with questions or concerns or to report new complaints and can be seen sooner if needed.    Lauren Zhang MD  Neuro-oncology

## 2021-04-17 ENCOUNTER — HEALTH MAINTENANCE LETTER (OUTPATIENT)
Age: 61
End: 2021-04-17

## 2021-04-30 ENCOUNTER — OFFICE VISIT (OUTPATIENT)
Dept: NEUROPSYCHOLOGY | Facility: CLINIC | Age: 61
End: 2021-04-30
Attending: PSYCHIATRY & NEUROLOGY
Payer: COMMERCIAL

## 2021-04-30 DIAGNOSIS — R41.842 VISUOSPATIAL DEFICIT: ICD-10-CM

## 2021-04-30 DIAGNOSIS — D43.3 HEMANGIOPERICYTOMA OF CNS, GRADE II (H): ICD-10-CM

## 2021-04-30 DIAGNOSIS — F06.8 OTHER SPECIFIED MENTAL DISORDERS DUE TO KNOWN PHYSIOLOGICAL CONDITION: ICD-10-CM

## 2021-04-30 DIAGNOSIS — F41.9 ANXIETY: ICD-10-CM

## 2021-04-30 DIAGNOSIS — R41.89 COGNITIVE CHANGES: ICD-10-CM

## 2021-04-30 DIAGNOSIS — R41.3 MEMORY CHANGES: ICD-10-CM

## 2021-04-30 DIAGNOSIS — D48.19 HEMANGIOPERICYTOMA: Primary | ICD-10-CM

## 2021-04-30 PROCEDURE — 96133 NRPSYC TST EVAL PHYS/QHP EA: CPT | Performed by: CLINICAL NEUROPSYCHOLOGIST

## 2021-04-30 PROCEDURE — 96138 PSYCL/NRPSYC TECH 1ST: CPT | Performed by: CLINICAL NEUROPSYCHOLOGIST

## 2021-04-30 PROCEDURE — 96139 PSYCL/NRPSYC TST TECH EA: CPT | Performed by: CLINICAL NEUROPSYCHOLOGIST

## 2021-04-30 PROCEDURE — 96116 NUBHVL XM PHYS/QHP 1ST HR: CPT | Performed by: CLINICAL NEUROPSYCHOLOGIST

## 2021-04-30 PROCEDURE — 96132 NRPSYC TST EVAL PHYS/QHP 1ST: CPT | Performed by: CLINICAL NEUROPSYCHOLOGIST

## 2021-04-30 NOTE — PROGRESS NOTES
"Name: Leidy Ascencio  MR#: [id number]  YOB: 1960  Date of Exam: Apr 30, 2021    NEUROPSYCHOLOGICAL EVALUATION    IDENTIFYING INFORMATION  Leidy Ascencio is a 60 year old year old, right handed, , with 10 years of formal education + GED.  She was unaccompanied to the interview.    The patient was in-clinic for the entirety of this evaluation. The interview for this evaluation was completed via a Zoom meeting. Testing was completed face-to-face.     BACKGROUND INFORMATION / INTERVIEW FINDINGS    Records indicate that Ms. Ascencio developed a headache in 2015. An MRI of her brain documented a right occipital mass. 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 An MRI of her liver in December, 2019 documented a 9 mm lesion in the lateral aspect of the junction of segments seven and eight of the liver that was possibly felt to be representative of a metastatic lesion. In January, 2020, she underwent one fraction of gamma knife to her brain lesion. A laparoscopic ultrasound-guided microwave ablation of her liver tumor was also completed in January, with pathology consistent with a cavernous hemangioma. The most recent MRI of her brain on March 12, 2021 documented, \"1. Stable postsurgical changes of right parietooccipital craniotomy for hemangiopericytoma resection. 2. Stable nodular enhancing region posterior to the superior sagittal sinus, not significantly changed compared with the two most recent studies and decreased in size since February 2020 following treatment.\" Her other medical history includes insomnia, depression, left inferior quadrantanopia, recurrent cold sores, plantar fasciitis, vitamin D deficiency, and atopic rhinitis. Concerns have been expressed about her cognition, and in particular with memory. The current evaluation was requested by Dr. Lauren Zhang, in this context.    Of note, I saw Ms. Ascencio for a " neuropsychology evaluation on 03/06/2020. My evaluation documented a pattern of subtle weaknesses and variability that was felt to be largely consistent with the location of her lesion.  Noted was the left inferior quadrantanopsia.  On testing, mild weaknesses were also identified in aspects of speeded visual processing, and general cognitive speed.  She was also reporting mild symptoms of anxiety.    On interview, Ms. Ascencio confirmed the above history.  She reported that things have been going pretty well for her in the last year.  She stated that she works from home quite a lot, and was recently vaccinated for COVID-19.  She stated that from a neuro-oncologic perspective, she has had a stable year.  She indicated that her vision troubles are unchanged.  She stated that she bumps into things.  She stated that she still has some degree of difficulty with memory, and compensates by taking notes.  She described troubles with memory for both verbal and nonverbal information.  She indicated that her daughter points out to her that she has troubles with her memory.  She reportedly forgets conversational details.  She otherwise denied cognitive changes or difficulties.    With respect to mental health, Ms. Ascencio reported that her mood is pretty good.  She stated that she has been feeling better since she has been able to get outside more.  She denied significant mental health related changes in the last year.  She is prescribed an antidepressant medication, and stated that this medicine seems to be working okay.  She noted that the dose of this medication was increased when she underwent the gamma knife treatments, but she is considering asking if she can reduce the dose.  She is not under the care of a counselor or therapist.  She denied interval psychiatric hospitalization, hallucinations, or symptoms consistent with severe mental illness.  She denied suicidal ideation or past suicide attempts.    With regard to other  medical background, Ms. Ascencio denied interval head injury, stroke, or seizure.  She stated that her sleep is on and off, and noted that she sometimes has trouble falling asleep.  She reported that she also wakes in the night.  She averages about 7 hours of sleep, and slept normally the night before the exam.  She denied pain at the time of the interview, but noted that she is developing some arthritis.  Per records, her current medications include albuterol, aspirin, azelastine, fluticasone, lactobacillus rhamnosus, loratadine, ProAir inhaler, rosuvastatin, sertraline, and cholecalciferol.  She stated that she usually consumes 1-2 alcoholic drinks per night, but otherwise denied substance use.  She noted that she is also developing some difficulties with hearing.    Ms. Ascencio lives at home with her , her daughter, her son-in-law, and 2 grandchildren (ages 8 and 5).  She manages her own basic and instrumental daily activities.  She drives.    By way of background, Ms. Ascencio denied significant changes in her family status, educational background, or work history.  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  She stated that work has been going well.  She has been going back in to restaurants now in person several times per week.    BEHAVIORAL OBSERVATIONS  Ms. Ascencio was pleasant and cooperative with the exam. Her speech was normal. Her comprehension was normal. Her thought processes were unremarkable. Her mood was euthymic with congruent affect. Her effort was good. The current results are felt to be an accurate depiction of her cognitive functioning.      RESULTS OF EXAM  Her performances on standardized measures of neuropsychological functioning were as follows.    She was fully oriented to time, place, and various aspects of personal information.  She was able to state for the names of the current president and the 2 most recent past presidents.  She obtained passing scores on  stand-alone and embedded metrics of cognitive performance validity.  Auditory attention for digits was average.  Visual attention for spatial sequences was high average.  Mental calculations were average.  Learning of words in a list format was low average.  Delayed recall of list words was average.  Percent retention of list words was high average.  Delayed recognition of list words was average, and performed without error.  Learning of simple geometric shapes and their spatial locations was borderline impaired.  Delayed recall of the shapes and their locations was borderline impaired.  Percent retention of the shapes was borderline impaired.  Delayed recognition of the shapes was normal, and performed without error.  Visuospatial judgments for variably oriented lines were performed in the high average to superior range.  Visual problem-solving with blocks was average.  Nonverbal reasoning for incomplete matrices was average. Line bisection was normal.  Letter cancellation was normal.  Her drawing of a complicated geometric figure was normal.  Comprehension of phrases and short stories was high average.  Verbal associative fluency was average.  Semantic verbal fluency was superior.  Naming to confrontation was performed in the high average to superior range.  Verbal abstract reasoning was average.  Speeded visual sequencing under focused attention was average.  A similar measure with a divided attention component was performed in the average to high average range.  Speeded word reading was performed in the low average to average range.  Speeded color naming was performed in the average to high average range.  Speeded inhibition of an overlearned response was average.  Speeded matching and cancellation was average.  Speeded visual motor coding was average.  Speeded fine motor dexterity was high average for the dominant, right hand, and average for the left hand.    She endorsed items consistent with minimal symptoms  of depression, and moderate symptoms of anxiety on self-report measures.    IMPRESSIONS  Ms. Ascencio demonstrated a pattern of subtle weaknesses that remains consistent with relative dysfunction of right posterior hemisphere brain networks.  Her cognition is mildly variable, but overall stable to somewhat improved relative to the prior evaluation in March, 2020.  In the current evaluation, she continues to have weaknesses in nonverbal learning and recall, and to a slight extent in speeded visual processing.  Of course, she also has the left inferior quadrantanopsia. The remainder of her cognitive abilities are normal and were performed in keeping with her above average range cognitive baseline.  She is reporting moderate symptoms of anxiety on a self-report measure.  It may be the case that anxiety symptoms and introduced some degree of variability into her thinking, although I do not think we can attribute all of her cognitive dysfunction to this anxiety.    RECOMMENDATIONS  Preliminary results and recommendations were provided to the patient over the telephone on the date of the evaluation, and all questions were answered.     1.  If she continues to have difficulties with memory, routine use of a memory notebook or other assistive device could be of benefit.    2.  In spite of treatment, she remains anxious.  If medically indicated, consideration could be given to modified treatment of her mental health.    3.  Given the stability, follow-up neuropsychological evaluation could be considered in the future, if clinically indicated.    Phan Wright, Ph.D., L.P., ABPP  Board Certified in Clinical Neuropsychology   / Licensed Psychologist [id number]    Time spent:  One unit (40 minutes) neurobehavioral status exam including interview and clinical assessment by licensed and board-certified neuropsychologist (CPT 84794). One unit (*60 minutes) neuropsychological testing evaluation by licensed and  board-certified neuropsychologist, including integration of patient data, interpretation of standardized test results and clinical data, clinical decision-making, treatment planning, and report, first hour (CPT 75602). One unit(s) (50 minutes) of neuropsychological testing evaluation by licensed and board-certified neuropsychologist, including integration of patient data, interpretation of standardized test results and clinical data, clinical decision-making, treatment planning, and report, subsequent hours (CPT 90705). One unit (30 minutes) of psychological and neuropsychological test administration and scoring by technician, first 30 minutes (CPT 06964). Four units (130 minutes) psychological or neuropsychological test administration and scoring by technician, subsequent 30 minutes (CPT 41539). Diagnoses: D48.1, R41.842, F06.8, F41.9.

## 2021-04-30 NOTE — LETTER
"4/30/2021      RE: Leidy Ascencio  [address] [place] [address]       Name: Leidy Ascencio  MR#: [id number]  YOB: 1960  Date of Exam: Apr 30, 2021    NEUROPSYCHOLOGICAL EVALUATION    IDENTIFYING INFORMATION  Leidy Ascencio is a 60 year old year old, right handed, , with 10 years of formal education + GED.  She was unaccompanied to the interview.    The patient was in-clinic for the entirety of this evaluation. The interview for this evaluation was completed via a Zoom meeting. Testing was completed face-to-face.     BACKGROUND INFORMATION / INTERVIEW FINDINGS    Records indicate that Ms. Ascencio developed a headache in 2015. An MRI of her brain documented a right occipital mass. 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 An MRI of her liver in December, 2019 documented a 9 mm lesion in the lateral aspect of the junction of segments seven and eight of the liver that was possibly felt to be representative of a metastatic lesion. In January, 2020, she underwent one fraction of gamma knife to her brain lesion. A laparoscopic ultrasound-guided microwave ablation of her liver tumor was also completed in January, with pathology consistent with a cavernous hemangioma. The most recent MRI of her brain on March 12, 2021 documented, \"1. Stable postsurgical changes of right parietooccipital craniotomy for hemangiopericytoma resection. 2. Stable nodular enhancing region posterior to the superior sagittal sinus, not significantly changed compared with the two most recent studies and decreased in size since February 2020 following treatment.\" Her other medical history includes insomnia, depression, left inferior quadrantanopia, recurrent cold sores, plantar fasciitis, vitamin D deficiency, and atopic rhinitis. Concerns have been expressed about her cognition, and in particular with memory. The current evaluation was " requested by Dr. Lauren Zhang, in this context.    Of note, I saw Ms. Ascencio for a neuropsychology evaluation on 03/06/2020. My evaluation documented a pattern of subtle weaknesses and variability that was felt to be largely consistent with the location of her lesion.  Noted was the left inferior quadrantanopsia.  On testing, mild weaknesses were also identified in aspects of speeded visual processing, and general cognitive speed.  She was also reporting mild symptoms of anxiety.    On interview, Ms. Ascencio confirmed the above history.  She reported that things have been going pretty well for her in the last year.  She stated that she works from home quite a lot, and was recently vaccinated for COVID-19.  She stated that from a neuro-oncologic perspective, she has had a stable year.  She indicated that her vision troubles are unchanged.  She stated that she bumps into things.  She stated that she still has some degree of difficulty with memory, and compensates by taking notes.  She described troubles with memory for both verbal and nonverbal information.  She indicated that her daughter points out to her that she has troubles with her memory.  She reportedly forgets conversational details.  She otherwise denied cognitive changes or difficulties.    With respect to mental health, Ms. Ascencio reported that her mood is pretty good.  She stated that she has been feeling better since she has been able to get outside more.  She denied significant mental health related changes in the last year.  She is prescribed an antidepressant medication, and stated that this medicine seems to be working okay.  She noted that the dose of this medication was increased when she underwent the gamma knife treatments, but she is considering asking if she can reduce the dose.  She is not under the care of a counselor or therapist.  She denied interval psychiatric hospitalization, hallucinations, or symptoms consistent with severe mental  illness.  She denied suicidal ideation or past suicide attempts.    With regard to other medical background, Ms. Ascencio denied interval head injury, stroke, or seizure.  She stated that her sleep is on and off, and noted that she sometimes has trouble falling asleep.  She reported that she also wakes in the night.  She averages about 7 hours of sleep, and slept normally the night before the exam.  She denied pain at the time of the interview, but noted that she is developing some arthritis.  Per records, her current medications include albuterol, aspirin, azelastine, fluticasone, lactobacillus rhamnosus, loratadine, ProAir inhaler, rosuvastatin, sertraline, and cholecalciferol.  She stated that she usually consumes 1-2 alcoholic drinks per night, but otherwise denied substance use.  She noted that she is also developing some difficulties with hearing.    Ms. Ascencio lives at home with her , her daughter, her son-in-law, and 2 grandchildren (ages 8 and 5).  She manages her own basic and instrumental daily activities.  She drives.    By way of background, Ms. Ascencio denied significant changes in her family status, educational background, or work history.  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  She stated that work has been going well.  She has been going back in to restaurants now in person several times per week.    BEHAVIORAL OBSERVATIONS  Ms. Ascencio was pleasant and cooperative with the exam. Her speech was normal. Her comprehension was normal. Her thought processes were unremarkable. Her mood was euthymic with congruent affect. Her effort was good. The current results are felt to be an accurate depiction of her cognitive functioning.      RESULTS OF EXAM  Her performances on standardized measures of neuropsychological functioning were as follows.    She was fully oriented to time, place, and various aspects of personal information.  She was able to state for the names of the  current president and the 2 most recent past presidents.  She obtained passing scores on stand-alone and embedded metrics of cognitive performance validity.  Auditory attention for digits was average.  Visual attention for spatial sequences was high average.  Mental calculations were average.  Learning of words in a list format was low average.  Delayed recall of list words was average.  Percent retention of list words was high average.  Delayed recognition of list words was average, and performed without error.  Learning of simple geometric shapes and their spatial locations was borderline impaired.  Delayed recall of the shapes and their locations was borderline impaired.  Percent retention of the shapes was borderline impaired.  Delayed recognition of the shapes was normal, and performed without error.  Visuospatial judgments for variably oriented lines were performed in the high average to superior range.  Visual problem-solving with blocks was average.  Nonverbal reasoning for incomplete matrices was average. Line bisection was normal.  Letter cancellation was normal.  Her drawing of a complicated geometric figure was normal.  Comprehension of phrases and short stories was high average.  Verbal associative fluency was average.  Semantic verbal fluency was superior.  Naming to confrontation was performed in the high average to superior range.  Verbal abstract reasoning was average.  Speeded visual sequencing under focused attention was average.  A similar measure with a divided attention component was performed in the average to high average range.  Speeded word reading was performed in the low average to average range.  Speeded color naming was performed in the average to high average range.  Speeded inhibition of an overlearned response was average.  Speeded matching and cancellation was average.  Speeded visual motor coding was average.  Speeded fine motor dexterity was high average for the dominant, right  hand, and average for the left hand.    She endorsed items consistent with minimal symptoms of depression, and moderate symptoms of anxiety on self-report measures.    IMPRESSIONS  Ms. Ascencio demonstrated a pattern of subtle weaknesses that remains consistent with relative dysfunction of right posterior hemisphere brain networks.  Her cognition is mildly variable, but overall stable to somewhat improved relative to the prior evaluation in March, 2020.  In the current evaluation, she continues to have weaknesses in nonverbal learning and recall, and to a slight extent in speeded visual processing.  Of course, she also has the left inferior quadrantanopsia. The remainder of her cognitive abilities are normal and were performed in keeping with her above average range cognitive baseline.  She is reporting moderate symptoms of anxiety on a self-report measure.  It may be the case that anxiety symptoms and introduced some degree of variability into her thinking, although I do not think we can attribute all of her cognitive dysfunction to this anxiety.    RECOMMENDATIONS  Preliminary results and recommendations were provided to the patient over the telephone on the date of the evaluation, and all questions were answered.     1.  If she continues to have difficulties with memory, routine use of a memory notebook or other assistive device could be of benefit.    2.  In spite of treatment, she remains anxious.  If medically indicated, consideration could be given to modified treatment of her mental health.    3.  Given the stability, follow-up neuropsychological evaluation could be considered in the future, if clinically indicated.    Phan Wright, Ph.D., L.P., ABPP  Board Certified in Clinical Neuropsychology   / Licensed Psychologist [id number]    Time spent:  One unit (40 minutes) neurobehavioral status exam including interview and clinical assessment by licensed and board-certified neuropsychologist (CPT  02756). One unit (*60 minutes) neuropsychological testing evaluation by licensed and board-certified neuropsychologist, including integration of patient data, interpretation of standardized test results and clinical data, clinical decision-making, treatment planning, and report, first hour (CPT 96116). One unit(s) (50 minutes) of neuropsychological testing evaluation by licensed and board-certified neuropsychologist, including integration of patient data, interpretation of standardized test results and clinical data, clinical decision-making, treatment planning, and report, subsequent hours (CPT 00119). One unit (30 minutes) of psychological and neuropsychological test administration and scoring by technician, first 30 minutes (CPT 27414). Four units (130 minutes) psychological or neuropsychological test administration and scoring by technician, subsequent 30 minutes (CPT 69234). Diagnoses: D48.1, R41.842, F06.8, F41.9.                 Phan Wrihgt, PhD LP

## 2021-04-30 NOTE — NURSING NOTE
The patient was seen for neuropsychological evaluation at the request of Dr. Lauren Zhang, for the purposes of diagnostic clarification and treatment planning. 160 minutes of test administration and scoring were provided by this writer, Miguel A Case. Please see Dr. Phan Wright's report for a full interpretation of the findings.

## 2021-05-01 NOTE — PROGRESS NOTES
NAME  Leidy Ascencio   RANDLE  21     MRN  [id number]    PROVIDER  BRITTA       60    TECH  KB     AGE  60    STATION  CSC     SEX  Female           HANDEDNESS Right           EDUCATION 9-11                        ORIENTATION            Time  0           Personal Info.           Place            Presidents  3 /6                       WAIS-IV             FSIQ: 0 WMI: 100          VSI: 0 PSI: 1022          WILMAR: 104 RDS: 9                         Raw SS          Similarities  23 9          Vocabulary  0 0          Information 0 0          Comprehension 0 0          Block Design 36 10          Matrix Reasoning 16 11          Visual Puzzles 0 0          Picture Comp. 0 0          Figure Weights 0 0          Digit Span  27 10          Arithmetic  14 10          L-N Sequencing 0 0          Symbol Search 32 11          Coding  62 10          Cancellation 0 0                       KEN-O COMPLEX FIGURE             Raw T %ile         Copy  32  >16         Time to Copy 167  >16                      COWAT FAS             Raw 39            SS 10            T 51                         ANIMAL FLUENCY            Raw 26            SS 13            T 65                          [place] NAMING TEST           Raw 57 /60           SS 0            %ile 83-93                         COMPLEX IDEATIONAL MATERIAL           Raw 12            SS 12            T 61                         TRAIL MAKING TEST             Time Errors SSa %ile         A 37 0 0 31-41         B 74 0 0 69-86                      STROOP              Raw %ile           Word 81 24-38           Color 67 55-79           C/W 29 31-48                         REBECCA   H         Raw 26            %ile 89-95                         GROOVED PEGBOARD             Raw Drops SS T         RH 61 0 11 61         LH 78 0 8 49                      BDI-II             Raw 3            Interp. Minimal                         VÍCTOR             Raw 16            Interp. Moderate                          WMS-III              Raw SS           Spat. Span 16 12                         HVLT   6           Raw T          Trial 1  5           Trial 2  9           Trial 3  9           Learning  3           Total Recall  23 39          Delayed Recall 10 51          Percent Retention 111% 63          True Positives 12           False Positives 0           Disc. Index  12 56                        BVMT   2           Raw T/%ile          Trial 1  3           Trial 2  4           Trial 3  6           Learning  3           Total Recall  13 33          Delayed Recall 4 30          Percent Retention 67% 3-5%          Recognition Hits 6           Recognition F.P 0           Disc. Index  6 >16                       LINE BISECTION            # Missed Lines 0           Errors R:  0 L:  0                      LETTER CANCELLATION            Time 123            Errors 0                         DCT             E-Score 6

## 2021-05-06 ENCOUNTER — ANCILLARY PROCEDURE (OUTPATIENT)
Dept: MAMMOGRAPHY | Facility: CLINIC | Age: 61
End: 2021-05-06
Attending: FAMILY MEDICINE
Payer: COMMERCIAL

## 2021-05-06 DIAGNOSIS — Z12.31 ENCOUNTER FOR SCREENING MAMMOGRAM FOR BREAST CANCER: ICD-10-CM

## 2021-05-06 PROCEDURE — 77063 BREAST TOMOSYNTHESIS BI: CPT | Mod: TC | Performed by: RADIOLOGY

## 2021-05-06 PROCEDURE — 77067 SCR MAMMO BI INCL CAD: CPT | Mod: TC | Performed by: RADIOLOGY

## 2021-05-07 ENCOUNTER — OFFICE VISIT (OUTPATIENT)
Dept: URGENT CARE | Facility: URGENT CARE | Age: 61
End: 2021-05-07
Payer: COMMERCIAL

## 2021-05-07 VITALS
SYSTOLIC BLOOD PRESSURE: 160 MMHG | TEMPERATURE: 98.6 F | DIASTOLIC BLOOD PRESSURE: 84 MMHG | HEART RATE: 77 BPM | OXYGEN SATURATION: 97 %

## 2021-05-07 DIAGNOSIS — B37.0 ORAL THRUSH: Primary | ICD-10-CM

## 2021-05-07 PROCEDURE — 99213 OFFICE O/P EST LOW 20 MIN: CPT | Performed by: PHYSICIAN ASSISTANT

## 2021-05-07 RX ORDER — CLOTRIMAZOLE 10 MG/1
10 LOZENGE ORAL
Qty: 35 LOZENGE | Refills: 0 | Status: SHIPPED | OUTPATIENT
Start: 2021-05-07 | End: 2021-05-14

## 2021-05-07 ASSESSMENT — ENCOUNTER SYMPTOMS
RESPIRATORY NEGATIVE: 1
FEVER: 0
GASTROINTESTINAL NEGATIVE: 1
FATIGUE: 0
RHINORRHEA: 0
CONSTITUTIONAL NEGATIVE: 1
CHILLS: 0
SINUS PAIN: 0
SORE THROAT: 0
SINUS PRESSURE: 0
CARDIOVASCULAR NEGATIVE: 1
COUGH: 0
PALPITATIONS: 0
WHEEZING: 0
SHORTNESS OF BREATH: 0

## 2021-05-07 NOTE — PROGRESS NOTES
Tiffanie Mathew is a 60 year old who presents for the following health issues  HPI   Concern - possible thrush  Onset: 2weeks  Description:  Noticed white and red spots on her tongue along with sensitive tongue for the past 2weeks.  She does use inhalers and she does rinse and spit after use.  She has since stopped her flovent and astelin inhalers because of this.    Intensity: mild  Progression of Symptoms:  same  Accompanying Signs & Symptoms:  No recent antibiotics.  No new soaps/lotions/detergent, no new medications or foods.  No one else in the family with similar rashes.  No URI symptoms or fevers.   Previous history of similar problem: none  Precipitating factors:        Worsened by: none  Alleviating factors:        Improved by: none  Therapies tried and outcome: brushing her tongue, mouthwash, stopping her steroid inhaler with minimal relief    Patient Active Problem List   Diagnosis     Advanced directives, counseling/discussion     Recurrent cold sores     Other insomnia     Atopic rhinitis     Major depressive disorder, recurrent episode, mild (H)     Plantar fasciitis     Vitamin D deficiency     Quadrant hemianopsia, left     Hemangiopericytoma of CNS, grade II     Current Outpatient Medications   Medication     albuterol (PROAIR HFA/PROVENTIL HFA/VENTOLIN HFA) 108 (90 Base) MCG/ACT inhaler     aspirin 81 MG EC tablet     azelastine (ASTELIN) 0.1 % nasal spray     fluticasone (FLONASE) 50 MCG/ACT nasal spray     fluticasone (FLOVENT HFA) 110 MCG/ACT inhaler     lactobacillus rhamnosus, GG, (CULTURELL) capsule     loratadine (CLARITIN) 10 MG tablet     PROAIR  (90 Base) MCG/ACT inhaler     rosuvastatin (CRESTOR) 10 MG tablet     sertraline (ZOLOFT) 100 MG tablet     VITAMIN D, CHOLECALCIFEROL, PO     Current Facility-Administered Medications   Medication     triamcinolone (KENALOG-40) injection 40 mg        Allergies   Allergen Reactions     Atorvastatin      Penicillins      Sulfa Drugs       Sulfamethoxazole-Trimethoprim Rash       Review of Systems   Constitutional: Negative.  Negative for chills, fatigue and fever.   HENT: Positive for mouth sores. Negative for congestion, ear discharge, ear pain, hearing loss, rhinorrhea, sinus pressure, sinus pain and sore throat.    Respiratory: Negative.  Negative for cough, shortness of breath and wheezing.    Cardiovascular: Negative.  Negative for chest pain, palpitations and peripheral edema.   Gastrointestinal: Negative.    All other systems reviewed and are negative.           Objective    BP (!) 160/84   Pulse 77   Temp 98.6  F (37  C) (Tympanic)   SpO2 97%   There is no height or weight on file to calculate BMI.  Physical Exam  Vitals signs and nursing note reviewed.   Constitutional:       General: She is not in acute distress.     Appearance: Normal appearance. She is well-developed. She is obese. She is not ill-appearing.   HENT:      Head: Normocephalic and atraumatic.      Comments: TMs are intact without any erythema or bulging bilaterally.  Airway is patent.     Nose: Nose normal.      Mouth/Throat:      Lips: Pink.      Mouth: Mucous membranes are moist. Oral lesions (white film present over the anterior tongue.  no erythema, tenderness or discharge present) present.      Pharynx: Oropharynx is clear. Uvula midline. No pharyngeal swelling, oropharyngeal exudate or posterior oropharyngeal erythema.      Tonsils: No tonsillar exudate.   Eyes:      General: No scleral icterus.     Extraocular Movements: Extraocular movements intact.      Conjunctiva/sclera: Conjunctivae normal.      Pupils: Pupils are equal, round, and reactive to light.   Neck:      Musculoskeletal: Normal range of motion and neck supple.      Thyroid: No thyromegaly.   Cardiovascular:      Rate and Rhythm: Normal rate and regular rhythm.      Pulses: Normal pulses.      Heart sounds: Normal heart sounds, S1 normal and S2 normal. No murmur. No friction rub. No gallop.     Pulmonary:      Effort: Pulmonary effort is normal. No accessory muscle usage, respiratory distress or retractions.      Breath sounds: Normal breath sounds and air entry. No stridor. No decreased breath sounds, wheezing, rhonchi or rales.   Lymphadenopathy:      Cervical: No cervical adenopathy.   Skin:     General: Skin is warm and dry.      Nails: There is no clubbing.     Neurological:      Mental Status: She is alert and oriented to person, place, and time.   Psychiatric:         Mood and Affect: Mood normal.         Behavior: Behavior normal.         Thought Content: Thought content normal.         Judgment: Judgment normal.            Assessment/Plan:  Oral thrush:  Will give trial of clotrimazole troches as directed.  Rinse and spot after inhaler use.  Brush tongue.  Avoid triggers and irritating agents.  RTC if worsening symptoms or if she develops redness, swelling, drainage or fevers.   -     clotrimazole (MYCELEX) 10 MG lozenge; Place 1 lozenge (10 mg) inside cheek 5 times daily for 7 days        Elizabeth See KIMMIE Loya

## 2021-06-17 DIAGNOSIS — B00.1 RECURRENT COLD SORES: ICD-10-CM

## 2021-06-17 NOTE — TELEPHONE ENCOUNTER
Routing refill request to provider for review/approval because:  Drug not active on patient's medication list, please advise.     Kami Almonte RN  [facility]

## 2021-06-18 RX ORDER — VALACYCLOVIR HYDROCHLORIDE 1 G/1
2000 TABLET, FILM COATED ORAL 2 TIMES DAILY
Qty: 12 TABLET | Refills: 1 | Status: SHIPPED | OUTPATIENT
Start: 2021-06-18 | End: 2022-08-25

## 2021-09-07 NOTE — PROGRESS NOTES
"Priyanka is a 61 year old who is being evaluated via a billable video visit.      How would you like to obtain your AVS? MyChart  If the video visit is dropped, the invitation should be resent by: Text to cell phone: [phone]  Will anyone else be joining your video visit? No      Video-Visit Details  Type of service:  Video Visit    Video Start Time: 3:40 PM  Video End Time: 3:54 PM    Originating Location (pt. Location): Home    Distant Location (provider location):  [facility]     Platform used for Video Visit: [email or web address]    _____________________________________________________________    NEURO-ONCOLOGY VISIT  Sep 10, 2021    CHIEF COMPLAINT: Ms. 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 She completed GammaKnife treatment in 1/2020. She is currently managed on imaging surveillance.     I met with Priyanka today for this follow-up visit.     HISTORY OF PRESENT ILLNESS  -Priyanka does not have any major changes to report and no new neurological symptoms.   -No headaches recently.   -Continues on Zoloft; mood is good.   -Energy remains \"low\", but trying to be more active.   -Stable visional field cut.  -Denies any changes in strength. No numbness or changes in sensation.   -Thinking and concentration is generally good; needing to take notes in the setting of mild short-term memory issues. Continues to work from home, but going into the restaurants at times. Complete repeat neuro-psych testing in April.   -No reported episodes concerning for a seizure.  -No abdominal pain. No difficulty breathing. No bony pain.   -Interested in the COVID booster vaccine.     REVIEW OF SYSTEMS  A comprehensive ROS was performed and negative except as in HPI.      MEDICATIONS   Current Outpatient Medications   Medication Sig Dispense Refill     albuterol (PROAIR HFA/PROVENTIL HFA/VENTOLIN HFA) 108 (90 " Base) MCG/ACT inhaler Inhale 2 puffs into the lungs every 6 hours 1 Inhaler 11     aspirin 81 MG EC tablet Take 81 mg by mouth At Bedtime       azelastine (ASTELIN) 0.1 % nasal spray Spray 1 spray into both nostrils 2 times daily 30 mL 11     fluticasone (FLONASE) 50 MCG/ACT nasal spray Spray 2 sprays into both nostrils daily       fluticasone (FLOVENT HFA) 110 MCG/ACT inhaler Inhale 2 puffs into the lungs 2 times daily 12 g 4     lactobacillus rhamnosus, GG, (CULTURELL) capsule Take 1 capsule by mouth 2 times daily       loratadine (CLARITIN) 10 MG tablet Take 10 mg by mouth daily       PROAIR  (90 Base) MCG/ACT inhaler Inhale 1-2 puffs into the lungs every 4 hours as needed for shortness of breath / dyspnea 1 Inhaler 11     rosuvastatin (CRESTOR) 10 MG tablet Take 1 tablet (10 mg) by mouth daily 90 tablet 3     sertraline (ZOLOFT) 100 MG tablet Take 1 tablet (100 mg) by mouth daily Take 1-2 tablets (100-200mg) by mouth daily 180 tablet 3     valACYclovir (VALTREX) 1000 mg tablet Take 2 tablets (2,000 mg) by mouth 2 times daily For a total of 2 doses 12 tablet 1     VITAMIN D, CHOLECALCIFEROL, PO Take 1,000 Units by mouth daily       DRUG ALLERGIES   Allergies   Allergen Reactions     Atorvastatin      Penicillins      Sulfa Drugs      Sulfamethoxazole-Trimethoprim Rash       ONCOLOGIC HISTORY  -2015 PRESENTATION: Headaches.  -2015 MR brain imaging with a right occipital mass   -4/22/2015 SURGERY: Resection by Dr. Lujan   PATHOLOGY: Hemangiopericytoma, grade 2.   -2015 CT chest, abdomen and pelvis negative for malignancy.  -2015 PET MRI negative for malignancy.   Conservative management per Dr. Herman Mayberry.    -8/1/2017 CT chest abdomen and pelvis and bone scan negative for malignancy.   -12/7/2017 MRB with no evidence of tumor recurrence.  -12/6/2018 MRB with no evidence of tumor recurrence.    -12/11/2019 MRB with a new nodule of enhancement within the superior sagittal sinus, concerning for tumor  recurrence. Stable appearance of the right parieto-occipital craniotomy site.  -12/13/2019 Bone scan with no convincing evidence of skeletal metastases.  -12/18/2019 MR liver with a 9 mm lesion in the lateral aspect of the junction of segments 7 and 8 of the liver shows homogeneous delayed enhancement and is new since 2015. This could represent a metastatic lesion.     -1/6/2020 NEURO-ONC: Referral to radiation oncology and GI oncology surgery. Signed consent for next generation sequencing.   -1/14/2020 RADS: GammaKnife to lesion (1 fraction).   -1/15/2020 LIVER LESION:  Laparoscopic ultrasound-guided microwave ablation of liver tumor by Dr. Phan Fierro.   PATHOLOGY: Cavernous hemangioma of the liver, benign:    -No metastatic hemangiopericytoma or other malignancy identified    -Underlying steatohepatitis, no significant fibrosis    -No follow-up needed.  -2/28/2020 NEURO-ONC/ MRB: Clinically stable. Headaches with migraine features; increasing Zoloft. Imaging with positive response to radiation.   -3/9/2020 Evaluated by Dr. Wright; Neuro-psych testing with mild weaknesses were noted in aspects of speeded visual processing, and general cognitive speed. Repeat in 3/2021.  -6/1/2020 NEURO-ONC/ MRB: Clinically well; for insomnia trial of melatonin. Imaging with positive treatment effect.   -6/1/2020 NEURO-ONC/ MRB: Clinically well. Imaging with no concerns.   -9/14/2020 NEURO-ONC/ MRB: Clinically well. Imaging of MRI brain and CT CAP with no concerns.  -12/14/2020 NEURO-ONC/ MRB: Clinically well. MR imaging of the brain with no concerns.  -3/12/2021 NEURO-ONC/ MRB: Clinically well. MRI of the brain with no concerns.  -9/10/2021 NEURO-ONC/ MRB: Clinically well. MRI of the brain and CT CAP with no concerns.    SOCIAL HISTORY   Tobacco use: Former smoker; quit in 2003. 27 pack years.  Alcohol use: Social use.   Drug use: Denies.  . 2 daughter, 1 son.   Employment: [employer]; [employer] managers.         PHYSICAL EXAMINATION    -Generally well appearing. Good energy today.   -Respiratory: No audible wheezing.   -Skin: No facial rashes.  -Psychiatric: Normal mood and affect. Pleasant, talkative.  -Neurologic:   MENTAL STATUS:     Alert, oriented.   Recall: Intact.    Speech fluent.    Comprehension intact.     CRANIAL NERVES:     Pupils are equal, round.    Extraocular movements full, patient denies diplopia.    Left inferior homonymous quadrantanopia.   Symmetric facial movements.   Hearing intact.   With normal phonation, no dysfunction tongue.  MOTOR: Antigravity in arms. No pronator drift.  SENSATION: Intact throughout.   COORDINATION: Intact to finger-nose with eyes closed.    The rest of a comprehensive physical examination is deferred due to PHE (public health emergency) video visit restrictions.        MEDICAL RECORDS  Obtained and personally reviewed all available outside medical records in addition to reviewing any records available in our electronic system.     LABS  Personally reviewed all available lab results.     IMAGING  Personally reviewed MR brain imaging from today and compared to prior imaging. There is minimal contrast enhancement at the sagittal sinus with no increase in T2 FLAIR. No new lesions.    Also reviewed CT body imaging from earlier today. Per report, there are no findings concerning for metastatic disease.     Imaging shown to Priyanka.       IMPRESSION  Clinic was spent discussing in detail the nature of her cancer in light of repeat imaging. This was in addition to answering questions pertaining to my recommendations and devising the plan as outlined below.     Grade II hemangiopericytomas are considered malignant tumors. Standard treatment options include surgery and radiation therapy. With no good surgical option, Priyanka completed stereotactic radiation therapy in 1/2020. Recent brain imaging was again with no new concerns. Will continue with imaging surveillance every 6  months. Per NCCN guidelines, imaging should be repeated every 3-6 months for the first 5 years (at least through 1/2025) and then, annually thereafter.      For radiation-refractory cancer, there are evidence-based options. Pazopanib (Votrient), a multitargeted receptor tyrosinase kinase inhibitor that inhibits angiogenesis, has been used in several patients with recurrent cancer. There is also evidence for the use of temozolomide plus bevacizumab (lorena); chino Rico MS, et al. Activity of temozolomide and bevacizumab in the treatment of locally advanced, recurrent, and metastatic hemangiopericytoma and malignant solitary fibrous tumor. Cancer. 2011 Nov 1;117(21):4939-47. doi: 10.1002/cncr.50843. Epub 2011 Apr 8. PMID: 39311203. AND mikel Marquez ML, et al. Efficacy of bevacizumab and temozolomide therapy in locally advanced, recurrent, and metastatic malignant solitary fibrous tumour: A population-based analysis. J Oncol Pharm Pract. 2019 Sep;25(6):5399-3507. doi: 10.1177/8438379633175554. Epub 2018 Jun 27. PMID: 24316607. Of note, Priyanka has consented to next generation sequencing. At disease recurrence, can consider sending pathology for mutational analysis to potentially identify targeted theray options.     Extracranial metastatic disease can occur; most commonly in bone, lung, and liver. Prior imaging of the MR liver showed a lesion. Priyanka was referred to Dr. Phan Fierro with surgical oncology. Dr. Fierro performed a  laparoscopic ultrasound-guided microwave ablation of a liver lesion on 1/15/2020. Pathology was consistent with a benign cavernous hemangioma with no metastatic hemangiopericytoma or other malignancy identified. She is currently monitored on annual systemic surveillance imaging with a CT C/A/P with contrast. Imaging from today again showed no concerning changes. I confirmed this with Dr. Fierro.     Clinically, Priyanka is doing well with no new complaints. Her energy is low, but she has been  increasing her activity level.     In terms of cognition and memory, Priyanka feels that things are stable there too and work is going well. She had repeat neuro-psych testing in 4/2021, which demonstrated a pattern of subtle weaknesses that remains consistent with relative dysfunction of right posterior hemisphere brain networks. Her cognition was mildly variable, but overall stable to somewhat improved relative to the prior evaluation in 3/2020.     PROBLEM LIST  Hemangiopericytoma, grade II  Liver lesion  Depressed mood  Sleep issues  Headache    PLAN  -CANCER-DIRECTED THERAPY-  -As above; Continue imaging surveillance. Repeat MR brain imaging in 6 months.     -LIVER LESION-  -Treated. No lesion-specific follow-up needed.   -CT c/a/p with no concerns in 9/2021. Will continue with annual systemic surveillance imaging.     -STEROIDS-  -Currently off dexamethasone.    -SEIZURE MANAGEMENT-  -While this patient is at increased risk of having seizures, given the lack of seizure history, there is no indication to prescribe an antiepileptic at this time.     -Quality of life/ MOOD/ FATIGUE-  -Continue to monitor mood as untreated/ undertreated depression can worsen fatigue, dysorexia, and quality of life.  -Continue Zoloft 150mg daily.     -MEMORY LOSS/ WORD FINDING ISSUES-  -Evaluated by Dr. Phan Wright in 3/2020 and 4/2021. Neuro-psych testing with mild weaknesses noted in aspects of speeded visual processing and general cognitive speed. Stable findings when comparing prior tests.  -Repeat testing if clinically indicated.     -OTHER-  -RNCC to provide information on obtaining a COVID booster vaccine given her cancer diagnosis.     COVID vaccination in 4/2021.     Return to clinic in 3/2022 to review repeat imaging.     In the meantime, Priyanka knows to call with questions or concerns or to report new complaints and can be seen sooner if needed.    Lauren Zhang MD  Neuro-oncology

## 2021-09-10 ENCOUNTER — ANCILLARY PROCEDURE (OUTPATIENT)
Dept: CT IMAGING | Facility: CLINIC | Age: 61
End: 2021-09-10
Attending: PSYCHIATRY & NEUROLOGY
Payer: COMMERCIAL

## 2021-09-10 ENCOUNTER — ANCILLARY PROCEDURE (OUTPATIENT)
Dept: MRI IMAGING | Facility: CLINIC | Age: 61
End: 2021-09-10
Attending: PSYCHIATRY & NEUROLOGY
Payer: COMMERCIAL

## 2021-09-10 ENCOUNTER — VIRTUAL VISIT (OUTPATIENT)
Dept: ONCOLOGY | Facility: CLINIC | Age: 61
End: 2021-09-10
Attending: PSYCHIATRY & NEUROLOGY
Payer: COMMERCIAL

## 2021-09-10 DIAGNOSIS — D43.3 HEMANGIOPERICYTOMA OF CNS, GRADE II (H): Primary | ICD-10-CM

## 2021-09-10 DIAGNOSIS — C80.0 DISSEMINATED CANCER (H): ICD-10-CM

## 2021-09-10 DIAGNOSIS — D43.3 HEMANGIOPERICYTOMA OF CNS, GRADE II (H): ICD-10-CM

## 2021-09-10 PROCEDURE — 70553 MRI BRAIN STEM W/O & W/DYE: CPT | Mod: GC | Performed by: RADIOLOGY

## 2021-09-10 PROCEDURE — 71260 CT THORAX DX C+: CPT | Performed by: RADIOLOGY

## 2021-09-10 PROCEDURE — 999N001193 HC VIDEO/TELEPHONE VISIT; NO CHARGE

## 2021-09-10 PROCEDURE — 99214 OFFICE O/P EST MOD 30 MIN: CPT | Mod: 95 | Performed by: PSYCHIATRY & NEUROLOGY

## 2021-09-10 PROCEDURE — 74177 CT ABD & PELVIS W/CONTRAST: CPT | Performed by: RADIOLOGY

## 2021-09-10 PROCEDURE — A9585 GADOBUTROL INJECTION: HCPCS | Performed by: RADIOLOGY

## 2021-09-10 RX ORDER — GADOBUTROL 604.72 MG/ML
10 INJECTION INTRAVENOUS ONCE
Status: COMPLETED | OUTPATIENT
Start: 2021-09-10 | End: 2021-09-10

## 2021-09-10 RX ORDER — IOPAMIDOL 755 MG/ML
122 INJECTION, SOLUTION INTRAVASCULAR ONCE
Status: COMPLETED | OUTPATIENT
Start: 2021-09-10 | End: 2021-09-10

## 2021-09-10 RX ADMIN — GADOBUTROL 9 ML: 604.72 INJECTION INTRAVENOUS at 09:59

## 2021-09-10 RX ADMIN — IOPAMIDOL 122 ML: 755 INJECTION, SOLUTION INTRAVASCULAR at 09:14

## 2021-09-10 NOTE — LETTER
"    9/10/2021         RE: Leidy Ascencio  [address] [place] [address]        Dear Colleague,    Thank you for referring your patient, Leidy Ascencio, to the [facility]. Please see a copy of my visit note below.    Priyanka is a 61 year old who is being evaluated via a billable video visit.      How would you like to obtain your AVS? MyChart  If the video visit is dropped, the invitation should be resent by: Text to cell phone: [phone]  Will anyone else be joining your video visit? No      Video-Visit Details  Type of service:  Video Visit    Video Start Time: 3:40 PM  Video End Time: 3:54 PM    Originating Location (pt. Location): Home    Distant Location (provider location):  [facility]     Platform used for Video Visit: FREECULTR    _____________________________________________________________    NEURO-ONCOLOGY VISIT  Sep 10, 2021    CHIEF COMPLAINT: Ms. 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 She completed GammaKnife treatment in 1/2020. She is currently managed on imaging surveillance.     I met with Priyanka today for this follow-up visit.     HISTORY OF PRESENT ILLNESS  -Priyanka does not have any major changes to report and no new neurological symptoms.   -No headaches recently.   -Continues on Zoloft; mood is good.   -Energy remains \"low\", but trying to be more active.   -Stable visional field cut.  -Denies any changes in strength. No numbness or changes in sensation.   -Thinking and concentration is generally good; needing to take notes in the setting of mild short-term memory issues. Continues to work from home, but going into the restaurants at times. Complete repeat neuro-psych testing in April.   -No reported episodes concerning for a seizure.  -No abdominal pain. No difficulty breathing. No bony pain.   -Interested in the " COVID booster vaccine.     REVIEW OF SYSTEMS  A comprehensive ROS was performed and negative except as in HPI.      MEDICATIONS   Current Outpatient Medications   Medication Sig Dispense Refill     albuterol (PROAIR HFA/PROVENTIL HFA/VENTOLIN HFA) 108 (90 Base) MCG/ACT inhaler Inhale 2 puffs into the lungs every 6 hours 1 Inhaler 11     aspirin 81 MG EC tablet Take 81 mg by mouth At Bedtime       azelastine (ASTELIN) 0.1 % nasal spray Spray 1 spray into both nostrils 2 times daily 30 mL 11     fluticasone (FLONASE) 50 MCG/ACT nasal spray Spray 2 sprays into both nostrils daily       fluticasone (FLOVENT HFA) 110 MCG/ACT inhaler Inhale 2 puffs into the lungs 2 times daily 12 g 4     lactobacillus rhamnosus, GG, (CULTURELL) capsule Take 1 capsule by mouth 2 times daily       loratadine (CLARITIN) 10 MG tablet Take 10 mg by mouth daily       PROAIR  (90 Base) MCG/ACT inhaler Inhale 1-2 puffs into the lungs every 4 hours as needed for shortness of breath / dyspnea 1 Inhaler 11     rosuvastatin (CRESTOR) 10 MG tablet Take 1 tablet (10 mg) by mouth daily 90 tablet 3     sertraline (ZOLOFT) 100 MG tablet Take 1 tablet (100 mg) by mouth daily Take 1-2 tablets (100-200mg) by mouth daily 180 tablet 3     valACYclovir (VALTREX) 1000 mg tablet Take 2 tablets (2,000 mg) by mouth 2 times daily For a total of 2 doses 12 tablet 1     VITAMIN D, CHOLECALCIFEROL, PO Take 1,000 Units by mouth daily       DRUG ALLERGIES   Allergies   Allergen Reactions     Atorvastatin      Penicillins      Sulfa Drugs      Sulfamethoxazole-Trimethoprim Rash       ONCOLOGIC HISTORY  -2015 PRESENTATION: Headaches.  -2015 MR brain imaging with a right occipital mass   -4/22/2015 SURGERY: Resection by Dr. Lujan   PATHOLOGY: Hemangiopericytoma, grade 2.   -2015 CT chest, abdomen and pelvis negative for malignancy.  -2015 PET MRI negative for malignancy.   Conservative management per Dr. Herman Mayberry.    -8/1/2017 CT chest abdomen and pelvis and  bone scan negative for malignancy.   -12/7/2017 MRB with no evidence of tumor recurrence.  -12/6/2018 MRB with no evidence of tumor recurrence.    -12/11/2019 MRB with a new nodule of enhancement within the superior sagittal sinus, concerning for tumor recurrence. Stable appearance of the right parieto-occipital craniotomy site.  -12/13/2019 Bone scan with no convincing evidence of skeletal metastases.  -12/18/2019 MR liver with a 9 mm lesion in the lateral aspect of the junction of segments 7 and 8 of the liver shows homogeneous delayed enhancement and is new since 2015. This could represent a metastatic lesion.     -1/6/2020 NEURO-ONC: Referral to radiation oncology and GI oncology surgery. Signed consent for next generation sequencing.   -1/14/2020 RADS: GammaKnife to lesion (1 fraction).   -1/15/2020 LIVER LESION:  Laparoscopic ultrasound-guided microwave ablation of liver tumor by Dr. Phan Fierro.   PATHOLOGY: Cavernous hemangioma of the liver, benign:    -No metastatic hemangiopericytoma or other malignancy identified    -Underlying steatohepatitis, no significant fibrosis    -No follow-up needed.  -2/28/2020 NEURO-ONC/ MRB: Clinically stable. Headaches with migraine features; increasing Zoloft. Imaging with positive response to radiation.   -3/9/2020 Evaluated by Dr. Wright; Neuro-psych testing with mild weaknesses were noted in aspects of speeded visual processing, and general cognitive speed. Repeat in 3/2021.  -6/1/2020 NEURO-ONC/ MRB: Clinically well; for insomnia trial of melatonin. Imaging with positive treatment effect.   -6/1/2020 NEURO-ONC/ MRB: Clinically well. Imaging with no concerns.   -9/14/2020 NEURO-ONC/ MRB: Clinically well. Imaging of MRI brain and CT CAP with no concerns.  -12/14/2020 NEURO-ONC/ MRB: Clinically well. MR imaging of the brain with no concerns.  -3/12/2021 NEURO-ONC/ MRB: Clinically well. MRI of the brain with no concerns.  -9/10/2021 NEURO-ONC/ MRB: Clinically well. MRI of  the brain and CT CAP with no concerns.    SOCIAL HISTORY   Tobacco use: Former smoker; quit in 2003. 27 pack years.  Alcohol use: Social use.   Drug use: Denies.  . 2 daughter, 1 son.   Employment: [employer]; [employer] and trains managers.        PHYSICAL EXAMINATION    -Generally well appearing. Good energy today.   -Respiratory: No audible wheezing.   -Skin: No facial rashes.  -Psychiatric: Normal mood and affect. Pleasant, talkative.  -Neurologic:   MENTAL STATUS:     Alert, oriented.   Recall: Intact.    Speech fluent.    Comprehension intact.     CRANIAL NERVES:     Pupils are equal, round.    Extraocular movements full, patient denies diplopia.    Left inferior homonymous quadrantanopia.   Symmetric facial movements.   Hearing intact.   With normal phonation, no dysfunction tongue.  MOTOR: Antigravity in arms. No pronator drift.  SENSATION: Intact throughout.   COORDINATION: Intact to finger-nose with eyes closed.    The rest of a comprehensive physical examination is deferred due to PHE (public health emergency) video visit restrictions.        MEDICAL RECORDS  Obtained and personally reviewed all available outside medical records in addition to reviewing any records available in our electronic system.     LABS  Personally reviewed all available lab results.     IMAGING  Personally reviewed MR brain imaging from today and compared to prior imaging. There is minimal contrast enhancement at the sagittal sinus with no increase in T2 FLAIR. No new lesions.    Also reviewed CT body imaging from earlier today. Per report, there are no findings concerning for metastatic disease.     Imaging shown to Priyanka.       IMPRESSION  Clinic was spent discussing in detail the nature of her cancer in light of repeat imaging. This was in addition to answering questions pertaining to my recommendations and devising the plan as outlined below.     Grade II hemangiopericytomas are considered malignant tumors.  Standard treatment options include surgery and radiation therapy. With no good surgical option, Priyanka completed stereotactic radiation therapy in 1/2020. Recent brain imaging was again with no new concerns. Will continue with imaging surveillance every 6 months. Per NCCN guidelines, imaging should be repeated every 3-6 months for the first 5 years (at least through 1/2025) and then, annually thereafter.      For radiation-refractory cancer, there are evidence-based options. Pazopanib (Votrient), a multitargeted receptor tyrosinase kinase inhibitor that inhibits angiogenesis, has been used in several patients with recurrent cancer. There is also evidence for the use of temozolomide plus bevacizumab (lorena); chino Rico MS, et al. Activity of temozolomide and bevacizumab in the treatment of locally advanced, recurrent, and metastatic hemangiopericytoma and malignant solitary fibrous tumor. Cancer. 2011 Nov 1;117(21):4939-47. doi: 10.1002/cncr.24518. Epub 2011 Apr 8. PMID: 90096050. AND mikel Marquez ML, et al. Efficacy of bevacizumab and temozolomide therapy in locally advanced, recurrent, and metastatic malignant solitary fibrous tumour: A population-based analysis. J Oncol Pharm Pract. 2019 Sep;25(6):6175-4163. doi: 10.1177/0017699045163667. Epub 2018 Jun 27. PMID: 79419137. Of note, Priyanka has consented to next generation sequencing. At disease recurrence, can consider sending pathology for mutational analysis to potentially identify targeted theray options.     Extracranial metastatic disease can occur; most commonly in bone, lung, and liver. Prior imaging of the MR liver showed a lesion. Priyanka was referred to Dr. Phan Fierro with surgical oncology. Dr. Fierro performed a  laparoscopic ultrasound-guided microwave ablation of a liver lesion on 1/15/2020. Pathology was consistent with a benign cavernous hemangioma with no metastatic hemangiopericytoma or other malignancy identified. She is currently monitored on  annual systemic surveillance imaging with a CT C/A/P with contrast. Imaging from today again showed no concerning changes. I confirmed this with Dr. Fierro.     Clinically, Priyanka is doing well with no new complaints. Her energy is low, but she has been increasing her activity level.     In terms of cognition and memory, Priyanka feels that things are stable there too and work is going well. She had repeat neuro-psych testing in 4/2021, which demonstrated a pattern of subtle weaknesses that remains consistent with relative dysfunction of right posterior hemisphere brain networks. Her cognition was mildly variable, but overall stable to somewhat improved relative to the prior evaluation in 3/2020.     PROBLEM LIST  Hemangiopericytoma, grade II  Liver lesion  Depressed mood  Sleep issues  Headache    PLAN  -CANCER-DIRECTED THERAPY-  -As above; Continue imaging surveillance. Repeat MR brain imaging in 6 months.     -LIVER LESION-  -Treated. No lesion-specific follow-up needed.   -CT c/a/p with no concerns in 9/2021. Will continue with annual systemic surveillance imaging.     -STEROIDS-  -Currently off dexamethasone.    -SEIZURE MANAGEMENT-  -While this patient is at increased risk of having seizures, given the lack of seizure history, there is no indication to prescribe an antiepileptic at this time.     -Quality of life/ MOOD/ FATIGUE-  -Continue to monitor mood as untreated/ undertreated depression can worsen fatigue, dysorexia, and quality of life.  -Continue Zoloft 150mg daily.     -MEMORY LOSS/ WORD FINDING ISSUES-  -Evaluated by Dr. Phan Wright in 3/2020 and 4/2021. Neuro-psych testing with mild weaknesses noted in aspects of speeded visual processing and general cognitive speed. Stable findings when comparing prior tests.  -Repeat testing if clinically indicated.     -OTHER-  -RNCC to provide information on obtaining a COVID booster vaccine given her cancer diagnosis.     COVID vaccination in 4/2021.     Return  to clinic in 3/2022 to review repeat imaging.     In the meantime, Priyanka knows to call with questions or concerns or to report new complaints and can be seen sooner if needed.    Lauren Zhang MD  Neuro-oncology      Again, thank you for allowing me to participate in the care of your patient.        Sincerely,        Lauren Zhang MD

## 2021-09-10 NOTE — DISCHARGE INSTRUCTIONS
MRI Contrast Discharge Instructions    The IV contrast you received today will pass out of your body in your  urine. This will happen in the next 24 hours. You will not feel this process.  Your urine will not change color.    Drink at least 4 extra glasses of water or juice today (unless your doctor  has restricted your fluids). This reduces the stress on your kidneys.  You may take your regular medicines.    If you are on dialysis: It is best to have dialysis today.    If you have a reaction: Most reactions happen right away. If you have  any new symptoms after leaving the hospital (such as hives or swelling),  call your hospital at the correct number below. Or call your family doctor.  If you have breathing distress or wheezing, call 911.    Special instructions: ***    I have read and understand the above information.    Signature:______________________________________ Date:___________    Staff:__________________________________________ Date:___________     Time:__________    [facility] [place] [facility] Departments:    ___[place]: [phone]  ___[facility]: [phone]  ___[place]: [phone] ___[facility]: [phone]  ___[facility]: [phone]  ___[facility]: [phone]  ___[phone]  ___[facility]: [phone]  ___[phone]

## 2021-09-23 ENCOUNTER — IMMUNIZATION (OUTPATIENT)
Dept: NURSING | Facility: CLINIC | Age: 61
End: 2021-09-23
Payer: COMMERCIAL

## 2021-09-23 PROCEDURE — 90682 RIV4 VACC RECOMBINANT DNA IM: CPT

## 2021-09-23 PROCEDURE — 90471 IMMUNIZATION ADMIN: CPT

## 2021-09-23 PROCEDURE — 91300 PR COVID VAC PFIZER DIL RECON 30 MCG/0.3 ML IM: CPT

## 2021-09-23 PROCEDURE — 0003A PR ADMIN COVID VAC PFIZER, 3RD DOSE IMM COMP PT: CPT

## 2022-03-03 NOTE — PROGRESS NOTES
"Priyanka is a 61 year old who is being evaluated via a billable video visit.      How would you like to obtain your AVS? MyChart  If the video visit is dropped, the invitation should be resent by: Text to cell phone: [phone]  Will anyone else be joining your video visit? No    Ana Maria Isaac VF      Video-Visit Details  Type of service:  Video Visit    Video Start Time: 4:20 PM  Video End Time: 4:27 PM    Originating Location (pt. Location): Home    Distant Location (provider location):  [facility]     Platform used for Video Visit: [employer]    _____________________________________________________________    NEURO-ONCOLOGY VISIT  Mar 7, 2022    CHIEF COMPLAINT: Ms. 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 She completed GammaKnife treatment in 1/2020. She is currently managed on imaging surveillance.     I met with Priyanka today for this follow-up visit.     HISTORY OF PRESENT ILLNESS  -Priyanka does not have any major changes to report and no new neurological symptoms.   -She had the \"flu\" last week; now fully recovered.   -No headaches recently.   -Continues on Zoloft; mood is good. Particularly with increased activity/ routine exercising her mood and energy is better.  -Stable visional field cut.  -Denies any changes in strength. No numbness or changes in sensation.   -Thinking, memory, and concentration is good; Taking on more responsibilities at work.  -No reported episodes concerning for a seizure.  -No abdominal pain or nausea/ vomiting. No difficulty breathing. No bony pain.     REVIEW OF SYSTEMS  A comprehensive ROS was performed and negative except as in HPI.      MEDICATIONS   Current Outpatient Medications   Medication Sig Dispense Refill     albuterol (PROAIR HFA/PROVENTIL HFA/VENTOLIN HFA) 108 (90 Base) MCG/ACT inhaler Inhale 2 puffs into the lungs every 6 hours " 1 Inhaler 11     aspirin 81 MG EC tablet Take 81 mg by mouth At Bedtime       azelastine (ASTELIN) 0.1 % nasal spray Spray 1 spray into both nostrils 2 times daily 30 mL 11     fluticasone (FLONASE) 50 MCG/ACT nasal spray Spray 2 sprays into both nostrils daily       fluticasone (FLOVENT HFA) 110 MCG/ACT inhaler Inhale 2 puffs into the lungs 2 times daily 12 g 4     lactobacillus rhamnosus, GG, (CULTURELL) capsule Take 1 capsule by mouth 2 times daily       loratadine (CLARITIN) 10 MG tablet Take 10 mg by mouth daily       PROAIR  (90 Base) MCG/ACT inhaler Inhale 1-2 puffs into the lungs every 4 hours as needed for shortness of breath / dyspnea 1 Inhaler 11     rosuvastatin (CRESTOR) 10 MG tablet Take 1 tablet (10 mg) by mouth daily 90 tablet 3     sertraline (ZOLOFT) 100 MG tablet Take 1 tablet (100 mg) by mouth daily Take 1-2 tablets (100-200mg) by mouth daily 180 tablet 3     valACYclovir (VALTREX) 1000 mg tablet Take 2 tablets (2,000 mg) by mouth 2 times daily For a total of 2 doses 12 tablet 1     VITAMIN D, CHOLECALCIFEROL, PO Take 1,000 Units by mouth daily       DRUG ALLERGIES   Allergies   Allergen Reactions     Atorvastatin      Penicillins      Sulfa Drugs      Sulfamethoxazole-Trimethoprim Rash       ONCOLOGIC HISTORY  -2015 PRESENTATION: Headaches.  -2015 MR brain imaging with a right occipital mass   -4/22/2015 SURGERY: Resection by Dr. Lujan   PATHOLOGY: Hemangiopericytoma, grade 2.   -2015 CT chest, abdomen and pelvis negative for malignancy.  -2015 PET MRI negative for malignancy.   Conservative management per Dr. Herman Mayberry.    -8/1/2017 CT chest abdomen and pelvis and bone scan negative for malignancy.   -12/7/2017 MRB with no evidence of tumor recurrence.  -12/6/2018 MRB with no evidence of tumor recurrence.    -12/11/2019 MRB with a new nodule of enhancement within the superior sagittal sinus, concerning for tumor recurrence. Stable appearance of the right parieto-occipital craniotomy  site.  -12/13/2019 Bone scan with no convincing evidence of skeletal metastases.  -12/18/2019 MR liver with a 9 mm lesion in the lateral aspect of the junction of segments 7 and 8 of the liver shows homogeneous delayed enhancement and is new since 2015. This could represent a metastatic lesion.     -1/6/2020 NEURO-ONC: Referral to radiation oncology and GI oncology surgery. Signed consent for next generation sequencing.   -1/14/2020 RADS: GammaKnife to lesion (1 fraction).   -1/15/2020 LIVER LESION:  Laparoscopic ultrasound-guided microwave ablation of liver tumor by Dr. Phan Fierro.   PATHOLOGY: Cavernous hemangioma of the liver, benign:    -No metastatic hemangiopericytoma or other malignancy identified    -Underlying steatohepatitis, no significant fibrosis    -No follow-up needed.  -2/28/2020 NEURO-ONC/ MRB: Clinically stable. Headaches with migraine features; increasing Zoloft. Imaging with positive response to radiation.   -3/9/2020 Evaluated by Dr. Wright; Neuro-psych testing with mild weaknesses were noted in aspects of speeded visual processing, and general cognitive speed. Repeat in 3/2021.  -6/1/2020 NEURO-ONC/ MRB: Clinically well; for insomnia trial of melatonin. Imaging with positive treatment effect.   -6/1/2020 NEURO-ONC/ MRB: Clinically well. Imaging with no concerns.   -9/14/2020 NEURO-ONC/ MRB: Clinically well. Imaging of MRI brain and CT CAP with no concerns.  -12/14/2020 NEURO-ONC/ MRB: Clinically well. MR imaging of the brain with no concerns.  -3/12/2021 NEURO-ONC/ MRB: Clinically well. MRI of the brain with no concerns.  -9/10/2021 NEURO-ONC/ MRB: Clinically well. MRI of the brain and CT CAP with no concerns.  -3/7/2022 NEURO-ONC/ MRB: Clinically well. MRI of the brain with no concerns.    SOCIAL HISTORY   Tobacco use: Former smoker; quit in 2003. 27 pack years.  Alcohol use: Social use.   Drug use: Denies.  . 2 daughter, 1 son.   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        PHYSICAL EXAMINATION    -Generally well appearing. Good energy today.   -Respiratory: No audible wheezing.   -Skin: No facial rashes.  -Psychiatric: Normal mood and affect. Pleasant, talkative.  -Neurologic:   MENTAL STATUS:     Alert, oriented.   Recall: Intact.    Speech fluent.    Comprehension intact.     CRANIAL NERVES:     Pupils are equal, round.    Extraocular movements full, patient denies diplopia.    Left inferior homonymous quadrantanopia.   Symmetric facial movements.   Hearing intact.   With normal phonation, no dysfunction tongue.  MOTOR: Antigravity in arms.   SENSATION: Intact throughout.   COORDINATION: Intact.     The rest of a comprehensive physical examination is deferred due to PHE (public health emergency) video visit restrictions.        MEDICAL RECORDS  Obtained and personally reviewed all available outside medical records in addition to reviewing any records available in our electronic system.     LABS  Personally reviewed all available lab results.     IMAGING  Personally reviewed MR brain imaging from today and compared to prior imaging. There is stable, minimal contrast enhancement at the sagittal sinus with no increase in T2 FLAIR. No new lesions.    Imaging shown to Priyanka.     Imaging and case was reviewed at Brain Tumor Conference earlier today.       IMPRESSION  Clinic was spent discussing in detail the nature of her cancer in light of repeat imaging. This was in addition to answering questions pertaining to my recommendations and devising the plan as outlined below.     Grade II hemangiopericytomas are considered malignant tumors. Standard treatment options include surgery and radiation therapy. With no good surgical option, Priyanka completed stereotactic radiation therapy in 1/2020. Recent brain imaging was again with no new concerns. Will continue with imaging surveillance every 6 months. Per NCCN guidelines, imaging should be repeated every 3-6 months for  the first 5 years (at least through 1/2025) and then, annually thereafter.      For radiation-refractory cancer, there are evidence-based options. Pazopanib (Votrient), a multitargeted receptor tyrosinase kinase inhibitor that inhibits angiogenesis, has been used in several patients with recurrent cancer. There is also evidence for the use of temozolomide plus bevacizumab (lorena); barbara- Trisha MS, et al. Activity of temozolomide and bevacizumab in the treatment of locally advanced, recurrent, and metastatic hemangiopericytoma and malignant solitary fibrous tumor. Cancer. 2011 Nov 1;117(21):4939-47. doi: 10.1002/cncr.18996. Epub 2011 Apr 8. PMID: 98887610. AND mikel Marquez ML, et al. Efficacy of bevacizumab and temozolomide therapy in locally advanced, recurrent, and metastatic malignant solitary fibrous tumour: A population-based analysis. J Oncol Pharm Pract. 2019 Sep;25(6):5554-5492. doi: 10.1177/7640399219063483. Epub 2018 Jun 27. PMID: 18910979. Of note, Priyanka has consented to next generation sequencing. At disease recurrence, can consider sending pathology for mutational analysis to potentially identify targeted theray options.     Extracranial metastatic disease can occur; most commonly in bone, lung, and liver. Prior imaging of the MR liver showed a lesion. Priyanka was referred to Dr. Phan Fierro with surgical oncology. Dr. Fierro performed a  laparoscopic ultrasound-guided microwave ablation of a liver lesion on 1/15/2020. Pathology was consistent with a benign cavernous hemangioma with no metastatic hemangiopericytoma or other malignancy identified. She is currently monitored on annual systemic surveillance imaging with a CT C/A/P with contrast done every Fall.     Clinically, Priyanka is doing well with no new complaints. Her mood and energy have improved as her activity level has increased. In terms of cognition and memory, Priyanka feels that things are stable and she is able to take on more responsibilities  at work.     PROBLEM LIST  Hemangiopericytoma, grade II  Liver lesion  Depressed mood  Sleep issues  Headache    PLAN  -CANCER-DIRECTED THERAPY-  -As above; Continue imaging surveillance. Repeat MR brain imaging in 6 months.     -LIVER LESION-  -Treated. No lesion-specific follow-up needed.   -CT c/a/p done annual for systemic surveillance imaging; next due in 9/2022.     -STEROIDS-  -Currently off dexamethasone.    -SEIZURE MANAGEMENT-  -While this patient is at increased risk of having seizures, given the lack of seizure history, there is no indication to prescribe an antiepileptic at this time.     -Quality of life/ MOOD/ FATIGUE-  -Continue to monitor mood as untreated/ undertreated depression can worsen fatigue, dysorexia, and quality of life.  -Continue Zoloft 150mg daily.   -Continue with routine exercise.     -MEMORY LOSS/ WORD FINDING ISSUES-  -Evaluated by Dr. Phan Wright in 3/2020 and 4/2021. Neuro-psych testing with mild weaknesses noted in aspects of speeded visual processing and general cognitive speed. Stable findings when comparing prior tests.  -Repeat testing if clinically indicated.     Return to clinic in 9/2022 to review repeat imaging.     In the meantime, Priyanka knows to call with questions or concerns or to report new complaints and can be seen sooner if needed.    Lauren Zhang MD  Neuro-oncology

## 2022-03-07 ENCOUNTER — VIRTUAL VISIT (OUTPATIENT)
Dept: ONCOLOGY | Facility: CLINIC | Age: 62
End: 2022-03-07
Attending: PSYCHIATRY & NEUROLOGY
Payer: COMMERCIAL

## 2022-03-07 ENCOUNTER — ANCILLARY PROCEDURE (OUTPATIENT)
Dept: MRI IMAGING | Facility: CLINIC | Age: 62
End: 2022-03-07
Attending: PSYCHIATRY & NEUROLOGY
Payer: COMMERCIAL

## 2022-03-07 DIAGNOSIS — C80.0: ICD-10-CM

## 2022-03-07 DIAGNOSIS — D43.3 HEMANGIOPERICYTOMA OF CNS, GRADE II (H): Primary | ICD-10-CM

## 2022-03-07 DIAGNOSIS — D43.3 HEMANGIOPERICYTOMA OF CNS, GRADE II (H): ICD-10-CM

## 2022-03-07 PROCEDURE — 70553 MRI BRAIN STEM W/O & W/DYE: CPT | Performed by: RADIOLOGY

## 2022-03-07 PROCEDURE — A9585 GADOBUTROL INJECTION: HCPCS | Performed by: RADIOLOGY

## 2022-03-07 PROCEDURE — 99215 OFFICE O/P EST HI 40 MIN: CPT | Mod: 95 | Performed by: PSYCHIATRY & NEUROLOGY

## 2022-03-07 RX ORDER — GADOBUTROL 604.72 MG/ML
10 INJECTION INTRAVENOUS ONCE
Status: COMPLETED | OUTPATIENT
Start: 2022-03-07 | End: 2022-03-07

## 2022-03-07 RX ADMIN — GADOBUTROL 9 ML: 604.72 INJECTION INTRAVENOUS at 10:40

## 2022-03-07 NOTE — LETTER
"  3/7/2022     RE: Leidy Ascencio  [address] [place] [address]    Dear Colleague,    Thank you for referring your patient, Leidy Ascencio, to the [facility]. Please see a copy of my visit note below.    Priyanka is a 61 year old who is being evaluated via a billable video visit.      How would you like to obtain your AVS? MyChart  If the video visit is dropped, the invitation should be resent by: Text to cell phone: [phone]  Will anyone else be joining your video visit? No    Ana Maria Isaac       Video-Visit Details  Type of service:  Video Visit    Video Start Time: 4:20 PM  Video End Time: 4:27 PM    Originating Location (pt. Location): Home    Distant Location (provider location):  [facility]     Platform used for Video Visit: [employer]    _____________________________________________________________    NEURO-ONCOLOGY VISIT  Mar 7, 2022    CHIEF COMPLAINT: Ms. 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 She completed GammaKnife treatment in 1/2020. She is currently managed on imaging surveillance.     I met with Priyanka today for this follow-up visit.     HISTORY OF PRESENT ILLNESS  -Priyanka does not have any major changes to report and no new neurological symptoms.   -She had the \"flu\" last week; now fully recovered.   -No headaches recently.   -Continues on Zoloft; mood is good. Particularly with increased activity/ routine exercising her mood and energy is better.  -Stable visional field cut.  -Denies any changes in strength. No numbness or changes in sensation.   -Thinking, memory, and concentration is good; Taking on more responsibilities at work.  -No reported episodes concerning for a seizure.  -No abdominal pain or nausea/ vomiting. No difficulty breathing. No bony pain.     REVIEW OF SYSTEMS  A comprehensive ROS was " performed and negative except as in HPI.      MEDICATIONS   Current Outpatient Medications   Medication Sig Dispense Refill     albuterol (PROAIR HFA/PROVENTIL HFA/VENTOLIN HFA) 108 (90 Base) MCG/ACT inhaler Inhale 2 puffs into the lungs every 6 hours 1 Inhaler 11     aspirin 81 MG EC tablet Take 81 mg by mouth At Bedtime       azelastine (ASTELIN) 0.1 % nasal spray Spray 1 spray into both nostrils 2 times daily 30 mL 11     fluticasone (FLONASE) 50 MCG/ACT nasal spray Spray 2 sprays into both nostrils daily       fluticasone (FLOVENT HFA) 110 MCG/ACT inhaler Inhale 2 puffs into the lungs 2 times daily 12 g 4     lactobacillus rhamnosus, GG, (CULTURELL) capsule Take 1 capsule by mouth 2 times daily       loratadine (CLARITIN) 10 MG tablet Take 10 mg by mouth daily       PROAIR  (90 Base) MCG/ACT inhaler Inhale 1-2 puffs into the lungs every 4 hours as needed for shortness of breath / dyspnea 1 Inhaler 11     rosuvastatin (CRESTOR) 10 MG tablet Take 1 tablet (10 mg) by mouth daily 90 tablet 3     sertraline (ZOLOFT) 100 MG tablet Take 1 tablet (100 mg) by mouth daily Take 1-2 tablets (100-200mg) by mouth daily 180 tablet 3     valACYclovir (VALTREX) 1000 mg tablet Take 2 tablets (2,000 mg) by mouth 2 times daily For a total of 2 doses 12 tablet 1     VITAMIN D, CHOLECALCIFEROL, PO Take 1,000 Units by mouth daily       DRUG ALLERGIES   Allergies   Allergen Reactions     Atorvastatin      Penicillins      Sulfa Drugs      Sulfamethoxazole-Trimethoprim Rash       ONCOLOGIC HISTORY  -2015 PRESENTATION: Headaches.  -2015 MR brain imaging with a right occipital mass   -4/22/2015 SURGERY: Resection by Dr. Lujan   PATHOLOGY: Hemangiopericytoma, grade 2.   -2015 CT chest, abdomen and pelvis negative for malignancy.  -2015 PET MRI negative for malignancy.   Conservative management per Dr. Herman Mayberry.    -8/1/2017 CT chest abdomen and pelvis and bone scan negative for malignancy.   -12/7/2017 MRB with no evidence of  tumor recurrence.  -12/6/2018 MRB with no evidence of tumor recurrence.    -12/11/2019 MRB with a new nodule of enhancement within the superior sagittal sinus, concerning for tumor recurrence. Stable appearance of the right parieto-occipital craniotomy site.  -12/13/2019 Bone scan with no convincing evidence of skeletal metastases.  -12/18/2019 MR liver with a 9 mm lesion in the lateral aspect of the junction of segments 7 and 8 of the liver shows homogeneous delayed enhancement and is new since 2015. This could represent a metastatic lesion.     -1/6/2020 NEURO-ONC: Referral to radiation oncology and GI oncology surgery. Signed consent for next generation sequencing.   -1/14/2020 RADS: GammaKnife to lesion (1 fraction).   -1/15/2020 LIVER LESION:  Laparoscopic ultrasound-guided microwave ablation of liver tumor by Dr. Phan Fierro.   PATHOLOGY: Cavernous hemangioma of the liver, benign:    -No metastatic hemangiopericytoma or other malignancy identified    -Underlying steatohepatitis, no significant fibrosis    -No follow-up needed.  -2/28/2020 NEURO-ONC/ MRB: Clinically stable. Headaches with migraine features; increasing Zoloft. Imaging with positive response to radiation.   -3/9/2020 Evaluated by Dr. Wright; Neuro-psych testing with mild weaknesses were noted in aspects of speeded visual processing, and general cognitive speed. Repeat in 3/2021.  -6/1/2020 NEURO-ONC/ MRB: Clinically well; for insomnia trial of melatonin. Imaging with positive treatment effect.   -6/1/2020 NEURO-ONC/ MRB: Clinically well. Imaging with no concerns.   -9/14/2020 NEURO-ONC/ MRB: Clinically well. Imaging of MRI brain and CT CAP with no concerns.  -12/14/2020 NEURO-ONC/ MRB: Clinically well. MR imaging of the brain with no concerns.  -3/12/2021 NEURO-ONC/ MRB: Clinically well. MRI of the brain with no concerns.  -9/10/2021 NEURO-ONC/ MRB: Clinically well. MRI of the brain and CT CAP with no concerns.  -3/7/2022 NEURO-ONC/ MRB:  Clinically well. MRI of the brain with no concerns.    SOCIAL HISTORY   Tobacco use: Former smoker; quit in 2003. 27 pack years.  Alcohol use: Social use.   Drug use: Denies.  . 2 daughter, 1 son.   Employment: [employer]; [employer] managers.        PHYSICAL EXAMINATION    -Generally well appearing. Good energy today.   -Respiratory: No audible wheezing.   -Skin: No facial rashes.  -Psychiatric: Normal mood and affect. Pleasant, talkative.  -Neurologic:   MENTAL STATUS:     Alert, oriented.   Recall: Intact.    Speech fluent.    Comprehension intact.     CRANIAL NERVES:     Pupils are equal, round.    Extraocular movements full, patient denies diplopia.    Left inferior homonymous quadrantanopia.   Symmetric facial movements.   Hearing intact.   With normal phonation, no dysfunction tongue.  MOTOR: Antigravity in arms.   SENSATION: Intact throughout.   COORDINATION: Intact.     The rest of a comprehensive physical examination is deferred due to PHE (public health emergency) video visit restrictions.        MEDICAL RECORDS  Obtained and personally reviewed all available outside medical records in addition to reviewing any records available in our electronic system.     LABS  Personally reviewed all available lab results.     IMAGING  Personally reviewed MR brain imaging from today and compared to prior imaging. There is stable, minimal contrast enhancement at the sagittal sinus with no increase in T2 FLAIR. No new lesions.    Imaging shown to Priyanka.     Imaging and case was reviewed at Brain Tumor Conference earlier today.       IMPRESSION  Clinic was spent discussing in detail the nature of her cancer in light of repeat imaging. This was in addition to answering questions pertaining to my recommendations and devising the plan as outlined below.     Grade II hemangiopericytomas are considered malignant tumors. Standard treatment options include surgery and radiation therapy. With no good  surgical option, Priyanka completed stereotactic radiation therapy in 1/2020. Recent brain imaging was again with no new concerns. Will continue with imaging surveillance every 6 months. Per NCCN guidelines, imaging should be repeated every 3-6 months for the first 5 years (at least through 1/2025) and then, annually thereafter.      For radiation-refractory cancer, there are evidence-based options. Pazopanib (Votrient), a multitargeted receptor tyrosinase kinase inhibitor that inhibits angiogenesis, has been used in several patients with recurrent cancer. There is also evidence for the use of temozolomide plus bevacizumab (lorena); chino Rico MS, et al. Activity of temozolomide and bevacizumab in the treatment of locally advanced, recurrent, and metastatic hemangiopericytoma and malignant solitary fibrous tumor. Cancer. 2011 Nov 1;117(21):4938-47. doi: 10.1002/cncr.63459. Epub 2011 Apr 8. PMID: 25455941. AND mikel Marquez ML, et al. Efficacy of bevacizumab and temozolomide therapy in locally advanced, recurrent, and metastatic malignant solitary fibrous tumour: A population-based analysis. J Oncol Pharm Pract. 2019 Sep;25(6):6847-9765. doi: 10.1177/4843961859750386. Epub 2018 Jun 27. PMID: 23049247. Of note, Priyanka has consented to next generation sequencing. At disease recurrence, can consider sending pathology for mutational analysis to potentially identify targeted theray options.     Extracranial metastatic disease can occur; most commonly in bone, lung, and liver. Prior imaging of the MR liver showed a lesion. Priyanka was referred to Dr. Phan Fierro with surgical oncology. Dr. Fierro performed a  laparoscopic ultrasound-guided microwave ablation of a liver lesion on 1/15/2020. Pathology was consistent with a benign cavernous hemangioma with no metastatic hemangiopericytoma or other malignancy identified. She is currently monitored on annual systemic surveillance imaging with a CT C/A/P with contrast done every  Fall.     Clinically, Priyanka is doing well with no new complaints. Her mood and energy have improved as her activity level has increased. In terms of cognition and memory, Priyanka feels that things are stable and she is able to take on more responsibilities at work.     PROBLEM LIST  Hemangiopericytoma, grade II  Liver lesion  Depressed mood  Sleep issues  Headache    PLAN  -CANCER-DIRECTED THERAPY-  -As above; Continue imaging surveillance. Repeat MR brain imaging in 6 months.     -LIVER LESION-  -Treated. No lesion-specific follow-up needed.   -CT c/a/p done annual for systemic surveillance imaging; next due in 9/2022.     -STEROIDS-  -Currently off dexamethasone.    -SEIZURE MANAGEMENT-  -While this patient is at increased risk of having seizures, given the lack of seizure history, there is no indication to prescribe an antiepileptic at this time.     -Quality of life/ MOOD/ FATIGUE-  -Continue to monitor mood as untreated/ undertreated depression can worsen fatigue, dysorexia, and quality of life.  -Continue Zoloft 150mg daily.   -Continue with routine exercise.     -MEMORY LOSS/ WORD FINDING ISSUES-  -Evaluated by Dr. Phan Wright in 3/2020 and 4/2021. Neuro-psych testing with mild weaknesses noted in aspects of speeded visual processing and general cognitive speed. Stable findings when comparing prior tests.  -Repeat testing if clinically indicated.     Return to clinic in 9/2022 to review repeat imaging.     In the meantime, Priyanka knows to call with questions or concerns or to report new complaints and can be seen sooner if needed.    Lauren Zhang MD  Neuro-oncology

## 2022-05-06 DIAGNOSIS — E78.00 ELEVATED CHOLESTEROL: ICD-10-CM

## 2022-05-06 NOTE — TELEPHONE ENCOUNTER
"Requested Prescriptions   Pending Prescriptions Disp Refills    rosuvastatin (CRESTOR) 10 MG tablet [Pharmacy Med Name: rosuvastatin 10 mg tablet] 90 tablet 3     Sig: Take 1 tablet (10 mg) by mouth daily        Statins Protocol Failed - 5/6/2022  2:57 PM        Failed - LDL on file in past 12 months     Recent Labs   Lab Test 03/01/21  0743   LDL 90               Failed - Recent (12 mo) or future (30 days) visit within the authorizing provider's specialty     Patient has had an office visit with the authorizing provider or a provider within the authorizing providers department within the previous 12 mos or has a future within next 30 days. See \"Patient Info\" tab in inbasket, or \"Choose Columns\" in Meds & Orders section of the refill encounter.              Passed - No abnormal creatine kinase in past 12 months     No lab results found.             Passed - Medication is active on med list        Passed - Patient is age 18 or older        Passed - No active pregnancy on record        Passed - No positive pregnancy test in past 12 months              "

## 2022-05-08 ENCOUNTER — HEALTH MAINTENANCE LETTER (OUTPATIENT)
Age: 62
End: 2022-05-08

## 2022-05-08 RX ORDER — ROSUVASTATIN CALCIUM 10 MG/1
10 TABLET, COATED ORAL DAILY
Qty: 90 TABLET | Refills: 0 | Status: SHIPPED | OUTPATIENT
Start: 2022-05-08 | End: 2022-08-04

## 2022-07-07 ENCOUNTER — PATIENT OUTREACH (OUTPATIENT)
Dept: ONCOLOGY | Facility: CLINIC | Age: 62
End: 2022-07-07

## 2022-07-07 DIAGNOSIS — F33.0 MAJOR DEPRESSIVE DISORDER, RECURRENT EPISODE, MILD (H): ICD-10-CM

## 2022-07-07 NOTE — PROGRESS NOTES
[facility]: Cancer Care Short Note                                                                                          Completed chart audit to assign Oncology Care Coordination enrollment status.      Frances Granda RN, BSN  Oncology/Neurosurgery Care Coordinator  [facility]    .

## 2022-07-08 RX ORDER — SERTRALINE HYDROCHLORIDE 100 MG/1
100 TABLET, FILM COATED ORAL DAILY
Qty: 30 TABLET | Refills: 3 | Status: SHIPPED | OUTPATIENT
Start: 2022-07-08 | End: 2022-08-25

## 2022-08-04 DIAGNOSIS — E78.00 ELEVATED CHOLESTEROL: ICD-10-CM

## 2022-08-04 RX ORDER — ROSUVASTATIN CALCIUM 10 MG/1
TABLET, COATED ORAL
Qty: 30 TABLET | Refills: 0 | Status: SHIPPED | OUTPATIENT
Start: 2022-08-04 | End: 2022-08-25

## 2022-08-24 ASSESSMENT — ASTHMA QUESTIONNAIRES
QUESTION_4 LAST FOUR WEEKS HOW OFTEN HAVE YOU USED YOUR RESCUE INHALER OR NEBULIZER MEDICATION (SUCH AS ALBUTEROL): ONCE A WEEK OR LESS
ACT_TOTALSCORE: 24
QUESTION_5 LAST FOUR WEEKS HOW WOULD YOU RATE YOUR ASTHMA CONTROL: COMPLETELY CONTROLLED
QUESTION_3 LAST FOUR WEEKS HOW OFTEN DID YOUR ASTHMA SYMPTOMS (WHEEZING, COUGHING, SHORTNESS OF BREATH, CHEST TIGHTNESS OR PAIN) WAKE YOU UP AT NIGHT OR EARLIER THAN USUAL IN THE MORNING: NOT AT ALL
QUESTION_2 LAST FOUR WEEKS HOW OFTEN HAVE YOU HAD SHORTNESS OF BREATH: NOT AT ALL
QUESTION_1 LAST FOUR WEEKS HOW MUCH OF THE TIME DID YOUR ASTHMA KEEP YOU FROM GETTING AS MUCH DONE AT WORK, SCHOOL OR AT HOME: NONE OF THE TIME
ACT_TOTALSCORE: 24

## 2022-08-24 ASSESSMENT — ENCOUNTER SYMPTOMS
SHORTNESS OF BREATH: 0
ABDOMINAL PAIN: 0
DYSURIA: 0
ARTHRALGIAS: 1
FREQUENCY: 0
SORE THROAT: 0
NAUSEA: 0
PARESTHESIAS: 1
HEARTBURN: 0
JOINT SWELLING: 1
HEMATOCHEZIA: 0
DIZZINESS: 1
HEMATURIA: 0
BREAST MASS: 0
DIARRHEA: 1
MYALGIAS: 1
PALPITATIONS: 0
WEAKNESS: 1
HEADACHES: 1
EYE PAIN: 1
NERVOUS/ANXIOUS: 1
CHILLS: 0
CONSTIPATION: 0
COUGH: 0
FEVER: 0

## 2022-08-25 ENCOUNTER — OFFICE VISIT (OUTPATIENT)
Dept: FAMILY MEDICINE | Facility: CLINIC | Age: 62
End: 2022-08-25
Payer: COMMERCIAL

## 2022-08-25 ENCOUNTER — TELEPHONE (OUTPATIENT)
Dept: GASTROENTEROLOGY | Facility: CLINIC | Age: 62
End: 2022-08-25

## 2022-08-25 VITALS
HEART RATE: 66 BPM | BODY MASS INDEX: 34.96 KG/M2 | OXYGEN SATURATION: 97 % | HEIGHT: 62 IN | TEMPERATURE: 98.9 F | WEIGHT: 190 LBS | SYSTOLIC BLOOD PRESSURE: 145 MMHG | DIASTOLIC BLOOD PRESSURE: 79 MMHG

## 2022-08-25 DIAGNOSIS — E78.00 ELEVATED CHOLESTEROL: ICD-10-CM

## 2022-08-25 DIAGNOSIS — Z00.00 ROUTINE GENERAL MEDICAL EXAMINATION AT A HEALTH CARE FACILITY: Primary | ICD-10-CM

## 2022-08-25 DIAGNOSIS — J45.20 MILD INTERMITTENT ASTHMA WITHOUT COMPLICATION: ICD-10-CM

## 2022-08-25 DIAGNOSIS — Z23 HIGH PRIORITY FOR 2019-NCOV VACCINE: ICD-10-CM

## 2022-08-25 DIAGNOSIS — F33.0 MAJOR DEPRESSIVE DISORDER, RECURRENT EPISODE, MILD (H): ICD-10-CM

## 2022-08-25 DIAGNOSIS — B00.1 RECURRENT COLD SORES: ICD-10-CM

## 2022-08-25 DIAGNOSIS — Z12.11 COLON CANCER SCREENING: ICD-10-CM

## 2022-08-25 LAB
ALBUMIN SERPL-MCNC: 4.1 G/DL (ref 3.4–5)
ALP SERPL-CCNC: 66 U/L (ref 40–150)
ALT SERPL W P-5'-P-CCNC: 28 U/L (ref 0–50)
ANION GAP SERPL CALCULATED.3IONS-SCNC: 4 MMOL/L (ref 3–14)
AST SERPL W P-5'-P-CCNC: 22 U/L (ref 0–45)
BILIRUB SERPL-MCNC: 0.5 MG/DL (ref 0.2–1.3)
BUN SERPL-MCNC: 19 MG/DL (ref 7–30)
CALCIUM SERPL-MCNC: 9.2 MG/DL (ref 8.5–10.1)
CHLORIDE BLD-SCNC: 106 MMOL/L (ref 94–109)
CHOLEST SERPL-MCNC: 182 MG/DL
CO2 SERPL-SCNC: 29 MMOL/L (ref 20–32)
CREAT SERPL-MCNC: 0.63 MG/DL (ref 0.52–1.04)
FASTING STATUS PATIENT QL REPORTED: YES
GFR SERPL CREATININE-BSD FRML MDRD: >90 ML/MIN/1.73M2
GLUCOSE BLD-MCNC: 117 MG/DL (ref 70–99)
HBA1C MFR BLD: 5.6 % (ref 0–5.6)
HDLC SERPL-MCNC: 49 MG/DL
LDLC SERPL CALC-MCNC: 74 MG/DL
NONHDLC SERPL-MCNC: 133 MG/DL
POTASSIUM BLD-SCNC: 3.9 MMOL/L (ref 3.4–5.3)
PROT SERPL-MCNC: 7.4 G/DL (ref 6.8–8.8)
SODIUM SERPL-SCNC: 139 MMOL/L (ref 133–144)
TRIGL SERPL-MCNC: 297 MG/DL

## 2022-08-25 PROCEDURE — 80053 COMPREHEN METABOLIC PANEL: CPT | Performed by: FAMILY MEDICINE

## 2022-08-25 PROCEDURE — 99214 OFFICE O/P EST MOD 30 MIN: CPT | Mod: 25 | Performed by: FAMILY MEDICINE

## 2022-08-25 PROCEDURE — 91305 COVID-19,PF,PFIZER (12+ YRS): CPT | Performed by: FAMILY MEDICINE

## 2022-08-25 PROCEDURE — 90677 PCV20 VACCINE IM: CPT | Performed by: FAMILY MEDICINE

## 2022-08-25 PROCEDURE — 83036 HEMOGLOBIN GLYCOSYLATED A1C: CPT | Performed by: FAMILY MEDICINE

## 2022-08-25 PROCEDURE — 99396 PREV VISIT EST AGE 40-64: CPT | Mod: 25 | Performed by: FAMILY MEDICINE

## 2022-08-25 PROCEDURE — 0054A COVID-19,PF,PFIZER (12+ YRS): CPT | Performed by: FAMILY MEDICINE

## 2022-08-25 PROCEDURE — 36415 COLL VENOUS BLD VENIPUNCTURE: CPT | Performed by: FAMILY MEDICINE

## 2022-08-25 PROCEDURE — 80061 LIPID PANEL: CPT | Performed by: FAMILY MEDICINE

## 2022-08-25 PROCEDURE — 90471 IMMUNIZATION ADMIN: CPT | Performed by: FAMILY MEDICINE

## 2022-08-25 RX ORDER — VALACYCLOVIR HYDROCHLORIDE 1 G/1
2000 TABLET, FILM COATED ORAL 2 TIMES DAILY
Qty: 12 TABLET | Refills: 1 | Status: SHIPPED | OUTPATIENT
Start: 2022-08-25 | End: 2023-11-07

## 2022-08-25 RX ORDER — ROSUVASTATIN CALCIUM 10 MG/1
10 TABLET, COATED ORAL DAILY
Qty: 90 TABLET | Refills: 1 | Status: SHIPPED | OUTPATIENT
Start: 2022-08-25 | End: 2023-05-26

## 2022-08-25 RX ORDER — ALBUTEROL SULFATE 90 UG/1
2 AEROSOL, METERED RESPIRATORY (INHALATION) EVERY 6 HOURS
Qty: 18 G | Refills: 1 | Status: SHIPPED | OUTPATIENT
Start: 2022-08-25 | End: 2023-05-08

## 2022-08-25 RX ORDER — SERTRALINE HYDROCHLORIDE 100 MG/1
100 TABLET, FILM COATED ORAL DAILY
Qty: 90 TABLET | Refills: 3 | Status: SHIPPED | OUTPATIENT
Start: 2022-08-25 | End: 2022-10-21

## 2022-08-25 RX ORDER — FLUTICASONE PROPIONATE 110 UG/1
2 AEROSOL, METERED RESPIRATORY (INHALATION) 2 TIMES DAILY
Qty: 12 G | Refills: 4 | Status: SHIPPED | OUTPATIENT
Start: 2022-08-25 | End: 2023-05-08

## 2022-08-25 ASSESSMENT — PAIN SCALES - GENERAL: PAINLEVEL: NO PAIN (0)

## 2022-08-25 ASSESSMENT — ENCOUNTER SYMPTOMS
HEARTBURN: 0
NERVOUS/ANXIOUS: 1
FEVER: 0
JOINT SWELLING: 1
PARESTHESIAS: 1
DYSURIA: 0
EYE PAIN: 1
DIARRHEA: 1
MYALGIAS: 1
WEAKNESS: 1
HEMATURIA: 0
DIZZINESS: 1
ABDOMINAL PAIN: 0
SORE THROAT: 0
HEMATOCHEZIA: 0
BREAST MASS: 0
HEADACHES: 1
COUGH: 0
NAUSEA: 0
SHORTNESS OF BREATH: 0
FREQUENCY: 0
CONSTIPATION: 0
CHILLS: 0
PALPITATIONS: 0
ARTHRALGIAS: 1

## 2022-08-25 ASSESSMENT — PATIENT HEALTH QUESTIONNAIRE - PHQ9
SUM OF ALL RESPONSES TO PHQ QUESTIONS 1-9: 7
10. IF YOU CHECKED OFF ANY PROBLEMS, HOW DIFFICULT HAVE THESE PROBLEMS MADE IT FOR YOU TO DO YOUR WORK, TAKE CARE OF THINGS AT HOME, OR GET ALONG WITH OTHER PEOPLE: NOT DIFFICULT AT ALL
SUM OF ALL RESPONSES TO PHQ QUESTIONS 1-9: 7

## 2022-08-25 NOTE — PROGRESS NOTES
SUBJECTIVE:   CC: Leidy Ascencio is an 62 year old woman who presents for preventive health visit.       Patient has been advised of split billing requirements and indicates understanding: Yes  Healthy Habits:     Getting at least 3 servings of Calcium per day:  Yes    Bi-annual eye exam:  Yes    Dental care twice a year:  Yes    Sleep apnea or symptoms of sleep apnea:  None    Diet:  Regular (no restrictions)    Duration of exercise:  Less than 15 minutes    Taking medications regularly:  Yes    Medication side effects:  None    PHQ-2 Total Score: 1    Additional concerns today:  Yes    Answers for HPI/ROS submitted by the patient on 8/25/2022  If you checked off any problems, how difficult have these problems made it for you to do your work, take care of things at home, or get along with other people?: Not difficult at all  PHQ9 TOTAL SCORE: 7    right occipital hemangiopericytoma (grade II), 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 She completed GammaKnife treatment in 1/2020    bilateral hand numbness started 4-5 months. It is getting worse.    Preventive -     Immunization History   Administered Date(s) Administered     COVID-19,PF,Pfizer (12+ Yrs) 03/16/2021, 04/07/2021, 09/23/2021     COVID-19,PF,Pfizer 12+ Yrs (2022 and After) 08/25/2022     DTaP, Unspecified 01/03/2008, 06/05/2017     HepB 12/17/1997, 01/27/1998, 07/27/1998     HepB, Unspecified 12/17/1997, 01/27/1998, 07/27/1998     Influenza (intradermal) 11/06/2006, 12/24/2008, 10/12/2009, 11/23/2010, 08/20/2012     Influenza Quad, Recombinant, pf(RIV4) (Flublok) 09/24/2020, 09/23/2021     Influenza Vaccine IM > 6 months Valent IIV4 (Alfuria,Fluzone) 10/21/2013, 11/03/2014, 10/13/2015, 09/19/2016, 12/19/2019     Pneumococcal 20 valent Conjugate (Prevnar 20) 08/25/2022     TD (ADULT, 7+) 12/08/1997     Tdap (Adacel,Boostrix) 01/03/2008, 06/05/2017     Tdap (Adult) Unspecified Formulation 01/03/2008, 06/05/2017     Zoster vaccine recombinant  adjuvanted (SHINGRIX) 2018, 2019         -Mammogram:   IMPRESSION: BI-RADS CATEGORY: 1 - Negative.     RECOMMENDED FOLLOW-UP: Annual Mammography.  Recommend routine annual screening mammography.     Exam results letter mailed to patient.      -PAP:discontinues hysterectomy     No results found for: PAP      - Colon CA screen: Colonoscopy, age 45-75 every 10 years or FIT every year or Cologuard every 3 years       -lipids screen: ordered     Diabetes screen: ordered               SH:    Employment:    Exercise: not so much   Tobacco: no   Etoh: occasionally   Recreational drugs: no  Caffeine:coffee     Today's PHQ-2 Score:   PHQ-2 (  Pfizer) 2022   Q1: Little interest or pleasure in doing things 1   Q2: Feeling down, depressed or hopeless 0   PHQ-2 Score 1   Q1: Little interest or pleasure in doing things Several days   Q2: Feeling down, depressed or hopeless Not at all   PHQ-2 Score 1       Abuse: Current or Past (Physical, Sexual or Emotional) - No  Do you feel safe in your environment? Yes    Have you ever done Advance Care Planning? (For example, a Health Directive, POLST, or a discussion with a medical provider or your loved ones about your wishes): No, advance care planning information given to patient to review.  Patient plans to discuss their wishes with loved ones or provider.      Social History     Tobacco Use     Smoking status: Former Smoker     Packs/day: 1.00     Years: 27.00     Pack years: 27.00     Types: Cigarettes     Quit date: 2003     Years since quittin.6     Smokeless tobacco: Never Used   Substance Use Topics     Alcohol use: Yes     If you drink alcohol do you typically have >3 drinks per day or >7 drinks per week? No    No flowsheet data found.    Reviewed orders with patient.  Reviewed health maintenance and updated orders accordingly - Yes  Lab work is in process  BP Readings from Last 3 Encounters:   22 (!) 145/79    21 (!) 160/84   21 132/84    Wt Readings from Last 3 Encounters:   22 86.2 kg (190 lb)   21 89.8 kg (198 lb)   20 90.3 kg (199 lb)                  Patient Active Problem List   Diagnosis     Advanced directives, counseling/discussion     Recurrent cold sores     Other insomnia     Atopic rhinitis     Major depressive disorder, recurrent episode, mild (H)     Plantar fasciitis     Vitamin D deficiency     Quadrant hemianopsia, left     Hemangiopericytoma of CNS, grade II     Past Surgical History:   Procedure Laterality Date     APPENDECTOMY       COLONOSCOPY       GYN SURGERY      3 cesections     HYSTERECTOMY VAGINAL, BILATERAL SALPINGO-OOPHERECTOMY, COMBINED      hysterectomy + ovaries      LAPAROSCOPIC ABLATION LIVER TUMOR N/A 1/15/2020    Procedure: Laparoscopic ultrasound guided percutaneous microwave abalation of tumor x1; ultrasound guided Liver Biopsy x 4; Hepatic Ultrasound Intraoperatively;  Surgeon: Phan Fierro MD;  Location: [facility] OR     LAPAROSCOPY DIAGNOSTIC (GENERAL)  1/15/2020    Procedure: Laparoscopy diagnostic (general);  Surgeon: Phan Fierro MD;  Location: [facility] OR     ZZC EXCIS INFRATENT MENINGIOMA         Social History     Tobacco Use     Smoking status: Former Smoker     Packs/day: 1.00     Years: 27.00     Pack years: 27.00     Types: Cigarettes     Quit date: 2003     Years since quittin.6     Smokeless tobacco: Never Used   Substance Use Topics     Alcohol use: Yes     Family History   Problem Relation Age of Onset     Coronary Artery Disease Mother      Hypertension Mother      Hyperlipidemia Mother      Arthritis Father      Cerebrovascular Disease Father      Alzheimer Disease Maternal Grandmother      Other Cancer Maternal Grandmother         lung     Diabetes Maternal Grandfather      Other Cancer Paternal Grandfather         meloma     Anxiety Disorder Daughter      Substance Abuse Son          Current Outpatient Medications    Medication Sig Dispense Refill     albuterol (PROAIR HFA/PROVENTIL HFA/VENTOLIN HFA) 108 (90 Base) MCG/ACT inhaler Inhale 2 puffs into the lungs every 6 hours 18 g 1     azelastine (ASTELIN) 0.1 % nasal spray Spray 1 spray into both nostrils 2 times daily 30 mL 11     fluticasone (FLOVENT HFA) 110 MCG/ACT inhaler Inhale 2 puffs into the lungs 2 times daily 12 g 4     lactobacillus rhamnosus, GG, (CULTURELL) capsule Take 1 capsule by mouth 2 times daily       loratadine (CLARITIN) 10 MG tablet Take 10 mg by mouth daily       PROAIR  (90 Base) MCG/ACT inhaler Inhale 1-2 puffs into the lungs every 4 hours as needed for shortness of breath / dyspnea 1 Inhaler 11     rosuvastatin (CRESTOR) 10 MG tablet Take 1 tablet (10 mg) by mouth daily 90 tablet 1     sertraline (ZOLOFT) 100 MG tablet Take 1 tablet (100 mg) by mouth daily Needs to be seen for further refills 90 tablet 3     valACYclovir (VALTREX) 1000 mg tablet Take 2 tablets (2,000 mg) by mouth 2 times daily For a total of 2 doses 12 tablet 1     VITAMIN D, CHOLECALCIFEROL, PO Take 1,000 Units by mouth daily       Allergies   Allergen Reactions     Atorvastatin      Penicillins      Sulfa Drugs      Sulfamethoxazole-Trimethoprim Rash     Recent Labs   Lab Test 03/01/21  0743 09/24/20  0732 12/11/19  0000 02/03/16  1358 01/19/16  0930 06/30/15  0000   A1C  --   --   --  5.9  --  5.9   LDL 90 85  --   --  119* 153   HDL 47* 48*  --   --  37* 37   TRIG 353* 237*  --   --  379* 339   ALT  --   --   --  32  --  33   CR 0.72  --  0.80  --   --  0.73   GFRESTIMATED 89  --  >60  --   --  >60   GFRESTBLACK >90  --  >60  --   --  >60   POTASSIUM 3.9  --   --   --   --  4.3   TSH  --   --   --   --   --  1.54        Breast Cancer Screening:    FHS-7: No flowsheet data found.    Mammogram Screening: Recommended mammography every 1-2 years with patient discussion and risk factor consideration  Pertinent mammograms are reviewed under the imaging tab.    History of  abnormal Pap smear: Status post benign hysterectomy. Health Maintenance and Surgical History updated.     Reviewed and updated as needed this visit by clinical staff   Tobacco   Meds   Med Hx  Surg Hx  Fam Hx            Reviewed and updated as needed this visit by Provider   Tobacco   Meds   Med Hx  Surg Hx  Fam Hx           Past Medical History:   Diagnosis Date     Arthritis      Cancer (H)     brain tumor     COPD (chronic obstructive pulmonary disease) (H)      Depressive disorder      Hyperlipemia       Past Surgical History:   Procedure Laterality Date     APPENDECTOMY       COLONOSCOPY       GYN SURGERY      3 cesections     HYSTERECTOMY VAGINAL, BILATERAL SALPINGO-OOPHERECTOMY, COMBINED  1988    hysterectomy + ovaries      LAPAROSCOPIC ABLATION LIVER TUMOR N/A 1/15/2020    Procedure: Laparoscopic ultrasound guided percutaneous microwave abalation of tumor x1; ultrasound guided Liver Biopsy x 4; Hepatic Ultrasound Intraoperatively;  Surgeon: Phan Fierro MD;  Location: [facility] OR     LAPAROSCOPY DIAGNOSTIC (GENERAL)  1/15/2020    Procedure: Laparoscopy diagnostic (general);  Surgeon: Phan Fierro MD;  Location: [facility] OR     ZZC EXCIS INFRATENT MENINGIOMA         Review of Systems   Constitutional: Negative for chills and fever.   HENT: Positive for hearing loss. Negative for congestion, ear pain and sore throat.    Eyes: Positive for pain and visual disturbance.   Respiratory: Negative for cough and shortness of breath.    Cardiovascular: Negative for chest pain, palpitations and peripheral edema.   Gastrointestinal: Positive for diarrhea. Negative for abdominal pain, constipation, heartburn, hematochezia and nausea.   Breasts:  Negative for tenderness, breast mass and discharge.   Genitourinary: Negative for dysuria, frequency, genital sores, hematuria, pelvic pain, urgency, vaginal bleeding and vaginal discharge.   Musculoskeletal: Positive for arthralgias, joint swelling and myalgias.   Skin:  "Negative for rash.   Neurological: Positive for dizziness, weakness, headaches and paresthesias.   Psychiatric/Behavioral: Positive for mood changes. The patient is nervous/anxious.           OBJECTIVE:   BP (!) 145/79 (BP Location: Left arm, Patient Position: Sitting, Cuff Size: Adult Regular)   Pulse 66   Temp 98.9  F (37.2  C) (Tympanic)   Ht 1.575 m (5' 2\")   Wt 86.2 kg (190 lb)   SpO2 97%   BMI 34.75 kg/m    Physical Exam  GENERAL: healthy, alert and no distress  EYES: Eyes grossly normal to inspection, PERRL and conjunctivae and sclerae normal  HENT: ear canals and TM's normal, nose and mouth without ulcers or lesions  NECK: no adenopathy, no asymmetry, masses, or scars and thyroid normal to palpation  RESP: lungs clear to auscultation - no rales, rhonchi or wheezes  CV: regular rate and rhythm, normal S1 S2, no S3 or S4, no murmur, click or rub, no peripheral edema and peripheral pulses strong  ABDOMEN: soft, nontender, no hepatosplenomegaly, no masses and bowel sounds normal  MS: no gross musculoskeletal defects noted, no edema  SKIN: no suspicious lesions or rashes  NEURO: Normal strength and tone, mentation intact and speech normal  PSYCH: mentation appears normal, affect normal/bright        ASSESSMENT/PLAN:   (Z00.00) Routine general medical examination at a health care facility  (primary encounter diagnosis)  Comment: Preventive care reviewed and updated.    Plan: Lipid panel reflex to direct LDL Fasting,         Comprehensive metabolic panel (BMP + Alb, Alk         Phos, ALT, AST, Total. Bili, TP), Hemoglobin         A1c       (J45.20) Mild intermittent asthma without complication  Comment:stable , no current symptoms   Plan: albuterol (PROAIR HFA/PROVENTIL HFA/VENTOLIN         HFA) 108 (90 Base) MCG/ACT inhaler, fluticasone        (FLOVENT HFA) 110 MCG/ACT inhaler    (E78.00) Elevated cholesterol  Comment: chronic stable problem     Plan: rosuvastatin (CRESTOR) 10 MG tablet    tolerating " "Crestor, refill provided   Lipid profile today   (F33.0) Major depressive disorder, recurrent episode, mild (H)  Comment: doing well on Zoloft   Refill provided   Plan: sertraline (ZOLOFT) 100 MG tablet    (B00.1) Recurrent cold sores  No current symptoms   Plan: valACYclovir (VALTREX) 1000 mg tablet      (Z12.11) Colon cancer screening  Due in 11/2022  Plan: Adult GI  Referral - Procedure Only    (Z23) High priority for 2019-nCoV vaccine    Plan: COVID-19,PF,PFIZER (12+ Yrs GRAY LABEL)      Patient has been advised of split billing requirements and indicates understanding: Yes    COUNSELING:  Reviewed preventive health counseling, as reflected in patient instructions       Regular exercise       Healthy diet/nutrition       Colorectal Cancer Screening    Estimated body mass index is 34.75 kg/m  as calculated from the following:    Height as of this encounter: 1.575 m (5' 2\").    Weight as of this encounter: 86.2 kg (190 lb).    Weight management plan: Discussed healthy diet and exercise guidelines    She reports that she quit smoking about 19 years ago. Her smoking use included cigarettes. She has a 27.00 pack-year smoking history. She has never used smokeless tobacco.      Counseling Resources:  ATP IV Guidelines  Pooled Cohorts Equation Calculator  Breast Cancer Risk Calculator  BRCA-Related Cancer Risk Assessment: FHS-7 Tool  FRAX Risk Assessment  ICSI Preventive Guidelines  Dietary Guidelines for Americans, 2010  USDA's MyPlate  ASA Prophylaxis  Lung CA Screening    Nelson Pulliam MD  [facility]  "

## 2022-08-25 NOTE — TELEPHONE ENCOUNTER
Screening Questions    BlueKIND OF PREP RedLOCATION [review exclusion criteria] GreenSEDATION TYPE      1. Are you active on mychart? Y    2. What insurance is in the chart? BCBS     3.   Ordering/Referring Provider: Nelson Pulliam MD    4. BMI   (If greater than 40 review exclusion criteria [PAC APPT IF [MAC] @ [facility])  34.75  [If yes, BMI OVER 40-EXTENDED PREP]      **(Sedation review/consideration needed)**  Do you have a legal guardian or Medical Power of    and/or are you able to give consent for your medical care?     Y SELF     5. Have you had a positive covid test in the last 90 days?   N -     6.  Are you currently on dialysis?   N [ If yes, G-PREP & HOSPITAL setting ONLY]     7.  Do you have chronic kidney disease?  N [ If yes, G-PREP ]    8.   Do you have a diagnosis of diabetes?   N   [ If yes, G-PREP ]    9.  On a regular basis do you go 3-5 days between bowel movements?   N   [ If yes, EXTENDED PREP]    10.  Are you taking any prescription pain medications on a routine schedule?    N -  [ If yes, EXTENDED PREP] [If yes, MAC]      11.   Do you have any chemical dependencies such as alcohol, street drugs, or methadone?    N [If yes, MAC]    12.   Do you have any history of post-traumatic stress syndrome, severe anxiety or history of psychosis?    N  [If yes, MAC]    13.  [FEMALES] Are you currently pregnant? N    If yes, how many weeks?       Respiratory/Heart Screening:  [If yes to any of the following HOSPITAL setting only]     14. Do you have Pulmonary Hypertension [Lungs]?   N       15. Do you have UNCONTROLLED asthma?   N     16.  Do you use daily home oxygen?  N      17. Do you have mod to severe Obstructive Sleep Apnea?         (OKAY @ [facility]  [facility]  SH  PH  RI  MG - if pt is not on OXYGEN)  N      18.   Have you had a heart or lung transplant?   N      19.   Have you had a stroke or Transient ischemic attack (TIA - aka  mini stroke ) within 6 months?  (If yes, please review exclusion  criteria)  N     20.   In the past 6 months, have you had any heart related issues including cardiomyopathy or heart attack?   N          If yes, did it require cardiac stenting or other implantable device?   N      21.   Do you have any implantable devices in your body (pacemaker, defib, LVAD)? (If yes, please review exclusion criteria)  N   22.  Do you take the medication Phentermine?  NO        23. Do you take nitroglycerin?   N           If yes, how often?   (if yes, HOSPITAL setting ONLY)    24.  Are you currently taking any blood thinners?    [If yes, INFORM patient to follw up w/ ORDERING PROVIDER FOR BRIDGING INSTRUCTIONS]     N    25.   Do you transfer independently?                (If NO, please HOSPITAL setting ONLY)  Y    26.   Preferred LOCAL Pharmacy for Pre Prescription:         [facility] - [place], [place] - [address] [place] [address]      Scheduling Details  (Please ask for phone number if not scheduled by patient)      Caller : Junior  Date of Procedure: 11/29/22  Surgeon: Lucas  Location:         Sedation Type: MAC l   Conscious Sedation- Needs  for 6 hours after the procedure  MAC/General-Needs  for 24 hours after procedure    n :[Pre-op Required] at [facility]  SH  MG and OR for MAC sedation   (advise patient they will need a pre-op WITH IN 30 DAYS of procedure date)     Type of Procedure Scheduled:   Lower Endoscopy [Colonoscopy]    Which Colonoscopy Prep was Sent?:   nicho BRUCE CF PATIENTS & GROEN'S PATIENTS NEEDS EXTENDED PREP       Informed patient they will need an adult  y  Cannot take any type of public or medical transportation alone    Pre-Procedure Covid test to be completed at [facility] or Externally: at home test  **INFORMED OF HOME TESTING & LAB OPTION**        Confirmed Nurse will call to complete assessment y    Additional comments:

## 2022-08-25 NOTE — PATIENT INSTRUCTIONS
Set up colonoscopy  Med refilled   Labs today          Preventive Health Recommendations  Female Ages 50 - 64    Yearly exam: See your health care provider every year in order to  Review health changes.   Discuss preventive care.    Review your medicines if your doctor has prescribed any.    Get a Pap test every three years (unless you have an abnormal result and your provider advises testing more often).  If you get Pap tests with HPV test, you only need to test every 5 years, unless you have an abnormal result.   You do not need a Pap test if your uterus was removed (hysterectomy) and you have not had cancer.  You should be tested each year for STDs (sexually transmitted diseases) if you're at risk.   Have a mammogram every 1 to 2 years.  Have a colonoscopy at age 50, or have a yearly FIT test (stool test). These exams screen for colon cancer.    Have a cholesterol test every 5 years, or more often if advised.  Have a diabetes test (fasting glucose) every three years. If you are at risk for diabetes, you should have this test more often.   If you are at risk for osteoporosis (brittle bone disease), think about having a bone density scan (DEXA).    Shots: Get a flu shot each year. Get a tetanus shot every 10 years.    Nutrition:   Eat at least 5 servings of fruits and vegetables each day.  Eat whole-grain bread, whole-wheat pasta and brown rice instead of white grains and rice.  Get adequate Calcium and Vitamin D.     Lifestyle  Exercise at least 150 minutes a week (30 minutes a day, 5 days a week). This will help you control your weight and prevent disease.  Limit alcohol to one drink per day.  No smoking.   Wear sunscreen to prevent skin cancer.   See your dentist every six months for an exam and cleaning.  See your eye doctor every 1 to 2 years.

## 2022-09-06 ENCOUNTER — ANCILLARY PROCEDURE (OUTPATIENT)
Dept: CT IMAGING | Facility: CLINIC | Age: 62
End: 2022-09-06
Attending: PSYCHIATRY & NEUROLOGY
Payer: COMMERCIAL

## 2022-09-06 DIAGNOSIS — D43.3 HEMANGIOPERICYTOMA OF CNS, GRADE II (H): ICD-10-CM

## 2022-09-06 DIAGNOSIS — C80.0: ICD-10-CM

## 2022-09-06 RX ORDER — IOPAMIDOL 755 MG/ML
105 INJECTION, SOLUTION INTRAVASCULAR ONCE
Status: COMPLETED | OUTPATIENT
Start: 2022-09-06 | End: 2022-09-06

## 2022-09-06 RX ADMIN — IOPAMIDOL 105 ML: 755 INJECTION, SOLUTION INTRAVASCULAR at 08:47

## 2022-10-18 NOTE — PROGRESS NOTES
NEURO-ONCOLOGY VISIT  Oct 21, 2022    CHIEF COMPLAINT: Ms. 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 She completed GammaKnife treatment in 1/2020. She is currently managed on imaging surveillance.     I met with Priyanka today for this follow-up visit.     HISTORY OF PRESENT ILLNESS  -Priyanka is doing well.   -Any headaches can be triggered by stress.   -Trying to partake in routine exercise however, struggling with a sore knee. Had knee injections.   -Stable visional field cut.  -Denies any changes in strength.  -Thinking, memory, and concentration is good; Taking on more responsibilities at work.  -Mood and headaches are improved with dose increase of Zoloft to 200mg.   -No reported episodes concerning for a seizure.  -No abdominal pain or nausea/ vomiting. No difficulty breathing. No bony pain.   -Mild numbness of the left hand at night or with repetitive movements.       MEDICATIONS   Current Outpatient Medications   Medication Sig Dispense Refill     albuterol (PROAIR HFA/PROVENTIL HFA/VENTOLIN HFA) 108 (90 Base) MCG/ACT inhaler Inhale 2 puffs into the lungs every 6 hours 18 g 1     fluticasone (FLOVENT HFA) 110 MCG/ACT inhaler Inhale 2 puffs into the lungs 2 times daily 12 g 4     lactobacillus rhamnosus, GG, (CULTURELL) capsule Take 1 capsule by mouth 2 times daily       PROAIR  (90 Base) MCG/ACT inhaler Inhale 1-2 puffs into the lungs every 4 hours as needed for shortness of breath / dyspnea 1 Inhaler 11     rosuvastatin (CRESTOR) 10 MG tablet Take 1 tablet (10 mg) by mouth daily 90 tablet 1     sertraline (ZOLOFT) 100 MG tablet Take 2 tablets (200 mg) by mouth daily Needs to be seen for further refills       VITAMIN D, CHOLECALCIFEROL, PO Take 1,000 Units by mouth daily       azelastine (ASTELIN) 0.1 % nasal spray Spray 1 spray into both nostrils 2 times daily (Patient not taking: Reported on  10/21/2022) 30 mL 11     loratadine (CLARITIN) 10 MG tablet Take 10 mg by mouth daily (Patient not taking: Reported on 10/21/2022)       valACYclovir (VALTREX) 1000 mg tablet Take 2 tablets (2,000 mg) by mouth 2 times daily For a total of 2 doses (Patient not taking: Reported on 10/21/2022) 12 tablet 1     DRUG ALLERGIES   Allergies   Allergen Reactions     Atorvastatin      Penicillins      Sulfa Drugs      Sulfamethoxazole-Trimethoprim Rash       ONCOLOGIC HISTORY  -2015 PRESENTATION: Headaches.  -2015 MR brain imaging with a right occipital mass   -4/22/2015 SURGERY: Resection by Dr. Lujan   PATHOLOGY: Hemangiopericytoma, grade 2.   -2015 CT chest, abdomen and pelvis negative for malignancy.  -2015 PET MRI negative for malignancy.   Conservative management per Dr. Herman Mayberry.    -8/1/2017 CT chest abdomen and pelvis and bone scan negative for malignancy.   -12/7/2017 MRB with no evidence of tumor recurrence.  -12/6/2018 MRB with no evidence of tumor recurrence.    -12/11/2019 MRB with a new nodule of enhancement within the superior sagittal sinus, concerning for tumor recurrence. Stable appearance of the right parieto-occipital craniotomy site.  -12/13/2019 Bone scan with no convincing evidence of skeletal metastases.  -12/18/2019 MR liver with a 9 mm lesion in the lateral aspect of the junction of segments 7 and 8 of the liver shows homogeneous delayed enhancement and is new since 2015. This could represent a metastatic lesion.     -1/6/2020 NEURO-ONC: Referral to radiation oncology and GI oncology surgery. Signed consent for next generation sequencing.   -1/14/2020 RADS: GammaKnife to lesion (1 fraction).   -1/15/2020 LIVER LESION:  Laparoscopic ultrasound-guided microwave ablation of liver tumor by Dr. Phan Fierro.   PATHOLOGY: Cavernous hemangioma of the liver, benign:    -No metastatic hemangiopericytoma or other malignancy identified    -Underlying steatohepatitis, no significant fibrosis    -No  "follow-up needed.  -2/28/2020 NEURO-ONC/ MRB: Clinically stable. Headaches with migraine features; increasing Zoloft. Imaging with positive response to radiation.   -3/9/2020 Evaluated by Dr. Wright; Neuro-psych testing with mild weaknesses were noted in aspects of speeded visual processing, and general cognitive speed. Repeat in 3/2021.  -6/1/2020 NEURO-ONC/ MRB: Clinically well; for insomnia trial of melatonin. Imaging with positive treatment effect.   -6/1/2020 NEURO-ONC/ MRB: Clinically well. Imaging with no concerns.   -9/14/2020 NEURO-ONC/ MRB: Clinically well. Imaging of MRI brain and CT CAP with no concerns.  -12/14/2020 NEURO-ONC/ MRB: Clinically well. MR imaging of the brain with no concerns.  -3/12/2021 NEURO-ONC/ MRB: Clinically well. MRI of the brain with no concerns.  -9/10/2021 NEURO-ONC/ MRB: Clinically well. MRI of the brain and CT CAP with no concerns.  -3/7/2022 NEURO-ONC/ MRB: Clinically well. MRI of the brain with no concerns.  -10/21/2022 NEURO-ONC/ MRB: Clinically well; signs of carpal tunnel in the left wrist. MRI of the brain and CT CAP with no concerns.    SOCIAL HISTORY   Tobacco use: Former smoker; quit in 2003. 27 pack years.  . 2 daughter, 1 son.   Employment: [employer]; [employer] managers.        PHYSICAL EXAMINATION  /80   Pulse 61   Temp 98.2  F (36.8  C) (Oral)   Resp 16   Wt 87.5 kg (193 lb)   SpO2 96%   BMI 35.30 kg/m     Wt Readings from Last 2 Encounters:   10/21/22 87.5 kg (193 lb)   08/25/22 86.2 kg (190 lb)      Ht Readings from Last 2 Encounters:   08/25/22 1.575 m (5' 2\")   03/01/21 1.575 m (5' 2\")        -Generally well appearing. Good energy today.   -Respiratory: No audible wheezing.   -Skin: No facial rashes.  -Psychiatric: Normal mood and affect. Pleasant, talkative.  -Neurologic:   MENTAL STATUS:     Alert, oriented.   Recall: Intact.    Speech fluent.    Comprehension intact.     CRANIAL NERVES:     Pupils are equal, round. "    Extraocular movements full, patient denies diplopia.    Left inferior homonymous quadrantanopia.   Symmetric facial movements.   Hearing intact.   With normal phonation, no dysfunction tongue.  MOTOR: Antigravity in arms.   SENSATION: Intact throughout.    + tinnel's sign on the left.  COORDINATION: Intact.   GAIT: Steady, unassisted. Able to heel and toe walk, but mild limited by knee pain.       MEDICAL RECORDS  Obtained and personally reviewed all available outside medical records in addition to reviewing any records available in our electronic system.   -PCP note.     LABS  Personally reviewed all available lab results; HbA1c 5.6. LDL 74.     IMAGING  Personally reviewed MR brain imaging from today and compared to prior imaging. There is stable, minimal contrast enhancement at the sagittal sinus with no increase in T2 FLAIR. No new lesions.    Imaging shown to Priyanka.       CT c/a/p (9/6/2022);  1.  A few larger left axillary lymph nodes may just be reactive to an inflammatory process. Recommend further attention to these at imaging follow-up.  2.  Remainder of the scan is stable with no new areas of disease.  3.  Stable right-sided pulmonary nodule.      IMPRESSION  Clinic was spent discussing in detail the nature of her cancer in light of repeat imaging. This was in addition to answering questions pertaining to my recommendations and devising the plan as outlined below.     Grade 2 hemangiopericytomas are considered malignant tumors. Standard treatment options include surgery and radiation therapy. With no good surgical option, Priyanka completed stereotactic radiation therapy in 1/2020. Recent brain imaging was again with no new concerns. Will continue with imaging surveillance every 6 months. Per NCCN guidelines, imaging should be repeated every 3-6 months for the first 5 years (at least through 1/2025) and then, annually thereafter.      For radiation-refractory cancer, there are evidence-based options.  Pazopanib (Votrient), a multitargeted receptor tyrosinase kinase inhibitor that inhibits angiogenesis, has been used in several patients with recurrent cancer. There is also evidence for the use of temozolomide plus bevacizumab (lorena); chino Rico MS et al. Activity of temozolomide and bevacizumab in the treatment of locally advanced, recurrent, and metastatic hemangiopericytoma and malignant solitary fibrous tumor. Cancer. 2011 Nov 1;117(21):4939-47. doi: 10.1002/cncr.08252. Epub 2011 Apr 8. PMID: 09890351. AND mikel Marquez ML et al. Efficacy of bevacizumab and temozolomide therapy in locally advanced, recurrent, and metastatic malignant solitary fibrous tumour: A population-based analysis. J Oncol Pharm Pract. 2019 Sep;25(6):6880-7968. doi: 10.1177/9570142193542409. Epub 2018 Jun 27. PMID: 50582971. Of note, Priyanka has consented to next generation sequencing. At disease recurrence, can consider sending pathology for mutational analysis to potentially identify targeted theray options.     Extracranial metastatic disease can occur; most commonly in bone, lung, and liver. Prior imaging of the MR liver showed a lesion. Priyanka was referred to Dr. Phan Fierro with surgical oncology. Dr. Fierro performed a  laparoscopic ultrasound-guided microwave ablation of a liver lesion on 1/15/2020. Pathology was consistent with a benign cavernous hemangioma with no metastatic hemangiopericytoma or other malignancy identified. She is currently monitored on annual systemic surveillance imaging with a CT C/A/P with contrast done every Fall. Her recent imaging from this month was without any new concerns. The scan did show a few larger left axillary lymph nodes. Priyanka is due for her annual mammography and she is scheduled with routine screening colonoscopy in November.  I am in agreement with her having this routine screening.    Clinically, Priyanka is doing well. Her mood and energy are good. She increased Zoloft to 200mg daily and  her mood is better and her frequency of headaches is reduced. In terms of cognition and memory, Priyanka feels that things are stable and she is able to take on more responsibilities at work. However, she is noting worsening hand numbness at night and with repetitive movements. I reviewed the note from her primary care provider, Dr. Pulliam, in which I did not see recommendations for work-up. On examination, + tinnel's sign on the left. I recommended bracing the left wrist overnight.     PROBLEM LIST  Hemangiopericytoma, grade II  Liver lesion  Depressed mood  Sleep issues  Headache    PLAN  -CANCER-DIRECTED THERAPY-  -As above; Continue imaging surveillance. Repeat MR brain imaging in 6 months.     -LIVER LESION-  -Treated. No lesion-specific follow-up needed.   -CT c/a/p done annual for systemic surveillance imaging; next due in 10/2023.     -STEROIDS-  -Currently off dexamethasone.    -SEIZURE MANAGEMENT-  -While this patient is at increased risk of having seizures, given the lack of seizure history, there is no indication to prescribe an antiepileptic at this time.     -CARPAL TUNNEL-  -Left wrist > right.   -Recommended bracing overnight.     -Quality of life/ MOOD/ FATIGUE-  -Continue to monitor mood as untreated/ undertreated depression can worsen fatigue, dysorexia, and quality of life.  -Continue Zoloft 200mg daily (max dose).   -Continue with routine exercise.     -MEMORY LOSS/ WORD FINDING ISSUES-  -Evaluated by Dr. Phan Wright in 3/2020 and 4/2021. Neuro-psych testing with mild weaknesses noted in aspects of speeded visual processing and general cognitive speed. Stable findings when comparing prior tests.  -Repeat testing if clinically indicated.     Return to clinic in 4/2023 to review repeat imaging.     In the meantime, Priyanka knows to call with questions or concerns or to report new complaints and can be seen sooner if needed.    Lauren Zhang MD  Neuro-oncology

## 2022-10-21 ENCOUNTER — ONCOLOGY VISIT (OUTPATIENT)
Dept: ONCOLOGY | Facility: CLINIC | Age: 62
End: 2022-10-21
Attending: PSYCHIATRY & NEUROLOGY
Payer: COMMERCIAL

## 2022-10-21 ENCOUNTER — ANCILLARY PROCEDURE (OUTPATIENT)
Dept: MRI IMAGING | Facility: CLINIC | Age: 62
End: 2022-10-21
Attending: PSYCHIATRY & NEUROLOGY
Payer: COMMERCIAL

## 2022-10-21 VITALS
TEMPERATURE: 98.2 F | RESPIRATION RATE: 16 BRPM | SYSTOLIC BLOOD PRESSURE: 132 MMHG | HEART RATE: 61 BPM | DIASTOLIC BLOOD PRESSURE: 80 MMHG | BODY MASS INDEX: 35.3 KG/M2 | OXYGEN SATURATION: 96 % | WEIGHT: 193 LBS

## 2022-10-21 DIAGNOSIS — F33.0 MAJOR DEPRESSIVE DISORDER, RECURRENT EPISODE, MILD (H): ICD-10-CM

## 2022-10-21 DIAGNOSIS — D43.3 HEMANGIOPERICYTOMA OF CNS, GRADE II (H): ICD-10-CM

## 2022-10-21 DIAGNOSIS — D43.3 HEMANGIOPERICYTOMA OF CNS, GRADE II (H): Primary | ICD-10-CM

## 2022-10-21 PROCEDURE — 99214 OFFICE O/P EST MOD 30 MIN: CPT | Performed by: PSYCHIATRY & NEUROLOGY

## 2022-10-21 PROCEDURE — 70553 MRI BRAIN STEM W/O & W/DYE: CPT | Performed by: RADIOLOGY

## 2022-10-21 PROCEDURE — A9585 GADOBUTROL INJECTION: HCPCS | Performed by: RADIOLOGY

## 2022-10-21 PROCEDURE — G0463 HOSPITAL OUTPT CLINIC VISIT: HCPCS

## 2022-10-21 RX ORDER — SERTRALINE HYDROCHLORIDE 100 MG/1
100 TABLET, FILM COATED ORAL DAILY
COMMUNITY
Start: 2022-10-21 | End: 2023-09-19

## 2022-10-21 RX ORDER — GADOBUTROL 604.72 MG/ML
10 INJECTION INTRAVENOUS ONCE
Status: COMPLETED | OUTPATIENT
Start: 2022-10-21 | End: 2022-10-21

## 2022-10-21 RX ADMIN — GADOBUTROL 9 ML: 604.72 INJECTION INTRAVENOUS at 12:49

## 2022-10-21 ASSESSMENT — PAIN SCALES - GENERAL: PAINLEVEL: NO PAIN (0)

## 2022-10-21 NOTE — NURSING NOTE
"Oncology Rooming Note    October 21, 2022 1:22 PM   Leidy Ascencio is a 62 year old female who presents for:    Chief Complaint   Patient presents with     Oncology Clinic Visit     Hemangiopericytoma of CNS, grade II     Initial Vitals: /80   Pulse 61   Temp 98.2  F (36.8  C) (Oral)   Resp 16   Wt 87.5 kg (193 lb)   SpO2 96%   BMI 35.30 kg/m   Estimated body mass index is 35.3 kg/m  as calculated from the following:    Height as of 8/25/22: 1.575 m (5' 2\").    Weight as of this encounter: 87.5 kg (193 lb). Body surface area is 1.96 meters squared.  No Pain (0) Comment: Data Unavailable   No LMP recorded. Patient has had a hysterectomy.  Allergies reviewed: Yes  Medications reviewed: Yes    Medications: Medication refills not needed today.  Pharmacy name entered into [facility]:    [facility] - [place], [place] - [address] [place] [address] [facility] [place], [place] - [address] [facility] [phone]    Clinical concerns:        Priscilla Christian CMA              "

## 2022-10-21 NOTE — LETTER
10/21/2022         RE: Leidy Ascencio  [address] [place] [address]        Dear Colleague,    Thank you for referring your patient, Leidy Ascencio, to the [facility]. Please see a copy of my visit note below.    NEURO-ONCOLOGY VISIT  Oct 21, 2022    CHIEF COMPLAINT: Ms. 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 She completed GammaKnife treatment in 1/2020. She is currently managed on imaging surveillance.     I met with Priyanka today for this follow-up visit.     HISTORY OF PRESENT ILLNESS  -Priyanka is doing well.   -Any headaches can be triggered by stress.   -Trying to partake in routine exercise however, struggling with a sore knee. Had knee injections.   -Stable visional field cut.  -Denies any changes in strength.  -Thinking, memory, and concentration is good; Taking on more responsibilities at work.  -Mood and headaches are improved with dose increase of Zoloft to 200mg.   -No reported episodes concerning for a seizure.  -No abdominal pain or nausea/ vomiting. No difficulty breathing. No bony pain.   -Mild numbness of the left hand at night or with repetitive movements.       MEDICATIONS   Current Outpatient Medications   Medication Sig Dispense Refill     albuterol (PROAIR HFA/PROVENTIL HFA/VENTOLIN HFA) 108 (90 Base) MCG/ACT inhaler Inhale 2 puffs into the lungs every 6 hours 18 g 1     fluticasone (FLOVENT HFA) 110 MCG/ACT inhaler Inhale 2 puffs into the lungs 2 times daily 12 g 4     lactobacillus rhamnosus, GG, (CULTURELL) capsule Take 1 capsule by mouth 2 times daily       PROAIR  (90 Base) MCG/ACT inhaler Inhale 1-2 puffs into the lungs every 4 hours as needed for shortness of breath / dyspnea 1 Inhaler 11     rosuvastatin (CRESTOR) 10 MG tablet Take 1 tablet (10 mg) by mouth daily 90 tablet 1     sertraline (ZOLOFT) 100 MG tablet Take 2 tablets  (200 mg) by mouth daily Needs to be seen for further refills       VITAMIN D, CHOLECALCIFEROL, PO Take 1,000 Units by mouth daily       azelastine (ASTELIN) 0.1 % nasal spray Spray 1 spray into both nostrils 2 times daily (Patient not taking: Reported on 10/21/2022) 30 mL 11     loratadine (CLARITIN) 10 MG tablet Take 10 mg by mouth daily (Patient not taking: Reported on 10/21/2022)       valACYclovir (VALTREX) 1000 mg tablet Take 2 tablets (2,000 mg) by mouth 2 times daily For a total of 2 doses (Patient not taking: Reported on 10/21/2022) 12 tablet 1     DRUG ALLERGIES   Allergies   Allergen Reactions     Atorvastatin      Penicillins      Sulfa Drugs      Sulfamethoxazole-Trimethoprim Rash       ONCOLOGIC HISTORY  -2015 PRESENTATION: Headaches.  -2015 MR brain imaging with a right occipital mass   -4/22/2015 SURGERY: Resection by Dr. Lujan   PATHOLOGY: Hemangiopericytoma, grade 2.   -2015 CT chest, abdomen and pelvis negative for malignancy.  -2015 PET MRI negative for malignancy.   Conservative management per Dr. Herman Mayberry.    -8/1/2017 CT chest abdomen and pelvis and bone scan negative for malignancy.   -12/7/2017 MRB with no evidence of tumor recurrence.  -12/6/2018 MRB with no evidence of tumor recurrence.    -12/11/2019 MRB with a new nodule of enhancement within the superior sagittal sinus, concerning for tumor recurrence. Stable appearance of the right parieto-occipital craniotomy site.  -12/13/2019 Bone scan with no convincing evidence of skeletal metastases.  -12/18/2019 MR liver with a 9 mm lesion in the lateral aspect of the junction of segments 7 and 8 of the liver shows homogeneous delayed enhancement and is new since 2015. This could represent a metastatic lesion.     -1/6/2020 NEURO-ONC: Referral to radiation oncology and GI oncology surgery. Signed consent for next generation sequencing.   -1/14/2020 RADS: GammaKnife to lesion (1 fraction).   -1/15/2020 LIVER LESION:  Laparoscopic  "ultrasound-guided microwave ablation of liver tumor by Dr. Phan Fierro.   PATHOLOGY: Cavernous hemangioma of the liver, benign:    -No metastatic hemangiopericytoma or other malignancy identified    -Underlying steatohepatitis, no significant fibrosis    -No follow-up needed.  -2/28/2020 NEURO-ONC/ MRB: Clinically stable. Headaches with migraine features; increasing Zoloft. Imaging with positive response to radiation.   -3/9/2020 Evaluated by Dr. Wright; Neuro-psych testing with mild weaknesses were noted in aspects of speeded visual processing, and general cognitive speed. Repeat in 3/2021.  -6/1/2020 NEURO-ONC/ MRB: Clinically well; for insomnia trial of melatonin. Imaging with positive treatment effect.   -6/1/2020 NEURO-ONC/ MRB: Clinically well. Imaging with no concerns.   -9/14/2020 NEURO-ONC/ MRB: Clinically well. Imaging of MRI brain and CT CAP with no concerns.  -12/14/2020 NEURO-ONC/ MRB: Clinically well. MR imaging of the brain with no concerns.  -3/12/2021 NEURO-ONC/ MRB: Clinically well. MRI of the brain with no concerns.  -9/10/2021 NEURO-ONC/ MRB: Clinically well. MRI of the brain and CT CAP with no concerns.  -3/7/2022 NEURO-ONC/ MRB: Clinically well. MRI of the brain with no concerns.  -10/21/2022 NEURO-ONC/ MRB: Clinically well; signs of carpal tunnel in the left wrist. MRI of the brain and CT CAP with no concerns.    SOCIAL HISTORY   Tobacco use: Former smoker; quit in 2003. 27 pack years.  . 2 daughter, 1 son.   Employment: [employer]; [employer].        PHYSICAL EXAMINATION  /80   Pulse 61   Temp 98.2  F (36.8  C) (Oral)   Resp 16   Wt 87.5 kg (193 lb)   SpO2 96%   BMI 35.30 kg/m     Wt Readings from Last 2 Encounters:   10/21/22 87.5 kg (193 lb)   08/25/22 86.2 kg (190 lb)      Ht Readings from Last 2 Encounters:   08/25/22 1.575 m (5' 2\")   03/01/21 1.575 m (5' 2\")        -Generally well appearing. Good energy today.   -Respiratory: No audible " wheezing.   -Skin: No facial rashes.  -Psychiatric: Normal mood and affect. Pleasant, talkative.  -Neurologic:   MENTAL STATUS:     Alert, oriented.   Recall: Intact.    Speech fluent.    Comprehension intact.     CRANIAL NERVES:     Pupils are equal, round.    Extraocular movements full, patient denies diplopia.    Left inferior homonymous quadrantanopia.   Symmetric facial movements.   Hearing intact.   With normal phonation, no dysfunction tongue.  MOTOR: Antigravity in arms.   SENSATION: Intact throughout.    + tinnel's sign on the left.  COORDINATION: Intact.   GAIT: Steady, unassisted. Able to heel and toe walk, but mild limited by knee pain.       MEDICAL RECORDS  Obtained and personally reviewed all available outside medical records in addition to reviewing any records available in our electronic system.   -PCP note.     LABS  Personally reviewed all available lab results; HbA1c 5.6. LDL 74.     IMAGING  Personally reviewed MR brain imaging from today and compared to prior imaging. There is stable, minimal contrast enhancement at the sagittal sinus with no increase in T2 FLAIR. No new lesions.    Imaging shown to Priyanka.     CT c/a/p (9/6/2022);  1.  A few larger left axillary lymph nodes may just be reactive to an inflammatory process. Recommend further attention to these at imaging follow-up.  2.  Remainder of the scan is stable with no new areas of disease.  3.  Stable right-sided pulmonary nodule.      IMPRESSION  Clinic was spent discussing in detail the nature of her cancer in light of repeat imaging. This was in addition to answering questions pertaining to my recommendations and devising the plan as outlined below.     Grade 2 hemangiopericytomas are considered malignant tumors. Standard treatment options include surgery and radiation therapy. With no good surgical option, Priyanka completed stereotactic radiation therapy in 1/2020. Recent brain imaging was again with no new concerns. Will continue  with imaging surveillance every 6 months. Per NCCN guidelines, imaging should be repeated every 3-6 months for the first 5 years (at least through 1/2025) and then, annually thereafter.      For radiation-refractory cancer, there are evidence-based options. Pazopanib (Votrient), a multitargeted receptor tyrosinase kinase inhibitor that inhibits angiogenesis, has been used in several patients with recurrent cancer. There is also evidence for the use of temozolomide plus bevacizumab (lorena); chino Rico MS, et al. Activity of temozolomide and bevacizumab in the treatment of locally advanced, recurrent, and metastatic hemangiopericytoma and malignant solitary fibrous tumor. Cancer. 2011 Nov 1;117(21):4939-47. doi: 10.1002/cncr.95531. Epub 2011 Apr 8. PMID: 77845733. AND mikel Marquez ML, et al. Efficacy of bevacizumab and temozolomide therapy in locally advanced, recurrent, and metastatic malignant solitary fibrous tumour: A population-based analysis. J Oncol Pharm Pract. 2019 Sep;25(6):4487-0227. doi: 10.1177/0781594123591055. Epub 2018 Jun 27. PMID: 66509293. Of note, Priyanka has consented to next generation sequencing. At disease recurrence, can consider sending pathology for mutational analysis to potentially identify targeted theray options.     Extracranial metastatic disease can occur; most commonly in bone, lung, and liver. Prior imaging of the MR liver showed a lesion. Priyanka was referred to Dr. Phan Fierro with surgical oncology. Dr. Fierro performed a  laparoscopic ultrasound-guided microwave ablation of a liver lesion on 1/15/2020. Pathology was consistent with a benign cavernous hemangioma with no metastatic hemangiopericytoma or other malignancy identified. She is currently monitored on annual systemic surveillance imaging with a CT C/A/P with contrast done every Fall. Her recent imaging from this month was without any new concerns. The scan did show a few larger left axillary lymph nodes. Priyanka is due for  her annual mammography and she is scheduled with routine screening colonoscopy in November.  I am in agreement with her having this routine screening.    Clinically, Priyanka is doing well. Her mood and energy are good. She increased Zoloft to 200mg daily and her mood is better and her frequency of headaches is reduced. In terms of cognition and memory, Priyanka feels that things are stable and she is able to take on more responsibilities at work. However, she is noting worsening hand numbness at night and with repetitive movements. I reviewed the note from her primary care provider, Dr. Pulliam, in which I did not see recommendations for work-up. On examination, + tinnel's sign on the left. I recommended bracing the left wrist overnight.     PROBLEM LIST  Hemangiopericytoma, grade II  Liver lesion  Depressed mood  Sleep issues  Headache    PLAN  -CANCER-DIRECTED THERAPY-  -As above; Continue imaging surveillance. Repeat MR brain imaging in 6 months.     -LIVER LESION-  -Treated. No lesion-specific follow-up needed.   -CT c/a/p done annual for systemic surveillance imaging; next due in 10/2023.     -STEROIDS-  -Currently off dexamethasone.    -SEIZURE MANAGEMENT-  -While this patient is at increased risk of having seizures, given the lack of seizure history, there is no indication to prescribe an antiepileptic at this time.     -CARPAL TUNNEL-  -Left wrist > right.   -Recommended bracing overnight.     -Quality of life/ MOOD/ FATIGUE-  -Continue to monitor mood as untreated/ undertreated depression can worsen fatigue, dysorexia, and quality of life.  -Continue Zoloft 200mg daily (max dose).   -Continue with routine exercise.     -MEMORY LOSS/ WORD FINDING ISSUES-  -Evaluated by Dr. Phan Wirght in 3/2020 and 4/2021. Neuro-psych testing with mild weaknesses noted in aspects of speeded visual processing and general cognitive speed. Stable findings when comparing prior tests.  -Repeat testing if clinically indicated.      Return to clinic in 4/2023 to review repeat imaging.     In the meantime, Priyanka knows to call with questions or concerns or to report new complaints and can be seen sooner if needed.    Lauren Zhang MD  Neuro-oncology

## 2022-11-01 DIAGNOSIS — J30.89 SEASONAL ALLERGIC RHINITIS DUE TO OTHER ALLERGIC TRIGGER: ICD-10-CM

## 2022-11-02 RX ORDER — AZELASTINE 1 MG/ML
1 SPRAY, METERED NASAL 2 TIMES DAILY
Qty: 30 ML | Refills: 8 | Status: SHIPPED | OUTPATIENT
Start: 2022-11-02

## 2022-11-16 ENCOUNTER — VIRTUAL VISIT (OUTPATIENT)
Dept: FAMILY MEDICINE | Facility: CLINIC | Age: 62
End: 2022-11-16
Payer: COMMERCIAL

## 2022-11-16 DIAGNOSIS — D43.3 HEMANGIOPERICYTOMA OF CNS, GRADE II (H): ICD-10-CM

## 2022-11-16 DIAGNOSIS — C80.0: ICD-10-CM

## 2022-11-16 DIAGNOSIS — U07.1 INFECTION DUE TO 2019 NOVEL CORONAVIRUS: Primary | ICD-10-CM

## 2022-11-16 PROCEDURE — 99213 OFFICE O/P EST LOW 20 MIN: CPT | Mod: CS | Performed by: FAMILY MEDICINE

## 2022-11-16 ASSESSMENT — PATIENT HEALTH QUESTIONNAIRE - PHQ9
10. IF YOU CHECKED OFF ANY PROBLEMS, HOW DIFFICULT HAVE THESE PROBLEMS MADE IT FOR YOU TO DO YOUR WORK, TAKE CARE OF THINGS AT HOME, OR GET ALONG WITH OTHER PEOPLE: SOMEWHAT DIFFICULT
SUM OF ALL RESPONSES TO PHQ QUESTIONS 1-9: 3
SUM OF ALL RESPONSES TO PHQ QUESTIONS 1-9: 3

## 2022-11-16 NOTE — PROGRESS NOTES
Priyanka is a 62 year old who is being evaluated via a billable video visit.      How would you like to obtain your AVS? MyChart  If the video visit is dropped, the invitation should be resent by: Text to cell phone: [phone]  Will anyone else be joining your video visit? No    Assessment & Plan       ICD-10-CM    1. Infection due to 2019 novel coronavirus  U07.1 nirmatrelvir and ritonavir (PAXLOVID) therapy pack      2. Disseminated malignancy (H)  C80.0       3. Hemangiopericytoma of CNS, grade II  D48.1         Clinical presentation and labs are consistent with a COVID-19 infection.  Outpatient medication options were discussed with the patient.  We mutually agreed to proceed with Plaxlovid.  Patient was advised to hold statin for 5 days.  Patient's renal function is normal.      Return in about 1 week (around 11/23/2022) for Follow-up visit-  if symptoms failed to improve.    Iker Aviles MD  [facility]   Priyanka is a 62 year old , presenting for the following health issues:  Covid Concern      HPI       COVID-19 Symptom Review  How many days ago did these symptoms start? yesterday    Are any of the following symptoms significant for you?    New or worsening difficulty breathing? No    Worsening cough? Yes, it's a dry cough.     Fever or chills? Yes, I felt feverish or had chills.    Headache: YES    Sore throat: YES    Chest pain: YES    Diarrhea: YES    Body aches? YES    What treatments has patient tried? ibuprofen   Does patient live in a nursing home, group home, or shelter? No  Does patient have a way to get food/medications during quarantined? Yes, I have a friend or family member who can help me.  Answers for HPI/ROS submitted by the patient on 11/16/2022  If you checked off any problems, how difficult have these problems made it for you to do your work, take care of things at home, or get along with other people?: Somewhat difficult  PHQ9 TOTAL SCORE:  3      Vacation with a few friends in a cruise.   Patient left on 11/02/22-11/14/22.  Patient started feeling ill on 11/13/22. Itchy ears and mild sore throat. Had headache and eye pain yesterday. Started having a fever today.     Review of Systems   Constitutional, HEENT, cardiovascular, pulmonary, gi and gu systems are negative, except as otherwise noted.      Objective           Vitals:  No vitals were obtained today due to virtual visit.    Physical Exam   GENERAL: Healthy, alert and no distress  EYES: Eyes grossly normal to inspection.  No discharge or erythema, or obvious scleral/conjunctival abnormalities.  RESP: No audible wheeze, cough, or visible cyanosis.  No visible retractions or increased work of breathing.    SKIN: Visible skin clear. No significant rash, abnormal pigmentation or lesions.  NEURO: Cranial nerves grossly intact.  Mentation and speech appropriate for age.  PSYCH: Mentation appears normal, affect normal/bright, judgement and insight intact, normal speech and appearance well-groomed.    No results found for any visits on 11/16/22.          Video-Visit Details    Video Start Time: 2:11 PM    Type of service:  Video Visit    Video End Time: 02:27 PM    Originating Location (pt. Location): Home  Distant Location (provider location):  On-site    Platform used for Video Visit: Froilan

## 2022-11-16 NOTE — PROGRESS NOTES
Answers for HPI/ROS submitted by the patient on 11/16/2022  If you checked off any problems, how difficult have these problems made it for you to do your work, take care of things at home, or get along with other people?: Somewhat difficult  PHQ9 TOTAL SCORE: 3      Vacation with a few friends in a cruise.   Patient left on 11/02/22-11/14/22.  Patient started feeling ill on 11/13/22. Itchy ears and mild sore throat. Had headache and eye pain yesterday. Started having a fever today.

## 2022-11-17 NOTE — PATIENT INSTRUCTIONS
FACT SHEET FOR PATIENTS, PARENTS, AND CAREGIVERS  EMERGENCY USE AUTHORIZATION (EUA) OF PAXLOVID  FOR CORONAVIRUS DISEASE 2019 (COVID-19)  You are being given this Fact Sheet because your healthcare provider believes it is necessary to provide you with PAXLOVID for the treatment of mild-to-moderate coronavirus disease (COVID-19) caused by the SARS-CoV-2 virus. This Fact Sheet contains information to help you understand the risks and benefits of taking the PAXLOVID you have received or may receive.    The U.S. Food and Drug Administration (FDA) has issued an Emergency Use Authorization (EUA) to make PAXLOVID available during the COVID-19 pandemic (for more details about an EUA please see  What is an Emergency Use Authorization?     at the end of this document). PAXLOVID is not an FDA-approved medicine in the [place]. Read this Fact Sheet for information about PAXLOVID. Talk to your healthcare provider about your options or if you have any questions. It is your choice to take PAXLOVID.    What is COVID-19?  COVID-19 is caused by a virus called a coronavirus. You can get COVID-19 through close contact with another person who has the virus. COVID-19 illnesses have ranged from very mild-to-severe, including illness resulting in death. While information so far suggests that most COVID-19 illness is mild, serious  illness can happen and may cause some of your other medical conditions to become worse. Older people and people of all ages with severe, long lasting (chronic) medical conditions like heart disease, lung disease, and diabetes, for example seem to be at higher risk of being hospitalized for COVID-19.    What is PAXLOVID?  PAXLOVID is an investigational medicine used to treat mild-to-moderate COVID-19 in adults and children [12 years of age and older weighing at least 88 pounds (40 kg)] with positive results of direct SARS-CoV-2 viral testing, and who are at high risk for progression to severe COVID-19,  including hospitalization or death. PAXLOVID is investigational because it is still being studied. There is limited information about the  safety and effectiveness of using PAXLOVID to treat people with mild-to-moderate COVID-19.    The FDA has authorized the emergency use of PAXLOVID for the treatment of mild-tomoderate COVID-19 in adults and children [12 years of age and older weighing at least 88 pounds (40 kg)] with a positive test for the virus that causes COVID-19, and who are at high risk for progression to severe COVID-19, including hospitalization or death, under an EUA.    What should I tell my healthcare provider before I take PAXLOVID?  Tell your healthcare provider if you:  ? Have any allergies  ? Have liver or kidney disease  ? Are pregnant or plan to become pregnant  ? Are breastfeeding a child  ? Have any serious illnesses    Tell your healthcare provider about all the medicines you take, including prescription and over-the-counter medicines, vitamins, and herbal supplements. Some medicines may interact with PAXLOVID and may cause serious side effects. Keep a list of your medicines to show your healthcare provider and pharmacist when you get a new medicine.    You can ask your healthcare provider or pharmacist for a list of medicines that interact with PAXLOVID. Do not start taking a new medicine without telling your healthcare provider. Your healthcare provider can tell you if it is safe to take PAXLOVID with other medicines.    Tell your healthcare provider if you are taking combined hormonal contraceptive. PAXLOVID may affect how your birth control pills work. Females who are able to become pregnant should use another effective alternative form of contraception or an additional barrier method of contraception. Talk to your healthcare provider if you have any questions about contraceptive methods that might be right for you.    How do I take PAXLOVID?  ? PAXLOVID consists of 2 medicines:  nirmatrelvir and ritonavir.   o Take 2 pink tablets of nirmatrelvir with 1 white tablet of ritonavir by mouth  2 times each day (in the morning and in the evening) for 5 days. For each  dose, take all 3 tablets at the same time.  o If you have kidney disease, talk to your healthcare provider. You  may need a different dose.  ? Swallow the tablets whole. Do not chew, break, or crush the tablets.  ? Take PAXLOVID with or without food.  ? Do not stop taking PAXLOVID without talking to your healthcare provider, even if  you feel better.    ? If you miss a dose of PAXLOVID within 8 hours of the time it is usually taken,  take it as soon as you remember. If you miss a dose by more than 8 hours, skip  the missed dose and take the next dose at your regular time. Do not take  2 doses of PAXLOVID at the same time.  ? If you take too much PAXLOVID, call your healthcare provider or go to the  nearest hospital emergency room right away.  ? If you are taking a ritonavir- or cobicistat-containing medicine to treat hepatitis C  or Human Immunodeficiency Virus (HIV), you should continue to take your  medicine as prescribed by your healthcare provider.        Talk to your healthcare provider if you do not feel better or if you feel worse after  5 days.  Who should generally not take PAXLOVID?  Do not take PAXLOVID if:  ? You are allergic to nirmatrelvir, ritonavir, or any of the ingredients in PAXLOVID.  ? You are taking any of the following medicines:  o Alfuzosin  o Pethidine, propoxyphene  o Ranolazine  o Amiodarone, dronedarone, flecainide, propafenone, quinidine  o Colchicine  o Lurasidone, pimozide, clozapine  o Dihydroergotamine, ergotamine, methylergonovine  o Lovastatin, simvastatin  o Sildenafil (Revatio ) for pulmonary arterial hypertension (PAH)  o Triazolam, oral midazolam  o Apalutamide  o Carbamazepine, phenobarbital, phenytoin  o Rifampin  o [place] s Wort (hypericum perforatum)  Taking PAXLOVID with these medicines  may cause serious or life-threatening side  effects or affect how PAXLOVID works.  These are not the only medicines that may cause serious side effects if taken with  PAXLOVID. PAXLOVID may increase or decrease the levels of multiple other  medicines. It is very important to tell your healthcare provider about all of the medicines  you are taking because additional laboratory tests or changes in the dose of your other  medicines may be necessary while you are taking PAXLOVID. Your healthcare provider  may also tell you about specific symptoms to watch out for that may indicate that you  need to stop or decrease the dose of some of your other medicines.    What are the important possible side effects of PAXLOVID?  Possible side effects of PAXLOVID are:  ? Allergic Reactions. Allergic reactions can happen in people taking  PAXLOVID, even after only 1 dose. Stop taking PAXLOVID and call your  healthcare provider right away if you get any of the following symptoms of an  allergic reaction:  o hives  o trouble swallowing or breathing  o swelling of the mouth, lips, or face  o throat tightness  o hoarseness   o skin rash  ? Liver Problems. Tell your healthcare provider right away if you have any of  these signs and symptoms of liver problems: loss of appetite, yellowing of your  skin and the whites of eyes (jaundice), dark-colored urine, pale colored stools  and itchy skin, stomach area (abdominal) pain.  ? Resistance to HIV Medicines. If you have untreated HIV infection, PAXLOVID  may lead to some HIV medicines not working as well in the future.  ? Other possible side effects include:  o altered sense of taste  o diarrhea  o high blood pressure  o muscle aches  These are not all the possible side effects of PAXLOVID. Not many people have taken  PAXLOVID. Serious and unexpected side effects may happen. PAXLOVID is still being  studied, so it is possible that all of the risks are not known at this time.    What other  treatment choices are there?  Veklury (remdesivir) is FDA-approved for the treatment of mild-to-moderate COVID-19  in certain adults and children. Talk with your doctor to see if Veklury is appropriate for  you.  Like PAXLOVID, FDA may also allow for the emergency use of other medicines to treat  people with COVID-19. Go to https://www.fda.gov/emergency-preparedness-andresponse/mcm-legal-regulatory-and-policy-framework/emergency-use-authorization for information on the emergency use of other medicines that are authorized by FDA to treat people with COVID-19. Your healthcare provider may talk with you about clinical  trials for which you may be eligible.  It is your choice to be treated or not to be treated with PAXLOVID. Should you decide  not to receive it or for your child not to receive it, it will not change your standard  medical care.  What if I am pregnant or breastfeeding?  There is no experience treating pregnant women or breastfeeding mothers with  PAXLOVID. For a mother and unborn baby, the benefit of taking PAXLOVID may be  greater than the risk from the treatment. If you are pregnant, discuss your options and  specific situation with your healthcare provider.  It is recommended that you use effective barrier contraception or do not have sexual  activity while taking PAXLOVID. If you are breastfeeding, discuss your options and specific situation with your healthcare provider.     How do I report side effects with PAXLOVID?  Contact your healthcare provider if you have any side effects that bother you or do not  go away.  Report side effects to FDA MedWatch at www.fda.gov/medwatch or call 1-623-XUJ6917 or you can report side effects to [employer] at the contact information provided  below.  Website Fax number Telephone number  [employer] 1-309.581.8688 [phone]    How should I store PAXLOVID?  Store PAXLOVID tablets at room temperature, between 68?F to 77?F (20?C to 25?C).  How  can I learn more about COVID-19?  ? Ask your healthcare provider.  ? Visit https://www.cdc.gov/COVID19.  ? Contact your local or state public health department.    What is an Emergency Use Authorization (EUA)?  The [place] FDA has made PAXLOVID available under an emergency access  mechanism called an Emergency Use Authorization (EUA). The EUA is supported by a   of Health and Human Service ([facility]) declaration that circumstances exist to  justify the emergency use of drugs and biological products during the COVID-19  pandemic.    PAXLOVID for the treatment of mild-to-moderate COVID-19 in adults and children  [12 years of age and older weighing at least 88 pounds (40 kg)] with positive results of  direct SARS-CoV-2 viral testing, and who are at high risk for progression to severe  COVID-19, including hospitalization or death, has not undergone the same type of  review as an FDA-approved product. In issuing an EUA under the COVID-19 public  health emergency, the FDA has determined, among other things, that based on the  total amount of scientific evidence available including data from adequate and  well-controlled clinical trials, if available, it is reasonable to believe that the product may  be effective for diagnosing, treating, or preventing COVID-19, or a serious or  life-threatening disease or condition caused by COVID-19; that the known and potential  benefits of the product, when used to diagnose, treat, or prevent such disease or  condition, outweigh the known and potential risks of such product; and that there are no  adequate, approved, and available alternatives.    All of these criteria must be met to allow for the product to be used in the treatment of  patients during the COVID-19 pandemic. The EUA for PAXLOVID is in effect for the  duration of the COVID-19 declaration justifying emergency use of this product, unless  terminated or revoked (after which the products may no longer be used  under the  EUA).        Additional Information  For general questions, visit the website or call the telephone number provided below.  Website Telephone number  www.[employer]  1-402.540.7316  (3-377-D44-PBEF)  You can also go to [email or web address] or call [phone] for more information.

## 2022-11-21 ENCOUNTER — TELEPHONE (OUTPATIENT)
Dept: FAMILY MEDICINE | Facility: CLINIC | Age: 62
End: 2022-11-21

## 2022-11-21 NOTE — TELEPHONE ENCOUNTER
FS,   FYI:  Patient calling about Paxlovid side effects     When taking medication tongue was sore  Also started to develop bumps on stomach that were itchy   Stopped med yesterday after AM dose d/t side effects    Spots are still present but not getting any new ones and not itchy anymore   Breathing normally  Able to swallow normally   No tongue/mouth itching/numbness    States covid symptom are improving so no further treatment needed for that at this time    Advised monitoring s/s  If changes let us know or be seen   Debbi VAZQUEZ RN

## 2022-11-28 NOTE — H&P
[facility] Anesthesia Pre-op History and Physical    Leidy Ascencio MRN# [id number]   Age: 62 year old YOB: 1960      Date of Surgery: 11/29/2022 Location [facility]      Date of Exam 11/29/2022 Facility (Same day)       Home clinic: [facility]  Primary care provider: [facility]         Chief Complaint and/or Reason for Procedure:   No chief complaint on file.  Colonoscopy 2012 and 2016 but no report.    Treated for COVID recently symptoms 11/15.  Video visit 11/16and treated, but pt had side effects from Paxlovid and stopped Rx 11/20.   No lab test found.         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           Recurrent cold sores 01/19/2016     Priority: Medium     Other insomnia 01/19/2016     Priority: Medium     Plantar fasciitis 01/19/2016     Priority: Medium     Vitamin D deficiency 01/19/2016     Priority: Medium     Atopic rhinitis 04/25/2015     Priority: Medium     Major depressive disorder, recurrent episode, mild (H) 04/13/2015     Priority: Medium     Overview:   Sertraline since 2010              Medications (include herbals and vitamins):   Any Plavix use in the last 7 days? No     Current Facility-Administered Medications   Medication     triamcinolone (KENALOG-40) injection 40 mg     Current Outpatient Medications   Medication Sig     albuterol (PROAIR HFA/PROVENTIL HFA/VENTOLIN HFA) 108 (90 Base) MCG/ACT  inhaler Inhale 2 puffs into the lungs every 6 hours     azelastine (ASTELIN) 0.1 % nasal spray Spray 1 spray into both nostrils 2 times daily     bisacodyl (DULCOLAX) 5 MG EC tablet Take 2 tablets at 3 pm the day before your procedure. If your procedure is before 11 am, take 2 additional tablets at 11 pm. If your procedure is after 11 am, take 2 additional tablets at 6 am. For additional instructions refer to your colonoscopy prep instructions.     bisacodyl (DULCOLAX) 5 MG EC tablet Take 2 tablets at 3 pm the day before your procedure. If your procedure is before 11 am, take 2 additional tablets at 11 pm. If your procedure is after 11 am, take 2 additional tablets at 6 am. For additional instructions refer to your colonoscopy prep instructions.     fluticasone (FLOVENT HFA) 110 MCG/ACT inhaler Inhale 2 puffs into the lungs 2 times daily     lactobacillus rhamnosus, GG, (CULTURELL) capsule Take 1 capsule by mouth 2 times daily     loratadine (CLARITIN) 10 MG tablet Take 10 mg by mouth daily (Patient not taking: Reported on 10/21/2022)     polyethylene glycol (GOLYTELY) 236 g suspension The night before the exam at 6 pm drink an 8-ounce glass every 15 minutes until the jug is half empty. If you arrive before 11 AM: Drink the other half of the Golytely jug at 11 PM night before procedure. If you arrive after 11 AM: Drink the other half of the Golytely jug at 6 AM day of procedure. For additional instructions refer to your colonoscopy prep instructions.     polyethylene glycol (GOLYTELY) 236 g suspension The night before the exam at 6 pm drink an 8-ounce glass every 15 minutes until the jug is half empty. If you arrive before 11 AM: Drink the other half of the Golytely jug at 11 PM night before procedure. If you arrive after 11 AM: Drink the other half of the Golytely jug at 6 AM day of procedure. For additional instructions refer to your colonoscopy prep instructions.     PROAIR  (90 Base) MCG/ACT inhaler  Inhale 1-2 puffs into the lungs every 4 hours as needed for shortness of breath / dyspnea     rosuvastatin (CRESTOR) 10 MG tablet Take 1 tablet (10 mg) by mouth daily     sertraline (ZOLOFT) 100 MG tablet Take 2 tablets (200 mg) by mouth daily Needs to be seen for further refills     valACYclovir (VALTREX) 1000 mg tablet Take 2 tablets (2,000 mg) by mouth 2 times daily For a total of 2 doses (Patient not taking: Reported on 10/21/2022)     VITAMIN D, CHOLECALCIFEROL, PO Take 1,000 Units by mouth daily             Allergies:      Allergies   Allergen Reactions     Atorvastatin      Penicillins      Sulfa Drugs      Sulfamethoxazole-Trimethoprim Rash     Allergy to Latex? No  Allergy to tape?   No  Intolerances:             Physical Exam:   All vitals have been reviewed  No data found.  No intake/output data recorded.  Lungs:   No increased work of breathing, good air exchange, clear to auscultation bilaterally, no crackles or wheezing     Cardiovascular:   Normal apical impulse, regular rate and rhythm, normal S1 and S2, no S3 or S4, and no murmur noted             Lab / Radiology Results:            Anesthetic risk and/or ASA classification:       Rick Zavala MD

## 2022-11-29 ENCOUNTER — ANESTHESIA EVENT (OUTPATIENT)
Dept: GASTROENTEROLOGY | Facility: CLINIC | Age: 62
End: 2022-11-29
Payer: COMMERCIAL

## 2022-11-29 ENCOUNTER — HOSPITAL ENCOUNTER (OUTPATIENT)
Facility: CLINIC | Age: 62
Discharge: HOME | End: 2022-11-29
Attending: INTERNAL MEDICINE | Admitting: INTERNAL MEDICINE
Payer: COMMERCIAL

## 2022-11-29 ENCOUNTER — ANESTHESIA (OUTPATIENT)
Dept: GASTROENTEROLOGY | Facility: CLINIC | Age: 62
End: 2022-11-29
Payer: COMMERCIAL

## 2022-11-29 VITALS — DIASTOLIC BLOOD PRESSURE: 77 MMHG | OXYGEN SATURATION: 96 % | HEART RATE: 63 BPM | SYSTOLIC BLOOD PRESSURE: 108 MMHG

## 2022-11-29 LAB — COLONOSCOPY: NORMAL

## 2022-11-29 PROCEDURE — 250N000011 HC RX IP 250 OP 636: Performed by: NURSE ANESTHETIST, CERTIFIED REGISTERED

## 2022-11-29 PROCEDURE — 370N000017 HC ANESTHESIA TECHNICAL FEE, PER MIN: Performed by: INTERNAL MEDICINE

## 2022-11-29 PROCEDURE — 45380 COLONOSCOPY AND BIOPSY: CPT | Mod: PT | Performed by: INTERNAL MEDICINE

## 2022-11-29 PROCEDURE — 250N000009 HC RX 250: Performed by: NURSE ANESTHETIST, CERTIFIED REGISTERED

## 2022-11-29 PROCEDURE — 258N000003 HC RX IP 258 OP 636: Performed by: NURSE ANESTHETIST, CERTIFIED REGISTERED

## 2022-11-29 PROCEDURE — 88305 TISSUE EXAM BY PATHOLOGIST: CPT | Mod: 26 | Performed by: PATHOLOGY

## 2022-11-29 PROCEDURE — 88305 TISSUE EXAM BY PATHOLOGIST: CPT | Mod: TC | Performed by: INTERNAL MEDICINE

## 2022-11-29 RX ORDER — LIDOCAINE HYDROCHLORIDE 20 MG/ML
INJECTION, SOLUTION INFILTRATION; PERINEURAL PRN
Status: DISCONTINUED | OUTPATIENT
Start: 2022-11-29 | End: 2022-11-29

## 2022-11-29 RX ORDER — PROPOFOL 10 MG/ML
INJECTION, EMULSION INTRAVENOUS CONTINUOUS PRN
Status: DISCONTINUED | OUTPATIENT
Start: 2022-11-29 | End: 2022-11-29

## 2022-11-29 RX ORDER — SODIUM CHLORIDE, SODIUM LACTATE, POTASSIUM CHLORIDE, CALCIUM CHLORIDE 600; 310; 30; 20 MG/100ML; MG/100ML; MG/100ML; MG/100ML
INJECTION, SOLUTION INTRAVENOUS CONTINUOUS
Status: DISCONTINUED | OUTPATIENT
Start: 2022-11-29 | End: 2022-11-29 | Stop reason: HOSPADM

## 2022-11-29 RX ORDER — PROPOFOL 10 MG/ML
INJECTION, EMULSION INTRAVENOUS PRN
Status: DISCONTINUED | OUTPATIENT
Start: 2022-11-29 | End: 2022-11-29

## 2022-11-29 RX ADMIN — PROPOFOL 200 MCG/KG/MIN: 10 INJECTION, EMULSION INTRAVENOUS at 07:21

## 2022-11-29 RX ADMIN — PROPOFOL 50 MG: 10 INJECTION, EMULSION INTRAVENOUS at 07:20

## 2022-11-29 RX ADMIN — SODIUM CHLORIDE, POTASSIUM CHLORIDE, SODIUM LACTATE AND CALCIUM CHLORIDE: 600; 310; 30; 20 INJECTION, SOLUTION INTRAVENOUS at 07:15

## 2022-11-29 RX ADMIN — PROPOFOL 50 MG: 10 INJECTION, EMULSION INTRAVENOUS at 07:21

## 2022-11-29 RX ADMIN — LIDOCAINE HYDROCHLORIDE 50 MG: 20 INJECTION, SOLUTION INFILTRATION; PERINEURAL at 07:20

## 2022-11-29 ASSESSMENT — LIFESTYLE VARIABLES: TOBACCO_USE: 1

## 2022-11-29 ASSESSMENT — COPD QUESTIONNAIRES: COPD: 1

## 2022-11-29 ASSESSMENT — ACTIVITIES OF DAILY LIVING (ADL): ADLS_ACUITY_SCORE: 35

## 2022-11-29 NOTE — ANESTHESIA POSTPROCEDURE EVALUATION
Patient: Leidy Ascencio    Procedure: Procedure(s):  COLONOSCOPY, BIOPSY and polypectomy       Anesthesia Type:  MAC    Note:  Disposition: Outpatient   Postop Pain Control: Uneventful            Sign Out: Well controlled pain   PONV: No   Neuro/Psych: Uneventful            Sign Out: Acceptable/Baseline neuro status   Airway/Respiratory: Uneventful            Sign Out: Acceptable/Baseline resp. status   CV/Hemodynamics: Uneventful            Sign Out: Acceptable CV status   Other NRE: NONE   DID A NON-ROUTINE EVENT OCCUR? No    Event details/Postop Comments:  Pt was happy with anesthesia care.  No complications.  I will follow up with the pt if needed.           Last vitals:  Vitals Value Taken Time   /69 11/29/22 0750   Temp     Pulse 69 11/29/22 0750   Resp     SpO2 96 % 11/29/22 0754   Vitals shown include unvalidated device data.    Electronically Signed By: CRISTA Bridges CRNA  November 29, 2022  7:55 AM

## 2022-11-29 NOTE — ANESTHESIA PREPROCEDURE EVALUATION
Anesthesia Pre-Procedure Evaluation    Patient: Leidy Ascencio   MRN: [id number] : 1960        Procedure : Procedure(s):  COLONOSCOPY          Past Medical History:   Diagnosis Date     Arthritis      Cancer (H)     brain tumor     COPD (chronic obstructive pulmonary disease) (H)      Depressive disorder      Hyperlipemia       Past Surgical History:   Procedure Laterality Date     APPENDECTOMY       COLONOSCOPY       GYN SURGERY      3 cesections     HYSTERECTOMY VAGINAL, BILATERAL SALPINGO-OOPHERECTOMY, COMBINED  1988    hysterectomy + ovaries      LAPAROSCOPIC ABLATION LIVER TUMOR N/A 1/15/2020    Procedure: Laparoscopic ultrasound guided percutaneous microwave abalation of tumor x1; ultrasound guided Liver Biopsy x 4; Hepatic Ultrasound Intraoperatively;  Surgeon: Phan Fierro MD;  Location: [facility] OR     LAPAROSCOPY DIAGNOSTIC (GENERAL)  1/15/2020    Procedure: Laparoscopy diagnostic (general);  Surgeon: Phan Fierro MD;  Location: [facility] OR     ZZC EXCIS INFRATENT MENINGIOMA        Allergies   Allergen Reactions     Atorvastatin      Penicillins      Sulfa Drugs      Sulfamethoxazole-Trimethoprim Rash      Social History     Tobacco Use     Smoking status: Former     Packs/day: 1.00     Years: 27.00     Pack years: 27.00     Types: Cigarettes     Quit date: 2003     Years since quittin.8     Smokeless tobacco: Never   Substance Use Topics     Alcohol use: Yes      Wt Readings from Last 1 Encounters:   10/21/22 87.5 kg (193 lb)        Anesthesia Evaluation   Pt has had prior anesthetic. Type: MAC and General.    No history of anesthetic complications       ROS/MED HX  ENT/Pulmonary:  - neg pulmonary ROS   (+) tobacco use, Past use, COPD,     Neurologic:  - neg neurologic ROS     Cardiovascular:     (+) Dyslipidemia -----    METS/Exercise Tolerance: >4 METS    Hematologic:  - neg hematologic  ROS     Musculoskeletal:   (+) arthritis,     GI/Hepatic:  - neg GI/hepatic ROS      Renal/Genitourinary:  - neg Renal ROS     Endo:  - neg endo ROS     Psychiatric/Substance Use:     (+) psychiatric history depression     Infectious Disease:  - neg infectious disease ROS     Malignancy: Comment: right occipital hemangiopericytoma (grade 2), 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 She completed GammaKnife treatment in 1/2020. She is currently managed on imaging surveillance.      (+) Malignancy, History of Neuro.    Other:  - neg other ROS          Physical Exam    Airway  airway exam normal      Mallampati: II   TM distance: > 3 FB   Neck ROM: full   Mouth opening: > 3 cm    Respiratory Devices and Support         Dental  no notable dental history         Cardiovascular   cardiovascular exam normal       Rhythm and rate: regular and normal     Pulmonary   pulmonary exam normal        breath sounds clear to auscultation           OUTSIDE LABS:  CBC: No results found for: WBC, HGB, HCT, PLT  BMP:   Lab Results   Component Value Date     08/25/2022     03/01/2021    POTASSIUM 3.9 08/25/2022    POTASSIUM 3.9 03/01/2021    CHLORIDE 106 08/25/2022    CHLORIDE 106 03/01/2021    CO2 29 08/25/2022    CO2 29 03/01/2021    BUN 19 08/25/2022    BUN 18 03/01/2021    CR 0.63 08/25/2022    CR 0.72 03/01/2021     (H) 08/25/2022     (H) 03/01/2021     COAGS: No results found for: PTT, INR, FIBR  POC:   Lab Results   Component Value Date     (H) 01/15/2020     HEPATIC:   Lab Results   Component Value Date    ALBUMIN 4.1 08/25/2022    PROTTOTAL 7.4 08/25/2022    ALT 28 08/25/2022    AST 22 08/25/2022    ALKPHOS 66 08/25/2022    BILITOTAL 0.5 08/25/2022     OTHER:   Lab Results   Component Value Date    A1C 5.6 08/25/2022    TANA 9.2 08/25/2022    TSH 1.54 06/30/2015       Anesthesia Plan    ASA Status:  3   NPO Status:  NPO Appropriate    Anesthesia Type: MAC.     - Reason for MAC: Deep or markedly invasive procedure (G8)   Induction: Intravenous.   Maintenance: TIVA.         Consents    Anesthesia Plan(s) and associated risks, benefits, and realistic alternatives discussed. Questions answered and patient/representative(s) expressed understanding.     - Discussed: Risks, Benefits and Alternatives for BOTH SEDATION and the PROCEDURE were discussed     - Discussed with:  Patient      - Extended Intubation/Ventilatory Support Discussed: No.      - Patient is DNR/DNI Status: No    Use of blood products discussed: No .     Postoperative Care    Pain management: Multi-modal analgesia.   PONV prophylaxis: Background Propofol Infusion     Comments:    Other Comments: The risks and benefits of anesthesia, and the alternatives where applicable, have been discussed with the patient, and they wish to proceed.            Teresa Gutierrez, APRN CRNA

## 2022-11-29 NOTE — DISCHARGE INSTRUCTIONS
[facility]    Home Care Following Endoscopy          Activity:  You have just undergone an endoscopic procedure usually performed with conscious sedation.  Do not work or operate machinery (including a car) for at least 12 hours.    I encourage you to walk and attempt to pass this air as soon as possible.    Diet:  Return to the diet you were on before your procedure but eat lightly for the first 12-24 hours.  Drink plenty of water.  Resume any regular medications unless otherwise advised by your physician.  Please begin any new medication prescribed as a result of your procedure as directed by your physician.   If you had any biopsy or polyp removed please refrain from aspirin or aspirin products for 2 days.  If on Coumadin please restart as instructed by your physician.   Pain:  You may take Tylenol as needed for pain.  Expected Recovery:  You can expect some mild abdominal fullness and/or discomfort due to the air used to inflate your intestinal tract. It is also normal to have a mild sore throat after upper endoscopy.    Call Your Physician if You Have:  After Colonoscopy:  Worsening persisting abdominal pain which is worse with activity.  Fevers (>101 degrees F), chills or shakes.  Passage of continued blood with bowel movements.   Any questions or concerns about your recovery, please call [phone] or after hours [phone] Nurse Advice Line.    Follow-up Care:  You did have polyps/biopsy tissue sample(s) removed.  The polyps/biopsy tissue sample(s) will be sent to pathology.  You should receive letter in your My Chart from Dr Zavala with your results within 1-2 weeks. If you do not participate in My Chart a physical letter will come in the mail in 2-3 weeks.  Please call if you have not received a notification of your results.  If asked to return to clinic please make an appointment 1 week after your procedure.  Call [phone].    
none

## 2022-11-29 NOTE — LETTER
November 30, 2022      Priyanka Ascencio  [address] [place] [address]        Dear ,    We are writing to inform you of your test results.    The polyp is non-concerning a repeat exam in 10 yrs is suggested.  The colon tissue is normal.  The IBS condition may still be treated with Imodium on a regular basis.    Resulted Orders   Surgical Pathology Exam   Result Value Ref Range    Case Report       Surgical Pathology Report                         Case: [id number]                                  Authorizing Provider:  Rick Zavala MD        Collected:           11/29/2022 07:30 AM          Ordering Location:     [facility]          Received:            11/29/2022 09:04 AM                                 [facility]                                                          Pathologist:           Karis Rocha MD PhD                                                      Specimens:   A) - Large Intestine, Colon, random colon biopsy                                                    B) - Rectum, Rectal Polyp                                                                  Final Diagnosis       A(1). Colon, random locations, biopsies:  - Colonic mucosa with no specific histopathologic abnormalities.  - No features of an acute, chronic or microscopic colitis identified.  - Negative for dysplasia or malignancy.      B(2). Colon, Rectum, polyp, polypectomy:  -Hyperplastic polyp  -Negative for dysplasia or malignancy.          Clinical Information       Procedure:  COLONOSCOPY, BIOPSY and polypectomy  Pre-op Diagnosis: Colon cancer screening [Z12.11]  Post-op Diagnosis: Z12.11 - Colon cancer screening [ICD-10-CM]      Gross Description       A(1). Large Intestine, Colon, random colon biopsy:  The specimen is received in formalin, labeled with the patient's name, medical record number and other identifying information and designated  random colon biopsy . It consists of 4 tan soft tissue  fragments ranging from 0.2-0.3 cm in greatest dimension. Entirely submitted in one cassette.    B(2). Rectum, Rectal Polyp:  The specimen is received in formalin, labeled with the patient's name, medical record number and other identifying information and designated  rectal polyp . It consists of two tan soft tissue fragments, 0.2 and 0.4 cm. Entirely submitted in one cassette.  (Sarah Richardson, Biopsy Tech)      Microscopic Description       Microscopic examination was performed.      Performing Labs       The technical component of this testing was completed at [facility]      Case Images         If you have any questions or concerns, please call the clinic at the number listed above.       Sincerely,      Rick Zavala MD

## 2022-11-29 NOTE — ANESTHESIA CARE TRANSFER NOTE
Patient: Leidy Ascencio    Procedure: Procedure(s):  COLONOSCOPY, BIOPSY and polypectomy       Diagnosis: Colon cancer screening [Z12.11]  Diagnosis Additional Information: No value filed.    Anesthesia Type:   MAC     Note:    Oropharynx: oropharynx clear of all foreign objects and spontaneously breathing  Level of Consciousness: drowsy  Oxygen Supplementation: face mask    Independent Airway: airway patency satisfactory and stable  Dentition: dentition unchanged  Vital Signs Stable: post-procedure vital signs reviewed and stable  Report to RN Given: handoff report given  Patient transferred to: Phase II    Handoff Report: Identifed the Patient, Identified the Reponsible Provider, Reviewed the pertinent medical history, Discussed the surgical course, Reviewed Intra-OP anesthesia mangement and issues during anesthesia, Set expectations for post-procedure period and Allowed opportunity for questions and acknowledgement of understanding      Vitals:  Vitals Value Taken Time   /69 11/29/22 0750   Temp     Pulse 69 11/29/22 0750   Resp     SpO2 96 % 11/29/22 0754   Vitals shown include unvalidated device data.    Electronically Signed By: CRISTA Bridges CRNA  November 29, 2022  7:55 AM

## 2022-11-30 LAB
PATH REPORT.COMMENTS IMP SPEC: NORMAL
PATH REPORT.COMMENTS IMP SPEC: NORMAL
PATH REPORT.FINAL DX SPEC: NORMAL
PATH REPORT.GROSS SPEC: NORMAL
PATH REPORT.MICROSCOPIC SPEC OTHER STN: NORMAL
PATH REPORT.RELEVANT HX SPEC: NORMAL
PHOTO IMAGE: NORMAL

## 2022-12-02 ENCOUNTER — OFFICE VISIT (OUTPATIENT)
Dept: URGENT CARE | Facility: URGENT CARE | Age: 62
End: 2022-12-02
Payer: COMMERCIAL

## 2022-12-02 VITALS
SYSTOLIC BLOOD PRESSURE: 157 MMHG | WEIGHT: 189 LBS | BODY MASS INDEX: 34.57 KG/M2 | OXYGEN SATURATION: 98 % | RESPIRATION RATE: 16 BRPM | TEMPERATURE: 97.6 F | HEART RATE: 69 BPM | DIASTOLIC BLOOD PRESSURE: 81 MMHG

## 2022-12-02 DIAGNOSIS — J01.00 ACUTE NON-RECURRENT MAXILLARY SINUSITIS: Primary | ICD-10-CM

## 2022-12-02 PROCEDURE — 99213 OFFICE O/P EST LOW 20 MIN: CPT | Performed by: PHYSICIAN ASSISTANT

## 2022-12-02 RX ORDER — BENZONATATE 200 MG/1
200 CAPSULE ORAL 3 TIMES DAILY PRN
Qty: 30 CAPSULE | Refills: 0 | Status: SHIPPED | OUTPATIENT
Start: 2022-12-02 | End: 2023-04-17

## 2022-12-02 RX ORDER — DOXYCYCLINE HYCLATE 100 MG
100 TABLET ORAL 2 TIMES DAILY
Qty: 14 TABLET | Refills: 0 | Status: SHIPPED | OUTPATIENT
Start: 2022-12-02 | End: 2022-12-02

## 2022-12-02 RX ORDER — AZITHROMYCIN 250 MG/1
TABLET, FILM COATED ORAL
Qty: 6 TABLET | Refills: 0 | Status: SHIPPED | OUTPATIENT
Start: 2022-12-02 | End: 2022-12-07

## 2022-12-02 RX ORDER — PREDNISONE 20 MG/1
40 TABLET ORAL DAILY
Qty: 10 TABLET | Refills: 0 | Status: SHIPPED | OUTPATIENT
Start: 2022-12-02 | End: 2022-12-07

## 2022-12-02 ASSESSMENT — ENCOUNTER SYMPTOMS
COUGH: 1
CHEST TIGHTNESS: 1
SORE THROAT: 1
SINUS PRESSURE: 1

## 2022-12-02 NOTE — PROGRESS NOTES
"SUBJECTIVE:   Leidy Ascencio is a 62 year old female presenting with a chief complaint of   Chief Complaint   Patient presents with     Nasal Congestion     Sx - had Covid a couple of weeks ago, and the sinus and cough kept coming  Pt suggests it is bronchitis and a sinus infection.     Cough     Sx post covid     Pharyngitis     Sx post covid sinus drainage.       She is an established patient of [facility]. Presents with cough, sore throat, and congestion since last Monday. Pt dx with Covid a few weeks ago, since then has been feeling a \"sinus infection coming on.\" Endorses pleuritic CP, cough, sore throat, ear congestion, and headaches. Cough productive of sputum, no blood. No fevers. States she is taking her inhaler more than normal - up to 3x/day. Pt has tried Dayquil, Tylenol, Claritin, and nasal spray which have not improved symptoms.       Review of Systems   HENT: Positive for congestion, sinus pressure and sore throat.    Respiratory: Positive for cough and chest tightness.    All other systems reviewed and are negative.      Past Medical History:   Diagnosis Date     Arthritis      Cancer (H)     brain tumor     COPD (chronic obstructive pulmonary disease) (H)      Depressive disorder      Hyperlipemia      Family History   Problem Relation Age of Onset     Coronary Artery Disease Mother      Hypertension Mother      Hyperlipidemia Mother      Arthritis Father      Cerebrovascular Disease Father      Alzheimer Disease Maternal Grandmother      Other Cancer Maternal Grandmother         lung     Diabetes Maternal Grandfather      Other Cancer Paternal Grandfather         meloma     Anxiety Disorder Daughter      Substance Abuse Son      Current Outpatient Medications   Medication Sig Dispense Refill     albuterol (PROAIR HFA/PROVENTIL HFA/VENTOLIN HFA) 108 (90 Base) MCG/ACT inhaler Inhale 2 puffs into the lungs every 6 hours 18 g 1     azelastine (ASTELIN) 0.1 % nasal spray Spray 1 spray into both nostrils 2 " times daily 30 mL 8     azithromycin (ZITHROMAX) 250 MG tablet Take 2 tablets (500 mg) by mouth daily for 1 day, THEN 1 tablet (250 mg) daily for 4 days. 6 tablet 0     benzonatate (TESSALON) 200 MG capsule Take 1 capsule (200 mg) by mouth 3 times daily as needed for cough 30 capsule 0     bisacodyl (DULCOLAX) 5 MG EC tablet Take 2 tablets at 3 pm the day before your procedure. If your procedure is before 11 am, take 2 additional tablets at 11 pm. If your procedure is after 11 am, take 2 additional tablets at 6 am. For additional instructions refer to your colonoscopy prep instructions. 4 tablet 0     bisacodyl (DULCOLAX) 5 MG EC tablet Take 2 tablets at 3 pm the day before your procedure. If your procedure is before 11 am, take 2 additional tablets at 11 pm. If your procedure is after 11 am, take 2 additional tablets at 6 am. For additional instructions refer to your colonoscopy prep instructions. 4 tablet 0     fluticasone (FLOVENT HFA) 110 MCG/ACT inhaler Inhale 2 puffs into the lungs 2 times daily 12 g 4     lactobacillus rhamnosus, GG, (CULTURELL) capsule Take 1 capsule by mouth 2 times daily       loratadine (CLARITIN) 10 MG tablet Take 10 mg by mouth daily       predniSONE (DELTASONE) 20 MG tablet Take 2 tablets (40 mg) by mouth daily for 5 days 10 tablet 0     PROAIR  (90 Base) MCG/ACT inhaler Inhale 1-2 puffs into the lungs every 4 hours as needed for shortness of breath / dyspnea 1 Inhaler 11     rosuvastatin (CRESTOR) 10 MG tablet Take 1 tablet (10 mg) by mouth daily 90 tablet 1     sertraline (ZOLOFT) 100 MG tablet Take 2 tablets (200 mg) by mouth daily Needs to be seen for further refills       valACYclovir (VALTREX) 1000 mg tablet Take 2 tablets (2,000 mg) by mouth 2 times daily For a total of 2 doses 12 tablet 1     VITAMIN D, CHOLECALCIFEROL, PO Take 1,000 Units by mouth daily       polyethylene glycol (GOLYTELY) 236 g suspension The night before the exam at 6 pm drink an 8-ounce glass every  15 minutes until the jug is half empty. If you arrive before 11 AM: Drink the other half of the Golytely jug at 11 PM night before procedure. If you arrive after 11 AM: Drink the other half of the Golytely jug at 6 AM day of procedure. For additional instructions refer to your colonoscopy prep instructions. 4000 mL 0     polyethylene glycol (GOLYTELY) 236 g suspension The night before the exam at 6 pm drink an 8-ounce glass every 15 minutes until the jug is half empty. If you arrive before 11 AM: Drink the other half of the Golytely jug at 11 PM night before procedure. If you arrive after 11 AM: Drink the other half of the Golytely jug at 6 AM day of procedure. For additional instructions refer to your colonoscopy prep instructions. 4000 mL 0     Social History     Tobacco Use     Smoking status: Former     Packs/day: 1.00     Years: 27.00     Pack years: 27.00     Types: Cigarettes     Quit date: 2003     Years since quittin.8     Smokeless tobacco: Never   Substance Use Topics     Alcohol use: Yes       OBJECTIVE  BP (!) 157/81   Pulse 69   Temp 97.6  F (36.4  C) (Tympanic)   Resp 16   Wt 85.7 kg (189 lb)   SpO2 98%   BMI 34.57 kg/m      Physical Exam  Vitals and nursing note reviewed.   Constitutional:       Appearance: Normal appearance. She is obese.   HENT:      Head: Normocephalic.      Right Ear: Tympanic membrane, ear canal and external ear normal.      Left Ear: Tympanic membrane, ear canal and external ear normal.      Nose: Congestion and rhinorrhea present.      Mouth/Throat:      Mouth: Mucous membranes are moist.      Pharynx: Oropharynx is clear.   Eyes:      Extraocular Movements: Extraocular movements intact.      Conjunctiva/sclera: Conjunctivae normal.      Pupils: Pupils are equal, round, and reactive to light.   Cardiovascular:      Rate and Rhythm: Normal rate and regular rhythm.      Heart sounds: Normal heart sounds.   Pulmonary:      Breath sounds: Normal breath sounds. No  wheezing or rhonchi.      Comments: Course breath sounds  Musculoskeletal:         General: Normal range of motion.      Cervical back: Normal range of motion.   Skin:     General: Skin is warm and dry.   Neurological:      General: No focal deficit present.      Mental Status: She is alert.   Psychiatric:         Mood and Affect: Mood normal.         Behavior: Behavior normal.         Labs:  No results found for this or any previous visit (from the past 24 hour(s)).    X-Ray was not done.    ASSESSMENT:      ICD-10-CM    1. Acute non-recurrent maxillary sinusitis  J01.00 predniSONE (DELTASONE) 20 MG tablet     azithromycin (ZITHROMAX) 250 MG tablet     benzonatate (TESSALON) 200 MG capsule     DISCONTINUED: doxycycline hyclate (VIBRA-TABS) 100 MG tablet           Medical Decision Making:    Differential Diagnosis:  URI Adult/Peds:  Sinusitis, Viral upper respiratory illness and COPD exacerbation    Serious Comorbid Conditions:  Adult:  reviewed    PLAN:    URI Adult:  Tylenol, Ibuprofen, Fluids, Rest, RX sinusitis  Azithromycin , prednisone. Rx cough Tessalon. Discussed expected course of illness and red flag symptoms for which to seek immediate follow up.    Followup:    If not improving or if condition worsens, follow up with your Primary Care Provider    There are no Patient Instructions on file for this visit.

## 2023-01-08 ENCOUNTER — HEALTH MAINTENANCE LETTER (OUTPATIENT)
Age: 63
End: 2023-01-08

## 2023-02-15 ENCOUNTER — NURSE TRIAGE (OUTPATIENT)
Dept: FAMILY MEDICINE | Facility: CLINIC | Age: 63
End: 2023-02-15
Payer: COMMERCIAL

## 2023-02-15 NOTE — TELEPHONE ENCOUNTER
Pt coming in today for chest pain and tightness in chest. Scheduled through mychart - can this please be triaged and make sure this is appropriate for office visit.

## 2023-02-15 NOTE — TELEPHONE ENCOUNTER
Patient reports that she has been having pain between her breasts. The pain and pressure go down and under the breasts and around to the back. This area feels heavy. She has had it for about 1.5 weeks. The pain wraps around both sides but the right is worse. There is right sided neck pain. The chest/abd pain is currently rated 5/10.  No injury. Hx of brain cancer about 6 years ago and then returning 1-2 years ago. At that time she had radiation. She reports heaviness in her chest more than once while triaging her. The pain comes and goes if she lays down it calms down. When she moves it comes and goes. Last week she was really tired.        Denies: illness, nausea, vomiting,     Nursing advice: Due to the type of symptoms the patient is having, her age and her history she is to hang up and call 911.  Patient verbalized good understanding, agrees with plan and needs no further support.  Thank you. Humera Miller R.N.      Reason for Disposition    Chest pain lasting longer than 5 minutes and ANY of the following:* Over 44 years old* Over 30 years old and at least one cardiac risk factor (e.g., diabetes mellitus, high blood pressure, high cholesterol, smoker, or strong family history of heart disease)* History of heart disease (i.e., angina, heart attack, heart failure, bypass surgery, takes nitroglycerin)* Pain is crushing, pressure-like, or heavy    Additional Information    Negative: SEVERE difficulty breathing (e.g., struggling for each breath, speaks in single words)    Negative: Passed out (i.e., fainted, collapsed and was not responding)    Negative: Difficult to awaken or acting confused (e.g., disoriented, slurred speech)    Negative: Shock suspected (e.g., cold/pale/clammy skin, too weak to stand, low BP, rapid pulse)    Protocols used: CHEST PAIN-A-OH

## 2023-03-15 ENCOUNTER — OFFICE VISIT (OUTPATIENT)
Dept: URGENT CARE | Facility: URGENT CARE | Age: 63
End: 2023-03-15
Payer: COMMERCIAL

## 2023-03-15 ENCOUNTER — E-VISIT (OUTPATIENT)
Dept: FAMILY MEDICINE | Facility: CLINIC | Age: 63
End: 2023-03-15
Payer: COMMERCIAL

## 2023-03-15 VITALS
TEMPERATURE: 97.4 F | DIASTOLIC BLOOD PRESSURE: 79 MMHG | WEIGHT: 191.4 LBS | SYSTOLIC BLOOD PRESSURE: 137 MMHG | OXYGEN SATURATION: 94 % | BODY MASS INDEX: 35.01 KG/M2 | HEART RATE: 72 BPM

## 2023-03-15 DIAGNOSIS — J44.1 COPD EXACERBATION (H): Primary | ICD-10-CM

## 2023-03-15 DIAGNOSIS — R06.02 SHORTNESS OF BREATH: Primary | ICD-10-CM

## 2023-03-15 PROCEDURE — 99207 PR NON-BILLABLE SERV PER CHARTING: CPT | Performed by: FAMILY MEDICINE

## 2023-03-15 PROCEDURE — 99214 OFFICE O/P EST MOD 30 MIN: CPT | Performed by: PHYSICIAN ASSISTANT

## 2023-03-15 RX ORDER — PREDNISONE 20 MG/1
40 TABLET ORAL DAILY
Qty: 10 TABLET | Refills: 0 | Status: SHIPPED | OUTPATIENT
Start: 2023-03-15 | End: 2023-03-20

## 2023-03-15 RX ORDER — DOXYCYCLINE 100 MG/1
100 CAPSULE ORAL 2 TIMES DAILY
Qty: 20 CAPSULE | Refills: 0 | Status: SHIPPED | OUTPATIENT
Start: 2023-03-15 | End: 2023-03-22

## 2023-03-15 NOTE — PATIENT INSTRUCTIONS
Dear Leidy Ascencio,    We are sorry you are not feeling well. Based on the responses you provided, it is recommended that you be seen in-person in urgent care so we can better evaluate your symptoms. Please click here to find the nearest urgent care location to you.   You will not be charged for this Visit. Thank you for trusting us with your care.    Nelson Pulliam MD

## 2023-03-15 NOTE — PATIENT INSTRUCTIONS
There were no signs of pneumonia.    Take antibiotic as prescribed. Take this medication with food to avoid stomach upset.   Tylenol as needed for fever or pain.    Take Albuterol to help with the cough/wheezing.    Take Prednisone in the morning and with food.     May take Tessalon Perles as needed for cough.  May also try to use Mucinex or Robitussin.    Please monitor symptoms carefully.  If developing chest pain, shortness of breath, fever, coughing up blood, extreme fatigue, or any other new, concerning symptoms, come back to clinic or go to ER immediately.  Otherwise, if no improvement in symptoms in one week, follow-up with your primary care provider.

## 2023-03-15 NOTE — PROGRESS NOTES
Patient presents with:  Pharyngitis: A little soreness from the drainage of sinuses and coughing. Congestion - chest and nasal.   Cough: Started Thursday. Pt gets chronic bronchitis a lot. Chest pain from coughing.       Clinical Decision Making:  Suspect COPD exacerbation with increased cough productivity.  Patient started on prednisone and doxycycline.  I confirm that she has enough Flovent and albuterol to continue taking these medications.  Refill for benzonatate was also offered, but patient states she does not need it.  COVID test offered, but patient declines because she already did an at-home COVID test this morning, which was negative.      ICD-10-CM    1. COPD exacerbation (H)  J44.1 doxycycline hyclate (VIBRAMYCIN) 100 MG capsule     predniSONE (DELTASONE) 20 MG tablet          Patient Instructions   There were no signs of pneumonia.    Take antibiotic as prescribed. Take this medication with food to avoid stomach upset.   Tylenol as needed for fever or pain.    Take Albuterol to help with the cough/wheezing.    Take Prednisone in the morning and with food.     May take Tessalon Perles as needed for cough.  May also try to use Mucinex or Robitussin.    Please monitor symptoms carefully.  If developing chest pain, shortness of breath, fever, coughing up blood, extreme fatigue, or any other new, concerning symptoms, come back to clinic or go to ER immediately.  Otherwise, if no improvement in symptoms in one week, follow-up with your primary care provider.        HPI:  Leidy Ascencio is a 62 year old female with past medical history of brain tumor, COPD, disseminated malignancy who presents today complaining of cough x 6 days. Patient also having ST, PND. No fever.  She is been taking her Flovent and albuterol along with benzonatate and azelastine.  She reports increased productivity with this cough.    History obtained from the patient.    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, left  2016: Right knee pain  2016: Hip pain, right  2016: Cervical pain  2016: Advanced directives, counseling/discussion  2016: Recurrent cold sores  2016: Other insomnia  2016: Left homonymous hemianopsia  2016: Plantar fasciitis  2016: Vitamin D deficiency  2015: Atopic rhinitis  2015: Major depressive disorder, recurrent episode, mild (H)      Past Medical History:   Diagnosis Date     Arthritis      Cancer (H)     brain tumor     COPD (chronic obstructive pulmonary disease) (H)      Depressive disorder      Hyperlipemia        Social History     Tobacco Use     Smoking status: Former     Packs/day: 1.00     Years: 27.00     Pack years: 27.00     Types: Cigarettes     Quit date: 2003     Years since quittin.1     Smokeless tobacco: Never   Substance Use Topics     Alcohol use: Yes       Review of Systems    Vitals:    03/15/23 1042   BP: 137/79   BP Location: Right arm   Cuff Size: Adult Large   Pulse: 72   Temp: 97.4  F (36.3  C)   TempSrc: Tympanic   SpO2: 94%   Weight: 86.8 kg (191 lb 6.4 oz)       Physical Exam  Vitals and nursing note reviewed.   Constitutional:       General: She is not in acute distress.     Appearance: She is not toxic-appearing or diaphoretic.   HENT:      Head: Normocephalic and atraumatic.      Right Ear: External ear normal.      Left Ear: External ear normal.   Eyes:      Conjunctiva/sclera: Conjunctivae normal.   Cardiovascular:      Rate and Rhythm: Normal rate and regular rhythm.      Heart sounds: No murmur heard.  Pulmonary:      Effort: Pulmonary effort is normal. No respiratory distress.      Breath sounds: No stridor. Wheezing present. No rhonchi or rales.      Comments: Frequent hacking cough  Neurological:      Mental Status: She is alert.   Psychiatric:         Mood and Affect: Mood normal.         Behavior: Behavior  normal.         Thought Content: Thought content normal.         Judgment: Judgment normal.       At the end of the encounter, I discussed results, diagnosis, medications. Discussed red flags for immediate return to clinic/ER, as well as indications for follow up if no improvement. Patient understood and agreed to plan. Patient was stable for discharge.

## 2023-03-22 ENCOUNTER — OFFICE VISIT (OUTPATIENT)
Dept: URGENT CARE | Facility: URGENT CARE | Age: 63
End: 2023-03-22
Payer: COMMERCIAL

## 2023-03-22 ENCOUNTER — ANCILLARY PROCEDURE (OUTPATIENT)
Dept: GENERAL RADIOLOGY | Facility: CLINIC | Age: 63
End: 2023-03-22
Attending: FAMILY MEDICINE
Payer: COMMERCIAL

## 2023-03-22 VITALS
DIASTOLIC BLOOD PRESSURE: 81 MMHG | TEMPERATURE: 97.7 F | RESPIRATION RATE: 18 BRPM | WEIGHT: 193 LBS | BODY MASS INDEX: 35.3 KG/M2 | OXYGEN SATURATION: 94 % | SYSTOLIC BLOOD PRESSURE: 147 MMHG | HEART RATE: 87 BPM

## 2023-03-22 DIAGNOSIS — R05.1 ACUTE COUGH: ICD-10-CM

## 2023-03-22 DIAGNOSIS — R05.1 ACUTE COUGH: Primary | ICD-10-CM

## 2023-03-22 PROCEDURE — 99214 OFFICE O/P EST MOD 30 MIN: CPT | Performed by: FAMILY MEDICINE

## 2023-03-22 PROCEDURE — 87798 DETECT AGENT NOS DNA AMP: CPT | Performed by: FAMILY MEDICINE

## 2023-03-22 PROCEDURE — 71046 X-RAY EXAM CHEST 2 VIEWS: CPT | Mod: TC | Performed by: RADIOLOGY

## 2023-03-22 RX ORDER — LEVOFLOXACIN 500 MG/1
500 TABLET, FILM COATED ORAL DAILY
Qty: 7 TABLET | Refills: 0 | Status: SHIPPED | OUTPATIENT
Start: 2023-03-22 | End: 2023-03-29

## 2023-03-22 RX ORDER — CODEINE PHOSPHATE/GUAIFENESIN 10-100MG/5
5 LIQUID (ML) ORAL EVERY 4 HOURS PRN
Qty: 180 ML | Refills: 0 | Status: SHIPPED | OUTPATIENT
Start: 2023-03-22 | End: 2023-04-17

## 2023-03-22 NOTE — PROGRESS NOTES
ICD-10-CM    1. Acute cough  R05.1 XR Chest 2 Views     guaiFENesin-codeine (GUAIFENESIN AC) 100-10 MG/5ML syrup     levofloxacin (LEVAQUIN) 500 MG tablet     B. pertussis/parapertussis PCR-NP     Adult Pulmonary Medicine Referral      ? Etiology. Doubt asthma/copd solely given no current wheeze and no better wit prednisone. Discussed options. Given the creackles will use levaquin but also refer to pulm for clariyt of diagnosis. Testing for perutssis in progress given paroxyms or cough. Also will use roxie AC given severity.    -------------------------------  Leidy Ascencio with presents with about 15 days symptoms including sore throat, productive cough, trouble breathing especially with the cough spasms. Also some post nasal drip but not really a lot of nasal congestion/sinus pressure Treated for copd exacerbation with doxy and prednisone about a week ago but no improvement. The inhalers don't seem to do much. She is worse when lying down.     The patient has a history of copd per her chart but unclear how this diagnosis came about.  Tested neg for covid    Treatment measures tried include Inhaler (name: proair and flovent).  Exposures--no  Recent travel--no    Current Outpatient Medications   Medication Sig Dispense Refill     albuterol (PROAIR HFA/PROVENTIL HFA/VENTOLIN HFA) 108 (90 Base) MCG/ACT inhaler Inhale 2 puffs into the lungs every 6 hours 18 g 1     azelastine (ASTELIN) 0.1 % nasal spray Spray 1 spray into both nostrils 2 times daily 30 mL 8     benzonatate (TESSALON) 200 MG capsule Take 1 capsule (200 mg) by mouth 3 times daily as needed for cough 30 capsule 0     doxycycline hyclate (VIBRAMYCIN) 100 MG capsule Take 1 capsule (100 mg) by mouth 2 times daily for 10 days 20 capsule 0     fluticasone (FLOVENT HFA) 110 MCG/ACT inhaler Inhale 2 puffs into the lungs 2 times daily 12 g 4     lactobacillus rhamnosus, GG, (CULTURELL) capsule Take 1 capsule by mouth 2 times daily       loratadine (CLARITIN) 10  MG tablet Take 10 mg by mouth daily       PROAIR  (90 Base) MCG/ACT inhaler Inhale 1-2 puffs into the lungs every 4 hours as needed for shortness of breath / dyspnea 1 Inhaler 11     rosuvastatin (CRESTOR) 10 MG tablet Take 1 tablet (10 mg) by mouth daily 90 tablet 1     sertraline (ZOLOFT) 100 MG tablet Take 2 tablets (200 mg) by mouth daily Needs to be seen for further refills       valACYclovir (VALTREX) 1000 mg tablet Take 2 tablets (2,000 mg) by mouth 2 times daily For a total of 2 doses 12 tablet 1     VITAMIN D, CHOLECALCIFEROL, PO Take 1,000 Units by mouth daily         ROS otherwise negative for resp., ID,  HEENT symptoms.    Objective: BP (!) 147/81   Pulse 87   Temp 97.7  F (36.5  C) (Tympanic)   Resp 18   Wt 87.5 kg (193 lb)   SpO2 94%   BMI 35.30 kg/m    Exam:  GENERAL APPEARANCE: healthy, alert and no distress  EYES: Eyes grossly normal to inspection  HENT: ear canals and TM's normal and nose and mouth without ulcers or lesions  NECK: no adenopathy, no asymmetry, masses, or scars and thyroid normal to palpation  RESP: lungs with a few crackles left base. No wheezing noted.  CV: regular rates and rhythm, no murmur.    cxr normal Per my interpretation.

## 2023-03-23 LAB
B PARAPERT DNA SPEC QL NAA+PROBE: NOT DETECTED
B PERT DNA SPEC QL NAA+PROBE: NOT DETECTED

## 2023-04-13 NOTE — PROGRESS NOTES
"NEURO-ONCOLOGY VISIT  Apr 17, 2023    CHIEF COMPLAINT: Ms. 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 She completed GammaKnife treatment in 1/2020.     Priyanka is currently managed on imaging surveillance and imaging to date demonstrates no evidence of cancer recurrence. CT body imaging is done annually as well.     I met with Priyanka and Main () today for this follow-up visit.     HISTORY OF PRESENT ILLNESS  -Fatigued lately. Thinks this may have started after long course of bronchitis several months ago, but fatigue has lingered despite improvement in breathing.   -Also struggling with concentration and mood lately, things have been \"up and down\" as she struggles with stressors at work. Considering long-term, possibly moving per .   -Tried using higher dose of Zoloft for ~ 1 month after last appointment but felt \"numb,\" so went back down to 100mg.   -Continues to have occasional headaches, but feels they may be related to intermittent right neck and right shoulder pain or the stress of her job. Improves with Tylenol and/or ibuprofen.   -Stable visional field cut. No falls but does occasionally bump into things.  Main helps guide her on the sidewalk/near curbs if they're out walking together.   -Denies changes in strength to either upper or lower extremity.   -Has been trying to protect her left hand at work lately, numbness of left hand has since improved. However, has had some spasms of right rist occasionally while on phone. Not painful. No sensory change to right hand. Has not yet used nightly wrist braces.     MEDICATIONS   Current Outpatient Medications   Medication Sig Dispense Refill     albuterol (PROAIR HFA/PROVENTIL HFA/VENTOLIN HFA) 108 (90 Base) MCG/ACT inhaler Inhale 2 puffs into the lungs every 6 hours 18 g 1     azelastine (ASTELIN) 0.1 % nasal spray Spray 1 spray into " both nostrils 2 times daily 30 mL 8     fluticasone (FLOVENT HFA) 110 MCG/ACT inhaler Inhale 2 puffs into the lungs 2 times daily 12 g 4     lactobacillus rhamnosus, GG, (CULTURELL) capsule Take 1 capsule by mouth 2 times daily       loratadine (CLARITIN) 10 MG tablet Take 10 mg by mouth daily       PROAIR  (90 Base) MCG/ACT inhaler Inhale 1-2 puffs into the lungs every 4 hours as needed for shortness of breath / dyspnea 1 Inhaler 11     rosuvastatin (CRESTOR) 10 MG tablet Take 1 tablet (10 mg) by mouth daily 90 tablet 1     sertraline (ZOLOFT) 100 MG tablet Take 100 mg by mouth daily Needs to be seen for further refills       Vitamin D3 (VITAMIN D, CHOLECALCIFEROL,) 25 mcg (1000 units) tablet Take 25 mcg by mouth daily       valACYclovir (VALTREX) 1000 mg tablet Take 2 tablets (2,000 mg) by mouth 2 times daily For a total of 2 doses 12 tablet 1     VITAMIN D, CHOLECALCIFEROL, PO Take 1,000 Units by mouth daily       DRUG ALLERGIES   Allergies   Allergen Reactions     Other Drug Allergy (See Comments) Hives     Paxlovid      Atorvastatin      Penicillins      Sulfa Drugs      Sulfamethoxazole-Trimethoprim Rash       ONCOLOGIC HISTORY  -2015 PRESENTATION: Headaches.  -2015 MR brain imaging with a right occipital mass   -4/22/2015 SURGERY: Resection by Dr. Lujan   PATHOLOGY: Hemangiopericytoma, grade 2.   -2015 CT chest, abdomen and pelvis negative for malignancy.  -2015 PET MRI negative for malignancy.   Conservative management per Dr. Herman Mayberry.    -8/1/2017 CT chest abdomen and pelvis and bone scan negative for malignancy.   -12/7/2017 MRB with no evidence of tumor recurrence.  -12/6/2018 MRB with no evidence of tumor recurrence.    -12/11/2019 MRB with a new nodule of enhancement within the superior sagittal sinus, concerning for tumor recurrence. Stable appearance of the right parieto-occipital craniotomy site.  -12/13/2019 Bone scan with no convincing evidence of skeletal metastases.  -12/18/2019 MR  liver with a 9 mm lesion in the lateral aspect of the junction of segments 7 and 8 of the liver shows homogeneous delayed enhancement and is new since 2015. This could represent a metastatic lesion.     -1/6/2020 NEURO-ONC: Referral to radiation oncology and GI oncology surgery. Signed consent for next generation sequencing.   -1/14/2020 RADS: GammaKnife to lesion (1 fraction).   -1/15/2020 LIVER LESION:  Laparoscopic ultrasound-guided microwave ablation of liver tumor by Dr. Phan Fierro.   PATHOLOGY: Cavernous hemangioma of the liver, benign:    -No metastatic hemangiopericytoma or other malignancy identified    -Underlying steatohepatitis, no significant fibrosis    -No follow-up needed.  -2/28/2020 NEURO-ONC/ MRB: Clinically stable. Headaches with migraine features; increasing Zoloft. Imaging with positive response to radiation.   -3/9/2020 Evaluated by Dr. Wright; Neuro-psych testing with mild weaknesses were noted in aspects of speeded visual processing, and general cognitive speed. Repeat in 3/2021.  -6/1/2020 NEURO-ONC/ MRB: Clinically well; for insomnia trial of melatonin. Imaging with positive treatment effect.   -6/1/2020 NEURO-ONC/ MRB: Clinically well. Imaging with no concerns.   -9/14/2020 NEURO-ONC/ MRB: Clinically well. Imaging of MRI brain and CT CAP with no concerns.  -12/14/2020 NEURO-ONC/ MRB: Clinically well. MR imaging of the brain with no concerns.  -3/12/2021 NEURO-ONC/ MRB: Clinically well. MRI of the brain with no concerns.  -9/10/2021 NEURO-ONC/ MRB: Clinically well. MRI of the brain and CT CAP with no concerns.  -3/7/2022 NEURO-ONC/ MRB: Clinically well. MRI of the brain with no concerns.  -10/21/2022 NEURO-ONC/ MRB: Clinically well; signs of carpal tunnel in the left wrist. MRI of the brain and CT CAP with no concerns.  -10/21/2022 NEURO-ONC/ MRB: Clinically well; signs of carpal tunnel in the left wrist. MRI of the brain and CT CAP with no concerns.  -4/17/2023 NEURO-ONC/ MRB: Mood is  "down, less motivation, more issues with concentration. Carpal tunnel of left improving with behavior change. Fatigue labs today; recommended follow-up with primary care provider. MRI of the brain with no concerns.     SOCIAL HISTORY   Tobacco use: Former smoker; quit in 2003. 27 pack years.  . 2 daughter, 1 son.   Employment: [employer]; [employer] managers.        PHYSICAL EXAMINATION  /78 (BP Location: Right arm, Patient Position: Sitting, Cuff Size: Adult Large)   Pulse 70   Temp 98.3  F (36.8  C) (Oral)   Resp 16   Wt 87.4 kg (192 lb 9.6 oz)   SpO2 95%   BMI 35.23 kg/m     Wt Readings from Last 2 Encounters:   04/17/23 87.4 kg (192 lb 9.6 oz)   03/22/23 87.5 kg (193 lb)      Ht Readings from Last 2 Encounters:   08/25/22 1.575 m (5' 2\")   03/01/21 1.575 m (5' 2\")      KPS   -Generally well appearing, feeling fatigued.   -Respiratory: No audible wheezing. Normal respiratory effort.   -Psychiatric: Normal mood and affect. Pleasant, talkative.  -Neurologic:   MENTAL STATUS:     Alert, oriented.   Recall: Intact.    Speech fluent.    Comprehension intact.     CRANIAL NERVES:     Pupils are equal, round.    Extraocular movements full, patient denies diplopia.    Left inferior homonymous quadrantanopia.   Symmetric facial movements.   Hearing intact.   With normal phonation, no dysfunction tongue.   Normal rise of palate.   MOTOR: Antigravity in arms.   SENSATION: Intact throughout.   COORDINATION: Intact.   GAIT: Steady, unassisted. Able to heel and toe walk, but mild left sided instability secondary to knee pain.       MEDICAL RECORDS  Personally reviewed  -ED encounter in Feb.   -Primary care notes    LABS  Personally reviewed all available lab results; March- Bordetella pertussis DNA not detected. Feb- Troponin not elevated.     IMAGING  Personally reviewed MR brain imaging from today and compared to prior imaging. There is stable, minimal contrast enhancement at the sagittal " sinus with no increase in T2 FLAIR. No new lesions.    Imaging shown to Priyanka and Main.        Chest x-ray 3/22 IMPRESSION: Negative chest. Lungs clear.      IMPRESSION  Clinic time of 40 minutes reviewing data, in evaluation, and coordinating care for this high complexity visit was spent discussing in detail the nature of her cancer in light of repeat imaging. This was in addition to answering questions pertaining to my recommendations and devising the plan as outlined below.      Grade 2 hemangiopericytomas are considered malignant tumors. Standard treatment options include surgery and radiation therapy. With no good surgical option, Priyanka completed stereotactic radiation therapy in 1/2020. Recent brain imaging was again with no new concerns. Will continue with imaging surveillance every 6 months. Per NCCN guidelines, imaging should be repeated every 3-6 months for the first 5 years (at least through 1/2025) and then, annually thereafter.      Of note, Priyanka has consented to next generation sequencing. At [facility], can consider sending pathology for mutational analysis to potentially identify targeted theray options. Additional treatment options include Pazopanib (Votrient) or temozolomide plus bevacizumab.    Extracranial metastatic disease can occur; most commonly in bone, lung, and liver. Prior MR liver imaging showed a lesion. Priyanka was referred to Dr. Phan Fierro with surgical oncology. Dr. Fierro performed a  laparoscopic ultrasound-guided microwave ablation of a liver lesion on 1/15/2020. Pathology was consistent with a benign cavernous hemangioma and no metastatic hemangiopericytoma or other malignancy was identified. She is currently monitored on annual systemic surveillance imaging with a CT C/A/P with contrast done every Fall. Her recent imaging from September 2022 was without any new concerns. The scan did show a few larger left axillary lymph nodes.    I personally reviewed the ED visit in  "February in which Priyanka presented for evaluation of chest pain. Chest rxay and troponin trend was not revealing. She has since follow-up with Dr. Monae in March and I reviewed this note. She was treated for a sore throat and cough. Today, Priyanka denies any ongoing chest pain, shortness of breath, or infectious symptoms.     However, since that time, she has felt more fatigued. While this could be a linger affect from her respiratory infection in March, she has continued to feel tired at work and take naps despite reported improvement in her breathing. She is also having more \"lows\" in her mood lately, more difficulty with concentration at work, and occasional racing thoughts that keep her up at night. Overall, this is concerning for possible mild depression and/or anxiety. Priyanka tried a higher dose of Zoloft (200mg) and initially, there was improvement in mood and headaches, but she did not tolerate the dosing. She is now back to 100mg daily. Suspect life stressors and possible career change ([employer]) may be precipitating some anxiety/depression. Personally reviewed CBC with no signs of anemia. BMP and LFT with no concerns. B12 in 2019 was low normal. To initiate a fatigue lab work-up, will recheck B12 and add TSH/ T4 and vitamin D today. ADDENDUM: Vitamin B12 419 (normal) and TSH normal. Vitamin D remains pending. At this point, will continue Zoloft and will defer to primary care provider for further management of both mood and fatigue work-up as noted above.     Numbness of left hand improved with reduced strain at work, however, now having intermittent right wrist jerking while using phone. Would not expect brain lesion to cause these symptoms given location, suspect this is also due to carpal tunnel. Again, encouraged Priyanka to use nightly braces.     PROBLEM LIST  Hemangiopericytoma, grade II  Liver lesion  Depressed mood  Sleep issues  Headache    PLAN  -CANCER-DIRECTED THERAPY-  -As above; Continue imaging " surveillance. Repeat MR brain imaging in 6 months.     -LIVER LESION-  -Treated. No lesion-specific follow-up needed.   -CT c/a/p done annual for systemic surveillance imaging; next due in 10/2023.     -STEROIDS-  -Currently off dexamethasone.    -SEIZURE MANAGEMENT-  -While this patient is at increased risk of having seizures, given the lack of seizure history, there is no indication to prescribe an antiepileptic at this time.     -CARPAL TUNNEL-  -Left wrist > right. Occasional right wrist jerking.   -Improved with decreased strain at work.  -Recommended bracing overnight.     -Quality of life/ MOOD/ FATIGUE-  -Referral to primary care provider for further evaluation/treatment of mood/fatigue.   -Today labs; TSH (normal), Vitamin D (pending), Vitamin B12 (normal).   -Continue Zoloft 100mg daily.  -Continue with routine exercise.     -MEMORY LOSS/ WORD FINDING ISSUES-  -Evaluated by Dr. Phan Wright in 3/2020 and 4/2021. Neuro-psych testing with mild weaknesses noted in aspects of speeded visual processing and general cognitive speed. Stable findings when comparing prior tests.  -Repeat testing if clinically indicated.     Return to clinic in 10/2023 to review repeat imaging.     In the meantime, Priyanka knows to call with questions or concerns or to report new complaints and can be seen sooner if needed.    Patient was also seen and examined by Kami Noe, MS4 [employer], under my direct supervision.     Lauren Zhang MD  Neuro-oncology

## 2023-04-17 ENCOUNTER — ANCILLARY PROCEDURE (OUTPATIENT)
Dept: MRI IMAGING | Facility: CLINIC | Age: 63
End: 2023-04-17
Attending: PSYCHIATRY & NEUROLOGY
Payer: COMMERCIAL

## 2023-04-17 ENCOUNTER — ONCOLOGY VISIT (OUTPATIENT)
Dept: ONCOLOGY | Facility: CLINIC | Age: 63
End: 2023-04-17
Attending: PSYCHIATRY & NEUROLOGY
Payer: COMMERCIAL

## 2023-04-17 VITALS
OXYGEN SATURATION: 95 % | HEART RATE: 70 BPM | TEMPERATURE: 98.3 F | RESPIRATION RATE: 16 BRPM | DIASTOLIC BLOOD PRESSURE: 78 MMHG | BODY MASS INDEX: 35.23 KG/M2 | WEIGHT: 192.6 LBS | SYSTOLIC BLOOD PRESSURE: 130 MMHG

## 2023-04-17 DIAGNOSIS — E07.9 DISORDER OF THYROID: ICD-10-CM

## 2023-04-17 DIAGNOSIS — D43.3 HEMANGIOPERICYTOMA OF CNS, GRADE II (H): Primary | ICD-10-CM

## 2023-04-17 DIAGNOSIS — D43.3 HEMANGIOPERICYTOMA OF CNS, GRADE II (H): ICD-10-CM

## 2023-04-17 DIAGNOSIS — R53.83 FATIGUE, UNSPECIFIED TYPE: ICD-10-CM

## 2023-04-17 DIAGNOSIS — E56.9 VITAMIN DEFICIENCY: ICD-10-CM

## 2023-04-17 LAB
TSH SERPL DL<=0.005 MIU/L-ACNC: 2.77 UIU/ML (ref 0.3–4.2)
VIT B12 SERPL-MCNC: 419 PG/ML (ref 232–1245)

## 2023-04-17 PROCEDURE — 36415 COLL VENOUS BLD VENIPUNCTURE: CPT | Performed by: PSYCHIATRY & NEUROLOGY

## 2023-04-17 PROCEDURE — 70553 MRI BRAIN STEM W/O & W/DYE: CPT | Performed by: RADIOLOGY

## 2023-04-17 PROCEDURE — 99215 OFFICE O/P EST HI 40 MIN: CPT | Performed by: PSYCHIATRY & NEUROLOGY

## 2023-04-17 PROCEDURE — 82607 VITAMIN B-12: CPT | Performed by: PSYCHIATRY & NEUROLOGY

## 2023-04-17 PROCEDURE — 99212 OFFICE O/P EST SF 10 MIN: CPT | Performed by: PSYCHIATRY & NEUROLOGY

## 2023-04-17 PROCEDURE — A9585 GADOBUTROL INJECTION: HCPCS | Performed by: RADIOLOGY

## 2023-04-17 PROCEDURE — 82306 VITAMIN D 25 HYDROXY: CPT | Performed by: PSYCHIATRY & NEUROLOGY

## 2023-04-17 PROCEDURE — 84443 ASSAY THYROID STIM HORMONE: CPT | Performed by: PSYCHIATRY & NEUROLOGY

## 2023-04-17 RX ORDER — GADOBUTROL 604.72 MG/ML
10 INJECTION INTRAVENOUS ONCE
Status: COMPLETED | OUTPATIENT
Start: 2023-04-17 | End: 2023-04-17

## 2023-04-17 RX ORDER — VITAMIN B COMPLEX
25 TABLET ORAL DAILY
COMMUNITY

## 2023-04-17 RX ADMIN — GADOBUTROL 9 ML: 604.72 INJECTION INTRAVENOUS at 14:14

## 2023-04-17 ASSESSMENT — PAIN SCALES - GENERAL: PAINLEVEL: NO PAIN (0)

## 2023-04-17 NOTE — DISCHARGE INSTRUCTIONS
MRI Contrast Discharge Instructions    The IV contrast you received today will pass out of your body in your  urine. This will happen in the next 24 hours. You will not feel this process.  Your urine will not change color.    Drink at least 4 extra glasses of water or juice today (unless your doctor  has restricted your fluids). This reduces the stress on your kidneys.  You may take your regular medicines.    If you are on dialysis: It is best to have dialysis today.    If you have a reaction: Most reactions happen right away. If you have  any new symptoms after leaving the hospital (such as hives or swelling),  call your hospital at the correct number below. Or call your family doctor.  If you have breathing distress or wheezing, call 911.    Special instructions: ***    I have read and understand the above information.    Signature:______________________________________ Date:___________    Staff:__________________________________________ Date:___________     Time:__________    [place] [facility] Departments:    ___[place]: [phone]  ___[facility]: [phone]  ___[place]: [phone] ___[facility]: [phone]  ___[facility]: [phone]  ___[facility]: [phone]  ___[phone]  ___[facility]: [phone]  ___[phone]

## 2023-04-17 NOTE — NURSING NOTE
"Oncology Rooming Note    April 17, 2023 3:33 PM   Leidy Ascencio is a 62 year old female who presents for:    Chief Complaint   Patient presents with     Oncology Clinic Visit     Hemangiopericytoma     Initial Vitals: /78 (BP Location: Right arm, Patient Position: Sitting, Cuff Size: Adult Large)   Pulse 70   Temp 98.3  F (36.8  C) (Oral)   Resp 16   Wt 87.4 kg (192 lb 9.6 oz)   SpO2 95%   BMI 35.23 kg/m   Estimated body mass index is 35.23 kg/m  as calculated from the following:    Height as of 8/25/22: 1.575 m (5' 2\").    Weight as of this encounter: 87.4 kg (192 lb 9.6 oz). Body surface area is 1.96 meters squared.  No Pain (0) Comment: Data Unavailable   No LMP recorded. Patient has had a hysterectomy.  Allergies reviewed: Yes  Medications reviewed: Yes    Medications: Medication refills not needed today.  Pharmacy name entered into [facility]:    [facility] - [place], [place] - [address] [place] [address] [facility] [place], [place] - [address] [place] [address] [phone]  [facility] - [place], [place] - [address] [facility] [place], [place] - [address], [address]    Clinical concerns: Pt presents today for f/u with Dr Pato Renee, ROD  4/17/2023              "

## 2023-04-17 NOTE — LETTER
"    4/17/2023         RE: Leidy Ascencio  [address] [place] [address]        Dear Colleague,    Thank you for referring your patient, Leidy Ascencio, to the [facility]. Please see a copy of my visit note below.    NEURO-ONCOLOGY VISIT  Apr 17, 2023    CHIEF COMPLAINT: Ms. 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 She completed GammaKnife treatment in 1/2020.     Priyanka is currently managed on imaging surveillance and imaging to date demonstrates no evidence of cancer recurrence. CT body imaging is done annually as well.     I met with Priyanka and Main () today for this follow-up visit.     HISTORY OF PRESENT ILLNESS  -Fatigued lately. Thinks this may have started after long course of bronchitis several months ago, but fatigue has lingered despite improvement in breathing.   -Also struggling with concentration and mood lately, things have been \"up and down\" as she struggles with stressors at work. Considering [facility], possibly moving per .   -Tried using higher dose of Zoloft for ~ 1 month after last appointment but felt \"numb,\" so went back down to 100mg.   -Continues to have occasional headaches, but feels they may be related to intermittent right neck and right shoulder pain or the stress of her job. Improves with Tylenol and/or ibuprofen.   -Stable visional field cut. No falls but does occasionally bump into things.  Main helps guide her on the sidewalk/near curbs if they're out walking together.   -Denies changes in strength to either upper or lower extremity.   -Has been trying to protect her left hand at work lately, numbness of left hand has since improved. However, has had some spasms of right rist occasionally while on phone. Not painful. No sensory change to right hand. Has not yet used nightly wrist braces.     MEDICATIONS   Current " Outpatient Medications   Medication Sig Dispense Refill    albuterol (PROAIR HFA/PROVENTIL HFA/VENTOLIN HFA) 108 (90 Base) MCG/ACT inhaler Inhale 2 puffs into the lungs every 6 hours 18 g 1    azelastine (ASTELIN) 0.1 % nasal spray Spray 1 spray into both nostrils 2 times daily 30 mL 8    fluticasone (FLOVENT HFA) 110 MCG/ACT inhaler Inhale 2 puffs into the lungs 2 times daily 12 g 4    lactobacillus rhamnosus, GG, (CULTURELL) capsule Take 1 capsule by mouth 2 times daily      loratadine (CLARITIN) 10 MG tablet Take 10 mg by mouth daily      PROAIR  (90 Base) MCG/ACT inhaler Inhale 1-2 puffs into the lungs every 4 hours as needed for shortness of breath / dyspnea 1 Inhaler 11    rosuvastatin (CRESTOR) 10 MG tablet Take 1 tablet (10 mg) by mouth daily 90 tablet 1    sertraline (ZOLOFT) 100 MG tablet Take 100 mg by mouth daily Needs to be seen for further refills      Vitamin D3 (VITAMIN D, CHOLECALCIFEROL,) 25 mcg (1000 units) tablet Take 25 mcg by mouth daily      valACYclovir (VALTREX) 1000 mg tablet Take 2 tablets (2,000 mg) by mouth 2 times daily For a total of 2 doses 12 tablet 1    VITAMIN D, CHOLECALCIFEROL, PO Take 1,000 Units by mouth daily       DRUG ALLERGIES   Allergies   Allergen Reactions    Other Drug Allergy (See Comments) Hives     Paxlovid     Atorvastatin     Penicillins     Sulfa Drugs     Sulfamethoxazole-Trimethoprim Rash       ONCOLOGIC HISTORY  -2015 PRESENTATION: Headaches.  -2015 MR brain imaging with a right occipital mass   -4/22/2015 SURGERY: Resection by Dr. Lujan   PATHOLOGY: Hemangiopericytoma, grade 2.   -2015 CT chest, abdomen and pelvis negative for malignancy.  -2015 PET MRI negative for malignancy.   Conservative management per Dr. Herman Mayberry.    -8/1/2017 CT chest abdomen and pelvis and bone scan negative for malignancy.   -12/7/2017 MRB with no evidence of tumor recurrence.  -12/6/2018 MRB with no evidence of tumor recurrence.    -12/11/2019 MRB with a new nodule of  enhancement within the superior sagittal sinus, concerning for tumor recurrence. Stable appearance of the right parieto-occipital craniotomy site.  -12/13/2019 Bone scan with no convincing evidence of skeletal metastases.  -12/18/2019 MR liver with a 9 mm lesion in the lateral aspect of the junction of segments 7 and 8 of the liver shows homogeneous delayed enhancement and is new since 2015. This could represent a metastatic lesion.     -1/6/2020 NEURO-ONC: Referral to radiation oncology and GI oncology surgery. Signed consent for next generation sequencing.   -1/14/2020 RADS: GammaKnife to lesion (1 fraction).   -1/15/2020 LIVER LESION:  Laparoscopic ultrasound-guided microwave ablation of liver tumor by Dr. Phan Fierro.   PATHOLOGY: Cavernous hemangioma of the liver, benign:    -No metastatic hemangiopericytoma or other malignancy identified    -Underlying steatohepatitis, no significant fibrosis    -No follow-up needed.  -2/28/2020 NEURO-ONC/ MRB: Clinically stable. Headaches with migraine features; increasing Zoloft. Imaging with positive response to radiation.   -3/9/2020 Evaluated by Dr. Wright; Neuro-psych testing with mild weaknesses were noted in aspects of speeded visual processing, and general cognitive speed. Repeat in 3/2021.  -6/1/2020 NEURO-ONC/ MRB: Clinically well; for insomnia trial of melatonin. Imaging with positive treatment effect.   -6/1/2020 NEURO-ONC/ MRB: Clinically well. Imaging with no concerns.   -9/14/2020 NEURO-ONC/ MRB: Clinically well. Imaging of MRI brain and CT CAP with no concerns.  -12/14/2020 NEURO-ONC/ MRB: Clinically well. MR imaging of the brain with no concerns.  -3/12/2021 NEURO-ONC/ MRB: Clinically well. MRI of the brain with no concerns.  -9/10/2021 NEURO-ONC/ MRB: Clinically well. MRI of the brain and CT CAP with no concerns.  -3/7/2022 NEURO-ONC/ MRB: Clinically well. MRI of the brain with no concerns.  -10/21/2022 NEURO-ONC/ MRB: Clinically well; signs of carpal  "tunnel in the left wrist. MRI of the brain and CT CAP with no concerns.  -10/21/2022 NEURO-ONC/ MRB: Clinically well; signs of carpal tunnel in the left wrist. MRI of the brain and CT CAP with no concerns.  -4/17/2023 NEURO-ONC/ MRB: Mood is down, less motivation, more issues with concentration. Carpal tunnel of left improving with behavior change. Fatigue labs today; recommended follow-up with primary care provider. MRI of the brain with no concerns.     SOCIAL HISTORY   Tobacco use: Former smoker; quit in 2003. 27 pack years.  . 2 daughter, 1 son.   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        PHYSICAL EXAMINATION  /78 (BP Location: Right arm, Patient Position: Sitting, Cuff Size: Adult Large)   Pulse 70   Temp 98.3  F (36.8  C) (Oral)   Resp 16   Wt 87.4 kg (192 lb 9.6 oz)   SpO2 95%   BMI 35.23 kg/m     Wt Readings from Last 2 Encounters:   04/17/23 87.4 kg (192 lb 9.6 oz)   03/22/23 87.5 kg (193 lb)      Ht Readings from Last 2 Encounters:   08/25/22 1.575 m (5' 2\")   03/01/21 1.575 m (5' 2\")      KPS   -Generally well appearing, feeling fatigued.   -Respiratory: No audible wheezing. Normal respiratory effort.   -Psychiatric: Normal mood and affect. Pleasant, talkative.  -Neurologic:   MENTAL STATUS:     Alert, oriented.   Recall: Intact.    Speech fluent.    Comprehension intact.     CRANIAL NERVES:     Pupils are equal, round.    Extraocular movements full, patient denies diplopia.    Left inferior homonymous quadrantanopia.   Symmetric facial movements.   Hearing intact.   With normal phonation, no dysfunction tongue.   Normal rise of palate.   MOTOR: Antigravity in arms.   SENSATION: Intact throughout.   COORDINATION: Intact.   GAIT: Steady, unassisted. Able to heel and toe walk, but mild left sided instability secondary to knee pain.       MEDICAL RECORDS  Personally reviewed  -ED encounter in Feb.   -Primary care notes    LABS  Personally reviewed all available " lab results; March- Bordetella pertussis DNA not detected. Feb- Troponin not elevated.     IMAGING  Personally reviewed MR brain imaging from today and compared to prior imaging. There is stable, minimal contrast enhancement at the sagittal sinus with no increase in T2 FLAIR. No new lesions.    Imaging shown to Priyanka and Main.        Chest x-ray 3/22 IMPRESSION: Negative chest. Lungs clear.      IMPRESSION  Clinic time of 40 minutes reviewing data, in evaluation, and coordinating care for this high complexity visit was spent discussing in detail the nature of her cancer in light of repeat imaging. This was in addition to answering questions pertaining to my recommendations and devising the plan as outlined below.      Grade 2 hemangiopericytomas are considered malignant tumors. Standard treatment options include surgery and radiation therapy. With no good surgical option, Priyanka completed stereotactic radiation therapy in 1/2020. Recent brain imaging was again with no new concerns. Will continue with imaging surveillance every 6 months. Per NCCN guidelines, imaging should be repeated every 3-6 months for the first 5 years (at least through 1/2025) and then, annually thereafter.      Of note, Priyanka has consented to next generation sequencing. At [facility] recurrence, can consider sending pathology for mutational analysis to potentially identify targeted theray options. Additional treatment options include Pazopanib (Votrient) or temozolomide plus bevacizumab.    Extracranial metastatic disease can occur; most commonly in bone, lung, and liver. Prior MR liver imaging showed a lesion. Priyanka was referred to Dr. Phan Fierro with surgical oncology. Dr. Fierro performed a  laparoscopic ultrasound-guided microwave ablation of a liver lesion on 1/15/2020. Pathology was consistent with a benign cavernous hemangioma and no metastatic hemangiopericytoma or other malignancy was identified. She is currently monitored on annual  "systemic surveillance imaging with a CT C/A/P with contrast done every Fall. Her recent imaging from September 2022 was without any new concerns. The scan did show a few larger left axillary lymph nodes.    I personally reviewed the ED visit in February in which Priyanka presented for evaluation of chest pain. Chest rxay and troponin trend was not revealing. She has since follow-up with Dr. Monae in March and I reviewed this note. She was treated for a sore throat and cough. Today, Priyanka denies any ongoing chest pain, shortness of breath, or infectious symptoms.     However, since that time, she has felt more fatigued. While this could be a linger affect from her respiratory infection in March, she has continued to feel tired at work and take naps despite reported improvement in her breathing. She is also having more \"lows\" in her mood lately, more difficulty with concentration at work, and occasional racing thoughts that keep her up at night. Overall, this is concerning for possible mild depression and/or anxiety. Priyanka tried a higher dose of Zoloft (200mg) and initially, there was improvement in mood and headaches, but she did not tolerate the dosing. She is now back to 100mg daily. 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 Personally reviewed CBC with no signs of anemia. BMP and LFT with no concerns. B12 in 2019 was low normal. To initiate a fatigue lab work-up, will recheck B12 and add TSH/ T4 and vitamin D today. ADDENDUM: Vitamin B12 419 (normal) and TSH normal. Vitamin D remains pending. At this point, will continue Zoloft and will defer to primary care provider for further management of both mood and fatigue work-up as noted above.     Numbness of left hand improved with reduced strain at work, however, now having intermittent right wrist jerking while using phone. Would not expect brain lesion to cause these symptoms given location, suspect " this is also due to carpal tunnel. Again, encouraged Priyanka to use nightly braces.     PROBLEM LIST  Hemangiopericytoma, grade II  Liver lesion  Depressed mood  Sleep issues  Headache    PLAN  -CANCER-DIRECTED THERAPY-  -As above; Continue imaging surveillance. Repeat MR brain imaging in 6 months.     -LIVER LESION-  -Treated. No lesion-specific follow-up needed.   -CT c/a/p done annual for systemic surveillance imaging; next due in 10/2023.     -STEROIDS-  -Currently off dexamethasone.    -SEIZURE MANAGEMENT-  -While this patient is at increased risk of having seizures, given the lack of seizure history, there is no indication to prescribe an antiepileptic at this time.     -CARPAL TUNNEL-  -Left wrist > right. Occasional right wrist jerking.   -Improved with decreased strain at work.  -Recommended bracing overnight.     -Quality of life/ MOOD/ FATIGUE-  -Referral to primary care provider for further evaluation/treatment of mood/fatigue.   -Today labs; TSH (normal), Vitamin D (pending), Vitamin B12 (normal).   -Continue Zoloft 100mg daily.  -Continue with routine exercise.     -MEMORY LOSS/ WORD FINDING ISSUES-  -Evaluated by Dr. Phan Wright in 3/2020 and 4/2021. Neuro-psych testing with mild weaknesses noted in aspects of speeded visual processing and general cognitive speed. Stable findings when comparing prior tests.  -Repeat testing if clinically indicated.     Return to clinic in 10/2023 to review repeat imaging.     In the meantime, Priyanka knows to call with questions or concerns or to report new complaints and can be seen sooner if needed.    Patient was also seen and examined by Kami Noe MS4 [employer], under my direct supervision.     Lauren Zhang MD  Neuro-oncology

## 2023-04-17 NOTE — NURSING NOTE
After I verified name and date of birth. I drew labs via venipuncture on this pt while in clinic. They tolerated this well.   Labs sent down to the first floor labs.    Rachel Langford, A

## 2023-04-18 LAB — DEPRECATED CALCIDIOL+CALCIFEROL SERPL-MC: 41 UG/L (ref 20–75)

## 2023-05-08 ENCOUNTER — OFFICE VISIT (OUTPATIENT)
Dept: NURSING | Facility: CLINIC | Age: 63
End: 2023-05-08
Attending: FAMILY MEDICINE
Payer: COMMERCIAL

## 2023-05-08 ENCOUNTER — OFFICE VISIT (OUTPATIENT)
Dept: PULMONOLOGY | Facility: CLINIC | Age: 63
End: 2023-05-08
Attending: FAMILY MEDICINE
Payer: COMMERCIAL

## 2023-05-08 VITALS — OXYGEN SATURATION: 96 % | WEIGHT: 194.6 LBS | HEART RATE: 76 BPM | BODY MASS INDEX: 35.59 KG/M2

## 2023-05-08 VITALS
HEART RATE: 64 BPM | SYSTOLIC BLOOD PRESSURE: 116 MMHG | DIASTOLIC BLOOD PRESSURE: 68 MMHG | BODY MASS INDEX: 35.59 KG/M2 | OXYGEN SATURATION: 97 % | WEIGHT: 194.6 LBS

## 2023-05-08 DIAGNOSIS — J43.2 CENTRILOBULAR EMPHYSEMA (H): Primary | ICD-10-CM

## 2023-05-08 DIAGNOSIS — R05.1 ACUTE COUGH: ICD-10-CM

## 2023-05-08 DIAGNOSIS — J45.20 MILD INTERMITTENT ASTHMA WITHOUT COMPLICATION: ICD-10-CM

## 2023-05-08 LAB
DLCOUNC-%PRED-PRE: 102 %
DLCOUNC-PRE: 18.9 ML/MIN/MMHG
DLCOUNC-PRED: 18.42 ML/MIN/MMHG
ERV-%PRED-PRE: 22 %
ERV-PRE: 0.07 L
ERV-PRED: 0.32 L
EXPTIME-PRE: 6.54 SEC
FEF2575-%PRED-POST: 62 %
FEF2575-%PRED-PRE: 46 %
FEF2575-POST: 1.21 L/SEC
FEF2575-PRE: 0.91 L/SEC
FEF2575-PRED: 1.93 L/SEC
FEFMAX-%PRED-PRE: 64 %
FEFMAX-PRE: 3.73 L/SEC
FEFMAX-PRED: 5.78 L/SEC
FEV1-%PRED-PRE: 66 %
FEV1-PRE: 1.39 L
FEV1FEV6-PRE: 71 %
FEV1FEV6-PRED: 80 %
FEV1FVC-PRE: 71 %
FEV1FVC-PRED: 80 %
FEV1SVC-PRE: 61 %
FEV1SVC-PRED: 71 %
FIFMAX-PRE: 3.66 L/SEC
FRCPLETH-%PRED-PRE: 81 %
FRCPLETH-PRE: 2.1 L
FRCPLETH-PRED: 2.58 L
FVC-%PRED-PRE: 75 %
FVC-PRE: 1.96 L
FVC-PRED: 2.61 L
IC-%PRED-PRE: 85 %
IC-PRE: 2.23 L
IC-PRED: 2.6 L
RVPLETH-%PRED-PRE: 109 %
RVPLETH-PRE: 2.03 L
RVPLETH-PRED: 1.85 L
TLCPLETH-%PRED-PRE: 94 %
TLCPLETH-PRE: 4.33 L
TLCPLETH-PRED: 4.56 L
VA-%PRED-PRE: 90 %
VA-PRE: 3.88 L
VC-%PRED-PRE: 78 %
VC-PRE: 2.3 L
VC-PRED: 2.92 L

## 2023-05-08 PROCEDURE — 94375 RESPIRATORY FLOW VOLUME LOOP: CPT | Performed by: INTERNAL MEDICINE

## 2023-05-08 PROCEDURE — 94726 PLETHYSMOGRAPHY LUNG VOLUMES: CPT | Performed by: INTERNAL MEDICINE

## 2023-05-08 PROCEDURE — 94729 DIFFUSING CAPACITY: CPT | Performed by: INTERNAL MEDICINE

## 2023-05-08 PROCEDURE — 99205 OFFICE O/P NEW HI 60 MIN: CPT | Mod: 25 | Performed by: INTERNAL MEDICINE

## 2023-05-08 RX ORDER — FLUTICASONE PROPIONATE AND SALMETEROL 113; 14 UG/1; UG/1
1 POWDER, METERED RESPIRATORY (INHALATION) 2 TIMES DAILY
Qty: 1 EACH | Refills: 11 | Status: SHIPPED | OUTPATIENT
Start: 2023-05-08 | End: 2023-10-16

## 2023-05-08 RX ORDER — ALBUTEROL SULFATE 90 UG/1
2 AEROSOL, METERED RESPIRATORY (INHALATION) EVERY 6 HOURS
Qty: 18 G | Refills: 1 | Status: SHIPPED | OUTPATIENT
Start: 2023-05-08 | End: 2023-11-08

## 2023-05-08 NOTE — PROGRESS NOTES
Pulmonary Clinic New Patient Consult  Reason for Consult: Cough  History of Present Illness  I had the pleasure of seeing Leidy Ascencio, who is a 62 yr old female who presents with symptoms of productive cough.  She was recently diagnosed with COPD following repeated spells of symptoms including productive cough, trouble breathing especially with the cough spasms and rhintis. Also some post nasal drip but not really a lot of nasal congestion/sinus pressure. She was treated for copd exacerbation with doxy and prednisone but no improvement. The inhalers don't seem to do much. She is worse when lying down. She is on Flovent inhaler and use albuterol as needed. Albuterol seems to be relatively more helpful. She was treated with a 2nd round of steroids after which she improved. Remote history of Hemangiopericytoma of CNS, grade II s/p brain surgery and followed with brain radiation after recurrence. She is currently undergoing surveillance imaging periodically.  Today, feeling better. Continues to cough but at baseline. Minimal SOB and no wheezing nor chest tightness (resolved). Denies any chest pain, pedal swellings or palpitations.   Remote hx of smoking,m quit 15 years ago, prior 27 pack years. Paternal uncle  from lung cancer (smoker). 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    Review of Systems:  10 of 14 systems reviewed and are negative unless otherwise stated in HPI.    Past Medical History:   Diagnosis Date     Arthritis      COPD (chronic obstructive pulmonary disease) (H)      Depressive disorder      Hyperlipemia        Past Surgical History:   Procedure Laterality Date     APPENDECTOMY       COLONOSCOPY       COLONOSCOPY N/A 2022    Procedure: COLONOSCOPY, BIOPSY and polypectomy;  Surgeon: Rick Zavala MD;  Location:  GI     GYN SURGERY      3 cesections     HYSTERECTOMY VAGINAL, BILATERAL SALPINGO-OOPHERECTOMY, COMBINED      hysterectomy + ovaries       LAPAROSCOPIC ABLATION LIVER TUMOR N/A 1/15/2020    Procedure: Laparoscopic ultrasound guided percutaneous microwave abalation of tumor x1; ultrasound guided Liver Biopsy x 4; Hepatic Ultrasound Intraoperatively;  Surgeon: Phan Fierro MD;  Location: [facility] OR     LAPAROSCOPY DIAGNOSTIC (GENERAL)  1/15/2020    Procedure: Laparoscopy diagnostic (general);  Surgeon: Phan Fierro MD;  Location: [facility] OR     ZZC EXCIS INFRATENT MENINGIOMA         Family History   Problem Relation Age of Onset     Coronary Artery Disease Mother      Hypertension Mother      Hyperlipidemia Mother      Arthritis Father      Cerebrovascular Disease Father      Alzheimer Disease Maternal Grandmother      Other Cancer Maternal Grandmother         lung     Diabetes Maternal Grandfather      Other Cancer Paternal Grandfather         meloma     Anxiety Disorder Daughter      Substance Abuse Son        Social History     Socioeconomic History     Marital status:      Spouse name: None     Number of children: None     Years of education: None     Highest education level: None   Tobacco Use     Smoking status: Former     Packs/day: 1.00     Years: 27.00     Pack years: 27.00     Types: Cigarettes     Quit date: 2003     Years since quittin.3     Smokeless tobacco: Never   Substance and Sexual Activity     Alcohol use: Yes     Drug use: No     Sexual activity: Yes     Partners: Male     Birth control/protection: Female Surgical   Other Topics Concern     Parent/sibling w/ CABG, MI or angioplasty before 65F 55M? No     Social Determinants of Health     Intimate Partner Violence: Unknown (10/21/2022)    Humiliation, Afraid, Rape, and Kick questionnaire      Fear of Current or Ex-Partner: No      Emotionally Abused: No         Allergies   Allergen Reactions     Other Drug Allergy (See Comments) Hives     Paxlovid      Atorvastatin      Penicillins      Sulfa Antibiotics      Sulfamethoxazole-Trimethoprim Rash         Current  Outpatient Medications:      albuterol (PROAIR HFA/PROVENTIL HFA/VENTOLIN HFA) 108 (90 Base) MCG/ACT inhaler, Inhale 2 puffs into the lungs every 6 hours, Disp: 18 g, Rfl: 1     azelastine (ASTELIN) 0.1 % nasal spray, Spray 1 spray into both nostrils 2 times daily, Disp: 30 mL, Rfl: 8     fluticasone (FLOVENT HFA) 110 MCG/ACT inhaler, Inhale 2 puffs into the lungs 2 times daily, Disp: 12 g, Rfl: 4     lactobacillus rhamnosus, GG, (CULTURELL) capsule, Take 1 capsule by mouth 2 times daily, Disp: , Rfl:      loratadine (CLARITIN) 10 MG tablet, Take 10 mg by mouth daily, Disp: , Rfl:      PROAIR  (90 Base) MCG/ACT inhaler, Inhale 1-2 puffs into the lungs every 4 hours as needed for shortness of breath / dyspnea, Disp: 1 Inhaler, Rfl: 11     rosuvastatin (CRESTOR) 10 MG tablet, Take 1 tablet (10 mg) by mouth daily, Disp: 90 tablet, Rfl: 1     sertraline (ZOLOFT) 100 MG tablet, Take 100 mg by mouth daily Needs to be seen for further refills, Disp: , Rfl:      valACYclovir (VALTREX) 1000 mg tablet, Take 2 tablets (2,000 mg) by mouth 2 times daily For a total of 2 doses, Disp: 12 tablet, Rfl: 1     VITAMIN D, CHOLECALCIFEROL, PO, Take 1,000 Units by mouth daily, Disp: , Rfl:      Vitamin D3 (VITAMIN D, CHOLECALCIFEROL,) 25 mcg (1000 units) tablet, Take 25 mcg by mouth daily, Disp: , Rfl:     Current Facility-Administered Medications:      triamcinolone (KENALOG-40) injection 40 mg, 40 mg, , , Lauren Edmonds MD, 40 mg at 12/07/20 1442      Physical Exam:  /68 (BP Location: Left arm, Patient Position: Sitting, Cuff Size: Adult Regular)   Pulse 64   Wt 88.3 kg (194 lb 9.6 oz)   SpO2 97%   Breastfeeding No   BMI 35.59 kg/m    GENERAL: Well developed, well nourished, alert, and in no apparent distress.  HEENT: Normocephalic, atraumatic. PERRL, EOMI. Oral mucosa is moist. No perioral cyanosis.  NECK: supple, no masses, no thyromegaly.  RESP:  Normal respiratory effort.  CTAB.  No rales, wheezes,  rhonchi.  No cyanosis or clubbing.  CV: Normal S1, S2, regular rhythm, normal rate. No murmur.  No LE edema.   ABDOMEN:  Soft, non-tender, non-distended.   SKIN: warm and dry. No rash.  NEURO: AAOx3.  Normal gait.  Fluent speech.  PSYCH: mentation appears normal.   Results:  PFTs: Mild obstruction with normal volumes and diffusion  Most Recent [facility] Pulmonary Function Testing    FVC-Pred   Date Value Ref Range Status   05/08/2023 2.61 L      FVC-Pre   Date Value Ref Range Status   05/08/2023 1.96 L      FVC-%Pred-Pre   Date Value Ref Range Status   05/08/2023 75 %      FEV1-Pre   Date Value Ref Range Status   05/08/2023 1.39 L      FEV1-%Pred-Pre   Date Value Ref Range Status   05/08/2023 66 %      FEV1FVC-Pred   Date Value Ref Range Status   05/08/2023 80 %      FEV1FVC-Pre   Date Value Ref Range Status   05/08/2023 71 %      No results found for: 20029  FEFMax-Pred   Date Value Ref Range Status   05/08/2023 5.78 L/sec      FEFMax-Pre   Date Value Ref Range Status   05/08/2023 3.73 L/sec      FEFMax-%Pred-Pre   Date Value Ref Range Status   05/08/2023 64 %      ExpTime-Pre   Date Value Ref Range Status   05/08/2023 6.54 sec      FIFMax-Pre   Date Value Ref Range Status   05/08/2023 3.66 L/sec      FEV1FEV6-Pred   Date Value Ref Range Status   05/08/2023 80 %      FEV1FEV6-Pre   Date Value Ref Range Status   05/08/2023 71 %      No results found for: 20055  Imaging (personally reviewed in clinic today): CT CHEST/ABDOMEN/PELVIS W CONTRAST: 9/6/2022 9:15 AM  IMPRESSION:  1.  A few larger left axillary lymph nodes may just be reactive to an inflammatory process. Recommend further attention to these at imaging follow-up.  2.  Remainder of the scan is stable with no new areas of disease.  3.  Stable right-sided pulmonary nodule.    Assessment and Plan:   COPD/Chronic cough  PFTs show very mild fixed obstruction and CT chest shows no clear evidence of airway nor airspace disease. There are no emphysematous changes. She  will benefit from more robust bronchodilation and I will switch her from Flovent to Advair. Prescriptions were given with refills. She knows to rinse her mouth after each use. ICS/LABA may be a better option as she has significant baseline seasonal allergies and I wonder if she has concomitant asthma.  She will continue albuterol as needed. I suspect her cough is primarily due to COPD and I expect some improvement.  Allergic Rhinitis  On Astelin and controlled      Lung Nodule  Stable this far.    Questions and concerns were answered to the patient's satisfaction.  she was provided with my contact information should new questions or concerns arise in the interim.  she should return to clinic in 6 months   Up to date on vaccination  I spent a total of 60 minutes face to face with Leidy Ascencio during today's office visit. Over 50% of this time was spent counseling the patient and/or coordinating care regarding their pulmonary disease.    Binh Fair MD  Pulmonary, Critical Care and Sleep Medicine  [facility]  Pager: [phone]        The above note was dictated using voice recognition software and may include typographical errors. Please contact the author for any clarifications.

## 2023-05-08 NOTE — NURSING NOTE
"Leidy Ascencio's goals for this visit include:   Chief Complaint   Patient presents with     Cough     Acute cough consult PFT at 9:00       PCP: [facility]    Referring Provider:  Darrion Monae MD  [address] [place] [address]      Initial /68 (BP Location: Left arm, Patient Position: Sitting, Cuff Size: Adult Regular)   Pulse 64   Wt 88.3 kg (194 lb 9.6 oz)   SpO2 97%   Breastfeeding No   BMI 35.59 kg/m   Estimated body mass index is 35.59 kg/m  as calculated from the following:    Height as of 8/25/22: 1.575 m (5' 2\").    Weight as of this encounter: 88.3 kg (194 lb 9.6 oz).    Medication Reconciliation: complete    Do you need any medication refills at today's visit? none    KACIE Junior  Rheumatology/Infectious disease  [facility]   [phone]    "

## 2023-05-26 ENCOUNTER — OFFICE VISIT (OUTPATIENT)
Dept: URGENT CARE | Facility: URGENT CARE | Age: 63
End: 2023-05-26
Payer: COMMERCIAL

## 2023-05-26 VITALS
HEART RATE: 78 BPM | TEMPERATURE: 97.2 F | SYSTOLIC BLOOD PRESSURE: 180 MMHG | OXYGEN SATURATION: 97 % | BODY MASS INDEX: 35.12 KG/M2 | DIASTOLIC BLOOD PRESSURE: 99 MMHG | RESPIRATION RATE: 18 BRPM | WEIGHT: 192 LBS

## 2023-05-26 DIAGNOSIS — E78.00 ELEVATED CHOLESTEROL: ICD-10-CM

## 2023-05-26 DIAGNOSIS — J44.1 COPD EXACERBATION (H): Primary | ICD-10-CM

## 2023-05-26 PROCEDURE — 99214 OFFICE O/P EST MOD 30 MIN: CPT | Performed by: PHYSICIAN ASSISTANT

## 2023-05-26 RX ORDER — ROSUVASTATIN CALCIUM 10 MG/1
10 TABLET, COATED ORAL DAILY
Qty: 90 TABLET | Refills: 1 | Status: SHIPPED | OUTPATIENT
Start: 2023-05-26 | End: 2023-12-22

## 2023-05-26 RX ORDER — DOXYCYCLINE 100 MG/1
100 CAPSULE ORAL 2 TIMES DAILY
Qty: 14 CAPSULE | Refills: 0 | Status: SHIPPED | OUTPATIENT
Start: 2023-05-26 | End: 2023-07-11

## 2023-05-26 RX ORDER — PREDNISONE 20 MG/1
40 TABLET ORAL DAILY
Qty: 10 TABLET | Refills: 0 | Status: SHIPPED | OUTPATIENT
Start: 2023-05-26 | End: 2023-05-26

## 2023-05-26 RX ORDER — ROSUVASTATIN CALCIUM 10 MG/1
10 TABLET, COATED ORAL DAILY
Qty: 90 TABLET | Refills: 1 | Status: SHIPPED | OUTPATIENT
Start: 2023-05-26 | End: 2023-05-26

## 2023-05-26 RX ORDER — DOXYCYCLINE 100 MG/1
100 CAPSULE ORAL 2 TIMES DAILY
Qty: 14 CAPSULE | Refills: 0 | Status: SHIPPED | OUTPATIENT
Start: 2023-05-26 | End: 2023-05-26

## 2023-05-26 RX ORDER — PREDNISONE 20 MG/1
40 TABLET ORAL DAILY
Qty: 10 TABLET | Refills: 0 | Status: SHIPPED | OUTPATIENT
Start: 2023-05-26 | End: 2023-10-16

## 2023-05-26 NOTE — PATIENT INSTRUCTIONS
Doxycycline twice daily for 7 days  Prednisone daily for 5 days  Rest! Your body needs more rest to heal.  Drink plenty of fluids (warm fluids like tea or soup are soothing and reduce cough)  Sit in the bathroom with a hot shower running and breathe in the steam.  Honey may soothe your sore throat and help manage your cough- may take straight or in warm water with lemon juice.  Avoid smoke (cigarettes, bonfires, fireplace, wood burning stoves).  Take Tylenol or an NSAID such as ibuprofen or naproxen as needed for pain.  Delsym (dextromethorphan polistirex) is an over the counter cough medication that lasts 12 hours.   Mucinex or Robitussin (guiafenesin) thin mucus and may help it to loosen more quickly  Good handwashing is the best way to prevent spread of germs  Present to emergency room if you develop trouble breathing, swallowing or cough-up blood.  Follow up with your primary care provider if symptoms worsen or fail to improve as expected.

## 2023-05-26 NOTE — PROGRESS NOTES
Assessment & Plan     COPD exacerbation (H)  - doxycycline hyclate (VIBRAMYCIN) 100 MG capsule; Take 1 capsule (100 mg) by mouth 2 times daily  - predniSONE (DELTASONE) 20 MG tablet; Take 2 tablets (40 mg) by mouth daily    Elevated cholesterol  - rosuvastatin (CRESTOR) 10 MG tablet; Take 1 tablet (10 mg) by mouth daily    Continue fluticasone-salmeterol inhaler twice daily, albuterol as needed. She has both of these at home. Follow up if symptoms worsen or do not improve.    Return in about 1 week (around 6/2/2023).     [facility]    Subjective   Leidy Ascencio is a 62 year old who presents for the following health issues     Patient presents with:  Urgent Care  Cough: Per patient states symptoms started two days ago cough dry deep, chest congestion and headaches    KIZZY Mathew presents clinic today for evaluation of a cough.  She has a history of COPD and notes that she does cough at baseline.  Her cough is generally productive.  In the last week or so, she has noticed that the cough is moving deeper into her chest and is becoming more painful.  Cough is worsened in the last 2 to 3 days.  In March, she had a similar exacerbation that required 2 rounds of antibiotic.  She states this time her symptoms do not seem quite so bad but she wants to get on top of it before it gets worse.  No fever.  Needs refill of cholesterol medication.    Review of Systems   ROS negative except as stated above.      Objective    BP (!) 180/99   Pulse 78   Temp 97.2  F (36.2  C) (Tympanic)   Resp 18   Wt 87.1 kg (192 lb)   SpO2 97%   BMI 35.12 kg/m    Physical Exam   GENERAL: healthy, alert and no distress  EYES: Eyes grossly normal to inspection, PERRL and conjunctivae and sclerae normal  HENT: ear canals and TM's normal, nose and mouth without ulcers or lesions  NECK: no adenopathy, no asymmetry, masses, or scars and thyroid normal to palpation  RESP: lungs clear to auscultation  - no rales, rhonchi or wheezes, mildly decreased lung sounds in bilateral upper lobes  CV: regular rate and rhythm, normal S1 S2, no S3 or S4, no murmur, click or rub, no peripheral edema and peripheral pulses strong    No results found for any visits on 05/26/23.

## 2023-07-11 ENCOUNTER — OFFICE VISIT (OUTPATIENT)
Dept: INTERNAL MEDICINE | Facility: CLINIC | Age: 63
End: 2023-07-11
Payer: COMMERCIAL

## 2023-07-11 VITALS
BODY MASS INDEX: 35.33 KG/M2 | HEIGHT: 62 IN | SYSTOLIC BLOOD PRESSURE: 128 MMHG | WEIGHT: 192 LBS | OXYGEN SATURATION: 97 % | DIASTOLIC BLOOD PRESSURE: 78 MMHG | TEMPERATURE: 98.4 F | HEART RATE: 66 BPM

## 2023-07-11 DIAGNOSIS — J30.9 ALLERGIC RHINITIS, UNSPECIFIED SEASONALITY, UNSPECIFIED TRIGGER: ICD-10-CM

## 2023-07-11 DIAGNOSIS — M17.11 PRIMARY OSTEOARTHRITIS OF RIGHT KNEE: ICD-10-CM

## 2023-07-11 DIAGNOSIS — M25.561 CHRONIC PAIN OF RIGHT KNEE: ICD-10-CM

## 2023-07-11 DIAGNOSIS — F33.1 MODERATE EPISODE OF RECURRENT MAJOR DEPRESSIVE DISORDER (H): ICD-10-CM

## 2023-07-11 DIAGNOSIS — Z12.31 VISIT FOR SCREENING MAMMOGRAM: ICD-10-CM

## 2023-07-11 DIAGNOSIS — D43.3 HEMANGIOPERICYTOMA OF CNS, GRADE II (H): Primary | ICD-10-CM

## 2023-07-11 DIAGNOSIS — E78.5 HYPERLIPIDEMIA LDL GOAL <130: ICD-10-CM

## 2023-07-11 DIAGNOSIS — D43.0 NEOPLASM OF UNCERTAIN BEHAVIOR OF BRAIN, SUPRATENTORIAL (H): ICD-10-CM

## 2023-07-11 DIAGNOSIS — G89.29 CHRONIC PAIN OF RIGHT KNEE: ICD-10-CM

## 2023-07-11 DIAGNOSIS — Z91.038 PAST HISTORY OF INSECT ALLERGY: ICD-10-CM

## 2023-07-11 PROCEDURE — 99215 OFFICE O/P EST HI 40 MIN: CPT | Performed by: INTERNAL MEDICINE

## 2023-07-11 RX ORDER — TRIAMCINOLONE ACETONIDE 1 MG/G
OINTMENT TOPICAL 2 TIMES DAILY
Qty: 30 G | Refills: 0 | Status: ON HOLD | OUTPATIENT
Start: 2023-07-11 | End: 2024-09-06

## 2023-07-11 ASSESSMENT — PATIENT HEALTH QUESTIONNAIRE - PHQ9
SUM OF ALL RESPONSES TO PHQ QUESTIONS 1-9: 12
10. IF YOU CHECKED OFF ANY PROBLEMS, HOW DIFFICULT HAVE THESE PROBLEMS MADE IT FOR YOU TO DO YOUR WORK, TAKE CARE OF THINGS AT HOME, OR GET ALONG WITH OTHER PEOPLE: SOMEWHAT DIFFICULT
SUM OF ALL RESPONSES TO PHQ QUESTIONS 1-9: 12

## 2023-07-11 NOTE — PROGRESS NOTES
"  Assessment & Plan     Hemangiopericytoma of CNS, grade II  Follow-up with oncologist    Neoplasm of uncertain behavior of brain, supratentorial (H)  As above      Primary osteoarthritis of right knee  We will set up with sports medicine as patient wishes to have an injection in her knee  - Orthopedic  Referral; Future    Chronic pain of right knee  As above      Hyperlipidemia LDL goal <130  Continue statin therapy.  Lab work reviewed    Moderate episode of recurrent major depressive disorder (H)  Stable.  Continue Zoloft    Allergic rhinitis, unspecified seasonality, unspecified trigger  Continue Claritin with Astelin    Past history of insect allergy  Use topical Kenalog ointment as needed irritated mosquito bites  - triamcinolone (KENALOG) 0.1 % external ointment; Apply topically 2 times daily    Visit for screening mammogram  Screening  - MA SCREENING DIGITAL BILAT - Future  (s+30); Future    Review of prior external note(s) from - [facility]where information from [facility] reviewed         BMI:   Estimated body mass index is 35.12 kg/m  as calculated from the following:    Height as of this encounter: 1.575 m (5' 2\").    Weight as of this encounter: 87.1 kg (192 lb).   Weight management plan: Discussed healthy diet and exercise guidelines                             FOLLOW UP   I have asked the patient to make an appointment for followup with:  1) Me  2) the patient's preferred provider  3) Any available provider  In 4 months.  She will follow-up with her oncologist, orthopedist as scheduled      Subjective   Priyanka is a 63 year old, presenting for the following health issues:  Establish [facility] [place]      7/11/2023     4:32 PM   Additional Questions   Roomed by Madelin Richardson     History of Present Illness       Reason for visit:  Establish care    She eats 2-3 servings of fruits and vegetables daily.She consumes 2 sweetened beverage(s) daily.She exercises with enough effort to increase her heart rate 9 or " "less minutes per day.  She exercises with enough effort to increase her heart rate 3 or less days per week. She is missing 1 dose(s) of medications per week.  She is not taking prescribed medications regularly due to remembering to take.               Review of Systems   CONSTITUTIONAL: NEGATIVE for fever, chills, change in weight  INTEGUMENTARY/SKIN: NEGATIVE for worrisome rashes, moles or lesions  EYES: Acknowledges bitemporal hemianopsia  ENT/MOUTH: NEGATIVE for ear, mouth and throat problems  RESP:NEGATIVE for significant cough or SOB and positive for some mild dyspnea with exertion.  Doing much better now that she is on a LABA steroid combination.  Pulmonary medicine notes reviewed  BREAST: NEGATIVE for masses, tenderness or discharge  CV: NEGATIVE for chest pain, palpitations or peripheral edema  GI: NEGATIVE for nausea, abdominal pain, heartburn, or change in bowel habits  : NEGATIVE for frequency, dysuria, or hematuria  MUSCULOSKELETAL: NEGATIVE for significant arthralgias or myalgia  NEURO: NEGATIVE for weakness, dizziness or paresthesias  ENDOCRINE: NEGATIVE for temperature intolerance, skin/hair changes  HEME: NEGATIVE for bleeding problems  PSYCHIATRIC: Acknowledges mild depression which is primarily situational.  Takes her medications compliantly.  Denies any suicidal intent or ideation      Objective    /78   Pulse 66   Temp 98.4  F (36.9  C) (Temporal)   Ht 1.575 m (5' 2\")   Wt 87.1 kg (192 lb)   SpO2 97%   BMI 35.12 kg/m    Body mass index is 35.12 kg/m .  Physical Exam   GENERAL: healthy, alert and no distress  EYES: Eyes grossly normal to inspection, PERRL and conjunctivae and sclerae normal  HENT: ear canals and TM's normal, nose and mouth without ulcers or lesions  NECK: no adenopathy, no asymmetry, masses, or scars and thyroid normal to palpation  RESP: lungs clear to auscultation - no rales, rhonchi or wheezes  CV: regular rate and rhythm, normal S1 S2, no S3 or S4, no murmur, " click or rub, no peripheral edema and peripheral pulses strong  ABDOMEN: soft, nontender, no hepatosplenomegaly, no masses and bowel sounds normal  MS: no gross musculoskeletal defects noted, no edema  SKIN: no suspicious lesions or rashes  NEURO: Normal strength and tone, mentation intact and speech normal  PSYCH: mentation appears normal, affect normal/bright    Recent/previous lab work and radiographs discussed with the patient in detail.             Time spent With/On Patient  Length of time   46 minutes      Chart reviewed  y    Review of outside records y    Review of test results y    Interpretation of results y     Patient visit  y  Documentation  y  Discussion with family n  Discussion with providers n          I have carefully explained the diagnosis and treatment options to the patient.  The patient has displayed an understanding of the above, and all subsequent questions were answered.      DO ROSA Fitch    Portions of this note were produced using [employer]  Although every attempt at real-time proof reading has been made, occasional grammar/syntax errors may have been missed.

## 2023-07-16 ENCOUNTER — HEALTH MAINTENANCE LETTER (OUTPATIENT)
Age: 63
End: 2023-07-16

## 2023-08-01 NOTE — PROGRESS NOTES
Leidy Ascencio  :  1960  DOS: 2023  MRN: [id number]  PCP: [facility]    Sports Medicine Clinic Visit      HPI  Leidy Ascencio is a 63 year old female who is seen in consultation at the request of  Valeriy Rowland D.O. presenting with right knee pain.    - Mechanism of Injury:  No inciting injury.   - Prior evaluation:  2020 right knee corticosteroid injection with Dr. Phoebe Edmonds for primary osteoarthritis.  Radiographs at that time revealed mild to moderate medial joint space narrowing without significant effusion.    - Pain Character:  Pain has been present for  many years. Seems to be worsening over the past year .  Pain is well localized to the patellar region of the right knee without significant radiation.   - Endorses:  aching pain, burning feeling around the knee occasionally, clicking and popping  - Denies:  instability, numbness, tingling, radicular shooting pain.  - Alleviating factors:   HEP before bed  - Aggravating factors:  Bending, stairs, squatting, kneeling, standing to do dishes. Aches at night.  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    Past Medical History:   Diagnosis Date    Arthritis     COPD (chronic obstructive pulmonary disease) (H)     Depressive disorder     Hyperlipemia      Past Surgical History:   Procedure Laterality Date    APPENDECTOMY      COLONOSCOPY      COLONOSCOPY N/A 2022    Procedure: COLONOSCOPY, BIOPSY and polypectomy;   Surgeon: Rick Zavala MD;  Location:  GI    GYN SURGERY      3 cesections    HYSTERECTOMY VAGINAL, BILATERAL SALPINGO-OOPHERECTOMY, COMBINED  1988    hysterectomy + ovaries     LAPAROSCOPIC ABLATION LIVER TUMOR N/A 1/15/2020    Procedure: Laparoscopic ultrasound guided percutaneous microwave abalation of tumor x1; ultrasound guided Liver Biopsy x 4; Hepatic Ultrasound Intraoperatively;  Surgeon: Phan Fierro MD;  Location: [facility] OR    LAPAROSCOPY DIAGNOSTIC (GENERAL)  1/15/2020    Procedure: Laparoscopy diagnostic (general);  Surgeon: Phan Fierro MD;  Location: [facility] OR    ZZC EXCIS INFRATENT MENINGIOMA       Family History   Problem Relation Age of Onset    Coronary Artery Disease Mother     Hypertension Mother     Hyperlipidemia Mother     Arthritis Father     Cerebrovascular Disease Father     Alzheimer Disease Maternal Grandmother     Other Cancer Maternal Grandmother         lung    Diabetes Maternal Grandfather     Other Cancer Paternal Grandfather         meloma    Anxiety Disorder Daughter     Substance Abuse Son          Objective  /78   Wt 87.1 kg (192 lb)   BMI 35.12 kg/m      General: healthy, alert and in no acute distress.    HEENT: no scleral icterus or conjunctival erythema.   Skin: no suspicious lesions or rash. No jaundice.   CV: regular rhythm by palpation, 2+ distal pulses.  Resp: normal respiratory effort without conversational dyspnea.   Psych: normal mood and affect.    Gait: nonantalgic, appropriate coordination and balance.     Neuro:        - Sensation to light touch:    - Intact throughout the BLE including all peripheral nerve distributions.        - MSR:      RLE  LLE  - Patella 2+ 2+  - Achilles 2+ 2+       - Special tests:   - Slump/SLR:  Neg    MSK - Knee:       - Inspection:    - No significant effusion, erythema, warmth, ecchymosis, lesion.        - ROM:    - Full AROM/PROM with pain during knee flexion/extension.        - Palpation:    - TTP at the medial joint  line, peripatellar borders.  - NTTP elsewhere.        - Strength:  (*antalgic)  RLE LLE  - Hip Flexion  5 5    - Hip Abduction 5 5   - Hip Adduction 5 5  - Knee Flexion  5 5  - Knee Extension 5 5  - Dorsiflexion  5 5  - Plantarflexion 5 5  - Ext. Juan F. Longus 5 5  - Inversion  5 5  - Eversion  5 5       - Special tests:        - Lachman:  Neg         - A/P drawer:  Neg        - Pivot shift:  Neg    - Patrick:  Neg      - Varus stress:  Neg for laxity or pain     - Valgus stress:  Neg for laxity or pain    - Patellar grind: Positive    Radiology  I independently reviewed the available relevant imaging, with the following interpretation:   - XR B/L knees 12/7/2020 shows mild to moderate medial joint space narrowing without significant osteophytosis, no knee joint effusion, no acute fracture or dislocation    I independently reviewed today's new relevant imaging, with the following interpretation:  - XR R knee 8/2/2023 shows moderate to severe degenerative changes with joint space narrowing of the medial compartment and to a lesser degree the patellofemoral compartment.  No significant knee joint effusion, no acute fractures or dislocations.      Procedure  Large Joint Injection/Arthocentesis: R knee joint    Date/Time: 8/2/2023 3:24 PM    Performed by: Chase Mendoza DO  Authorized by: Chase Mendoza DO    Indications:  Pain  Needle Size:  22 G  Guidance: landmark guided    Approach:  Anterolateral  Location:  Knee      Medications:  40 mg triamcinolone 40 MG/ML; 2 mL bupivacaine (PF) 0.5 %; 2 mL lidocaine 1 %  Outcome:  Tolerated well, no immediate complications  Procedure discussed: discussed risks, benefits, and alternatives    Consent Given by:  Patient  Prep: patient was prepped and draped in usual sterile fashion         PROCEDURE  Intraarticular Knee Joint Injection - Right  The patient was informed of the risks and benefits of the procedure and alternatives were discussed. A written consent was signed by the  patient.   The injection site was prepped with chlorhexidine in sterile fashion.   An injectate solution containing 2 mL of 1% lidocaine, 2 mL of 0.5% bupivacaine, and 1 mL of Kenalog (40 mg/mL) was drawn up into a 5 mL syringe.  Injection was performed using sterile technique.  A 1.5-inch 22-gauge needle was used to enter the knee joint using a lateral infrapatellar approach and injectate was injected successfully. After the injection, the site was cleaned and a bandage applied.     The patient tolerated the procedure well without complications.       Assessment  1. Primary osteoarthritis of right knee        Plan  Leidy Ascencio is a 63 year old female that presents with chronic right knee pain secondary to primary osteoarthritis.  Good responses from prior corticosteroid injections and interested in another injection today.  Discussed the nature of the condition and treatment options and mutually agreed upon the following plan:    - Imaging:          - Reviewed relevant imaging in the chart.  -XR R knee ordered today pre-clinic and independently interpreted by myself.    - Reviewed results and images with patient.   - Medications:          - Discussed pharmacologic options for pain relief.   - May use NSAIDs (Ibuprofen, Naproxen) or Acetaminophen (Tylenol) as needed for pain control.   - May also use topical medications such as lidocaine, IcyHot, BioFreeze, or Voltaren gel as needed for pain control.   - Injections:          - Discussed possible injection options and alternatives.    - Options include intra-articular knee joint injection with corticosteroid, viscosupplementation, or PRP.   - Performed a corticosteroid injection of the right intra-articular knee joint today in clinic. Patient tolerated the procedure well without complications.    - Post-procedure instructions:    - Keep the injection site clean and dry.   - Do not submerge the injection site for 24 hours (no baths, pools). Showers are ok.   - Rest  the area for 24-48 hours before resuming normal activities. Avoid overexerting the area for the first few weeks.   - It may take 2-3 days to start noticing the effects of the injection and up to 3-4 weeks to feel significant benefits.   - Therapy:          - Discussed the benefits of physical therapy vs home exercise program for optimization of range of motion, flexibility, strength, stability and function.   -Continue previously given home exercise program as instructed by your physical therapist.  Currently performing HEP regularly.  - Modalities:          - May use ice, heat, massage or other modalities as needed.   - Bracing:          - Discussed bracing options and recommend using knee stability bracing when walking or performing exacerbating activities.  - Activity:          - Encouraged to remain active and participate in regular activities as symptoms allow.  Avoid or modify exacerbating activities as needed.  - Follow up:          -As needed in the future for re-evaluation and update to treatment plan, or sooner for new/worsening symptoms.  - Patient has clinic contact information for questions or concerns.       Chase Mendoza DO, JULIAM  [facility]  [facility] - Department of Orthopedic Surgery       Disclaimer:  This note was prepared and written using Dragon Medical dictation software. As a result, there may be errors in the script that have gone undetected. Please consider this when interpreting the information in this note.

## 2023-08-02 ENCOUNTER — ANCILLARY PROCEDURE (OUTPATIENT)
Dept: GENERAL RADIOLOGY | Facility: CLINIC | Age: 63
End: 2023-08-02
Attending: STUDENT IN AN ORGANIZED HEALTH CARE EDUCATION/TRAINING PROGRAM
Payer: COMMERCIAL

## 2023-08-02 ENCOUNTER — OFFICE VISIT (OUTPATIENT)
Dept: ORTHOPEDICS | Facility: CLINIC | Age: 63
End: 2023-08-02
Payer: COMMERCIAL

## 2023-08-02 VITALS — WEIGHT: 192 LBS | SYSTOLIC BLOOD PRESSURE: 130 MMHG | BODY MASS INDEX: 35.12 KG/M2 | DIASTOLIC BLOOD PRESSURE: 78 MMHG

## 2023-08-02 DIAGNOSIS — M17.11 PRIMARY OSTEOARTHRITIS OF RIGHT KNEE: ICD-10-CM

## 2023-08-02 DIAGNOSIS — M17.11 PRIMARY OSTEOARTHRITIS OF RIGHT KNEE: Primary | ICD-10-CM

## 2023-08-02 PROCEDURE — 99204 OFFICE O/P NEW MOD 45 MIN: CPT | Mod: 25 | Performed by: STUDENT IN AN ORGANIZED HEALTH CARE EDUCATION/TRAINING PROGRAM

## 2023-08-02 PROCEDURE — 73564 X-RAY EXAM KNEE 4 OR MORE: CPT | Mod: TC | Performed by: STUDENT IN AN ORGANIZED HEALTH CARE EDUCATION/TRAINING PROGRAM

## 2023-08-02 PROCEDURE — 20610 DRAIN/INJ JOINT/BURSA W/O US: CPT | Mod: RT | Performed by: STUDENT IN AN ORGANIZED HEALTH CARE EDUCATION/TRAINING PROGRAM

## 2023-08-02 RX ADMIN — BUPIVACAINE HYDROCHLORIDE 2 ML: 5 INJECTION, SOLUTION EPIDURAL; INTRACAUDAL at 15:24

## 2023-08-02 RX ADMIN — TRIAMCINOLONE ACETONIDE 40 MG: 40 INJECTION, SUSPENSION INTRA-ARTICULAR; INTRAMUSCULAR at 15:24

## 2023-08-02 RX ADMIN — LIDOCAINE HYDROCHLORIDE 2 ML: 10 INJECTION, SOLUTION INFILTRATION; PERINEURAL at 15:24

## 2023-08-02 ASSESSMENT — PAIN SCALES - GENERAL: PAINLEVEL: MODERATE PAIN (4)

## 2023-08-02 NOTE — LETTER
2023         RE: Leidy Ascencio  [address] [place] [address]        Dear Colleague,    Thank you for referring your patient, Leidy Ascnecio, to the [facility] SPORTS MEDIC[facility] [place]. Please see a copy of my visit note below.    Leidy Ascencio  :  1960  DOS: 2023  MRN: [id number]  PCP: [facility]    Sports Medicine Clinic Visit      HPI  Leidy Ascencio is a 63 year old female who is seen in consultation at the request of  Valeriy Rowland D.O. presenting with right knee pain.    - Mechanism of Injury:  No inciting injury.   - Prior evaluation:  2020 right knee corticosteroid injection with Dr. Phoebe Edmonds for primary osteoarthritis.  Radiographs at that time revealed mild to moderate medial joint space narrowing without significant effusion.    - Pain Character:  Pain has been present for  many years. Seems to be worsening over the past year .  Pain is well localized to the patellar region of the right knee without significant radiation.   - Endorses:  aching pain, burning feeling around the knee occasionally, clicking and popping  - Denies:  instability, numbness, tingling, radicular shooting pain.  - Alleviating factors:   HEP before bed  - Aggravating factors:  Bending, stairs, squatting, kneeling, standing to do dishes. Aches at night.  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    Past Medical History:   Diagnosis Date     Arthritis       COPD (chronic obstructive pulmonary disease) (H)      Depressive disorder      Hyperlipemia      Past Surgical History:   Procedure Laterality Date     APPENDECTOMY       COLONOSCOPY       COLONOSCOPY N/A 11/29/2022    Procedure: COLONOSCOPY, BIOPSY and polypectomy;  Surgeon: Rick Zavala MD;  Location:  GI     GYN SURGERY      3 cesections     HYSTERECTOMY VAGINAL, BILATERAL SALPINGO-OOPHERECTOMY, COMBINED  1988    hysterectomy + ovaries      LAPAROSCOPIC ABLATION LIVER TUMOR N/A 1/15/2020    Procedure: Laparoscopic ultrasound guided percutaneous microwave abalation of tumor x1; ultrasound guided Liver Biopsy x 4; Hepatic Ultrasound Intraoperatively;  Surgeon: Phan Fierro MD;  Location: [facility] OR     LAPAROSCOPY DIAGNOSTIC (GENERAL)  1/15/2020    Procedure: Laparoscopy diagnostic (general);  Surgeon: Phan Fierro MD;  Location: [facility] OR     ZZC EXCIS INFRATENT MENINGIOMA       Family History   Problem Relation Age of Onset     Coronary Artery Disease Mother      Hypertension Mother      Hyperlipidemia Mother      Arthritis Father      Cerebrovascular Disease Father      Alzheimer Disease Maternal Grandmother      Other Cancer Maternal Grandmother         lung     Diabetes Maternal Grandfather      Other Cancer Paternal Grandfather         meloma     Anxiety Disorder Daughter      Substance Abuse Son          Objective  /78   Wt 87.1 kg (192 lb)   BMI 35.12 kg/m      General: healthy, alert and in no acute distress.    HEENT: no scleral icterus or conjunctival erythema.   Skin: no suspicious lesions or rash. No jaundice.   CV: regular rhythm by palpation, 2+ distal pulses.  Resp: normal respiratory effort without conversational dyspnea.   Psych: normal mood and affect.    Gait: nonantalgic, appropriate coordination and balance.     Neuro:        - Sensation to light touch:    - Intact throughout the BLE including all peripheral nerve distributions.        - MSR:      RLE  LLE  - Patella 2+ 2+  -  Achilles 2+ 2+       - Special tests:   - Slump/SLR:  Neg    MSK - Knee:       - Inspection:    - No significant effusion, erythema, warmth, ecchymosis, lesion.        - ROM:    - Full AROM/PROM with pain during knee flexion/extension.        - Palpation:    - TTP at the medial joint line, peripatellar borders.  - NTTP elsewhere.        - Strength:  (*antalgic)  RLE LLE  - Hip Flexion  5 5    - Hip Abduction 5 5   - Hip Adduction 5 5  - Knee Flexion  5 5  - Knee Extension 5 5  - Dorsiflexion  5 5  - Plantarflexion 5 5  - Ext. Juan F. Longus 5 5  - Inversion  5 5  - Eversion  5 5       - Special tests:        - Lachman:  Neg         - A/P drawer:  Neg        - Pivot shift:  Neg    - Patrick:  Neg      - Varus stress:  Neg for laxity or pain     - Valgus stress:  Neg for laxity or pain    - Patellar grind: Positive    Radiology  I independently reviewed the available relevant imaging, with the following interpretation:   - XR B/L knees 12/7/2020 shows mild to moderate medial joint space narrowing without significant osteophytosis, no knee joint effusion, no acute fracture or dislocation    I independently reviewed today's new relevant imaging, with the following interpretation:  - XR R knee 8/2/2023 shows moderate to severe degenerative changes with joint space narrowing of the medial compartment and to a lesser degree the patellofemoral compartment.  No significant knee joint effusion, no acute fractures or dislocations.      Procedure  Large Joint Injection/Arthocentesis: R knee joint    Date/Time: 8/2/2023 3:24 PM    Performed by: Chase Mendoza DO  Authorized by: Chase Mendoza DO    Indications:  Pain  Needle Size:  22 G  Guidance: landmark guided    Approach:  Anterolateral  Location:  Knee      Medications:  40 mg triamcinolone 40 MG/ML; 2 mL bupivacaine (PF) 0.5 %; 2 mL lidocaine 1 %  Outcome:  Tolerated well, no immediate complications  Procedure discussed: discussed risks, benefits, and alternatives     Consent Given by:  Patient  Prep: patient was prepped and draped in usual sterile fashion         PROCEDURE  Intraarticular Knee Joint Injection - Right  The patient was informed of the risks and benefits of the procedure and alternatives were discussed. A written consent was signed by the patient.   The injection site was prepped with chlorhexidine in sterile fashion.   An injectate solution containing 2 mL of 1% lidocaine, 2 mL of 0.5% bupivacaine, and 1 mL of Kenalog (40 mg/mL) was drawn up into a 5 mL syringe.  Injection was performed using sterile technique.  A 1.5-inch 22-gauge needle was used to enter the knee joint using a lateral infrapatellar approach and injectate was injected successfully. After the injection, the site was cleaned and a bandage applied.     The patient tolerated the procedure well without complications.       Assessment  1. Primary osteoarthritis of right knee        Plan  Leidy Ascencio is a 63 year old female that presents with chronic right knee pain secondary to primary osteoarthritis.  Good responses from prior corticosteroid injections and interested in another injection today.  Discussed the nature of the condition and treatment options and mutually agreed upon the following plan:    - Imaging:          - Reviewed relevant imaging in the chart.  -XR R knee ordered today pre-clinic and independently interpreted by myself.    - Reviewed results and images with patient.   - Medications:          - Discussed pharmacologic options for pain relief.   - May use NSAIDs (Ibuprofen, Naproxen) or Acetaminophen (Tylenol) as needed for pain control.   - May also use topical medications such as lidocaine, IcyHot, BioFreeze, or Voltaren gel as needed for pain control.   - Injections:          - Discussed possible injection options and alternatives.    - Options include intra-articular knee joint injection with corticosteroid, viscosupplementation, or PRP.   - Performed a corticosteroid  injection of the right intra-articular knee joint today in clinic. Patient tolerated the procedure well without complications.    - Post-procedure instructions:    - Keep the injection site clean and dry.   - Do not submerge the injection site for 24 hours (no baths, pools). Showers are ok.   - Rest the area for 24-48 hours before resuming normal activities. Avoid overexerting the area for the first few weeks.   - It may take 2-3 days to start noticing the effects of the injection and up to 3-4 weeks to feel significant benefits.   - Therapy:          - Discussed the benefits of physical therapy vs home exercise program for optimization of range of motion, flexibility, strength, stability and function.   -Continue previously given home exercise program as instructed by your physical therapist.  Currently performing HEP regularly.  - Modalities:          - May use ice, heat, massage or other modalities as needed.   - Bracing:          - Discussed bracing options and recommend using knee stability bracing when walking or performing exacerbating activities.  - Activity:          - Encouraged to remain active and participate in regular activities as symptoms allow.  Avoid or modify exacerbating activities as needed.  - Follow up:          -As needed in the future for re-evaluation and update to treatment plan, or sooner for new/worsening symptoms.  - Patient has clinic contact information for questions or concerns.       Chase Mendoza DO, CAQSM  [facility] - Sports Medicine  [facility] - Department of Orthopedic Surgery       Disclaimer:  This note was prepared and written using Dragon Medical dictation software. As a result, there may be errors in the script that have gone undetected. Please consider this when interpreting the information in this note.       Again, thank you for allowing me to participate in the care of your patient.        Sincerely,        Chase Mendoza DO

## 2023-08-07 RX ORDER — LIDOCAINE HYDROCHLORIDE 10 MG/ML
2 INJECTION, SOLUTION INFILTRATION; PERINEURAL
Status: DISCONTINUED | OUTPATIENT
Start: 2023-08-02 | End: 2024-09-06

## 2023-08-07 RX ORDER — TRIAMCINOLONE ACETONIDE 40 MG/ML
40 INJECTION, SUSPENSION INTRA-ARTICULAR; INTRAMUSCULAR
Status: DISCONTINUED | OUTPATIENT
Start: 2023-08-02 | End: 2024-09-06

## 2023-08-07 RX ORDER — BUPIVACAINE HYDROCHLORIDE 5 MG/ML
2 INJECTION, SOLUTION EPIDURAL; INTRACAUDAL
Status: DISCONTINUED | OUTPATIENT
Start: 2023-08-02 | End: 2024-09-06

## 2023-08-09 ENCOUNTER — E-VISIT (OUTPATIENT)
Dept: FAMILY MEDICINE | Facility: CLINIC | Age: 63
End: 2023-08-09
Payer: COMMERCIAL

## 2023-08-09 DIAGNOSIS — B37.0 THRUSH: Primary | ICD-10-CM

## 2023-08-09 PROCEDURE — 99421 OL DIG E/M SVC 5-10 MIN: CPT | Performed by: FAMILY MEDICINE

## 2023-08-09 RX ORDER — CLOTRIMAZOLE 10 MG/1
10 LOZENGE ORAL
Qty: 50 LOZENGE | Refills: 0 | Status: SHIPPED | OUTPATIENT
Start: 2023-08-09 | End: 2023-08-19

## 2023-08-09 NOTE — PATIENT INSTRUCTIONS
Thank you for choosing us for your care. I have placed an order for a prescription so that you can start treatment. View your full visit summary for details by clicking on the link below. Your pharmacist will able to address any questions you may have about the medication.     If you're not feeling better within 5-7 days, please schedule an appointment.  You can schedule an appointment right here in [facility], or call [phone]  If the visit is for the same symptoms as your eVisit, we'll refund the cost of your eVisit if seen within seven days.

## 2023-09-18 ENCOUNTER — MYC MEDICAL ADVICE (OUTPATIENT)
Dept: INTERNAL MEDICINE | Facility: CLINIC | Age: 63
End: 2023-09-18
Payer: COMMERCIAL

## 2023-09-18 DIAGNOSIS — F33.0 MAJOR DEPRESSIVE DISORDER, RECURRENT EPISODE, MILD (H): ICD-10-CM

## 2023-09-19 ENCOUNTER — PATIENT OUTREACH (OUTPATIENT)
Dept: FAMILY MEDICINE | Facility: CLINIC | Age: 63
End: 2023-09-19
Payer: COMMERCIAL

## 2023-09-19 NOTE — TELEPHONE ENCOUNTER
Ok for patient to continue taking Sertaline 150mg daily?  Our medication list shows she is to take 100mg daily.    If appropriate to continue 150mg daily, prescription pended for review/completion.

## 2023-09-19 NOTE — TELEPHONE ENCOUNTER
Patient Quality Outreach    Patient is due for the following:   Physical Preventive Adult Physical    Next Steps:   Schedule a Adult Preventative    Type of outreach:    Sent [employer] message.      Questions for provider review:    None           Bhargavi Brooks, CMA

## 2023-09-20 RX ORDER — SERTRALINE HYDROCHLORIDE 100 MG/1
150 TABLET, FILM COATED ORAL DAILY
Qty: 135 TABLET | Refills: 0 | Status: SHIPPED | OUTPATIENT
Start: 2023-09-20 | End: 2024-01-10

## 2023-09-29 ENCOUNTER — ANCILLARY PROCEDURE (OUTPATIENT)
Dept: MAMMOGRAPHY | Facility: CLINIC | Age: 63
End: 2023-09-29
Attending: INTERNAL MEDICINE
Payer: COMMERCIAL

## 2023-09-29 DIAGNOSIS — Z12.31 VISIT FOR SCREENING MAMMOGRAM: ICD-10-CM

## 2023-09-29 PROCEDURE — 77067 SCR MAMMO BI INCL CAD: CPT | Mod: TC | Performed by: RADIOLOGY

## 2023-09-29 PROCEDURE — 77063 BREAST TOMOSYNTHESIS BI: CPT | Mod: TC | Performed by: RADIOLOGY

## 2023-10-05 ENCOUNTER — OFFICE VISIT (OUTPATIENT)
Dept: URGENT CARE | Facility: URGENT CARE | Age: 63
End: 2023-10-05
Payer: COMMERCIAL

## 2023-10-05 VITALS
RESPIRATION RATE: 18 BRPM | BODY MASS INDEX: 35.67 KG/M2 | TEMPERATURE: 98 F | DIASTOLIC BLOOD PRESSURE: 78 MMHG | OXYGEN SATURATION: 95 % | WEIGHT: 195 LBS | HEART RATE: 76 BPM | SYSTOLIC BLOOD PRESSURE: 146 MMHG

## 2023-10-05 DIAGNOSIS — L03.115 CELLULITIS OF RIGHT LOWER EXTREMITY: ICD-10-CM

## 2023-10-05 DIAGNOSIS — R21 RASH: Primary | ICD-10-CM

## 2023-10-05 PROCEDURE — 99213 OFFICE O/P EST LOW 20 MIN: CPT | Performed by: NURSE PRACTITIONER

## 2023-10-05 RX ORDER — DOXYCYCLINE HYCLATE 100 MG
100 TABLET ORAL 2 TIMES DAILY
Qty: 14 TABLET | Refills: 0 | Status: SHIPPED | OUTPATIENT
Start: 2023-10-05 | End: 2023-10-16

## 2023-10-05 RX ORDER — PREDNISONE 20 MG/1
40 TABLET ORAL DAILY
Qty: 10 TABLET | Refills: 0 | Status: SHIPPED | OUTPATIENT
Start: 2023-10-05 | End: 2023-10-16

## 2023-10-05 NOTE — PROGRESS NOTES
Assessment & Plan     Rash    - Adult Dermatology Referral  - predniSONE (DELTASONE) 20 MG tablet  Dispense: 10 tablet; Refill: 0    Cellulitis of right lower extremity    - doxycycline hyclate (VIBRA-TABS) 100 MG tablet  Dispense: 14 tablet; Refill: 0     Unsure cause of rash, will treat for cellulitis with doxycycline twice daily for 7 days, avoid sun and prednisone daily for 5 days, potential side effects discussed. Rest, fluids, tylenol as needed, Zyrtec daily, cool compresses, try not to scratch. Watch closely for worsening symptoms of infection including fever, chills, redness, swelling, pain, purulent discharge and follow-up right away if develops. Referral placed for dermatology for further evaluation.     Follow-up with PCP if symptoms persist for 7 days, and sooner if symptoms worsen or new symptoms develop.     Discussed red flag symptoms which warrant immediate visit in emergency room    All questions were answered and patient verbalized understanding. AVS reviewed with patient.     Marlene Felder, DNP, APRN, CNP 10/5/2023 12:27 PM  [facility]     Priyanka is a 63 year old female who presents to clinic today for the following health issues:  Chief Complaint   Patient presents with    Urgent Care    Derm Problem     Per patient states she has been having bumps on hands and right elbow states they are painful, very itchy and red        Rash    Onset of rash was 1 week(s) ago.   Course of illness is worsening.  Severity moderate  Current and Associated symptoms: redness, itching, painful, warmth, swelling  Location of the rash: bilateral hands, right elbow was red and the redness resolved but still has flesh colored lesions, right upper back  Denies drainage, blistering, fever.  Previous history of a similar rash? No  Recent exposure history: none known  Associated symptoms include: fatigue  Treatment measures tried include: Voltaren and triamcinolone haven't  helped   No history of diabetes.     Problem list, Medication list, Allergies, and Medical history reviewed in EPIC.    ROS:  Review of systems negative except for noted above        Objective    BP (!) 146/78   Pulse 76   Temp 98  F (36.7  C) (Oral)   Resp 18   Wt 88.5 kg (195 lb)   SpO2 95%   BMI 35.67 kg/m    Physical Exam  Constitutional:       General: She is not in acute distress.     Appearance: She is not toxic-appearing or diaphoretic.   Cardiovascular:      Pulses: Normal pulses.   Skin:     General: Skin is warm and dry.      Findings: Erythema present.      Comments: 5 flesh colored 2 mm macular papular lesions right elbow without warmth, drainage, abscess, vesicles. Three erythematous 2 mm lesions right hand interphalangeal joints with increased warmth, surrounding erythema, no abscess or drainage. 1 flesh colored 2 mm papule right upper back without erythema   Neurological:      Mental Status: She is alert.      Sensory: No sensory deficit.                    Verbal consent obtained from patient to take photo for medical electronic health record

## 2023-10-09 ENCOUNTER — ANCILLARY PROCEDURE (OUTPATIENT)
Dept: CT IMAGING | Facility: CLINIC | Age: 63
End: 2023-10-09
Attending: PSYCHIATRY & NEUROLOGY
Payer: COMMERCIAL

## 2023-10-09 DIAGNOSIS — D43.3 HEMANGIOPERICYTOMA OF CNS, GRADE II (H): ICD-10-CM

## 2023-10-09 PROCEDURE — 74177 CT ABD & PELVIS W/CONTRAST: CPT | Performed by: RADIOLOGY

## 2023-10-09 PROCEDURE — 71260 CT THORAX DX C+: CPT | Performed by: RADIOLOGY

## 2023-10-09 RX ORDER — IOPAMIDOL 755 MG/ML
95 INJECTION, SOLUTION INTRAVASCULAR ONCE
Status: COMPLETED | OUTPATIENT
Start: 2023-10-09 | End: 2023-10-09

## 2023-10-09 RX ADMIN — IOPAMIDOL 95 ML: 755 INJECTION, SOLUTION INTRAVASCULAR at 13:09

## 2023-10-11 NOTE — PROGRESS NOTES
NEURO-ONCOLOGY VISIT  Oct 16, 2023    CHIEF COMPLAINT: Ms. 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 She completed GammaKnife treatment in 1/2020.     Priyanka is currently managed on imaging surveillance and imaging to date demonstrates no evidence of cancer recurrence. CT body imaging is done annually as well without any concerning findings having been identified.     I met with Priyanka and Main () today for this follow-up visit.     HISTORY OF PRESENT ILLNESS  -Priyanka is doing well. No headaches.   -Still fatigued, but slightly better energy.    Issues with falling asleep. Discussed the option of taking Melatonin 2-3 hours prior to bedtime.  -Continues to struggle with concentration, mild issues with short term memory.   -Stable visual field cut.  -Denies changes in strength.  -Carpel tunnel symptoms improving.  -Rash on her hands have improved some; looking to follow-up with primary care provider.   -Mood is good.       MEDICATIONS   Current Outpatient Medications   Medication Sig Dispense Refill    albuterol (PROAIR HFA/PROVENTIL HFA/VENTOLIN HFA) 108 (90 Base) MCG/ACT inhaler Inhale 2 puffs into the lungs every 6 hours 18 g 1    azelastine (ASTELIN) 0.1 % nasal spray Spray 1 spray into both nostrils 2 times daily 30 mL 8    loratadine (CLARITIN) 10 MG tablet Take 10 mg by mouth daily      rosuvastatin (CRESTOR) 10 MG tablet Take 1 tablet (10 mg) by mouth daily 90 tablet 1    sertraline (ZOLOFT) 100 MG tablet Take 1.5 tablets (150 mg) by mouth daily Needs to be seen for further refills 135 tablet 0    triamcinolone (KENALOG) 0.1 % external ointment Apply topically 2 times daily 30 g 0    Vitamin D3 (VITAMIN D, CHOLECALCIFEROL,) 25 mcg (1000 units) tablet Take 25 mcg by mouth daily      valACYclovir (VALTREX) 1000 mg tablet Take 2 tablets (2,000 mg) by mouth 2 times daily For a total of 2 doses  (Patient not taking: Reported on 7/11/2023) 12 tablet 1     DRUG ALLERGIES   Allergies   Allergen Reactions    Other Drug Allergy (See Comments) Hives     Paxlovid     Atorvastatin     Penicillins     Sulfa Antibiotics     Sulfamethoxazole-Trimethoprim Rash       ONCOLOGIC HISTORY  -2015 PRESENTATION: Headaches.  -2015 MR brain imaging with a right occipital mass   -4/22/2015 SURGERY: Resection by Dr. Lujan   PATHOLOGY: Hemangiopericytoma, grade 2.   -2015 CT chest, abdomen and pelvis negative for malignancy.  -2015 PET MRI negative for malignancy.   Conservative management per Dr. Herman Mayberry.    -8/1/2017 CT chest abdomen and pelvis and bone scan negative for malignancy.   -12/7/2017 MRB with no evidence of tumor recurrence.  -12/6/2018 MRB with no evidence of tumor recurrence.    -12/11/2019 MRB with a new nodule of enhancement within the superior sagittal sinus, concerning for tumor recurrence. Stable appearance of the right parieto-occipital craniotomy site.  -12/13/2019 Bone scan with no convincing evidence of skeletal metastases.  -12/18/2019 MR liver with a 9 mm lesion in the lateral aspect of the junction of segments 7 and 8 of the liver shows homogeneous delayed enhancement and is new since 2015. This could represent a metastatic lesion.     -1/6/2020 NEURO-ONC: Referral to radiation oncology and GI oncology surgery. Signed consent for next generation sequencing.   -1/14/2020 RADS: GammaKnife to lesion (1 fraction).   -1/15/2020 LIVER LESION:  Laparoscopic ultrasound-guided microwave ablation of liver tumor by Dr. Phan Fierro.   PATHOLOGY: Cavernous hemangioma of the liver, benign:    -No metastatic hemangiopericytoma or other malignancy identified    -Underlying steatohepatitis, no significant fibrosis    -No follow-up needed.  -2/28/2020 NEURO-ONC/ MRB: Clinically stable. Headaches with migraine features; increasing Zoloft. Imaging with positive response to radiation.   -3/9/2020 Evaluated by   Adriana; Neuro-psych testing with mild weaknesses were noted in aspects of speeded visual processing, and general cognitive speed. Repeat in 3/2021.  -6/1/2020 NEURO-ONC/ MRB: Clinically well; for insomnia trial of melatonin. Imaging with positive treatment effect.   -6/1/2020 NEURO-ONC/ MRB: Clinically well. Imaging with no concerns.   -9/14/2020 NEURO-ONC/ MRB: Clinically well. Imaging of MRI brain and CT CAP with no concerns.  -12/14/2020 NEURO-ONC/ MRB: Clinically well. MR imaging of the brain with no concerns.  -3/12/2021 NEURO-ONC/ MRB: Clinically well. MRI of the brain with no concerns.  -9/10/2021 NEURO-ONC/ MRB: Clinically well. MRI of the brain and CT CAP with no concerns.  -3/7/2022 NEURO-ONC/ MRB: Clinically well. MRI of the brain with no concerns.  -10/21/2022 NEURO-ONC/ MRB: Clinically well; signs of carpal tunnel in the left wrist. MRI of the brain and CT CAP with no concerns.  -10/21/2022 NEURO-ONC/ MRB: Clinically well; signs of carpal tunnel in the left wrist. MRI of the brain and CT CAP with no concerns.  -4/17/2023 NEURO-ONC/ MRB: Mood is down, less motivation, more issues with concentration. Carpal tunnel of left improving with behavior change. Fatigue labs today; recommended follow-up with primary care provider. MRI of the brain with no concerns.   -10/16/2023 NEURO-ONC/ MRB: Energy improving. No new neurological concerns. MRI of the brain with no concerns. CT CAP with no concerns.    SOCIAL HISTORY   Tobacco use: Former smoker; quit in 2003. 27 pack years.  . 2 daughter, 1 son.   Employment: [employer]; [employer] managers.        PHYSICAL EXAMINATION  /79 (BP Location: Right arm, Patient Position: Sitting, Cuff Size: Adult Regular)   Pulse 86   Temp 98.5  F (36.9  C) (Oral)   Wt 89.5 kg (197 lb 6.4 oz)   SpO2 97%   BMI 36.10 kg/m     Wt Readings from Last 2 Encounters:   10/16/23 89.5 kg (197 lb 6.4 oz)   10/05/23 88.5 kg (195 lb)      Ht Readings from Last 2  "Encounters:   07/11/23 1.575 m (5' 2\")   08/25/22 1.575 m (5' 2\")      KPS   -Generally well appearing, feeling fatigued.   -Respiratory: No audible wheezing. Normal respiratory effort.   -Psychiatric: Normal mood and affect. Pleasant, talkative.  -Neurologic:   MENTAL STATUS:     Alert, oriented.   Recall: Intact.    Speech fluent.    Comprehension intact.     CRANIAL NERVES:     Pupils are equal, round.    Extraocular movements full, patient denies diplopia.    Left inferior homonymous quadrantanopia.   Symmetric facial movements.   Hearing intact.   With normal phonation, no dysfunction tongue.   GAIT: Steady, unassisted.      MEDICAL RECORDS  Personally reviewed; Urgent care notes from this month.     LABS  Personally reviewed all available lab results; April- TSH (normal). Vitamin B12  419 (normal). Vitamin D 41 (normal).     IMAGING  Personally reviewed MR brain imaging from today and compared to prior imaging. There is stable, minimal contrast enhancement at the sagittal sinus with no increase in T2 FLAIR. No new lesions.    Formal read; \"Status post right occipital hemangiopericytoma resection without recurrence.\"    Imaging shown to Marlin.        CT CAP IMPRESSION: No findings to suggest potential neoplastic involvement in the chest, abdomen, or pelvis.      IMPRESSION  Clinic time of 40 minutes was spent reviewing data, in evaluation, and coordinating care for this high complexity visit. This was in addition to answering questions pertaining to my recommendations and devising the plan as outlined below.      WHO grade 2 hemangiopericytomas are considered malignant tumors. Standard treatment options include surgery and radiation therapy. With no good surgical option, Priyanka completed stereotactic radiation therapy in 1/2020. Recent brain imaging was again with no new concerns. I personally reviewed Priyanka's imaging and case at Brain Tumor Conference and all in attendance were in agreement " with this impression. Therefore, will continue with imaging surveillance every 6 months. Per NCCN guidelines, imaging should be repeated every 3-6 months for the first 5 years (at least through 1/2025) and then, annually thereafter.      Of note, Priyanka has consented to next generation sequencing. At cancer recurrence, can consider sending pathology for mutational analysis to potentially identify targeted theray options. Additional treatment options include Pazopanib (Votrient) or temozolomide plus bevacizumab.    Extracranial metastatic disease can occur; most commonly in the bone, lung, and liver. Prior MR liver imaging showed a lesion. Priyanka was referred to Dr. Phan Fierro with surgical oncology. Dr. Fierro performed a  laparoscopic ultrasound-guided microwave ablation of a liver lesion on 1/15/2020. Pathology was consistent with a benign cavernous hemangioma and no metastatic hemangiopericytoma or other malignancy was identified. She is currently monitored on annual systemic surveillance imaging with a CT C/A/P with contrast done every Fall. Her recent imaging from last week was without any new concerns.    I personally reviewed the Urgent care visit from this month in which Priyanka presented for evaluation of a rash. A referral was placed to dermatology. Today, Priyanka states that the rash has improved some and she is first going to follow-up with her primary care provider. I again reviewed the fatigue lab work-up that I ordered at her last visit and her vitamin B12, TSH/ T4 and vitamin D levels were all normal. Today, I reviewed the option of taking Melatonin 2-3 hours prior to bedtime to help her fall asleep.    PROBLEM LIST  Hemangiopericytoma, WHO grade 2  Liver lesion  Depressed mood  Sleep issues  Headache    PLAN  -CANCER-DIRECTED THERAPY-  -As above; Continue imaging surveillance. Repeat MR brain imaging in 6 months.     -LIVER LESION-  -Treated. No lesion-specific follow-up needed.   -CT c/a/p done  annual for systemic surveillance imaging; next due in 10/2024.     -STEROIDS-  -Currently off dexamethasone.    -SEIZURE MANAGEMENT-  -While this patient is at increased risk of having seizures, given the lack of seizure history, there is no indication to prescribe an antiepileptic at this time.     -CARPAL TUNNEL-  -Left wrist > right. Occasional right wrist jerking.   -Improved with decreased strain at work.  -Recommended bracing overnight.     -Quality of life/ MOOD/ FATIGUE-  -Referral to primary care provider for further evaluation/treatment of mood/fatigue.   -4/2023; TSH (normal), Vitamin D (pending), Vitamin B12 (normal).   -Continue Zoloft 100mg daily.  -Continue with routine exercise.   -Issues with falling asleep; recommend taking Melatonin 2-3 hours prior to bedtime.    -MEMORY LOSS/ WORD FINDING ISSUES-  -Evaluated by Dr. Phan Wright in 3/2020 and 4/2021. Neuro-psych testing with mild weaknesses noted in aspects of speeded visual processing and general cognitive speed. Stable findings when comparing prior tests.  -Repeat testing if clinically indicated.     Return to clinic in 4/2024 to review repeat imaging.     In the meantime, Priyanka knows to call with questions or concerns or to report new complaints and can be seen sooner if needed.    Lauren Zhang MD  Neuro-oncology

## 2023-10-16 ENCOUNTER — TUMOR CONFERENCE (OUTPATIENT)
Dept: ONCOLOGY | Facility: CLINIC | Age: 63
End: 2023-10-16
Payer: COMMERCIAL

## 2023-10-16 ENCOUNTER — ONCOLOGY VISIT (OUTPATIENT)
Dept: ONCOLOGY | Facility: CLINIC | Age: 63
End: 2023-10-16
Attending: PSYCHIATRY & NEUROLOGY
Payer: COMMERCIAL

## 2023-10-16 ENCOUNTER — ANCILLARY PROCEDURE (OUTPATIENT)
Dept: MRI IMAGING | Facility: CLINIC | Age: 63
End: 2023-10-16
Attending: PSYCHIATRY & NEUROLOGY
Payer: COMMERCIAL

## 2023-10-16 VITALS
BODY MASS INDEX: 36.1 KG/M2 | TEMPERATURE: 98.5 F | HEART RATE: 86 BPM | SYSTOLIC BLOOD PRESSURE: 138 MMHG | OXYGEN SATURATION: 97 % | DIASTOLIC BLOOD PRESSURE: 79 MMHG | WEIGHT: 197.4 LBS

## 2023-10-16 DIAGNOSIS — D43.3 HEMANGIOPERICYTOMA OF CNS, GRADE II (H): Primary | ICD-10-CM

## 2023-10-16 DIAGNOSIS — D43.3 HEMANGIOPERICYTOMA OF CNS, GRADE II (H): ICD-10-CM

## 2023-10-16 PROCEDURE — A9585 GADOBUTROL INJECTION: HCPCS | Mod: JZ | Performed by: STUDENT IN AN ORGANIZED HEALTH CARE EDUCATION/TRAINING PROGRAM

## 2023-10-16 PROCEDURE — 70553 MRI BRAIN STEM W/O & W/DYE: CPT | Performed by: STUDENT IN AN ORGANIZED HEALTH CARE EDUCATION/TRAINING PROGRAM

## 2023-10-16 PROCEDURE — 99213 OFFICE O/P EST LOW 20 MIN: CPT | Performed by: PSYCHIATRY & NEUROLOGY

## 2023-10-16 PROCEDURE — 99215 OFFICE O/P EST HI 40 MIN: CPT | Performed by: PSYCHIATRY & NEUROLOGY

## 2023-10-16 RX ORDER — GADOBUTROL 604.72 MG/ML
10 INJECTION INTRAVENOUS ONCE
Status: COMPLETED | OUTPATIENT
Start: 2023-10-16 | End: 2023-10-16

## 2023-10-16 RX ADMIN — GADOBUTROL 9 ML: 604.72 INJECTION INTRAVENOUS at 13:29

## 2023-10-16 ASSESSMENT — PAIN SCALES - GENERAL: PAINLEVEL: NO PAIN (0)

## 2023-10-16 NOTE — LETTER
10/16/2023         RE: Leidy Ascencio  [address] [place] [address]        Dear Colleague,    Thank you for referring your patient, Leidy Ascencio, to the [facility]. Please see a copy of my visit note below.    NEURO-ONCOLOGY VISIT  Oct 16, 2023    CHIEF COMPLAINT: Ms. 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 She completed GammaKnife treatment in 1/2020.     Priyanka is currently managed on imaging surveillance and imaging to date demonstrates no evidence of cancer recurrence. CT body imaging is done annually as well without any concerning findings having been identified.     I met with Priyanka and Main () today for this follow-up visit.     HISTORY OF PRESENT ILLNESS  -Priyanka is doing well. No headaches.   -Still fatigued, but slightly better energy.    Issues with falling asleep. Discussed the option of taking Melatonin 2-3 hours prior to bedtime.  -Continues to struggle with concentration, mild issues with short term memory.   -Stable visual field cut.  -Denies changes in strength.  -Carpel tunnel symptoms improving.  -Rash on her hands have improved some; looking to follow-up with primary care provider.   -Mood is good.       MEDICATIONS   Current Outpatient Medications   Medication Sig Dispense Refill    albuterol (PROAIR HFA/PROVENTIL HFA/VENTOLIN HFA) 108 (90 Base) MCG/ACT inhaler Inhale 2 puffs into the lungs every 6 hours 18 g 1    azelastine (ASTELIN) 0.1 % nasal spray Spray 1 spray into both nostrils 2 times daily 30 mL 8    loratadine (CLARITIN) 10 MG tablet Take 10 mg by mouth daily      rosuvastatin (CRESTOR) 10 MG tablet Take 1 tablet (10 mg) by mouth daily 90 tablet 1    sertraline (ZOLOFT) 100 MG tablet Take 1.5 tablets (150 mg) by mouth daily Needs to be seen for further refills 135 tablet 0    triamcinolone (KENALOG) 0.1 % external  ointment Apply topically 2 times daily 30 g 0    Vitamin D3 (VITAMIN D, CHOLECALCIFEROL,) 25 mcg (1000 units) tablet Take 25 mcg by mouth daily      valACYclovir (VALTREX) 1000 mg tablet Take 2 tablets (2,000 mg) by mouth 2 times daily For a total of 2 doses (Patient not taking: Reported on 7/11/2023) 12 tablet 1     DRUG ALLERGIES   Allergies   Allergen Reactions    Other Drug Allergy (See Comments) Hives     Paxlovid     Atorvastatin     Penicillins     Sulfa Antibiotics     Sulfamethoxazole-Trimethoprim Rash       ONCOLOGIC HISTORY  -2015 PRESENTATION: Headaches.  -2015 MR brain imaging with a right occipital mass   -4/22/2015 SURGERY: Resection by Dr. Lujan   PATHOLOGY: Hemangiopericytoma, grade 2.   -2015 CT chest, abdomen and pelvis negative for malignancy.  -2015 PET MRI negative for malignancy.   Conservative management per Dr. Herman Mayberry.    -8/1/2017 CT chest abdomen and pelvis and bone scan negative for malignancy.   -12/7/2017 MRB with no evidence of tumor recurrence.  -12/6/2018 MRB with no evidence of tumor recurrence.    -12/11/2019 MRB with a new nodule of enhancement within the superior sagittal sinus, concerning for tumor recurrence. Stable appearance of the right parieto-occipital craniotomy site.  -12/13/2019 Bone scan with no convincing evidence of skeletal metastases.  -12/18/2019 MR liver with a 9 mm lesion in the lateral aspect of the junction of segments 7 and 8 of the liver shows homogeneous delayed enhancement and is new since 2015. This could represent a metastatic lesion.     -1/6/2020 NEURO-ONC: Referral to radiation oncology and GI oncology surgery. Signed consent for next generation sequencing.   -1/14/2020 RADS: GammaKnife to lesion (1 fraction).   -1/15/2020 LIVER LESION:  Laparoscopic ultrasound-guided microwave ablation of liver tumor by Dr. Phan Fierro.   PATHOLOGY: Cavernous hemangioma of the liver, benign:    -No metastatic hemangiopericytoma or other malignancy identified     -Underlying steatohepatitis, no significant fibrosis    -No follow-up needed.  -2/28/2020 NEURO-ONC/ MRB: Clinically stable. Headaches with migraine features; increasing Zoloft. Imaging with positive response to radiation.   -3/9/2020 Evaluated by Dr. Wright; Neuro-psych testing with mild weaknesses were noted in aspects of speeded visual processing, and general cognitive speed. Repeat in 3/2021.  -6/1/2020 NEURO-ONC/ MRB: Clinically well; for insomnia trial of melatonin. Imaging with positive treatment effect.   -6/1/2020 NEURO-ONC/ MRB: Clinically well. Imaging with no concerns.   -9/14/2020 NEURO-ONC/ MRB: Clinically well. Imaging of MRI brain and CT CAP with no concerns.  -12/14/2020 NEURO-ONC/ MRB: Clinically well. MR imaging of the brain with no concerns.  -3/12/2021 NEURO-ONC/ MRB: Clinically well. MRI of the brain with no concerns.  -9/10/2021 NEURO-ONC/ MRB: Clinically well. MRI of the brain and CT CAP with no concerns.  -3/7/2022 NEURO-ONC/ MRB: Clinically well. MRI of the brain with no concerns.  -10/21/2022 NEURO-ONC/ MRB: Clinically well; signs of carpal tunnel in the left wrist. MRI of the brain and CT CAP with no concerns.  -10/21/2022 NEURO-ONC/ MRB: Clinically well; signs of carpal tunnel in the left wrist. MRI of the brain and CT CAP with no concerns.  -4/17/2023 NEURO-ONC/ MRB: Mood is down, less motivation, more issues with concentration. Carpal tunnel of left improving with behavior change. Fatigue labs today; recommended follow-up with primary care provider. MRI of the brain with no concerns.   -10/16/2023 NEURO-ONC/ MRB: Energy improving. No new neurological concerns. MRI of the brain with no concerns. CT CAP with no concerns.    SOCIAL HISTORY   Tobacco use: Former smoker; quit in 2003. 27 pack years.  . 2 daughter, 1 son.   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        PHYSICAL EXAMINATION  /79 (BP Location: Right arm, Patient Position: Sitting, Cuff Size:  "Adult Regular)   Pulse 86   Temp 98.5  F (36.9  C) (Oral)   Wt 89.5 kg (197 lb 6.4 oz)   SpO2 97%   BMI 36.10 kg/m     Wt Readings from Last 2 Encounters:   10/16/23 89.5 kg (197 lb 6.4 oz)   10/05/23 88.5 kg (195 lb)      Ht Readings from Last 2 Encounters:   07/11/23 1.575 m (5' 2\")   08/25/22 1.575 m (5' 2\")      KPS   -Generally well appearing, feeling fatigued.   -Respiratory: No audible wheezing. Normal respiratory effort.   -Psychiatric: Normal mood and affect. Pleasant, talkative.  -Neurologic:   MENTAL STATUS:     Alert, oriented.   Recall: Intact.    Speech fluent.    Comprehension intact.     CRANIAL NERVES:     Pupils are equal, round.    Extraocular movements full, patient denies diplopia.    Left inferior homonymous quadrantanopia.   Symmetric facial movements.   Hearing intact.   With normal phonation, no dysfunction tongue.   GAIT: Steady, unassisted.      MEDICAL RECORDS  Personally reviewed; Urgent care notes from this month.     LABS  Personally reviewed all available lab results; April- TSH (normal). Vitamin B12  419 (normal). Vitamin D 41 (normal).     IMAGING  Personally reviewed MR brain imaging from today and compared to prior imaging. There is stable, minimal contrast enhancement at the sagittal sinus with no increase in T2 FLAIR. No new lesions.    Formal read; \"Status post right occipital hemangiopericytoma resection without recurrence.\"    Imaging shown to Marlin.        CT CAP IMPRESSION: No findings to suggest potential neoplastic involvement in the chest, abdomen, or pelvis.      IMPRESSION  Clinic time of 40 minutes was spent reviewing data, in evaluation, and coordinating care for this high complexity visit. This was in addition to answering questions pertaining to my recommendations and devising the plan as outlined below.      WHO grade 2 hemangiopericytomas are considered malignant tumors. Standard treatment options include surgery and radiation therapy. With no " good surgical option, Priyanka completed stereotactic radiation therapy in 1/2020. Recent brain imaging was again with no new concerns. I personally reviewed Priyanka's imaging and case at Brain Tumor Conference and all in attendance were in agreement with this impression. Therefore, will continue with imaging surveillance every 6 months. Per NCCN guidelines, imaging should be repeated every 3-6 months for the first 5 years (at least through 1/2025) and then, annually thereafter.      Of note, Priyanka has consented to next generation sequencing. At cancer recurrence, can consider sending pathology for mutational analysis to potentially identify targeted theray options. Additional treatment options include Pazopanib (Votrient) or temozolomide plus bevacizumab.    Extracranial metastatic disease can occur; most commonly in the bone, lung, and liver. Prior MR liver imaging showed a lesion. Priyanka was referred to Dr. Phan Fierro with surgical oncology. Dr. Fierro performed a  laparoscopic ultrasound-guided microwave ablation of a liver lesion on 1/15/2020. Pathology was consistent with a benign cavernous hemangioma and no metastatic hemangiopericytoma or other malignancy was identified. She is currently monitored on annual systemic surveillance imaging with a CT C/A/P with contrast done every Fall. Her recent imaging from last week was without any new concerns.    I personally reviewed the Urgent care visit from this month in which Priyanka presented for evaluation of a rash. A referral was placed to dermatology. Today, Priyanka states that the rash has improved some and she is first going to follow-up with her primary care provider. I again reviewed the fatigue lab work-up that I ordered at her last visit and her vitamin B12, TSH/ T4 and vitamin D levels were all normal. Today, I reviewed the option of taking Melatonin 2-3 hours prior to bedtime to help her fall asleep.    PROBLEM LIST  Hemangiopericytoma, WHO grade 2  Liver  lesion  Depressed mood  Sleep issues  Headache    PLAN  -CANCER-DIRECTED THERAPY-  -As above; Continue imaging surveillance. Repeat MR brain imaging in 6 months.     -LIVER LESION-  -Treated. No lesion-specific follow-up needed.   -CT c/a/p done annual for systemic surveillance imaging; next due in 10/2024.     -STEROIDS-  -Currently off dexamethasone.    -SEIZURE MANAGEMENT-  -While this patient is at increased risk of having seizures, given the lack of seizure history, there is no indication to prescribe an antiepileptic at this time.     -CARPAL TUNNEL-  -Left wrist > right. Occasional right wrist jerking.   -Improved with decreased strain at work.  -Recommended bracing overnight.     -Quality of life/ MOOD/ FATIGUE-  -Referral to primary care provider for further evaluation/treatment of mood/fatigue.   -4/2023; TSH (normal), Vitamin D (pending), Vitamin B12 (normal).   -Continue Zoloft 100mg daily.  -Continue with routine exercise.   -Issues with falling asleep; recommend taking Melatonin 2-3 hours prior to bedtime.    -MEMORY LOSS/ WORD FINDING ISSUES-  -Evaluated by Dr. Phan Wright in 3/2020 and 4/2021. Neuro-psych testing with mild weaknesses noted in aspects of speeded visual processing and general cognitive speed. Stable findings when comparing prior tests.  -Repeat testing if clinically indicated.     Return to clinic in 4/2024 to review repeat imaging.     In the meantime, Priyanka knows to call with questions or concerns or to report new complaints and can be seen sooner if needed.    Lauren Zhang MD  Neuro-oncology

## 2023-10-16 NOTE — DISCHARGE INSTRUCTIONS
MRI Contrast Discharge Instructions    The IV contrast you received today will pass out of your body in your  urine. This will happen in the next 24 hours. You will not feel this process.  Your urine will not change color.    Drink at least 4 extra glasses of water or juice today (unless your doctor  has restricted your fluids). This reduces the stress on your kidneys.  You may take your regular medicines.    If you are on dialysis: It is best to have dialysis today.    If you have a reaction: Most reactions happen right away. If you have  any new symptoms after leaving the hospital (such as hives or swelling),  call your hospital at the correct number below. Or call your family doctor.  If you have breathing distress or wheezing, call 911.    Special instructions: ***    I have read and understand the above information.    Signature:______________________________________ Date:___________    Staff:__________________________________________ Date:___________     Time:__________    [place] [facility] Departments:    ___[place]: [phone]  ___[facility]: [phone]  ___[place]: [phone] ___[facility]: [phone]  ___[facility]: [phone]  ___[facility]: [phone]  ___[phone]  ___[facility]: [phone]  ___[phone]

## 2023-10-16 NOTE — NURSING NOTE
"Oncology Rooming Note    October 16, 2023 3:12 PM   Leidy Ascencio is a 63 year old female who presents for:    Chief Complaint   Patient presents with    Oncology Clinic Visit     Hemangiopericytoma of CNS      Initial Vitals: /79 (BP Location: Right arm, Patient Position: Sitting, Cuff Size: Adult Regular)   Pulse 86   Temp 98.5  F (36.9  C) (Oral)   Wt 89.5 kg (197 lb 6.4 oz)   SpO2 97%   BMI 36.10 kg/m   Estimated body mass index is 36.1 kg/m  as calculated from the following:    Height as of 7/11/23: 1.575 m (5' 2\").    Weight as of this encounter: 89.5 kg (197 lb 6.4 oz). Body surface area is 1.98 meters squared.  No Pain (0) Comment: Data Unavailable   No LMP recorded. Patient has had a hysterectomy.  Allergies reviewed: Yes  Medications reviewed: Yes    Medications: Medication refills not needed today.  Pharmacy name entered into [facility]:    [facility] - [place], [place] - [address] [place] [address] [facility] [place], [place] - [address] [facility] [phone]  [facility] - [place], [place] - [address] [facility] [place], [place] - [address] [address], [address]    Clinical concerns: none      Ninfa Frazier              "

## 2023-10-31 ASSESSMENT — ENCOUNTER SYMPTOMS
FEVER: 0
CONSTIPATION: 0
PALPITATIONS: 0
EYE PAIN: 0
HEADACHES: 0
NERVOUS/ANXIOUS: 1
SORE THROAT: 0
NAUSEA: 0
HEMATOCHEZIA: 0
ABDOMINAL PAIN: 0
PARESTHESIAS: 0
DIZZINESS: 0
HEMATURIA: 0
CHILLS: 0
JOINT SWELLING: 1
DIARRHEA: 0
MYALGIAS: 1
COUGH: 0
HEARTBURN: 0
FREQUENCY: 1
BREAST MASS: 0
ARTHRALGIAS: 1
DYSURIA: 0
SHORTNESS OF BREATH: 1

## 2023-11-07 ENCOUNTER — OFFICE VISIT (OUTPATIENT)
Dept: INTERNAL MEDICINE | Facility: CLINIC | Age: 63
End: 2023-11-07
Payer: COMMERCIAL

## 2023-11-07 VITALS
HEIGHT: 62 IN | OXYGEN SATURATION: 95 % | HEART RATE: 64 BPM | TEMPERATURE: 97.2 F | WEIGHT: 196.9 LBS | RESPIRATION RATE: 18 BRPM | SYSTOLIC BLOOD PRESSURE: 128 MMHG | BODY MASS INDEX: 36.23 KG/M2 | DIASTOLIC BLOOD PRESSURE: 80 MMHG

## 2023-11-07 DIAGNOSIS — E78.5 HYPERLIPIDEMIA LDL GOAL <130: ICD-10-CM

## 2023-11-07 DIAGNOSIS — Z00.00 ROUTINE GENERAL MEDICAL EXAMINATION AT A HEALTH CARE FACILITY: Primary | ICD-10-CM

## 2023-11-07 DIAGNOSIS — R53.83 FATIGUE, UNSPECIFIED TYPE: ICD-10-CM

## 2023-11-07 DIAGNOSIS — D43.3 HEMANGIOPERICYTOMA OF CNS, GRADE II (H): ICD-10-CM

## 2023-11-07 DIAGNOSIS — E66.01 CLASS 2 SEVERE OBESITY DUE TO EXCESS CALORIES WITH SERIOUS COMORBIDITY AND BODY MASS INDEX (BMI) OF 36.0 TO 36.9 IN ADULT (H): ICD-10-CM

## 2023-11-07 DIAGNOSIS — L23.9 ALLERGIC DERMATITIS: ICD-10-CM

## 2023-11-07 DIAGNOSIS — E55.9 VITAMIN D DEFICIENCY: ICD-10-CM

## 2023-11-07 DIAGNOSIS — F33.0 MAJOR DEPRESSIVE DISORDER, RECURRENT EPISODE, MILD (H): ICD-10-CM

## 2023-11-07 DIAGNOSIS — E66.812 CLASS 2 SEVERE OBESITY DUE TO EXCESS CALORIES WITH SERIOUS COMORBIDITY AND BODY MASS INDEX (BMI) OF 36.0 TO 36.9 IN ADULT (H): ICD-10-CM

## 2023-11-07 DIAGNOSIS — Z91.038 PAST HISTORY OF INSECT ALLERGY: ICD-10-CM

## 2023-11-07 LAB
ALBUMIN SERPL BCG-MCNC: 4.9 G/DL (ref 3.5–5.2)
ALBUMIN UR-MCNC: NEGATIVE MG/DL
ALP SERPL-CCNC: 71 U/L (ref 35–104)
ALT SERPL W P-5'-P-CCNC: 23 U/L (ref 0–50)
ANION GAP SERPL CALCULATED.3IONS-SCNC: 12 MMOL/L (ref 7–15)
APPEARANCE UR: CLEAR
AST SERPL W P-5'-P-CCNC: 25 U/L (ref 0–45)
BILIRUB SERPL-MCNC: 0.4 MG/DL
BILIRUB UR QL STRIP: NEGATIVE
BUN SERPL-MCNC: 13.8 MG/DL (ref 8–23)
CALCIUM SERPL-MCNC: 9.6 MG/DL (ref 8.8–10.2)
CHLORIDE SERPL-SCNC: 101 MMOL/L (ref 98–107)
CHOLEST SERPL-MCNC: 190 MG/DL
COLOR UR AUTO: ABNORMAL
CREAT SERPL-MCNC: 0.69 MG/DL (ref 0.51–0.95)
DEPRECATED HCO3 PLAS-SCNC: 27 MMOL/L (ref 22–29)
EGFRCR SERPLBLD CKD-EPI 2021: >90 ML/MIN/1.73M2
ERYTHROCYTE [DISTWIDTH] IN BLOOD BY AUTOMATED COUNT: 12.3 % (ref 10–15)
ERYTHROCYTE [SEDIMENTATION RATE] IN BLOOD BY WESTERGREN METHOD: 10 MM/HR (ref 0–30)
GLUCOSE SERPL-MCNC: 119 MG/DL (ref 70–99)
GLUCOSE UR STRIP-MCNC: NEGATIVE MG/DL
HCT VFR BLD AUTO: 40.4 % (ref 35–47)
HDLC SERPL-MCNC: 47 MG/DL
HGB BLD-MCNC: 13.2 G/DL (ref 11.7–15.7)
HGB UR QL STRIP: ABNORMAL
KETONES UR STRIP-MCNC: NEGATIVE MG/DL
LDLC SERPL CALC-MCNC: 89 MG/DL
LEUKOCYTE ESTERASE UR QL STRIP: ABNORMAL
MCH RBC QN AUTO: 28.7 PG (ref 26.5–33)
MCHC RBC AUTO-ENTMCNC: 32.7 G/DL (ref 31.5–36.5)
MCV RBC AUTO: 88 FL (ref 78–100)
MUCOUS THREADS #/AREA URNS LPF: PRESENT /LPF
NITRATE UR QL: NEGATIVE
NONHDLC SERPL-MCNC: 143 MG/DL
PH UR STRIP: 6 [PH] (ref 5–7)
PLATELET # BLD AUTO: 286 10E3/UL (ref 150–450)
POTASSIUM SERPL-SCNC: 4.1 MMOL/L (ref 3.4–5.3)
PROT SERPL-MCNC: 7.4 G/DL (ref 6.4–8.3)
RBC # BLD AUTO: 4.6 10E6/UL (ref 3.8–5.2)
RBC URINE: <1 /HPF
SODIUM SERPL-SCNC: 140 MMOL/L (ref 135–145)
SP GR UR STRIP: 1.01 (ref 1–1.03)
TRIGL SERPL-MCNC: 272 MG/DL
UROBILINOGEN UR STRIP-MCNC: NORMAL MG/DL
WBC # BLD AUTO: 5.8 10E3/UL (ref 4–11)
WBC URINE: 10 /HPF

## 2023-11-07 PROCEDURE — 81001 URINALYSIS AUTO W/SCOPE: CPT | Performed by: INTERNAL MEDICINE

## 2023-11-07 PROCEDURE — 99213 OFFICE O/P EST LOW 20 MIN: CPT | Mod: 25 | Performed by: INTERNAL MEDICINE

## 2023-11-07 PROCEDURE — 91320 SARSCV2 VAC 30MCG TRS-SUC IM: CPT | Performed by: INTERNAL MEDICINE

## 2023-11-07 PROCEDURE — 36415 COLL VENOUS BLD VENIPUNCTURE: CPT | Performed by: INTERNAL MEDICINE

## 2023-11-07 PROCEDURE — 99396 PREV VISIT EST AGE 40-64: CPT | Mod: 25 | Performed by: INTERNAL MEDICINE

## 2023-11-07 PROCEDURE — 85652 RBC SED RATE AUTOMATED: CPT | Performed by: INTERNAL MEDICINE

## 2023-11-07 PROCEDURE — 90682 RIV4 VACC RECOMBINANT DNA IM: CPT | Performed by: INTERNAL MEDICINE

## 2023-11-07 PROCEDURE — 80061 LIPID PANEL: CPT | Performed by: INTERNAL MEDICINE

## 2023-11-07 PROCEDURE — 85027 COMPLETE CBC AUTOMATED: CPT | Performed by: INTERNAL MEDICINE

## 2023-11-07 PROCEDURE — 90480 ADMN SARSCOV2 VAC 1/ONLY CMP: CPT | Performed by: INTERNAL MEDICINE

## 2023-11-07 PROCEDURE — 90471 IMMUNIZATION ADMIN: CPT | Performed by: INTERNAL MEDICINE

## 2023-11-07 PROCEDURE — 80053 COMPREHEN METABOLIC PANEL: CPT | Performed by: INTERNAL MEDICINE

## 2023-11-07 RX ORDER — TRIAMCINOLONE ACETONIDE 1 MG/G
CREAM TOPICAL 2 TIMES DAILY
Qty: 80 G | Refills: 0 | Status: SHIPPED | OUTPATIENT
Start: 2023-11-07

## 2023-11-07 RX ORDER — HYDROXYZINE HYDROCHLORIDE 25 MG/1
25 TABLET, FILM COATED ORAL
Qty: 30 TABLET | Refills: 0 | Status: SHIPPED | OUTPATIENT
Start: 2023-11-07 | End: 2024-08-21

## 2023-11-07 ASSESSMENT — ENCOUNTER SYMPTOMS
HEARTBURN: 0
CHILLS: 0
SORE THROAT: 0
BREAST MASS: 0
ABDOMINAL PAIN: 0
CONSTIPATION: 0
COUGH: 0
HEMATOCHEZIA: 0
MYALGIAS: 1
DIZZINESS: 0
NERVOUS/ANXIOUS: 1
FREQUENCY: 1
PALPITATIONS: 0
HEADACHES: 0
PARESTHESIAS: 0
JOINT SWELLING: 1
HEMATURIA: 0
DIARRHEA: 0
NAUSEA: 0
DYSURIA: 0
ARTHRALGIAS: 1
EYE PAIN: 0
FEVER: 0
SHORTNESS OF BREATH: 1

## 2023-11-07 ASSESSMENT — PAIN SCALES - GENERAL: PAINLEVEL: MODERATE PAIN (5)

## 2023-11-07 ASSESSMENT — PATIENT HEALTH QUESTIONNAIRE - PHQ9
SUM OF ALL RESPONSES TO PHQ QUESTIONS 1-9: 7
10. IF YOU CHECKED OFF ANY PROBLEMS, HOW DIFFICULT HAVE THESE PROBLEMS MADE IT FOR YOU TO DO YOUR WORK, TAKE CARE OF THINGS AT HOME, OR GET ALONG WITH OTHER PEOPLE: SOMEWHAT DIFFICULT
SUM OF ALL RESPONSES TO PHQ QUESTIONS 1-9: 7

## 2023-11-07 NOTE — PROGRESS NOTES
SUBJECTIVE:   CC: Priyanka is an 63 year old who presents for preventive health visit.       2023     8:10 AM   Additional Questions   Roomed by Kimberley VELASCO       Healthy Habits:     Getting at least 3 servings of Calcium per day:  Yes    Bi-annual eye exam:  Yes    Dental care twice a year:  Yes    Sleep apnea or symptoms of sleep apnea:  Daytime drowsiness    Diet:  Regular (no restrictions)    Frequency of exercise:  None    Taking medications regularly:  No    Barriers to taking medications:  Problems remembering to take them    Medication side effects:  None    Additional concerns today:  No      Today's PHQ-9 Score:       2023     7:42 AM   PHQ-9 SCORE   PHQ-9 Total Score MyChart 7 (Mild depression)   PHQ-9 Total Score 7                 Patient planes primarily of daytime drowsiness.  Notes that she is sleeping adequately at night.  Does have some seasonal allergies and is on Claritin.  Notes that her depression is well controlled on current medication.    Patient complains of some pain running down her right arm.  Worse with neck positioning and motion.  Has taken minimal over-the-counter anti-inflammatory.  Denies any gastrointestinal upset.  Notes that it does not keep her awake at night.  Has full function of her arm.        Social History     Tobacco Use    Smoking status: Former     Packs/day: 1.00     Years: 27.00     Additional pack years: 0.00     Total pack years: 27.00     Types: Cigarettes     Quit date: 2003     Years since quittin.8    Smokeless tobacco: Never   Substance Use Topics    Alcohol use: Yes             10/31/2023    11:54 AM   Alcohol Use   Prescreen: >3 drinks/day or >7 drinks/week? No     Reviewed orders with patient.  Reviewed health maintenance and updated orders accordingly - Yes  Lab work is in process  Labs reviewed in EPIC  BP Readings from Last 3 Encounters:   23 128/80   10/16/23 138/79   10/05/23 (!) 146/78    Wt Readings from Last 3 Encounters:    23 89.3 kg (196 lb 14.4 oz)   10/16/23 89.5 kg (197 lb 6.4 oz)   10/05/23 88.5 kg (195 lb)                  Patient Active Problem List   Diagnosis    Advanced directives, counseling/discussion    Recurrent cold sores    Other insomnia    Atopic rhinitis    Major depressive disorder, recurrent episode, mild (H24)    Plantar fasciitis    Vitamin D deficiency    Quadrant hemianopsia, left    Hemangiopericytoma of CNS, grade II (H)    Hyperlipidemia LDL goal <130    Moderate episode of recurrent major depressive disorder (H)    Neoplasm of uncertain behavior of brain, supratentorial (H)    Class 2 severe obesity due to excess calories with serious comorbidity in adult (H)     Past Surgical History:   Procedure Laterality Date    APPENDECTOMY      COLONOSCOPY      COLONOSCOPY N/A 2022    Procedure: COLONOSCOPY, BIOPSY and polypectomy;  Surgeon: Rick Zavala MD;  Location:  GI    GYN SURGERY      3 cesections    HYSTERECTOMY VAGINAL, BILATERAL SALPINGO-OOPHERECTOMY, COMBINED      hysterectomy + ovaries     LAPAROSCOPIC ABLATION LIVER TUMOR N/A 1/15/2020    Procedure: Laparoscopic ultrasound guided percutaneous microwave abalation of tumor x1; ultrasound guided Liver Biopsy x 4; Hepatic Ultrasound Intraoperatively;  Surgeon: Phan Fierro MD;  Location: [facility] OR    LAPAROSCOPY DIAGNOSTIC (GENERAL)  1/15/2020    Procedure: Laparoscopy diagnostic (general);  Surgeon: Phan Fierro MD;  Location: [facility] OR    ZZC EXCIS INFRATENT MENINGIOMA         Social History     Tobacco Use    Smoking status: Former     Packs/day: 1.00     Years: 27.00     Additional pack years: 0.00     Total pack years: 27.00     Types: Cigarettes     Quit date: 2003     Years since quittin.8    Smokeless tobacco: Never   Substance Use Topics    Alcohol use: Yes     Family History   Problem Relation Age of Onset    Coronary Artery Disease Mother     Hypertension Mother     Hyperlipidemia Mother     Arthritis Father      Cerebrovascular Disease Father     Alzheimer Disease Maternal Grandmother     Other Cancer Maternal Grandmother         lung    Diabetes Maternal Grandfather     Other Cancer Paternal Grandfather         meloma    Anxiety Disorder Daughter     Substance Abuse Son          Current Outpatient Medications   Medication Sig Dispense Refill    albuterol (PROAIR HFA/PROVENTIL HFA/VENTOLIN HFA) 108 (90 Base) MCG/ACT inhaler Inhale 2 puffs into the lungs every 6 hours 18 g 1    azelastine (ASTELIN) 0.1 % nasal spray Spray 1 spray into both nostrils 2 times daily 30 mL 8    hydrOXYzine (ATARAX) 25 MG tablet Take 1 tablet (25 mg) by mouth nightly as needed for itching 30 tablet 0    loratadine (CLARITIN) 10 MG tablet Take 10 mg by mouth daily      rosuvastatin (CRESTOR) 10 MG tablet Take 1 tablet (10 mg) by mouth daily 90 tablet 1    sertraline (ZOLOFT) 100 MG tablet Take 1.5 tablets (150 mg) by mouth daily Needs to be seen for further refills 135 tablet 0    triamcinolone (KENALOG) 0.1 % external cream Apply topically 2 times daily 80 g 0    Vitamin D3 (VITAMIN D, CHOLECALCIFEROL,) 25 mcg (1000 units) tablet Take 25 mcg by mouth daily      triamcinolone (KENALOG) 0.1 % external ointment Apply topically 2 times daily (Patient not taking: Reported on 11/7/2023) 30 g 0     Allergies   Allergen Reactions    Other Drug Allergy (See Comments) Hives     Paxlovid     Atorvastatin     Penicillins     Sulfa Antibiotics     Sulfamethoxazole-Trimethoprim Rash       Breast Cancer Screening:    FHS-7:       9/29/2023    10:37 AM   Breast CA Risk Assessment (FHS-7)   Did any of your first-degree relatives have breast or ovarian cancer? No   Did any of your relatives have bilateral breast cancer? No   Did any man in your family have breast cancer? No   Did any woman in your family have breast and ovarian cancer? No   Did any woman in your family have breast cancer before age 50 y? No   Do you have 2 or more relatives with breast and/or  ovarian cancer? No   Do you have 2 or more relatives with breast and/or bowel cancer? No       Mammogram Screening: Recommended mammography every 1-2 years with patient discussion and risk factor consideration  Pertinent mammograms are reviewed under the imaging tab.    History of abnormal Pap smear: Status post benign hysterectomy. Health Maintenance and Surgical History updated.     Reviewed and updated as needed this visit by clinical staff   Tobacco  Allergies  Meds              Reviewed and updated as needed this visit by Provider                 Past Medical History:   Diagnosis Date    Arthritis     Cancer (H)     COPD (chronic obstructive pulmonary disease) (H)     Depressive disorder     Hyperlipemia     Uncomplicated asthma       Past Surgical History:   Procedure Laterality Date    APPENDECTOMY      COLONOSCOPY      COLONOSCOPY N/A 11/29/2022    Procedure: COLONOSCOPY, BIOPSY and polypectomy;  Surgeon: Rick Zavala MD;  Location:  GI    GYN SURGERY      3 cesections    HYSTERECTOMY VAGINAL, BILATERAL SALPINGO-OOPHERECTOMY, COMBINED  1988    hysterectomy + ovaries     LAPAROSCOPIC ABLATION LIVER TUMOR N/A 1/15/2020    Procedure: Laparoscopic ultrasound guided percutaneous microwave abalation of tumor x1; ultrasound guided Liver Biopsy x 4; Hepatic Ultrasound Intraoperatively;  Surgeon: Phan Fierro MD;  Location: [facility]    LAPAROSCOPY DIAGNOSTIC (GENERAL)  1/15/2020    Procedure: Laparoscopy diagnostic (general);  Surgeon: Phan Fierro MD;  Location: [facility] OR    ZZC EXCIS INFRATENT MENINGIOMA       OB History   No obstetric history on file.       Review of Systems   Constitutional:  Negative for chills and fever.   HENT:  Positive for ear pain and hearing loss. Negative for congestion and sore throat.    Eyes:  Negative for pain and visual disturbance.   Respiratory:  Positive for shortness of breath. Negative for cough.    Cardiovascular:  Negative for chest pain and palpitations.  "  Gastrointestinal:  Negative for abdominal pain, constipation, diarrhea, heartburn, hematochezia and nausea.   Breasts:  Negative for tenderness, breast mass and discharge.   Genitourinary:  Positive for frequency. Negative for dysuria, genital sores, hematuria, pelvic pain, urgency, vaginal bleeding and vaginal discharge.   Musculoskeletal:  Positive for arthralgias, joint swelling and myalgias.   Skin:  Negative for rash.   Neurological:  Negative for dizziness, headaches and paresthesias.   Psychiatric/Behavioral:  Negative for mood changes. The patient is nervous/anxious.      Patient has had some ear pain in the past and has some mild hearing loss.  Patient has had shortness of breath in the past with exertion but not currently.  Patient acknowledges some arthralgias in the hands.  Has some discomfort in her right arm which appears to be radicular.  Patient does have some nervousness and anxiety.  This is often associated with her medical diagnosis of brain tumor.     OBJECTIVE:   /80 (BP Location: Right arm, Patient Position: Chair)   Pulse 64   Temp 97.2  F (36.2  C) (Temporal)   Resp 18   Ht 1.575 m (5' 2\")   Wt 89.3 kg (196 lb 14.4 oz)   SpO2 95%   BMI 36.01 kg/m    Physical Exam  GENERAL APPEARANCE: healthy, alert and no distress  EYES: Eyes grossly normal to inspection, PERRL and conjunctivae and sclerae normal  HENT: ear canals and TM's normal, nose and mouth without ulcers or lesions, oropharynx clear and oral mucous membranes moist  NECK: no adenopathy, no asymmetry, masses, or scars and thyroid normal to palpation  RESP: lungs clear to auscultation - no rales, rhonchi or wheezes  CV: regular rate and rhythm, normal S1 S2, no S3 or S4, no murmur, click or rub, no peripheral edema and peripheral pulses strong  ABDOMEN: soft, nontender, no hepatosplenomegaly, no masses and bowel sounds normal  MS: no musculoskeletal defects are noted, gait is age appropriate without ataxia, mild arthritic " changes of the wrist and hands noted.  No deformity or inflammation present.  SKIN: Patient has a small area of what appears to be allergic dermatitis on the dorsal aspect of her right forearm at the elbow.  It is mildly pruritic.  NEURO: Normal strength and tone, sensory exam grossly normal, mentation intact and speech normal  PSYCH: mentation appears normal and affect normal/bright    Diagnostic Test Results:  Labs reviewed in Epic  No results found for this or any previous visit (from the past 24 hour(s)).    ASSESSMENT/PLAN:       ICD-10-CM    1. Routine general medical examination at a health care facility  Z00.00       2. Hemangiopericytoma of CNS, grade II (H)  D43.3 CBC with platelets     CBC with platelets      3. Fatigue, unspecified type  R53.83 ESR: Erythrocyte sedimentation rate     CBC with platelets     ESR: Erythrocyte sedimentation rate     CBC with platelets      4. Allergic dermatitis  L23.9 hydrOXYzine (ATARAX) 25 MG tablet     triamcinolone (KENALOG) 0.1 % external cream      5. Past history of insect allergy  Z91.038       6. Class 2 severe obesity due to excess calories with serious comorbidity and body mass index (BMI) of 36.0 to 36.9 in adult (H)  E66.01     Z68.36       7. Major depressive disorder, recurrent episode, mild (H24)  F33.0       8. Hyperlipidemia LDL goal <130  E78.5 Comprehensive metabolic panel (BMP + Alb, Alk Phos, ALT, AST, Total. Bili, TP)     Lipid panel reflex to direct LDL Fasting     UA Macroscopic with reflex to Microscopic and Culture - Lab Collect     Comprehensive metabolic panel (BMP + Alb, Alk Phos, ALT, AST, Total. Bili, TP)     Lipid panel reflex to direct LDL Fasting     UA Macroscopic with reflex to Microscopic and Culture - Lab Collect      9. Vitamin D deficiency  E55.9           Patient has been advised of split billing requirements and indicates understanding: Yes      COUNSELING:  Reviewed preventive health counseling, as reflected in patient  instructions       Regular exercise       Healthy diet/nutrition       Vision screening       Hearing screening       Colorectal Cancer Screening        I have reviewed the patient's vaccination schedule and discussed the benefits of prophylactic vaccination in detail.  I recommend the patient contact their pharmacist for vaccinations.  Discussed that most insurance companies now favor reimbursement to the pharmacies and it will financially behoove the patient to have vaccinations performed at their pharmacy.        She reports that she quit smoking about 20 years ago. Her smoking use included cigarettes. She has a 27.00 pack-year smoking history. She has never used smokeless tobacco.        Valeriy Rowland [facility]  Answers submitted by the patient for this visit:  Patient Health Questionnaire (Submitted on 11/7/2023)  If you checked off any problems, how difficult have these problems made it for you to do your work, take care of things at home, or get along with other people?: Somewhat difficult  PHQ9 TOTAL SCORE: 7

## 2023-11-08 ENCOUNTER — OFFICE VISIT (OUTPATIENT)
Dept: PULMONOLOGY | Facility: CLINIC | Age: 63
End: 2023-11-08
Payer: COMMERCIAL

## 2023-11-08 VITALS
HEART RATE: 74 BPM | WEIGHT: 196 LBS | DIASTOLIC BLOOD PRESSURE: 82 MMHG | OXYGEN SATURATION: 96 % | BODY MASS INDEX: 35.85 KG/M2 | SYSTOLIC BLOOD PRESSURE: 126 MMHG

## 2023-11-08 DIAGNOSIS — J45.20 MILD INTERMITTENT ASTHMA WITHOUT COMPLICATION: ICD-10-CM

## 2023-11-08 PROCEDURE — 86003 ALLG SPEC IGE CRUDE XTRC EA: CPT | Performed by: INTERNAL MEDICINE

## 2023-11-08 PROCEDURE — 36415 COLL VENOUS BLD VENIPUNCTURE: CPT | Performed by: INTERNAL MEDICINE

## 2023-11-08 PROCEDURE — 99215 OFFICE O/P EST HI 40 MIN: CPT | Performed by: INTERNAL MEDICINE

## 2023-11-08 RX ORDER — ALBUTEROL SULFATE 90 UG/1
2 AEROSOL, METERED RESPIRATORY (INHALATION) EVERY 6 HOURS
Qty: 18 G | Refills: 1 | Status: SHIPPED | OUTPATIENT
Start: 2023-11-08

## 2023-11-08 RX ORDER — FLUTICASONE PROPIONATE AND SALMETEROL 113; 14 UG/1; UG/1
1 POWDER, METERED RESPIRATORY (INHALATION) 2 TIMES DAILY
Qty: 3 EACH | Refills: 3 | Status: SHIPPED | OUTPATIENT
Start: 2023-11-08 | End: 2024-09-20

## 2023-11-08 RX ORDER — MONTELUKAST SODIUM 10 MG/1
10 TABLET ORAL AT BEDTIME
Qty: 90 TABLET | Refills: 3 | Status: SHIPPED | OUTPATIENT
Start: 2023-11-08 | End: 2024-11-02

## 2023-11-08 NOTE — NURSING NOTE
Leidy Ascencio's goals for this visit include:   Chief Complaint   Patient presents with    Follow Up     Would like to discuss possible allergic reactions that patient has been having, unsure what it can be caused from     COPD       She requests these members of her care team be copied on today's visit information: yes     PCP: [facility]    Referring Provider:  No referring provider defined for this encounter.    /82 (BP Location: Left arm, Patient Position: Chair, Cuff Size: Adult Regular)   Pulse 74   Wt 88.9 kg (196 lb)   SpO2 96%   BMI 35.85 kg/m      Do you need any medication refills at today's visit? Yes     KACIE Dwyer   Neph/Pulm Teams  [facility]

## 2023-11-08 NOTE — PROGRESS NOTES
Pulmonary Clinic Return Patient Visit  Reason for Visit: Cough  History of Present Illness  Leidy Ascencio is a 63 yr old female who presents for follow up for COPD/chronic cough. I last saw her in 2023  To briefly review, she was recently diagnosed with COPD/asthma following repeated spells of symptoms including productive cough, trouble breathing especially with the cough spasms and rhintis. Also some post nasal drip but not really a lot of nasal congestion/sinus pressure. She was treated for copd exacerbation with doxy and prednisone but no improvement. The inhalers don't seem to do much.   When I saw her in clinic, PFTs showed very mild fixed obstruction and CT chest showed no clear evidence of airway nor airspace disease. There were no emphysematous changes. I thought that she may benefit from more robust bronchodilation and I switched her Flovent to Advair.  Today, she has no new complaints. There has been no COPD/asthma flares since the last clinic visit. Although she has benefited from Advair, he cough is less productive but persistent. She also tells me that her cough appears to be allergy related as she notices associated symptoms of itchy eyes and sneezing. Minimal SOB and no wheezing nor chest tightness. Denies any chest pain, pedal swellings or palpitations.   Remote hx of smoking,m quit 15 years ago, prior 27 pack years. Paternal uncle  from lung cancer (smoker). 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    Review of Systems:  10 of 14 systems reviewed and are negative unless otherwise stated in HPI.    Past Medical History:   Diagnosis Date    Arthritis     Cancer (H)     COPD (chronic obstructive pulmonary disease) (H)     Depressive disorder     Hyperlipemia     Uncomplicated asthma        Past Surgical History:   Procedure Laterality Date    APPENDECTOMY      COLONOSCOPY      COLONOSCOPY N/A 2022    Procedure: COLONOSCOPY, BIOPSY and polypectomy;   Surgeon: Rick Zavala MD;  Location:  GI    GYN SURGERY      3 cesections    HYSTERECTOMY VAGINAL, BILATERAL SALPINGO-OOPHERECTOMY, COMBINED  1988    hysterectomy + ovaries     LAPAROSCOPIC ABLATION LIVER TUMOR N/A 1/15/2020    Procedure: Laparoscopic ultrasound guided percutaneous microwave abalation of tumor x1; ultrasound guided Liver Biopsy x 4; Hepatic Ultrasound Intraoperatively;  Surgeon: Phan Fierro MD;  Location: [facility] OR    LAPAROSCOPY DIAGNOSTIC (GENERAL)  1/15/2020    Procedure: Laparoscopy diagnostic (general);  Surgeon: Phan Fierro MD;  Location: [facility] OR    ZZC EXCIS INFRATENT MENINGIOMA         Family History   Problem Relation Age of Onset    Coronary Artery Disease Mother     Hypertension Mother     Hyperlipidemia Mother     Arthritis Father     Cerebrovascular Disease Father     Alzheimer Disease Maternal Grandmother     Other Cancer Maternal Grandmother         lung    Diabetes Maternal Grandfather     Other Cancer Paternal Grandfather         meloma    Anxiety Disorder Daughter     Substance Abuse Son        Social History     Socioeconomic History    Marital status:      Spouse name: None    Number of children: None    Years of education: None    Highest education level: None   Tobacco Use    Smoking status: Former     Packs/day: 1.00     Years: 27.00     Pack years: 27.00     Types: Cigarettes     Quit date: 2003     Years since quittin.3    Smokeless tobacco: Never   Substance and Sexual Activity    Alcohol use: Yes    Drug use: No    Sexual activity: Yes     Partners: Male     Birth control/protection: Female Surgical   Other Topics Concern    Parent/sibling w/ CABG, MI or angioplasty before 65F 55M? No     Social Determinants of Health     Intimate Partner Violence: Unknown (10/21/2022)    Humiliation, Afraid, Rape, and Kick questionnaire     Fear of Current or Ex-Partner: No     Emotionally Abused: No         Allergies   Allergen Reactions    Other Drug  Allergy (See Comments) Hives     Paxlovid     Atorvastatin     Penicillins     Sulfa Antibiotics     Sulfamethoxazole-Trimethoprim Rash         Current Outpatient Medications:     albuterol (PROAIR HFA/PROVENTIL HFA/VENTOLIN HFA) 108 (90 Base) MCG/ACT inhaler, Inhale 2 puffs into the lungs every 6 hours, Disp: 18 g, Rfl: 1    azelastine (ASTELIN) 0.1 % nasal spray, Spray 1 spray into both nostrils 2 times daily, Disp: 30 mL, Rfl: 8    hydrOXYzine (ATARAX) 25 MG tablet, Take 1 tablet (25 mg) by mouth nightly as needed for itching, Disp: 30 tablet, Rfl: 0    loratadine (CLARITIN) 10 MG tablet, Take 10 mg by mouth daily, Disp: , Rfl:     rosuvastatin (CRESTOR) 10 MG tablet, Take 1 tablet (10 mg) by mouth daily, Disp: 90 tablet, Rfl: 1    sertraline (ZOLOFT) 100 MG tablet, Take 1.5 tablets (150 mg) by mouth daily Needs to be seen for further refills, Disp: 135 tablet, Rfl: 0    triamcinolone (KENALOG) 0.1 % external cream, Apply topically 2 times daily, Disp: 80 g, Rfl: 0    Vitamin D3 (VITAMIN D, CHOLECALCIFEROL,) 25 mcg (1000 units) tablet, Take 25 mcg by mouth daily, Disp: , Rfl:     triamcinolone (KENALOG) 0.1 % external ointment, Apply topically 2 times daily (Patient not taking: Reported on 11/7/2023), Disp: 30 g, Rfl: 0    Current Facility-Administered Medications:     2 mL bupivacaine (MARCAINE) preservative free injection 0.5% (20 mL vial), 2 mL, , , Chase Mendoza DO, 2 mL at 08/02/23 1524    lidocaine 1 % injection 2 mL, 2 mL, , , Chase Mendoza DO, 2 mL at 08/02/23 1524    triamcinolone (KENALOG-40) injection 40 mg, 40 mg, , , Chase Mendoza DO, 40 mg at 08/02/23 1524    triamcinolone (KENALOG-40) injection 40 mg, 40 mg, , , Lauren Edmonds MD, 40 mg at 12/07/20 1442      Physical Exam:  /82 (BP Location: Left arm, Patient Position: Chair, Cuff Size: Adult Regular)   Pulse 74   Wt 88.9 kg (196 lb)   SpO2 96%   BMI 35.85 kg/m    GENERAL: Well developed, well nourished, alert, and in no  "apparent distress.  HEENT: Normocephalic, atraumatic. PERRL, EOMI. Oral mucosa is moist. No perioral cyanosis.  NECK: supple, no masses, no thyromegaly.  RESP:  Normal respiratory effort.  CTAB.  No rales, wheezes, rhonchi.  No cyanosis or clubbing.  CV: Normal S1, S2, regular rhythm, normal rate. No murmur.  No LE edema.   ABDOMEN:  Soft, non-tender, non-distended.   SKIN: warm and dry. No rash.  NEURO: AAOx3.  Normal gait.  Fluent speech.  PSYCH: mentation appears normal.   Results:  PFTs: Mild obstruction with normal volumes and diffusion  Most Recent [facility] Pulmonary Function Testing    FVC-Pred   Date Value Ref Range Status   05/08/2023 2.61 L      FVC-Pre   Date Value Ref Range Status   05/08/2023 1.96 L      FVC-%Pred-Pre   Date Value Ref Range Status   05/08/2023 75 %      FEV1-Pre   Date Value Ref Range Status   05/08/2023 1.39 L      FEV1-%Pred-Pre   Date Value Ref Range Status   05/08/2023 66 %      FEV1FVC-Pred   Date Value Ref Range Status   05/08/2023 80 %      FEV1FVC-Pre   Date Value Ref Range Status   05/08/2023 71 %      No results found for: \"20029\"  FEFMax-Pred   Date Value Ref Range Status   05/08/2023 5.78 L/sec      FEFMax-Pre   Date Value Ref Range Status   05/08/2023 3.73 L/sec      FEFMax-%Pred-Pre   Date Value Ref Range Status   05/08/2023 64 %      ExpTime-Pre   Date Value Ref Range Status   05/08/2023 6.54 sec      FIFMax-Pre   Date Value Ref Range Status   05/08/2023 3.66 L/sec      FEV1FEV6-Pred   Date Value Ref Range Status   05/08/2023 80 %      FEV1FEV6-Pre   Date Value Ref Range Status   05/08/2023 71 %      No results found for: \"20055\"  Imaging (personally reviewed in clinic today): CT CHEST/ABDOMEN/PELVIS W CONTRAST: 9/6/2022 9:15 AM  IMPRESSION:  1.  A few larger left axillary lymph nodes may just be reactive to an inflammatory process. Recommend further attention to these at imaging follow-up.  2.  Remainder of the scan is stable with no new areas of disease.  3.  Stable " right-sided pulmonary nodule.    Assessment and Plan:   COPD/asthma/Chronic cough  PFTs show very mild fixed obstruction and CT chest shows no clear evidence of airway nor airspace disease. There are no emphysematous changes. Although she has benefited from Advair, he cough is less productive but persistent. She also tells me that her cough appears to be allergy related as she notices associated symptoms of itchy eyes and sneezing. She will benefit from Montelukast and prescriptions were given but I will check an environmental allergen panel today.  She will continue albuterol as needed.   Allergic Rhinitis  On Astelin and partially controlled. Montelukast was added today    Lung Nodule  Stable thus far.    Questions and concerns were answered to the patient's satisfaction.  she was provided with my contact information should new questions or concerns arise in the interim.  she should return to clinic in 12 months   Up to date on vaccination  I spent a total of 40 minutes face to face with Leidy Ascenico during today's office visit. Over 50% of this time was spent counseling the patient and/or coordinating care regarding their pulmonary disease.    Binh Fair MD  Pulmonary, Critical Care and Sleep Medicine  [facility]  Pager: [phone]        The above note was dictated using voice recognition software and may include typographical errors. Please contact the author for any clarifications.

## 2023-11-10 LAB — EAST WHITE PINE IGE QN: <0.1 KU(A)/L

## 2023-11-11 LAB
A ALTERNATA IGE QN: <0.1 KU(A)/L
A FUMIGATUS IGE QN: <0.1 KU(A)/L
C HERBARUM IGE QN: <0.1 KU(A)/L
CAT DANDER IGG QN: <0.1 KU(A)/L
CEDAR IGE QN: <0.1 KU(A)/L
COTTONWOOD IGE QN: <0.1 KU(A)/L
D FARINAE IGE QN: <0.1 KU(A)/L
D PTERONYSS IGE QN: <0.1 KU(A)/L
DOG DANDER+EPITH IGE QN: <0.1 KU(A)/L
E PURPURASCENS IGE QN: <0.1 KU(A)/L
ENGL PLANTAIN IGE QN: <0.1 KU(A)/L
FIREBUSH IGE QN: <0.1 KU(A)/L
GIANT RAGWEED IGE QN: <0.1 KU(A)/L
JOHNSON GRASS IGE QN: <0.1 KU(A)/L
WHITE ASH IGE QN: <0.1 KU(A)/L

## 2023-11-12 LAB
CALIF WALNUT POLN IGE QN: <0.1 KU(A)/L
COCKSFOOT IGE QN: <0.1 KU(A)/L
COMMON RAGWEED IGE QN: <0.1 KU(A)/L
GOOSEFOOT IGE QN: <0.1 KU(A)/L
MAPLE IGE QN: <0.1 KU(A)/L
MUGWORT IGE QN: <0.1 KU(A)/L
NETTLE IGE QN: <0.1 KU(A)/L
P NOTATUM IGE QN: <0.1 KU(A)/L
RED MULBERRY IGE QN: <0.1 KU(A)/L
SALTWORT IGE QN: <0.1 KU(A)/L
SHEEP SORREL IGE QN: <0.1 KU(A)/L
SILVER BIRCH IGE QN: <0.1 KU(A)/L
TIMOTHY IGE QN: <0.1 KU(A)/L
WHITE MULBERRY IGE QN: <0.1 KU(A)/L
WHITE OAK IGE QN: <0.1 KU(A)/L
WORMWOOD IGE QN: <0.1 KU(A)/L

## 2023-11-13 LAB — WHITE ELM IGE QN: <0.1 KU(A)/L

## 2023-12-22 DIAGNOSIS — E78.00 ELEVATED CHOLESTEROL: ICD-10-CM

## 2023-12-22 RX ORDER — ROSUVASTATIN CALCIUM 10 MG/1
10 TABLET, COATED ORAL DAILY
Qty: 90 TABLET | Refills: 1 | Status: SHIPPED | OUTPATIENT
Start: 2023-12-22 | End: 2024-08-05

## 2024-01-08 DIAGNOSIS — F33.0 MAJOR DEPRESSIVE DISORDER, RECURRENT EPISODE, MILD (H): ICD-10-CM

## 2024-01-10 RX ORDER — SERTRALINE HYDROCHLORIDE 100 MG/1
TABLET, FILM COATED ORAL
Qty: 135 TABLET | Refills: 0 | Status: SHIPPED | OUTPATIENT
Start: 2024-01-10 | End: 2024-06-11

## 2024-04-11 NOTE — PROGRESS NOTES
NEURO-ONCOLOGY VISIT  Apr 15, 2024    CHIEF COMPLAINT: Ms. 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 She completed GammaKnife treatment in 1/2020.     Priyanka had been managed on imaging surveillance and imaging through 10/2023 demonstrated no evidence of cancer recurrence. However, imaging in 4/2024 showed a subtle increase in enhancement within the superior sagittal sinus. CT body imaging has been done annually as well and has been without any concerning findings for metastatic disease.     I met with Priyanka and Main () today for this follow-up visit.     HISTORY OF PRESENT ILLNESS  -Priyanka is doing well.   -Work is busy.    Continues to struggle with concentration, mild issues with short term memory.   -More stress and tension. More headaches; tightness on the left side of her head.   -Still fatigued.   -Stable visual field cut.  -Denies changes in strength.  -Mood is good.       MEDICATIONS   Current Outpatient Medications   Medication Sig Dispense Refill    albuterol (PROAIR HFA/PROVENTIL HFA/VENTOLIN HFA) 108 (90 Base) MCG/ACT inhaler Inhale 2 puffs into the lungs every 6 hours 18 g 1    azelastine (ASTELIN) 0.1 % nasal spray Spray 1 spray into both nostrils 2 times daily 30 mL 8    fluticasone-salmeterol (AIRDUO RESPICLICK) 113-14 MCG/ACT inhaler Inhale 1 puff into the lungs 2 times daily for 360 days 3 each 3    hydrOXYzine (ATARAX) 25 MG tablet Take 1 tablet (25 mg) by mouth nightly as needed for itching 30 tablet 0    loratadine (CLARITIN) 10 MG tablet Take 10 mg by mouth daily      montelukast (SINGULAIR) 10 MG tablet Take 1 tablet (10 mg) by mouth at bedtime for 360 days 90 tablet 3    rosuvastatin (CRESTOR) 10 MG tablet Take 1 tablet (10 mg) by mouth daily 90 tablet 1    sertraline (ZOLOFT) 100 MG tablet Take 1.5 tablets (150 mg) by mouth daily. **Needs to be seen for further  refills** 135 tablet 0    triamcinolone (KENALOG) 0.1 % external cream Apply topically 2 times daily 80 g 0    triamcinolone (KENALOG) 0.1 % external ointment Apply topically 2 times daily (Patient not taking: Reported on 11/7/2023) 30 g 0    Vitamin D3 (VITAMIN D, CHOLECALCIFEROL,) 25 mcg (1000 units) tablet Take 25 mcg by mouth daily       DRUG ALLERGIES   Allergies   Allergen Reactions    Other Drug Allergy (See Comments) Hives     Paxlovid     Atorvastatin     Penicillins     Sulfa Antibiotics     Sulfamethoxazole-Trimethoprim Rash       ONCOLOGIC HISTORY  -2015 PRESENTATION: Headaches.  -2015 MR brain imaging with a right occipital mass   -4/22/2015 SURGERY: Resection by Dr. Lujan   PATHOLOGY: Hemangiopericytoma, grade 2.   -2015 CT chest, abdomen and pelvis negative for malignancy.  -2015 PET MRI negative for malignancy.   Conservative management per Dr. Herman Mayberry.    -8/1/2017 CT chest abdomen and pelvis and bone scan negative for malignancy.   -12/7/2017 MRB with no evidence of tumor recurrence.  -12/6/2018 MRB with no evidence of tumor recurrence.    -12/11/2019 MRB with a new nodule of enhancement within the superior sagittal sinus, concerning for tumor recurrence. Stable appearance of the right parieto-occipital craniotomy site.  -12/13/2019 Bone scan with no convincing evidence of skeletal metastases.  -12/18/2019 MR liver with a 9 mm lesion in the lateral aspect of the junction of segments 7 and 8 of the liver shows homogeneous delayed enhancement and is new since 2015. This could represent a metastatic lesion.     -1/6/2020 NEURO-ONC: Referral to radiation oncology and GI oncology surgery. Signed consent for next generation sequencing.   -1/14/2020 RADS: GammaKnife to lesion (1 fraction).   -1/15/2020 LIVER LESION:  Laparoscopic ultrasound-guided microwave ablation of liver tumor by Dr. Phan Fierro.   PATHOLOGY: Cavernous hemangioma of the liver, benign:    -No metastatic hemangiopericytoma or  other malignancy identified    -Underlying steatohepatitis, no significant fibrosis    -No follow-up needed.  -2/28/2020 NEURO-ONC/ MRB: Clinically stable. Headaches with migraine features; increasing Zoloft. Imaging with positive response to radiation.   -3/9/2020 Evaluated by Dr. Wright; Neuro-psych testing with mild weaknesses were noted in aspects of speeded visual processing, and general cognitive speed. Repeat in 3/2021.  -6/1/2020 NEURO-ONC/ MRB: Clinically well; for insomnia trial of melatonin. Imaging with positive treatment effect.   -6/1/2020 NEURO-ONC/ MRB: Clinically well. Imaging with no concerns.   -9/14/2020 NEURO-ONC/ MRB: Clinically well. Imaging of MRI brain and CT CAP with no concerns.  -12/14/2020 NEURO-ONC/ MRB: Clinically well. MR imaging of the brain with no concerns.  -3/12/2021 NEURO-ONC/ MRB: Clinically well. MRI of the brain with no concerns.  -9/10/2021 NEURO-ONC/ MRB: Clinically well. MRI of the brain and CT CAP with no concerns.  -3/7/2022 NEURO-ONC/ MRB: Clinically well. MRI of the brain with no concerns.  -10/21/2022 NEURO-ONC/ MRB: Clinically well; signs of carpal tunnel in the left wrist. MRI of the brain and CT CAP with no concerns.  -10/21/2022 NEURO-ONC/ MRB: Clinically well; signs of carpal tunnel in the left wrist. MRI of the brain and CT CAP with no concerns.  -4/17/2023 NEURO-ONC/ MRB: Mood is down, less motivation, more issues with concentration. Carpal tunnel of left improving with behavior change. Fatigue labs today; recommended follow-up with primary care provider. MRI of the brain with no concerns.   -10/16/2023 NEURO-ONC/ MRB: Energy improving. No new neurological concerns. MRI of the brain with no concerns. CT CAP with no concerns.  -4/15/2024 NEURO-ONC/ MRB: Mild worsening in headache frequency in the setting of more stress. Imaging with an increased size of the enhancing extra-axial nodular mass within the superior sagittal sinus with elevated cerebral blood volume  "consistent with disease recurrence. Referral to neurosurgery. Repeat CT imaging for systemic staging now.     SOCIAL HISTORY   Tobacco use: Former smoker; quit in 2003. 27 pack years.  . 2 daughter, 1 son.   Employment: [employer]; [employer] managers.        PHYSICAL EXAMINATION  /75 (BP Location: Right arm, Patient Position: Sitting, Cuff Size: Adult Regular)   Pulse 80   Temp 98.9  F (37.2  C) (Oral)   Resp 16   Ht 1.57 m (5' 1.81\")   Wt 87.6 kg (193 lb 3.2 oz)   SpO2 95%   BMI 35.55 kg/m     Wt Readings from Last 2 Encounters:   04/15/24 87.6 kg (193 lb 3.2 oz)   11/08/23 88.9 kg (196 lb)      Ht Readings from Last 2 Encounters:   04/15/24 1.57 m (5' 1.81\")   11/07/23 1.575 m (5' 2\")      KPS   -Generally well appearing.  -Respiratory: No audible wheezing. Normal respiratory effort.   -Psychiatric: Normal mood and affect. Pleasant, talkative.  -Neurologic:   MENTAL STATUS:     Alert, oriented.   Recall: Intact.    Speech fluent.    Comprehension intact.     CRANIAL NERVES:    Left inferior homonymous quadrantanopia.   Symmetric facial movements.   Hearing intact.   With normal phonation, no dysfunction tongue.   GAIT: Steady, unassisted.      MEDICAL RECORDS  Personally reviewed; Pulmonary notes from November, indicated for follow-up for asthma.     LABS  Personally reviewed all available lab results; Allergen testing in Nov. Lipid panel (LDL 89) in Nov.     IMAGING  Personally reviewed MR brain imaging from today and compared to prior imaging.     Formal read; \"1.  Increased size of enhancing extra-axial nodular mass within the superior sagittal sinus with elevated cerebral blood volume consistent with disease recurrence. 2.  Superior sagittal sinus otherwise appears patent.\"    Imaging shown to Marlin.        IMPRESSION  On date of service, 45 minutes was spent in clinic and 6 minutes was spent preparing for the visit through extensive chart review and coordinating " care for this high complexity visit. The following is in explanation for the recommendations used to define the plan.       WHO grade 2 hemangiopericytomas are considered malignant tumors. Standard treatment options include surgery and radiation therapy. With no good surgical option, Priyanka completed stereotactic radiation therapy in 1/2020.     Unfortunately, today's brain imaging showed a subtle increase in the size of the enhancing extra-axial nodular mass within the superior sagittal sinus, which is concerning for cancer recurrence. I reviewed Priyanka's imaging and case with Dr. Samayoa in neurosurgery and he is wanting to see Priyanka in consultation to review the risks and benefits of surgical intervention. Priyanka is open to this referral to neurosurgery. Finally, I also reviewed Priyanka's case with Dr. Bauer in radiation oncology to understand if repeat radiation therapy remains an option.     Priyanka remains largely asymptomatic, though noting a slight worsening in headache frequency in the setting of more stress.     Of note, Priyanka has consented to next generation sequencing. If surgery is pursue, then pathology can be sent for mutational analysis to potentially identify targeted theray options. Additional treatment options include Pazopanib (Votrient) or temozolomide plus bevacizumab.    Extracranial metastatic disease can occur; most commonly in the bone, lung, and liver. Prior MR liver imaging showed a lesion. Priyanka was referred to Dr. Phan Fierro with surgical oncology. Dr. Fierro performed a  laparoscopic ultrasound-guided microwave ablation of a liver lesion on 1/15/2020. Pathology was consistent with a benign cavernous hemangioma and no metastatic hemangiopericytoma or other malignancy was identified. She is currently monitored on annual systemic surveillance imaging with a CT C/A/P with contrast done every Fall. Her imaging from Fall 2023 was without any new concerns. I will repeat body imaging now  given the changes seen on brain imaging as new findings could impact management.     PROBLEM LIST  Hemangiopericytoma, WHO grade 2  Liver lesion  Depressed mood  Sleep issues  Headache    PLAN  -CANCER-DIRECTED THERAPY-  -As above. Referral to neurosurgery.    Repeat CT imaging for systemic staging now.     -LIVER LESION-  -Treated. No lesion-specific follow-up needed.   -CT c/a/p done annually for systemic surveillance imaging.     -STEROIDS-  -Currently off dexamethasone.    -SEIZURE MANAGEMENT-  -While this patient is at increased risk of having seizures, given the lack of seizure history, there is no indication to prescribe an antiepileptic at this time.     -CARPAL TUNNEL-  -Left wrist > right. Occasional right wrist jerking.   -Improved with decreased strain at work.  -Recommended bracing overnight.     -Quality of life/ MOOD/ FATIGUE-  -Referral to primary care provider for further evaluation/treatment of mood/fatigue.   -4/2023; TSH (normal), Vitamin D (pending), Vitamin B12 (normal).   -Continue Zoloft 100mg daily.  -Continue with routine exercise.   -Issues with falling asleep; recommend taking Melatonin 2-3 hours prior to bedtime.    -MEMORY LOSS/ WORD FINDING ISSUES-  -Evaluated by Dr. Phan Wright in 3/2020 and 4/2021. Neuro-psych testing with mild weaknesses noted in aspects of speeded visual processing and general cognitive speed. Stable findings when comparing prior tests.  -Repeat testing if clinically indicated.     Return to clinic pending discussion with neurosurgery.     In the meantime, Priyanka knows to call with questions or concerns or to report new complaints and can be seen sooner if needed.    The longitudinal plan of care for the diagnosis(es)/condition(s) as documented were addressed during this visit. Due to the added complexity in care, I will continue to support Priyanka in the subsequent management and with ongoing continuity of care.     Lauren Zhang MD  Neuro-oncology

## 2024-04-15 ENCOUNTER — ANCILLARY PROCEDURE (OUTPATIENT)
Dept: MRI IMAGING | Facility: CLINIC | Age: 64
End: 2024-04-15
Attending: PSYCHIATRY & NEUROLOGY
Payer: COMMERCIAL

## 2024-04-15 ENCOUNTER — ONCOLOGY VISIT (OUTPATIENT)
Dept: ONCOLOGY | Facility: CLINIC | Age: 64
End: 2024-04-15
Attending: PSYCHIATRY & NEUROLOGY
Payer: COMMERCIAL

## 2024-04-15 VITALS
OXYGEN SATURATION: 95 % | BODY MASS INDEX: 35.55 KG/M2 | WEIGHT: 193.2 LBS | DIASTOLIC BLOOD PRESSURE: 75 MMHG | TEMPERATURE: 98.9 F | HEART RATE: 80 BPM | HEIGHT: 62 IN | RESPIRATION RATE: 16 BRPM | SYSTOLIC BLOOD PRESSURE: 125 MMHG

## 2024-04-15 DIAGNOSIS — D43.3 HEMANGIOPERICYTOMA OF CNS, GRADE II (H): Primary | ICD-10-CM

## 2024-04-15 DIAGNOSIS — D43.3 HEMANGIOPERICYTOMA OF CNS, GRADE II (H): ICD-10-CM

## 2024-04-15 LAB — RADIOLOGIST FLAGS: NORMAL

## 2024-04-15 PROCEDURE — 70553 MRI BRAIN STEM W/O & W/DYE: CPT | Mod: GC | Performed by: RADIOLOGY

## 2024-04-15 PROCEDURE — A9585 GADOBUTROL INJECTION: HCPCS | Performed by: RADIOLOGY

## 2024-04-15 PROCEDURE — 99215 OFFICE O/P EST HI 40 MIN: CPT | Performed by: PSYCHIATRY & NEUROLOGY

## 2024-04-15 PROCEDURE — 99213 OFFICE O/P EST LOW 20 MIN: CPT | Performed by: PSYCHIATRY & NEUROLOGY

## 2024-04-15 PROCEDURE — G2211 COMPLEX E/M VISIT ADD ON: HCPCS | Performed by: PSYCHIATRY & NEUROLOGY

## 2024-04-15 RX ORDER — GADOBUTROL 604.72 MG/ML
10 INJECTION INTRAVENOUS ONCE
Status: COMPLETED | OUTPATIENT
Start: 2024-04-15 | End: 2024-04-15

## 2024-04-15 RX ADMIN — GADOBUTROL 9 ML: 604.72 INJECTION INTRAVENOUS at 13:10

## 2024-04-15 ASSESSMENT — PAIN SCALES - GENERAL: PAINLEVEL: NO PAIN (0)

## 2024-04-15 NOTE — LETTER
4/15/2024         RE: Leidy Ascencio  [address] [place] [address]        Dear Colleague,    Thank you for referring your patient, Leidy Ascencio, to the [facility]. Please see a copy of my visit note below.    NEURO-ONCOLOGY VISIT  Apr 15, 2024    CHIEF COMPLAINT: Ms. 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 She completed GammaKnife treatment in 1/2020.     Priyanka had been managed on imaging surveillance and imaging through 10/2023 demonstrated no evidence of cancer recurrence. However, imaging in 4/2024 showed a subtle increase in enhancement within the superior sagittal sinus. CT body imaging has been done annually as well and has been without any concerning findings for metastatic disease.     I met with Priyanka and Main () today for this follow-up visit.     HISTORY OF PRESENT ILLNESS  -Priyanka is doing well.   -Work is busy.    Continues to struggle with concentration, mild issues with short term memory.   -More stress and tension. More headaches; tightness on the left side of her head.   -Still fatigued.   -Stable visual field cut.  -Denies changes in strength.  -Mood is good.       MEDICATIONS   Current Outpatient Medications   Medication Sig Dispense Refill    albuterol (PROAIR HFA/PROVENTIL HFA/VENTOLIN HFA) 108 (90 Base) MCG/ACT inhaler Inhale 2 puffs into the lungs every 6 hours 18 g 1    azelastine (ASTELIN) 0.1 % nasal spray Spray 1 spray into both nostrils 2 times daily 30 mL 8    fluticasone-salmeterol (AIRDUO RESPICLICK) 113-14 MCG/ACT inhaler Inhale 1 puff into the lungs 2 times daily for 360 days 3 each 3    hydrOXYzine (ATARAX) 25 MG tablet Take 1 tablet (25 mg) by mouth nightly as needed for itching 30 tablet 0    loratadine (CLARITIN) 10 MG tablet Take 10 mg by mouth daily      montelukast (SINGULAIR) 10 MG tablet Take 1 tablet (10  mg) by mouth at bedtime for 360 days 90 tablet 3    rosuvastatin (CRESTOR) 10 MG tablet Take 1 tablet (10 mg) by mouth daily 90 tablet 1    sertraline (ZOLOFT) 100 MG tablet Take 1.5 tablets (150 mg) by mouth daily. **Needs to be seen for further refills** 135 tablet 0    triamcinolone (KENALOG) 0.1 % external cream Apply topically 2 times daily 80 g 0    triamcinolone (KENALOG) 0.1 % external ointment Apply topically 2 times daily (Patient not taking: Reported on 11/7/2023) 30 g 0    Vitamin D3 (VITAMIN D, CHOLECALCIFEROL,) 25 mcg (1000 units) tablet Take 25 mcg by mouth daily       DRUG ALLERGIES   Allergies   Allergen Reactions    Other Drug Allergy (See Comments) Hives     Paxlovid     Atorvastatin     Penicillins     Sulfa Antibiotics     Sulfamethoxazole-Trimethoprim Rash       ONCOLOGIC HISTORY  -2015 PRESENTATION: Headaches.  -2015 MR brain imaging with a right occipital mass   -4/22/2015 SURGERY: Resection by Dr. Lujan   PATHOLOGY: Hemangiopericytoma, grade 2.   -2015 CT chest, abdomen and pelvis negative for malignancy.  -2015 PET MRI negative for malignancy.   Conservative management per Dr. Herman Mayberry.    -8/1/2017 CT chest abdomen and pelvis and bone scan negative for malignancy.   -12/7/2017 MRB with no evidence of tumor recurrence.  -12/6/2018 MRB with no evidence of tumor recurrence.    -12/11/2019 MRB with a new nodule of enhancement within the superior sagittal sinus, concerning for tumor recurrence. Stable appearance of the right parieto-occipital craniotomy site.  -12/13/2019 Bone scan with no convincing evidence of skeletal metastases.  -12/18/2019 MR liver with a 9 mm lesion in the lateral aspect of the junction of segments 7 and 8 of the liver shows homogeneous delayed enhancement and is new since 2015. This could represent a metastatic lesion.     -1/6/2020 NEURO-ONC: Referral to radiation oncology and GI oncology surgery. Signed consent for next generation sequencing.   -1/14/2020 RADS:  GammaKnife to lesion (1 fraction).   -1/15/2020 LIVER LESION:  Laparoscopic ultrasound-guided microwave ablation of liver tumor by Dr. Phan Fierro.   PATHOLOGY: Cavernous hemangioma of the liver, benign:    -No metastatic hemangiopericytoma or other malignancy identified    -Underlying steatohepatitis, no significant fibrosis    -No follow-up needed.  -2/28/2020 NEURO-ONC/ MRB: Clinically stable. Headaches with migraine features; increasing Zoloft. Imaging with positive response to radiation.   -3/9/2020 Evaluated by Dr. Wright; Neuro-psych testing with mild weaknesses were noted in aspects of speeded visual processing, and general cognitive speed. Repeat in 3/2021.  -6/1/2020 NEURO-ONC/ MRB: Clinically well; for insomnia trial of melatonin. Imaging with positive treatment effect.   -6/1/2020 NEURO-ONC/ MRB: Clinically well. Imaging with no concerns.   -9/14/2020 NEURO-ONC/ MRB: Clinically well. Imaging of MRI brain and CT CAP with no concerns.  -12/14/2020 NEURO-ONC/ MRB: Clinically well. MR imaging of the brain with no concerns.  -3/12/2021 NEURO-ONC/ MRB: Clinically well. MRI of the brain with no concerns.  -9/10/2021 NEURO-ONC/ MRB: Clinically well. MRI of the brain and CT CAP with no concerns.  -3/7/2022 NEURO-ONC/ MRB: Clinically well. MRI of the brain with no concerns.  -10/21/2022 NEURO-ONC/ MRB: Clinically well; signs of carpal tunnel in the left wrist. MRI of the brain and CT CAP with no concerns.  -10/21/2022 NEURO-ONC/ MRB: Clinically well; signs of carpal tunnel in the left wrist. MRI of the brain and CT CAP with no concerns.  -4/17/2023 NEURO-ONC/ MRB: Mood is down, less motivation, more issues with concentration. Carpal tunnel of left improving with behavior change. Fatigue labs today; recommended follow-up with primary care provider. MRI of the brain with no concerns.   -10/16/2023 NEURO-ONC/ MRB: Energy improving. No new neurological concerns. MRI of the brain with no concerns. CT CAP with no  "concerns.  -4/15/2024 NEURO-ONC/ MRB: Mild worsening in headache frequency in the setting of more stress. Imaging with an increased size of the enhancing extra-axial nodular mass within the superior sagittal sinus with elevated cerebral blood volume consistent with disease recurrence. Referral to neurosurgery. Repeat CT imaging for systemic staging now.     SOCIAL HISTORY   Tobacco use: Former smoker; quit in 2003. 27 pack years.  . 2 daughter, 1 son.   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        PHYSICAL EXAMINATION  /75 (BP Location: Right arm, Patient Position: Sitting, Cuff Size: Adult Regular)   Pulse 80   Temp 98.9  F (37.2  C) (Oral)   Resp 16   Ht 1.57 m (5' 1.81\")   Wt 87.6 kg (193 lb 3.2 oz)   SpO2 95%   BMI 35.55 kg/m     Wt Readings from Last 2 Encounters:   04/15/24 87.6 kg (193 lb 3.2 oz)   11/08/23 88.9 kg (196 lb)      Ht Readings from Last 2 Encounters:   04/15/24 1.57 m (5' 1.81\")   11/07/23 1.575 m (5' 2\")      KPS   -Generally well appearing.  -Respiratory: No audible wheezing. Normal respiratory effort.   -Psychiatric: Normal mood and affect. Pleasant, talkative.  -Neurologic:   MENTAL STATUS:     Alert, oriented.   Recall: Intact.    Speech fluent.    Comprehension intact.     CRANIAL NERVES:    Left inferior homonymous quadrantanopia.   Symmetric facial movements.   Hearing intact.   With normal phonation, no dysfunction tongue.   GAIT: Steady, unassisted.      MEDICAL RECORDS  Personally reviewed; Pulmonary notes from November, indicated for follow-up for asthma.     LABS  Personally reviewed all available lab results; Allergen testing in Nov. Lipid panel (LDL 89) in Nov.     IMAGING  Personally reviewed MR brain imaging from today and compared to prior imaging.     Formal read; \"1.  Increased size of enhancing extra-axial nodular mass within the superior sagittal sinus with elevated cerebral blood volume consistent with disease recurrence. 2.  " "Superior sagittal sinus otherwise appears patent.\"    Imaging shown to Priyanka and Elisa        IMPRESSION  On date of service, 45 minutes was spent in clinic and 6 minutes was spent preparing for the visit through extensive chart review and coordinating care for this high complexity visit. The following is in explanation for the recommendations used to define the plan.       WHO grade 2 hemangiopericytomas are considered malignant tumors. Standard treatment options include surgery and radiation therapy. With no good surgical option, Priyanka completed stereotactic radiation therapy in 1/2020.     Unfortunately, today's brain imaging showed a subtle increase in the size of the enhancing extra-axial nodular mass within the superior sagittal sinus, which is concerning for cancer recurrence. I reviewed Priyanka's imaging and case with Dr. Samayoa in neurosurgery and he is wanting to see Priyanka in consultation to review the risks and benefits of surgical intervention. Priyanka is open to this referral to neurosurgery. Finally, I also reviewed Priyanka's case with Dr. Bauer in radiation oncology to understand if repeat radiation therapy remains an option.     Priyanka remains largely asymptomatic, though noting a slight worsening in headache frequency in the setting of more stress.     Of note, Priyanka has consented to next generation sequencing. If surgery is pursue, then pathology can be sent for mutational analysis to potentially identify targeted theray options. Additional treatment options include Pazopanib (Votrient) or temozolomide plus bevacizumab.    Extracranial metastatic disease can occur; most commonly in the bone, lung, and liver. Prior MR liver imaging showed a lesion. Priyanka was referred to Dr. Phan Fierro with surgical oncology. Dr. Fierro performed a  laparoscopic ultrasound-guided microwave ablation of a liver lesion on 1/15/2020. Pathology was consistent with a benign cavernous hemangioma and no metastatic " hemangiopericytoma or other malignancy was identified. She is currently monitored on annual systemic surveillance imaging with a CT C/A/P with contrast done every Fall. Her imaging from Fall 2023 was without any new concerns. I will repeat body imaging now given the changes seen on brain imaging as new findings could impact management.     PROBLEM LIST  Hemangiopericytoma, WHO grade 2  Liver lesion  Depressed mood  Sleep issues  Headache    PLAN  -CANCER-DIRECTED THERAPY-  -As above. Referral to neurosurgery.    Repeat CT imaging for systemic staging now.     -LIVER LESION-  -Treated. No lesion-specific follow-up needed.   -CT c/a/p done annually for systemic surveillance imaging.     -STEROIDS-  -Currently off dexamethasone.    -SEIZURE MANAGEMENT-  -While this patient is at increased risk of having seizures, given the lack of seizure history, there is no indication to prescribe an antiepileptic at this time.     -CARPAL TUNNEL-  -Left wrist > right. Occasional right wrist jerking.   -Improved with decreased strain at work.  -Recommended bracing overnight.     -Quality of life/ MOOD/ FATIGUE-  -Referral to primary care provider for further evaluation/treatment of mood/fatigue.   -4/2023; TSH (normal), Vitamin D (pending), Vitamin B12 (normal).   -Continue Zoloft 100mg daily.  -Continue with routine exercise.   -Issues with falling asleep; recommend taking Melatonin 2-3 hours prior to bedtime.    -MEMORY LOSS/ WORD FINDING ISSUES-  -Evaluated by Dr. Phan Wright in 3/2020 and 4/2021. Neuro-psych testing with mild weaknesses noted in aspects of speeded visual processing and general cognitive speed. Stable findings when comparing prior tests.  -Repeat testing if clinically indicated.     Return to clinic pending discussion with neurosurgery.     In the meantime, Priyanka knows to call with questions or concerns or to report new complaints and can be seen sooner if needed.    The longitudinal plan of care for the  diagnosis(es)/condition(s) as documented were addressed during this visit. Due to the added complexity in care, I will continue to support Priyanka in the subsequent management and with ongoing continuity of care.     Lauren Zhang MD  Neuro-oncology

## 2024-04-15 NOTE — NURSING NOTE
"Oncology Rooming Note    April 15, 2024 2:52 PM   Leidy Ascencio is a 63 year old female who presents for:    Chief Complaint   Patient presents with    Oncology Clinic Visit     Hemangiopericytoma of CNS, grade II     Initial Vitals: /75 (BP Location: Right arm, Patient Position: Sitting, Cuff Size: Adult Regular)   Pulse 80   Temp 98.9  F (37.2  C) (Oral)   Resp 16   Ht 1.57 m (5' 1.81\")   Wt 87.6 kg (193 lb 3.2 oz)   SpO2 95%   BMI 35.55 kg/m   Estimated body mass index is 35.55 kg/m  as calculated from the following:    Height as of this encounter: 1.57 m (5' 1.81\").    Weight as of this encounter: 87.6 kg (193 lb 3.2 oz). Body surface area is 1.95 meters squared.  No Pain (0) Comment: Data Unavailable   No LMP recorded. Patient has had a hysterectomy.  Allergies reviewed: Yes  Medications reviewed: Yes    Medications: Medication refills not needed today.  Pharmacy name entered into [facility]:    [facility] [place], [place] - [address] [place] [address] [facility] [place], [place] - [address] [facility] [facility] [phone]  [place], [place] - [address] [facility] [place], [place] - [address] [address], [address]    Frailty Screening:   Is the patient here for a new oncology consult visit in cancer care? 2. No      Clinical concerns: none       Aimee Peres              "

## 2024-04-16 ENCOUNTER — NURSE TRIAGE (OUTPATIENT)
Dept: ONCOLOGY | Facility: CLINIC | Age: 64
End: 2024-04-16
Payer: COMMERCIAL

## 2024-04-16 NOTE — TELEPHONE ENCOUNTER
"Imaging calling to report incidental finding from brain MRI from 4/15/24. Cut and pasted from Epic, impression #1 in bold:   \"Impression:  1.  Increased size of enhancing extra-axial nodular mass within the  superior sagittal sinus with elevated cerebral blood volume consistent  with disease recurrence.  2.  Superior sagittal sinus otherwise appears patent.     [Consider Follow Up: Increased size of enhancing extra-axial nodular  mass within the superior sagittal sinus with elevated cerebral blood  volume consistent with disease recurrence.]     This report will be copied to the [facility] to ensure a  provider acknowledges the finding. [facility] Center is available Monday  through Friday 8am-3:30 pm.     I have personally reviewed the examination and initial interpretation  and I agree with the findings.     REED ROSE MD\"    Encounter routed to Dr. Escobar, RNCC.   "

## 2024-04-17 ENCOUNTER — PRE VISIT (OUTPATIENT)
Dept: RADIATION ONCOLOGY | Facility: CLINIC | Age: 64
End: 2024-04-17
Payer: COMMERCIAL

## 2024-04-17 NOTE — TELEPHONE ENCOUNTER
Action 2024 8:21 AM [facility]BT   Action Taken IB fron RN Judith TRIANA To get [facility] [place] [facility] records for Dr. Zhang & Dr. Bauer to review    GK treatment in  w/ Dr. Porfirio Song   MEDICAL RECORDS REQUEST   Radiation Oncology  [address] [facility] [place] [address]  Fax: [phone]          FUTURE VISIT INFORMATION                                                   Leidy Streety, : 1960 scheduled for future visit at [facility] Radiation Oncology    RECORDS REQUESTED FOR VISIT                                                     BRAIN STATUS DETAILS   OFFICE NOTE from PCP     OFFICE NOTE from neuro onc     OFFICE NOTE from neuro/neuro surg  Consult:    F/U:   ED/HOSP ADMISSION NOTES     OPERATIVE/BIOPSY REPORTS     Angiogram Embolization Report/Audiogram Reports (Acoustic Neuroma Only)     MEDICATION LIST     LABS     PATHOLOGY REPORTS     ANYTHING RELATED TO DIAGNOSIS     IMAGING (NEED IMAGES & REPORT)     CT SCANS     MRI     MAMMO/SURGICAL BREAST IMGS/SPECIMEN RADIOGRAPH     XRAYS     ULTRASOUND     PET     PREVIOUS RADIATION Re-req -[facility]   WHAT HEALTHCARE FACILITY External [facility]   RADIATION ONCOLOGIST NOTES Req -[facility] Dr. Porfirio Song   [employer] [facility]

## 2024-04-17 NOTE — TELEPHONE ENCOUNTER
Action 4/17/24 MV 1.28pm   Action Taken 1) Called [facility] to push 4/2015 MRI brains if they are still available  2) Called [facility] to push images     Action 4/18/24 MV 4.25pm   Action Taken Images resolved in PACS         RECORDS RECEIVED FROM: internal   REASON FOR VISIT: maging with an increased size of the enhancing extra-axial nodular mass within the superior sagittal sinus with elevated cerebral blood volume consistent with disease recurrence    PROVIDER: Dr. Will Samayoa   DATE OF APPT: 4/19/24   NOTES (FOR ALL VISITS) STATUS DETAILS   OFFICE NOTE from referring provider Internal Dr Lauren Zhang @ [facility]:  4/15/24  10/16/23  4/17/23  (Additional encounters)   OFFICE NOTE from other specialist Internal Neuropsych eval @ [facility]:  4/30/21  3/6/20    Dr Giles Lang @ [facility] Rad Onc:  1/10/20    Dr Porfirio Song @ [facility]:  1/3/20    Dr Herman Mayberry @ [facility]:  12/11/19  12/6/18  12/7/17  8/3/17  8/25/15  5/19/15    Dr Valentín Delgado @ [facility]:  12/26/16 9/19/16    [facility]:  5/22/15    Neli HINSON @  [facility]:  3/23/15  11/3/14   DISCHARGE SUMMARY from hospital Care Everywhere [facility]:  4/22/15-4/26/15   ED NOTES Care Everywhere  Urgent Care:  3/10/14   OPERATIVE REPORT Care Everywhere [facility]:  1/14/20  GammaKnife treatment     [facility]:  4/22/15  RIGHT OCCIPITAL CRANIOTOMY REMOVAL OF TUMOR    MEDICATION LIST Internal    IMAGING  (FOR ALL VISITS)     MRI (HEAD, NECK, SPINE) PACS [facility]:  MRI Brain 4/15/24  MRI Brain 10/16/23  MRI Brain 4/17/23  MRI Brain 10/21/22  MRI Brain 3/7/22  MRI Brain 9/10/21  MRI Brain 3/12/21  (Additional images)    [facility]:  MRI Head 12/11/19  MRI Head 12/6/18  MRI Brain 12/7/17  MRI Brain 8/1/17  MRI Brain 8/25/15  MRI Brain 4/23/15*  MRI Brain 4/22/15*    [facility]:  MRI Brain 4/2/15   CT (HEAD, NECK, SPINE) PACS [facility]:  CT Sinus 4/2/15

## 2024-04-18 NOTE — PROGRESS NOTES
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 Zhang,    It was a pleasure to see Ms. Ascencio in the [facility] today.  I have reviewed the referral notes and imaging and have summarized our visit here. As you recall, Ms. Ascencio is a pleasant 63 year-old right-handed female who was found with an incidental 4.4cm right occipital mass and underwent a craniotomy in  at [place] with Dr. Lujan. Pathology showed a hemangiopericytoma, WHO grade 2. Postoperatively she did well but did sustain a quadrantanopsia. She was followed with surveillance imaging and then in  it was found to have recurred. She underwent radiosurgery with 20Gy on 2020 with Dr. Song. Surveillance body imaging showed liver lesions that were biopsied and showed no evidence of hemangiopericytoma. Continued MRI surveillance of the brain showed growth of the radiated tumor on the latest MRI, so she is referred for a discussion of options.    Review of Systems:   Pertinent items are noted in HPI or as in patient entered ROS below, remainder of complete ROS is negative.     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   Head: No signs of trauma.    Eyes: Conjunctivae are normal.  Mouth/Throat: Oropharynx moist.  Neck: Normal range of motion.    Resp: No respiratory distress.   MSK: Moves all extremities.  No obvious deformity.  Neuro: The patient is fully oriented and quite pleasant. Speech normal. Extraocular movements are intact without nystagmus. Visual fields with a left inferior quadrantanopsia. Facial sensation is intact in V1, V2, V3 distributions. Facial nerve function is normal, rated as a House Brackmann 1, without synkinesis.  Palate is symmetric. Shoulders are full strength. Tongue is midline. There is no pronator drift. Full strength in all extremities. Sensation intact throughout.  Psych: Normal mood and affect. Behavior is normal.    Incision: clean and well healed    Imaging:  We reviewed the results of the MRI scans in our system. The preoperative MRI from 4/2/2015 shows a 4.4cm right occipital tumor. The MRI around the time of radiosurgery in 2020 shows a 1cm growth that is parasagittal. Since then, the MRIs have shown growth of the mass to 1.3 x 1.3cm centered on the sagittal sinus.     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 Options include observation, chemotherapy with CNS penetrant medication, re-radiation or surgical resection potentially with or without GammaTiles. Ideally the treatment of choice is gross total resection with negative margins for this type of tumor, however given the sinus involvement, this would increase the risk of a serious venous infarction. For this reason, we often favor radiation treatment or surgery for subtotal resection with GammaTiles for local radiation delivery for tumors that involve the  superior sagittal sinus. We weighed the relative risks and benefits of each approach.  We discussed the risks of surgery including bleeding, infection, neurologic injury, venous/major stroke, weakness, numbness, worsening visual field loss, death, need for additional procedures or operations.  We indicated that the decision is not urgent, and that our team is determining if conventional radiation and/or surgery with gammatiles are options. I will discuss with our neurooncology tumor board to discuss this further. We will be in touch with Ms. Ascencio after our discussion. She may benefit from a visit with our Radiation Oncologists as a next step.  I encouraged Ms. Ascencio to contact with any questions or concerns or if we may be of assistance in any way.      It was a pleasure to participate in the care of your patient. Please feel free to contact me at any point if I can be of any assistance for Ms. Ascencio.    Sincerely,  Will Samayoa MD       40 minutes spent on the date of the encounter doing chart review, review of outside records, review of test results, interpretation of tests, patient visit, documentation and discussion with other provider(s)

## 2024-04-19 ENCOUNTER — PRE VISIT (OUTPATIENT)
Dept: NEUROSURGERY | Facility: CLINIC | Age: 64
End: 2024-04-19

## 2024-04-19 ENCOUNTER — OFFICE VISIT (OUTPATIENT)
Dept: NEUROSURGERY | Facility: CLINIC | Age: 64
End: 2024-04-19
Payer: COMMERCIAL

## 2024-04-19 VITALS
BODY MASS INDEX: 35.51 KG/M2 | DIASTOLIC BLOOD PRESSURE: 88 MMHG | SYSTOLIC BLOOD PRESSURE: 145 MMHG | HEART RATE: 75 BPM | HEIGHT: 62 IN | WEIGHT: 193 LBS | OXYGEN SATURATION: 96 % | RESPIRATION RATE: 16 BRPM

## 2024-04-19 DIAGNOSIS — D49.2 SOLITARY FIBROUS TUMOR: Primary | ICD-10-CM

## 2024-04-19 PROCEDURE — 99203 OFFICE O/P NEW LOW 30 MIN: CPT | Performed by: NEUROLOGICAL SURGERY

## 2024-04-19 ASSESSMENT — PAIN SCALES - GENERAL: PAINLEVEL: NO PAIN (0)

## 2024-04-19 NOTE — LETTER
2024       RE: Leidy Ascencio  [address] [place] [address]     Dear Colleague,    Thank you for referring your patient, Leidy Ascencio, to the [facility] NEUROSURGERY [facility]IC [place] at [facility]. Please see a copy of my visit note below.    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 Zhang,    It was a pleasure to see Ms. Ascencio in the [facility] today.  I have reviewed the referral notes and imaging and have summarized our visit here. As you recall, Ms. Ascencio is a pleasant 63 year-old right-handed female who was found with an incidental 4.4cm right occipital mass and underwent a craniotomy in  at [place] with Dr. Lujan. Pathology showed a hemangiopericytoma, WHO grade 2. Postoperatively she did well but did sustain a quadrantanopsia. She was followed with surveillance imaging and then in  it was found to have recurred. She underwent radiosurgery with 20Gy on 2020 with Dr. Song. Surveillance body imaging showed liver lesions that were biopsied and showed no evidence of hemangiopericytoma. Continued MRI surveillance of the brain showed growth of the radiated tumor on the latest MRI, so she is referred for a discussion of options.    Review of Systems:   Pertinent items are noted in HPI or as in patient entered ROS below, remainder of complete ROS is negative.     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   Head: No signs of trauma.    Eyes: Conjunctivae are normal.  Mouth/Throat: Oropharynx moist.  Neck: Normal range of motion.    Resp: No respiratory distress.   MSK: Moves all extremities.  No obvious deformity.  Neuro: The patient is fully oriented and quite pleasant. Speech normal. Extraocular movements are intact without nystagmus. Visual fields with a left inferior quadrantanopsia. Facial sensation is intact in V1, V2, V3 distributions. Facial nerve function is normal, rated as a House Brackmann 1, without synkinesis.  Palate is symmetric. Shoulders are full strength. Tongue is midline. There is no pronator drift. Full strength in all extremities. Sensation intact throughout.  Psych: Normal mood and affect. Behavior is normal.    Incision: clean and well healed    Imaging:  We reviewed the results of the MRI scans in our system. The preoperative MRI from 4/2/2015 shows a 4.4cm right occipital tumor. The MRI around the time of radiosurgery in 2020 shows a 1cm growth that is parasagittal. Since then, the MRIs have shown growth of the mass to 1.3 x 1.3cm centered on the sagittal sinus.     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 Options include observation, chemotherapy with CNS penetrant medication, re-radiation or surgical resection potentially with or without GammaTiles. Ideally the treatment of choice is gross  total resection with negative margins for this type of tumor, however given the sinus involvement, this would increase the risk of a serious venous infarction. For this reason, we often favor radiation treatment or surgery for subtotal resection with GammaTiles for local radiation delivery for tumors that involve the superior sagittal sinus. We weighed the relative risks and benefits of each approach.  We discussed the risks of surgery including bleeding, infection, neurologic injury, venous/major stroke, weakness, numbness, worsening visual field loss, death, need for additional procedures or operations.  We indicated that the decision is not urgent, and that our team is determining if conventional radiation and/or surgery with gammatiles are options. I will discuss with our neurooncology tumor board to discuss this further. We will be in touch with Ms. Ascencio after our discussion. She may benefit from a visit with our Radiation Oncologists as a next step.  I encouraged Ms. Ascencio to contact with any questions or concerns or if we may be of assistance in any way.      It was a pleasure to participate in the care of your patient. Please feel free to contact me at any point if I can be of any assistance for Ms. Ascencio.      40 minutes spent on the date of the encounter doing chart review, review of outside records, review of test results, interpretation of tests, patient visit, documentation and discussion with other provider(s)          Again, thank you for allowing me to participate in the care of your patient.      Sincerely,    Will Samayoa MD

## 2024-04-24 ENCOUNTER — ANCILLARY PROCEDURE (OUTPATIENT)
Dept: CT IMAGING | Facility: CLINIC | Age: 64
End: 2024-04-24
Attending: PSYCHIATRY & NEUROLOGY
Payer: COMMERCIAL

## 2024-04-24 DIAGNOSIS — D43.3 HEMANGIOPERICYTOMA OF CNS, GRADE II (H): ICD-10-CM

## 2024-04-24 PROCEDURE — 74177 CT ABD & PELVIS W/CONTRAST: CPT | Performed by: RADIOLOGY

## 2024-04-24 PROCEDURE — 71260 CT THORAX DX C+: CPT | Performed by: RADIOLOGY

## 2024-04-24 RX ORDER — IOPAMIDOL 755 MG/ML
107 INJECTION, SOLUTION INTRAVASCULAR ONCE
Status: COMPLETED | OUTPATIENT
Start: 2024-04-24 | End: 2024-04-24

## 2024-04-24 RX ADMIN — IOPAMIDOL 107 ML: 755 INJECTION, SOLUTION INTRAVASCULAR at 15:15

## 2024-05-10 DIAGNOSIS — D43.3 HEMANGIOPERICYTOMA OF CNS, GRADE II (H): Primary | ICD-10-CM

## 2024-06-10 DIAGNOSIS — F33.0 MAJOR DEPRESSIVE DISORDER, RECURRENT EPISODE, MILD (H): ICD-10-CM

## 2024-06-11 RX ORDER — SERTRALINE HYDROCHLORIDE 100 MG/1
TABLET, FILM COATED ORAL
Qty: 135 TABLET | Refills: 0 | Status: SHIPPED | OUTPATIENT
Start: 2024-06-11 | End: 2024-07-22

## 2024-07-18 NOTE — PROGRESS NOTES
NEURO-ONCOLOGY VISIT  Jul 22, 2024    CHIEF COMPLAINT: Ms. 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 She completed GammaKnife treatment in 1/2020.     Priyanka had been managed on imaging surveillance and imaging through 10/2023 demonstrated no evidence of cancer recurrence. However, imaging in 4/2024 showed a subtle increase in enhancement within the superior sagittal sinus and unfortunately, repeat imaging in 7/2024 demonstrated a further increase in the size of the enhancing extra axial nodular mass.     To date, CT body imaging, most recently completed in 4/2024 and performed annually, has been without any concerning findings for metastatic disease.     I met with Priyanka, Main (), and April (daughter) today for this follow-up visit.     HISTORY OF PRESENT ILLNESS  -Priyanka has noted new and more frequent headaches in the AM with tightness on the left side of her head.   Main and April note much snoring overnight.    She is semi-retired, but remains under much stress.    Still fatigued.   -Stable visual field cut.  -Denies changes in strength or coordination.    No changes in sensation.   -Very anxious. Currently on Zoloft.       MEDICATIONS   Current Outpatient Medications   Medication Sig Dispense Refill    albuterol (PROAIR HFA/PROVENTIL HFA/VENTOLIN HFA) 108 (90 Base) MCG/ACT inhaler Inhale 2 puffs into the lungs every 6 hours 18 g 1    azelastine (ASTELIN) 0.1 % nasal spray Spray 1 spray into both nostrils 2 times daily 30 mL 8    fluticasone-salmeterol (AIRDUO RESPICLICK) 113-14 MCG/ACT inhaler Inhale 1 puff into the lungs 2 times daily for 360 days 3 each 3    hydrOXYzine (ATARAX) 25 MG tablet Take 1 tablet (25 mg) by mouth nightly as needed for itching 30 tablet 0    loratadine (CLARITIN) 10 MG tablet Take 10 mg by mouth daily      montelukast (SINGULAIR) 10 MG tablet Take 1 tablet (10 mg)  by mouth at bedtime for 360 days 90 tablet 3    rosuvastatin (CRESTOR) 10 MG tablet Take 1 tablet (10 mg) by mouth daily 90 tablet 1    sertraline (ZOLOFT) 100 MG tablet Take 2 tablets (200 mg) by mouth daily 60 tablet 5    triamcinolone (KENALOG) 0.1 % external cream Apply topically 2 times daily 80 g 0    triamcinolone (KENALOG) 0.1 % external ointment Apply topically 2 times daily 30 g 0    Vitamin D3 (VITAMIN D, CHOLECALCIFEROL,) 25 mcg (1000 units) tablet Take 25 mcg by mouth daily       DRUG ALLERGIES   Allergies   Allergen Reactions    Other Drug Allergy (See Comments) Hives     Paxlovid     Atorvastatin     Penicillins     Sulfa Antibiotics     Sulfamethoxazole-Trimethoprim Rash       ONCOLOGIC HISTORY  -2015 PRESENTATION: Headaches.  -2015 MR brain imaging with a right occipital mass   -4/22/2015 SURGERY: Resection by Dr. Lujan   PATHOLOGY: Hemangiopericytoma, grade 2.   -2015 CT chest, abdomen and pelvis negative for malignancy.  -2015 PET MRI negative for malignancy.   Conservative management per Dr. Herman Mayberry.    -8/1/2017 CT chest abdomen and pelvis and bone scan negative for malignancy.   -12/7/2017 MRB with no evidence of tumor recurrence.  -12/6/2018 MRB with no evidence of tumor recurrence.    -12/11/2019 MRB with a new nodule of enhancement within the superior sagittal sinus, concerning for tumor recurrence. Stable appearance of the right parieto-occipital craniotomy site.  -12/13/2019 Bone scan with no convincing evidence of skeletal metastases.  -12/18/2019 MR liver with a 9 mm lesion in the lateral aspect of the junction of segments 7 and 8 of the liver shows homogeneous delayed enhancement and is new since 2015. This could represent a metastatic lesion.     -1/6/2020 NEURO-ONC: Referral to radiation oncology and GI oncology surgery. Signed consent for next generation sequencing.   -1/14/2020 RADS: GammaKnife to lesion (1 fraction).   -1/15/2020 LIVER LESION:  Laparoscopic ultrasound-guided  microwave ablation of liver tumor by Dr. Phan Fierro.   PATHOLOGY: Cavernous hemangioma of the liver, benign:    -No metastatic hemangiopericytoma or other malignancy identified    -Underlying steatohepatitis, no significant fibrosis    -No follow-up needed.  -2/28/2020 NEURO-ONC/ MRB: Clinically stable. Headaches with migraine features; increasing Zoloft. Imaging with positive response to radiation.   -3/9/2020 Evaluated by Dr. Wright; Neuro-psych testing with mild weaknesses were noted in aspects of speeded visual processing, and general cognitive speed. Repeat in 3/2021.  -6/1/2020 NEURO-ONC/ MRB: Clinically well; for insomnia trial of melatonin. Imaging with positive treatment effect.   -6/1/2020 NEURO-ONC/ MRB: Clinically well. Imaging with no concerns.   -9/14/2020 NEURO-ONC/ MRB: Clinically well. Imaging of MRI brain and CT CAP with no concerns.  -12/14/2020 NEURO-ONC/ MRB: Clinically well. MR imaging of the brain with no concerns.  -3/12/2021 NEURO-ONC/ MRB: Clinically well. MRI of the brain with no concerns.  -9/10/2021 NEURO-ONC/ MRB: Clinically well. MRI of the brain and CT CAP with no concerns.  -3/7/2022 NEURO-ONC/ MRB: Clinically well. MRI of the brain with no concerns.  -10/21/2022 NEURO-ONC/ MRB: Clinically well; signs of carpal tunnel in the left wrist. MRI of the brain and CT CAP with no concerns.  -10/21/2022 NEURO-ONC/ MRB: Clinically well; signs of carpal tunnel in the left wrist. MRI of the brain and CT CAP with no concerns.  -4/17/2023 NEURO-ONC/ MRB: Mood is down, less motivation, more issues with concentration. Carpal tunnel of left improving with behavior change. Fatigue labs today; recommended follow-up with primary care provider. MRI of the brain with no concerns.   -10/16/2023 NEURO-ONC/ MRB: Energy improving. No new neurological concerns. MRI of the brain with no concerns. CT CAP with no concerns.  -4/15/2024 NEURO-ONC/ MRB: Mild worsening in headache frequency in the setting of more  "stress. Imaging with an increased size of the enhancing extra-axial nodular mass within the superior sagittal sinus with elevated cerebral blood volume consistent with disease recurrence. Referral to neurosurgery. Repeat CT imaging for systemic staging now.   -7/22/2024 NEURO-ONC/ MRB: AM headaches in the setting of snoring. Increased anxiety; dose increase Zoloft to 200mg daily. Imaging with a further increase in the size of the enhancing extra axial nodular mass. Referral back to neurosurgery and to radiation oncology. CT body imaging imaging in April was negative.      SOCIAL HISTORY   Tobacco use: Former smoker; quit in 2003. 27 pack years.  . 2 daughter, 1 son.   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        PHYSICAL EXAMINATION  /76 (BP Location: Right arm, Patient Position: Sitting, Cuff Size: Adult Regular)   Pulse 67   Temp 98.7  F (37.1  C) (Oral)   Resp 16   Wt 84.6 kg (186 lb 9.6 oz)   SpO2 97%   BMI 34.13 kg/m     Wt Readings from Last 2 Encounters:   07/22/24 84.6 kg (186 lb 9.6 oz)   04/19/24 87.5 kg (193 lb)      Ht Readings from Last 2 Encounters:   04/19/24 1.575 m (5' 2\")   04/15/24 1.57 m (5' 1.81\")      KPS     -Generally well appearing.  -Respiratory: No audible wheezing. Normal respiratory effort.   -Psychiatric: Normal mood and affect. Pleasant, talkative.  -Neurologic:   MENTAL STATUS:     Alert, oriented.   Recall: Intact.    Speech fluent.    Comprehension intact.     CRANIAL NERVES:    Left inferior homonymous quadrantanopia.   Symmetric facial movements.   Hearing intact.   With normal phonation, no dysfunction tongue.   GAIT: Steady, unassisted.      MEDICAL RECORDS  Personally reviewed; Neurosurgery clinic note with Dr. Samayoa from April. Recommendations included; \"We indicated that the decision is not urgent, and that our team is determining if conventional radiation and/or surgery with gammatiles are options. I will discuss with our " "neurooncology tumor board to discuss this further. We will be in touch with Ms. Ascencio after our discussion. She may benefit from a visit with our Radiation Oncologists as a next step.\"     LABS  Personally reviewed all available lab results; No recent labs.      IMAGING  Personally reviewed MR brain imaging from today and compared to prior imaging.     Formal read; \"1. Increased size of enhancing extra axial nodular mass within the superior sagittal sinus with elevated relative cerebral blood volume, suggesting disease recurrence. 2. No definite thrombus is identified within the visualized superior sagittal sinus.\"    Imaging shown to [facility].        4/24/2024 CT c/a/p IMPRESSION: No suspicious neoplastic lesions in the chest or abdomen or pelvis by CT. Prominent fatty hypertrophy of the ileocecal valve. Unchanged benign-appearing right lower lobe 3 mm pulmonary nodule. Atherosclerosis. Continued appearance of hepatic cysts.      IMPRESSION  On date of service, 44 minutes was spent in clinic and 16 minutes was spent preparing for the visit through extensive chart review and coordinating care for this high complexity visit. The following is in explanation for the recommendations used to define the plan.       Priyanka remains largely asymptomatic, though she is again noting more frequent and severe morning headaches in the setting of snoring and much work-related stress. In the future, can consider a referral to sleep medicine for a possible sleep study to evaluate for sleep apnea. Priyanka is also noting more anxiety. This is understandable and she is already on Zoloft. Priyanka is in agreement to dose increase to 200mg daily from 150mg daily.     WHO grade 2 hemangiopericytomas are considered malignant tumors. Standard treatment options include surgery and radiation therapy. With no good surgical option, Priyanka completed stereotactic radiation therapy in 1/2020. Now unfortunately on today's brain imaging, there is " a continued subtle increase in the size of the enhancing extra-axial nodular mass within the superior sagittal sinus, which is concerning for cancer recurrence. I reviewed Priyanka's imaging and case at Brain Tumor Conference and all in attendance were in agreement with this impression. I also communicated this with Dr. Samayoa in neurosurgery and Dr. Bauer in radiation oncology. Dr. Samayoa is wanting to see Priyanka back in consultation to review the risks and benefits of surgical intervention and potentially the use of GammaTile therapy.    Of note, Priyanka has consented to next generation sequencing. If surgery is pursue, then pathology can be sent for mutational analysis to potentially identify targeted theray options. Additional treatment options include Pazopanib (Votrient) or temozolomide plus bevacizumab.    Extracranial metastatic disease can occur; most commonly in the bone, lung, and liver. Prior MR liver imaging showed a lesion. Priyanka was referred to Dr. Phan Fierro with surgical oncology. Dr. Fierro performed a  laparoscopic ultrasound-guided microwave ablation of a liver lesion on 1/15/2020. Pathology was consistent with a benign cavernous hemangioma and no metastatic hemangiopericytoma or other malignancy was identified. She is under annual systemic imaging surveillance with a CT C/A/P with contrast. This scan was recently completed in April 2024 and was negative. I informed Priyanka of these results today.     PROBLEM LIST  Hemangiopericytoma, WHO grade 2  Liver lesion  Depressed mood  Sleep issues  Headache    PLAN  -CANCER-DIRECTED THERAPY-  -As above. Referral back to neurosurgery.   Repeat CT imaging for systemic staging next due in 4/2025.     -LIVER LESION-  -Treated. No lesion-specific follow-up needed.   -CT c/a/p done annually for systemic surveillance imaging.     -STEROIDS-  -Currently off dexamethasone.    -SEIZURE MANAGEMENT-  -While this patient is at increased risk of having seizures,  given the lack of seizure history, there is no indication to prescribe an antiepileptic at this time.     -CARPAL TUNNEL-  -Left wrist > right. Occasional right wrist jerking.   -Improved with decreased strain at work.  -Recommended bracing overnight.     -Quality of life/ MOOD/ FATIGUE/ HEADACHE-  -Referral to primary care provider for further evaluation/treatment of mood/fatigue.   -4/2023; TSH (normal), Vitamin D (pending), Vitamin B12 (normal).   -History of anxiety.   Increased Zoloft to 200mg daily.  -Continue with routine exercise.   -Morning headaches in the setting of snoring and much work-related stress.    Can consider a referral to sleep medicine for a possible sleep study to evaluate for sleep apnea.    -MEMORY LOSS/ WORD FINDING ISSUES-  -Evaluated by Dr. Phan Wright in 3/2020 and 4/2021. Neuro-psych testing with mild weaknesses noted in aspects of speeded visual processing and general cognitive speed. Stable findings when comparing prior tests.  -Repeat testing if clinically indicated.     Return to clinic pending discussion with neurosurgery.     In the meantime, Priyanka knows to call with questions or concerns or to report new complaints and can be seen sooner if needed.    The longitudinal plan of care for the diagnosis(es)/condition(s) as documented were addressed during this visit. Due to the added complexity in care, I will continue to support Priyanka in the subsequent management and with ongoing continuity of care.     Lauren Zhang MD  Neuro-oncology     [facility] Counseling:  I discussed with the patient the risks of imiquimod including but not limited to erythema, scaling, itching, weeping, crusting, and pain.  Patient understands that the inflammatory response to imiquimod is variable from person to person and was educated regarded proper titration schedule.  If flu-like symptoms develop, patient knows to discontinue the medication and contact us.

## 2024-07-22 ENCOUNTER — ONCOLOGY VISIT (OUTPATIENT)
Dept: ONCOLOGY | Facility: CLINIC | Age: 64
End: 2024-07-22
Attending: PSYCHIATRY & NEUROLOGY
Payer: MEDICARE

## 2024-07-22 ENCOUNTER — ANCILLARY PROCEDURE (OUTPATIENT)
Dept: MRI IMAGING | Facility: CLINIC | Age: 64
End: 2024-07-22
Attending: PSYCHIATRY & NEUROLOGY
Payer: MEDICARE

## 2024-07-22 ENCOUNTER — TUMOR CONFERENCE (OUTPATIENT)
Dept: ONCOLOGY | Facility: CLINIC | Age: 64
End: 2024-07-22
Payer: MEDICARE

## 2024-07-22 VITALS
HEART RATE: 67 BPM | OXYGEN SATURATION: 97 % | WEIGHT: 186.6 LBS | TEMPERATURE: 98.7 F | DIASTOLIC BLOOD PRESSURE: 76 MMHG | RESPIRATION RATE: 16 BRPM | SYSTOLIC BLOOD PRESSURE: 125 MMHG | BODY MASS INDEX: 34.13 KG/M2

## 2024-07-22 DIAGNOSIS — F33.0 MAJOR DEPRESSIVE DISORDER, RECURRENT EPISODE, MILD (H): ICD-10-CM

## 2024-07-22 DIAGNOSIS — D43.3 HEMANGIOPERICYTOMA OF CNS, GRADE II (H): ICD-10-CM

## 2024-07-22 DIAGNOSIS — D43.3 HEMANGIOPERICYTOMA OF CNS, GRADE II (H): Primary | ICD-10-CM

## 2024-07-22 PROCEDURE — 70553 MRI BRAIN STEM W/O & W/DYE: CPT | Performed by: RADIOLOGY

## 2024-07-22 PROCEDURE — G0463 HOSPITAL OUTPT CLINIC VISIT: HCPCS | Performed by: PSYCHIATRY & NEUROLOGY

## 2024-07-22 PROCEDURE — G2211 COMPLEX E/M VISIT ADD ON: HCPCS | Performed by: PSYCHIATRY & NEUROLOGY

## 2024-07-22 PROCEDURE — 99417 PROLNG OP E/M EACH 15 MIN: CPT | Performed by: PSYCHIATRY & NEUROLOGY

## 2024-07-22 PROCEDURE — 99215 OFFICE O/P EST HI 40 MIN: CPT | Performed by: PSYCHIATRY & NEUROLOGY

## 2024-07-22 PROCEDURE — A9585 GADOBUTROL INJECTION: HCPCS | Mod: JW | Performed by: RADIOLOGY

## 2024-07-22 PROCEDURE — 99212 OFFICE O/P EST SF 10 MIN: CPT | Performed by: PSYCHIATRY & NEUROLOGY

## 2024-07-22 RX ORDER — SERTRALINE HYDROCHLORIDE 100 MG/1
200 TABLET, FILM COATED ORAL DAILY
Qty: 60 TABLET | Refills: 5 | Status: SHIPPED | OUTPATIENT
Start: 2024-07-22

## 2024-07-22 RX ORDER — GADOBUTROL 604.72 MG/ML
10 INJECTION INTRAVENOUS ONCE
Status: COMPLETED | OUTPATIENT
Start: 2024-07-22 | End: 2024-07-22

## 2024-07-22 RX ADMIN — GADOBUTROL 9 ML: 604.72 INJECTION INTRAVENOUS at 11:50

## 2024-07-22 ASSESSMENT — PAIN SCALES - GENERAL: PAINLEVEL: NO PAIN (0)

## 2024-07-22 NOTE — NURSING NOTE
"Oncology Rooming Note    July 22, 2024 2:20 PM   Leidy Ascencio is a 64 year old female who presents for:    Chief Complaint   Patient presents with    Oncology Clinic Visit     Hemangiopericytoma     Initial Vitals: /76 (BP Location: Right arm, Patient Position: Sitting, Cuff Size: Adult Regular)   Pulse 67   Temp 98.7  F (37.1  C) (Oral)   Resp 16   Wt 84.6 kg (186 lb 9.6 oz)   SpO2 97%   BMI 34.13 kg/m   Estimated body mass index is 34.13 kg/m  as calculated from the following:    Height as of 4/19/24: 1.575 m (5' 2\").    Weight as of this encounter: 84.6 kg (186 lb 9.6 oz). Body surface area is 1.92 meters squared.  No Pain (0) Comment: Data Unavailable   No LMP recorded. Patient has had a hysterectomy.  Allergies reviewed: Yes  Medications reviewed: Yes    Medications: Medication refills not needed today.  Pharmacy name entered into [facility]:    [facility] - [place], [place] - [address] [place] [address] [facility] - [place], [facility], [place] - [address]    Frailty Screening:   Is the patient here for a new oncology consult visit in cancer care? 2. No      Clinical concerns:  none      Ninfa Frazier              "

## 2024-07-22 NOTE — LETTER
7/22/2024      Leidy Ascencio  [address] [place] [address]      Dear Colleague,    Thank you for referring your patient, Leidy Ascencio, to the [facility]. Please see a copy of my visit note below.    NEURO-ONCOLOGY VISIT  Jul 22, 2024    CHIEF COMPLAINT: Ms. 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 She completed GammaKnife treatment in 1/2020.     Priyanka had been managed on imaging surveillance and imaging through 10/2023 demonstrated no evidence of cancer recurrence. However, imaging in 4/2024 showed a subtle increase in enhancement within the superior sagittal sinus and unfortunately, repeat imaging in 7/2024 demonstrated a further increase in the size of the enhancing extra axial nodular mass.     To date, CT body imaging, most recently completed in 4/2024 and performed annually, has been without any concerning findings for metastatic disease.     I met with Priyanka, Main (), and April (daughter) today for this follow-up visit.     HISTORY OF PRESENT ILLNESS  -Priyanka has noted new and more frequent headaches in the AM with tightness on the left side of her head.   Main and April note much snoring overnight.    She is semi-retired, but remains under much stress.    Still fatigued.   -Stable visual field cut.  -Denies changes in strength or coordination.    No changes in sensation.   -Very anxious. Currently on Zoloft.       MEDICATIONS   Current Outpatient Medications   Medication Sig Dispense Refill     albuterol (PROAIR HFA/PROVENTIL HFA/VENTOLIN HFA) 108 (90 Base) MCG/ACT inhaler Inhale 2 puffs into the lungs every 6 hours 18 g 1     azelastine (ASTELIN) 0.1 % nasal spray Spray 1 spray into both nostrils 2 times daily 30 mL 8     fluticasone-salmeterol (AIRDUO RESPICLICK) 113-14 MCG/ACT inhaler Inhale 1 puff into the lungs 2 times daily for 360 days 3  each 3     hydrOXYzine (ATARAX) 25 MG tablet Take 1 tablet (25 mg) by mouth nightly as needed for itching 30 tablet 0     loratadine (CLARITIN) 10 MG tablet Take 10 mg by mouth daily       montelukast (SINGULAIR) 10 MG tablet Take 1 tablet (10 mg) by mouth at bedtime for 360 days 90 tablet 3     rosuvastatin (CRESTOR) 10 MG tablet Take 1 tablet (10 mg) by mouth daily 90 tablet 1     sertraline (ZOLOFT) 100 MG tablet Take 2 tablets (200 mg) by mouth daily 60 tablet 5     triamcinolone (KENALOG) 0.1 % external cream Apply topically 2 times daily 80 g 0     triamcinolone (KENALOG) 0.1 % external ointment Apply topically 2 times daily 30 g 0     Vitamin D3 (VITAMIN D, CHOLECALCIFEROL,) 25 mcg (1000 units) tablet Take 25 mcg by mouth daily       DRUG ALLERGIES   Allergies   Allergen Reactions     Other Drug Allergy (See Comments) Hives     Paxlovid      Atorvastatin      Penicillins      Sulfa Antibiotics      Sulfamethoxazole-Trimethoprim Rash       ONCOLOGIC HISTORY  -2015 PRESENTATION: Headaches.  -2015 MR brain imaging with a right occipital mass   -4/22/2015 SURGERY: Resection by Dr. Lujan   PATHOLOGY: Hemangiopericytoma, grade 2.   -2015 CT chest, abdomen and pelvis negative for malignancy.  -2015 PET MRI negative for malignancy.   Conservative management per Dr. Herman Mayberry.    -8/1/2017 CT chest abdomen and pelvis and bone scan negative for malignancy.   -12/7/2017 MRB with no evidence of tumor recurrence.  -12/6/2018 MRB with no evidence of tumor recurrence.    -12/11/2019 MRB with a new nodule of enhancement within the superior sagittal sinus, concerning for tumor recurrence. Stable appearance of the right parieto-occipital craniotomy site.  -12/13/2019 Bone scan with no convincing evidence of skeletal metastases.  -12/18/2019 MR liver with a 9 mm lesion in the lateral aspect of the junction of segments 7 and 8 of the liver shows homogeneous delayed enhancement and is new since 2015. This could represent a  metastatic lesion.     -1/6/2020 NEURO-ONC: Referral to radiation oncology and GI oncology surgery. Signed consent for next generation sequencing.   -1/14/2020 RADS: GammaKnife to lesion (1 fraction).   -1/15/2020 LIVER LESION:  Laparoscopic ultrasound-guided microwave ablation of liver tumor by Dr. Phan Fierro.   PATHOLOGY: Cavernous hemangioma of the liver, benign:    -No metastatic hemangiopericytoma or other malignancy identified    -Underlying steatohepatitis, no significant fibrosis    -No follow-up needed.  -2/28/2020 NEURO-ONC/ MRB: Clinically stable. Headaches with migraine features; increasing Zoloft. Imaging with positive response to radiation.   -3/9/2020 Evaluated by Dr. Wright; Neuro-psych testing with mild weaknesses were noted in aspects of speeded visual processing, and general cognitive speed. Repeat in 3/2021.  -6/1/2020 NEURO-ONC/ MRB: Clinically well; for insomnia trial of melatonin. Imaging with positive treatment effect.   -6/1/2020 NEURO-ONC/ MRB: Clinically well. Imaging with no concerns.   -9/14/2020 NEURO-ONC/ MRB: Clinically well. Imaging of MRI brain and CT CAP with no concerns.  -12/14/2020 NEURO-ONC/ MRB: Clinically well. MR imaging of the brain with no concerns.  -3/12/2021 NEURO-ONC/ MRB: Clinically well. MRI of the brain with no concerns.  -9/10/2021 NEURO-ONC/ MRB: Clinically well. MRI of the brain and CT CAP with no concerns.  -3/7/2022 NEURO-ONC/ MRB: Clinically well. MRI of the brain with no concerns.  -10/21/2022 NEURO-ONC/ MRB: Clinically well; signs of carpal tunnel in the left wrist. MRI of the brain and CT CAP with no concerns.  -10/21/2022 NEURO-ONC/ MRB: Clinically well; signs of carpal tunnel in the left wrist. MRI of the brain and CT CAP with no concerns.  -4/17/2023 NEURO-ONC/ MRB: Mood is down, less motivation, more issues with concentration. Carpal tunnel of left improving with behavior change. Fatigue labs today; recommended follow-up with primary care provider.  "MRI of the brain with no concerns.   -10/16/2023 NEURO-ONC/ MRB: Energy improving. No new neurological concerns. MRI of the brain with no concerns. CT CAP with no concerns.  -4/15/2024 NEURO-ONC/ MRB: Mild worsening in headache frequency in the setting of more stress. Imaging with an increased size of the enhancing extra-axial nodular mass within the superior sagittal sinus with elevated cerebral blood volume consistent with disease recurrence. Referral to neurosurgery. Repeat CT imaging for systemic staging now.   -7/22/2024 NEURO-ONC/ MRB: AM headaches in the setting of snoring. Increased anxiety; dose increase Zoloft to 200mg daily. Imaging with a further increase in the size of the enhancing extra axial nodular mass. Referral back to neurosurgery and to radiation oncology. CT body imaging imaging in April was negative.      SOCIAL HISTORY   Tobacco use: Former smoker; quit in 2003. 27 pack years.  . 2 daughter, 1 son.   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        PHYSICAL EXAMINATION  /76 (BP Location: Right arm, Patient Position: Sitting, Cuff Size: Adult Regular)   Pulse 67   Temp 98.7  F (37.1  C) (Oral)   Resp 16   Wt 84.6 kg (186 lb 9.6 oz)   SpO2 97%   BMI 34.13 kg/m     Wt Readings from Last 2 Encounters:   07/22/24 84.6 kg (186 lb 9.6 oz)   04/19/24 87.5 kg (193 lb)      Ht Readings from Last 2 Encounters:   04/19/24 1.575 m (5' 2\")   04/15/24 1.57 m (5' 1.81\")      KPS     -Generally well appearing.  -Respiratory: No audible wheezing. Normal respiratory effort.   -Psychiatric: Normal mood and affect. Pleasant, talkative.  -Neurologic:   MENTAL STATUS:     Alert, oriented.   Recall: Intact.    Speech fluent.    Comprehension intact.     CRANIAL NERVES:    Left inferior homonymous quadrantanopia.   Symmetric facial movements.   Hearing intact.   With normal phonation, no dysfunction tongue.   GAIT: Steady, unassisted.      MEDICAL RECORDS  Personally reviewed; " "Neurosurgery clinic note with Dr. Samayoa from April. Recommendations included; \"We indicated that the decision is not urgent, and that our team is determining if conventional radiation and/or surgery with gammatiles are options. I will discuss with our neurooncology tumor board to discuss this further. We will be in touch with Ms. Ascencio after our discussion. She may benefit from a visit with our Radiation Oncologists as a next step.\"     LABS  Personally reviewed all available lab results; No recent labs.      IMAGING  Personally reviewed MR brain imaging from today and compared to prior imaging.     Formal read; \"1. Increased size of enhancing extra axial nodular mass within the superior sagittal sinus with elevated relative cerebral blood volume, suggesting disease recurrence. 2. No definite thrombus is identified within the visualized superior sagittal sinus.\"    Imaging shown to [facility].        4/24/2024 CT c/a/p IMPRESSION: No suspicious neoplastic lesions in the chest or abdomen or pelvis by CT. Prominent fatty hypertrophy of the ileocecal valve. Unchanged benign-appearing right lower lobe 3 mm pulmonary nodule. Atherosclerosis. Continued appearance of hepatic cysts.      IMPRESSION  On date of service, 44 minutes was spent in clinic and 16 minutes was spent preparing for the visit through extensive chart review and coordinating care for this high complexity visit. The following is in explanation for the recommendations used to define the plan.       Priyanka remains largely asymptomatic, though she is again noting more frequent and severe morning headaches in the setting of snoring and much work-related stress. In the future, can consider a referral to sleep medicine for a possible sleep study to evaluate for sleep apnea. Priyanka is also noting more anxiety. This is understandable and she is already on Zoloft. Priyanka is in agreement to dose increase to 200mg daily from 150mg daily.     WHO grade 2 " hemangiopericytomas are considered malignant tumors. Standard treatment options include surgery and radiation therapy. With no good surgical option, Priyanka completed stereotactic radiation therapy in 1/2020. Now unfortunately on today's brain imaging, there is a continued subtle increase in the size of the enhancing extra-axial nodular mass within the superior sagittal sinus, which is concerning for cancer recurrence. I reviewed Priyanka's imaging and case at Brain Tumor Conference and all in attendance were in agreement with this impression. I also communicated this with Dr. Samayoa in neurosurgery and Dr. Bauer in radiation oncology. Dr. Samayoa is wanting to see Priyanka back in consultation to review the risks and benefits of surgical intervention and potentially the use of GammaTile therapy.    Of note, Priyanka has consented to next generation sequencing. If surgery is pursue, then pathology can be sent for mutational analysis to potentially identify targeted theray options. Additional treatment options include Pazopanib (Votrient) or temozolomide plus bevacizumab.    Extracranial metastatic disease can occur; most commonly in the bone, lung, and liver. Prior MR liver imaging showed a lesion. Priyanka was referred to Dr. Phan Fierro with surgical oncology. Dr. Fierro performed a  laparoscopic ultrasound-guided microwave ablation of a liver lesion on 1/15/2020. Pathology was consistent with a benign cavernous hemangioma and no metastatic hemangiopericytoma or other malignancy was identified. She is under annual systemic imaging surveillance with a CT C/A/P with contrast. This scan was recently completed in April 2024 and was negative. I informed Priyanka of these results today.     PROBLEM LIST  Hemangiopericytoma, WHO grade 2  Liver lesion  Depressed mood  Sleep issues  Headache    PLAN  -CANCER-DIRECTED THERAPY-  -As above. Referral back to neurosurgery.   Repeat CT imaging for systemic staging next due in  4/2025.     -LIVER LESION-  -Treated. No lesion-specific follow-up needed.   -CT c/a/p done annually for systemic surveillance imaging.     -STEROIDS-  -Currently off dexamethasone.    -SEIZURE MANAGEMENT-  -While this patient is at increased risk of having seizures, given the lack of seizure history, there is no indication to prescribe an antiepileptic at this time.     -CARPAL TUNNEL-  -Left wrist > right. Occasional right wrist jerking.   -Improved with decreased strain at work.  -Recommended bracing overnight.     -Quality of life/ MOOD/ FATIGUE/ HEADACHE-  -Referral to primary care provider for further evaluation/treatment of mood/fatigue.   -4/2023; TSH (normal), Vitamin D (pending), Vitamin B12 (normal).   -History of anxiety.   Increased Zoloft to 200mg daily.  -Continue with routine exercise.   -Morning headaches in the setting of snoring and much work-related stress.    Can consider a referral to sleep medicine for a possible sleep study to evaluate for sleep apnea.    -MEMORY LOSS/ WORD FINDING ISSUES-  -Evaluated by Dr. Phan Wright in 3/2020 and 4/2021. Neuro-psych testing with mild weaknesses noted in aspects of speeded visual processing and general cognitive speed. Stable findings when comparing prior tests.  -Repeat testing if clinically indicated.     Return to clinic pending discussion with neurosurgery.     In the meantime, Priyanka knows to call with questions or concerns or to report new complaints and can be seen sooner if needed.    The longitudinal plan of care for the diagnosis(es)/condition(s) as documented were addressed during this visit. Due to the added complexity in care, I will continue to support Priyanka in the subsequent management and with ongoing continuity of care.     Lauren Zhang MD  Neuro-oncology      Again, thank you for allowing me to participate in the care of your patient.        Sincerely,        Lauren Zhang MD

## 2024-07-25 ENCOUNTER — TELEPHONE (OUTPATIENT)
Dept: NEUROSURGERY | Facility: CLINIC | Age: 64
End: 2024-07-25
Payer: MEDICARE

## 2024-07-25 NOTE — TELEPHONE ENCOUNTER
[facility] Call Center    Phone Message    May a detailed message be left on voicemail: yes     Reason for Call: Other: Pt is returning call Please call Pt back to discuss appt.      Action Taken: Message routed to:  [facility] Neurosurgery    Travel Screening: Not Applicable     Date of Service:

## 2024-07-25 NOTE — PROGRESS NOTES
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 Zhang,    It was a pleasure to see Ms. Ascencio in the [facility] today.   As you recall, Ms. Ascencio is a pleasant 63 year-old right-handed female who was found with an incidental 4.4cm right occipital mass and underwent a craniotomy in  at [facility] with Dr. Lujan. Pathology showed a hemangiopericytoma, WHO grade 2. Postoperatively she did well but did sustain a quadrantanopsia. She was followed with surveillance imaging and then in  it was found to have recurred. She underwent radiosurgery with 20Gy on 2020 with Dr. Song. Surveillance body imaging showed liver lesions that were biopsied and showed no evidence of hemangiopericytoma. Continued MRI surveillance of the brain showed growth of the radiated tumor on the latest MRI, so she is referred for a discussion of options. We met previously about re-resection, re-radiation, or resection with Gamma Tiles and returns for another visit to discuss this.    Review of Systems:   Pertinent items are noted in HPI or as in patient entered ROS below, remainder of complete ROS is negative.     Medications: albuterol, azelastine spray, fluticasone-salmeterol, hydroxyzine, loratadine, montelukast, rosuvastatin, sertraline, triamcinolone cream, vitamin D    Allergies:  atorvastatin, penicillins, sulfa     Past Medical History: arthritis, copd, depression, hyperlipidemia, asthma, solitary fibrous tumor s/p craniotomy at OSH as above    Family History: noncontributory      Social History: originally from [place],  with three children, eight grandkids, works as a restaurant training, former smoker who quit in  2003    Physical Exam:   General: No acute distress.   Head: No signs of trauma.    Eyes: Conjunctivae are normal.  Mouth/Throat: Oropharynx moist.  Neck: Normal range of motion.    Resp: No respiratory distress.   MSK: Moves all extremities.  No obvious deformity.  Neuro: The patient is fully oriented and quite pleasant. Speech normal. Extraocular movements are intact without nystagmus. Visual fields with a left inferior quadrantanopsia. Facial sensation is intact in V1, V2, V3 distributions. Facial nerve function is normal, rated as a House Brackmann 1, without synkinesis.  Palate is symmetric. Shoulders are full strength. Tongue is midline. There is no pronator drift. Full strength in all extremities. Sensation intact throughout.  Psych: Normal mood and affect. Behavior is normal.    Incision: clean and well healed    Imaging:  We reviewed the results of the MRI scans in our system. The preoperative MRI from 4/2/2015 shows a 4.4cm right occipital tumor. The MRI around the time of radiosurgery in 2020 shows a 1cm growth that is parasagittal. Since then, the MRIs have shown growth of the mass to 1.5 x 1.5cm centered on the sagittal sinus on the most recent scan 7/22/2024.     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 Options include observation, chemotherapy with CNS penetrant medication, re-radiation or surgical resection potentially with or without GammaTiles. Ideally the treatment of choice is gross total resection with negative margins for this type of tumor, however given the sinus involvement, this would increase the risk of a serious venous infarction. Since the tumor is growing more quickly over the past 3 months, we will  assess the superior sagittal sinus to evaluate its patency. If growing slowly I would prefer a subtotal resection up to the sinus and then gamma tiles vs radiation to the remainder. Given its more rapidly growth over the past 3 months, I believe consideration of venous sinus resection for a more substantial tumor removal should be considered to maximize long term outcome.  We discussed the risks of surgery including bleeding, infection, neurologic injury, venous/major stroke, weakness, numbness, worsening visual field loss, death, need for additional procedures or operations.  CT-head to evaluate the craniotomy site  Will arrange an MRV for preop planning to evaluate status of the superior sagittal sinus.  Referral to Endovascular team for angio/venogram to assess the patency of the superior sagittal sinus  We indicated that we are working with our Radiation Oncology team for timing of Gamma Tiles as we have discussed in our multidisciplinary neurooncology tumor board.  She will meet with Dr. Bauer from radiation oncology shortly  She would like to proceed with surgery. We will place a case request for a biparieto-occipital craniotomy for tumor resection with Gamma Tiles with possible lumbar drain.  We will target a time in the next ~4 weeks if possible.  I would like to meet with her and her family again after the MRV study. Her daughter is an RN and I would like to discuss the surgical plan and options with her family at that point.  I encouraged Ms. Ascencio to contact with any questions or concerns or if we may be of assistance in any way.      It was a pleasure to participate in the care of your patient. Please feel free to contact me at any point if I can be of any assistance for Ms. Ascencio.    Sincerely,  Will Samayoa MD       30 minutes spent on the date of the encounter doing chart review, review of outside records, review of test results, interpretation of tests, patient visit, documentation and  discussion with other provider(s)

## 2024-07-25 NOTE — TELEPHONE ENCOUNTER
Left Voicemail (1st Attempt) and Sent Mychart (1st Attempt) for the patient to call back and schedule the following:    Appointment type: Follow Up  Provider: Dr. Samayoa  Return date: see below  Specialty phone number: [phone]  Additional appointment(s) needed:   Additonal Notes:     ** please assist with scheduling the pt next week. Can you offer and see if she can do 7/31? Ok to use SHAD. If she can't do next week, 8/7 is fine at 10 AM. Thanks! **

## 2024-07-26 ENCOUNTER — PATIENT OUTREACH (OUTPATIENT)
Dept: ONCOLOGY | Facility: CLINIC | Age: 64
End: 2024-07-26
Payer: MEDICARE

## 2024-07-26 DIAGNOSIS — D43.3 HEMANGIOPERICYTOMA OF CNS, GRADE II (H): Primary | ICD-10-CM

## 2024-07-26 NOTE — PROGRESS NOTES
Referral from Dr. Zhang received via IB message from Dr. Zhang/Dr. Bauer.     Will enter formal referral as patient has not been seen by [facility] since 2020 and route to [facility]PS to schedule as requested by SUSANNE WongCC with Dr. Bauer.     Jessica Briggs, BSN, RN, PHN, OCN  Hematology/Oncology Nurse Navigator  [facility]  [phone]

## 2024-07-31 ENCOUNTER — OFFICE VISIT (OUTPATIENT)
Dept: NEUROSURGERY | Facility: CLINIC | Age: 64
End: 2024-07-31
Payer: MEDICARE

## 2024-07-31 VITALS
OXYGEN SATURATION: 95 % | RESPIRATION RATE: 16 BRPM | HEIGHT: 62 IN | BODY MASS INDEX: 34.41 KG/M2 | SYSTOLIC BLOOD PRESSURE: 126 MMHG | WEIGHT: 187 LBS | DIASTOLIC BLOOD PRESSURE: 77 MMHG | HEART RATE: 68 BPM

## 2024-07-31 DIAGNOSIS — D43.3 HEMANGIOPERICYTOMA OF CNS, GRADE II (H): ICD-10-CM

## 2024-07-31 DIAGNOSIS — D49.2 SOLITARY FIBROUS TUMOR: Primary | ICD-10-CM

## 2024-07-31 DIAGNOSIS — R90.0 INTRACRANIAL SPACE-OCCUPYING LESION FOUND ON DIAGNOSTIC IMAGING OF CENTRAL NERVOUS SYSTEM: ICD-10-CM

## 2024-07-31 PROCEDURE — 99214 OFFICE O/P EST MOD 30 MIN: CPT | Performed by: NEUROLOGICAL SURGERY

## 2024-07-31 RX ORDER — CEFAZOLIN SODIUM 2 G/50ML
2 SOLUTION INTRAVENOUS SEE ADMIN INSTRUCTIONS
Status: CANCELLED | OUTPATIENT
Start: 2024-07-31

## 2024-07-31 RX ORDER — CEFAZOLIN SODIUM 2 G/50ML
2 SOLUTION INTRAVENOUS
Status: CANCELLED | OUTPATIENT
Start: 2024-07-31

## 2024-07-31 NOTE — PATIENT INSTRUCTIONS
You were seen today with Dr. Will Samayoa.    Next steps:  MRV & non-contrast head CT ASAP  Return to clinic to see Dr. Samayoa w/ daughter to include her in plan  Dr. Samayoa will reach out to Dr. Cruz to discuss the need for an angiogram   Schedule surgery, PAC appointment, and post-op appointment       How to Contact Us  Send a [employer] message to your provider.   Call the clinic - your call will be routed appropriately.   Neurosurgery Clinic: [phone]  To speak directly to an RN Care Coordinator:  Yoli RN: [phone]  Ana Maria RN: [phone]    Note: We do our best to check voicemail frequently throughout the day and make every effort to return calls within 1-2 business days. For urgent matters, please use the general clinic phone numbers listed above.     Surgery Instructions    Planning Ahead for Surgery    FINANCIAL SERVICES  [email or web address]  Customer Service: [phone]    Our financial team will contact your insurance company as soon as surgery is scheduled. If your insurance company requires a prior authorization or pre-approval, our financial team will complete this for you. Call your insurance company and let them know you re having surgery. Ask questions and make sure you understand your deductible, co-insurance, and out-of-pocket costs that you will be responsible for.       HEALTH CARE DIRECTIVE  Consider preparing a Health Care Directive and choosing a Health Care Agent. A Health Care Directive is a written plan outlining your values and priorities for your future medical treatment. A Health Care Agent makes health care decisions based on your wishes if you are unable to communicate.     Visit [email or web address] for more information, to download a form, or register for a free class. You can also call [phone] or email [email or web address].    If you would like your health care directive to be attached to your  chart, please upload the document to [employer]. You may also want to bring a copy with you on the day of surgery.       1 Month Before Surgery  You will need a preoperative assessment within 30 days of your surgery. We will schedule this for you once we know your surgery date. This appointment will be with our Preoperative Assessment Center.     Before surgery, call the clinic if you have any changes in your health or you are having more frequent or severe symptoms. Please let us know if you develop a respiratory illness (cold, flu, or COVID-19) or other illness or infection in the weeks leading up to your surgery. We are not currently requiring a COVID-19 test prior to your surgery, unless you are having symptoms.    In the 1-2 weeks prior to your surgery, follow these recommendations to protect yourself:  Wear a face covering outside your home.  Use social distancing and stay 6 feet away from others whenever you can.  Wash your hands often.    Having diabetes or smoking can cause delayed wound healing and increase your risk of a surgical site infection. Quitting smoking and lowering your blood sugars can make a big difference. If you would like support with this, please let us know or work with your primary care provider.        1 Week Before Surgery    MEDICATIONS   You will receive specific instructions about how to take your medications at your preoperative assessment visit. Inform your care team of every medication that you take, including over-the-counter medications and supplements. Some medications can make you bleed too much during surgery, and some change how well anesthesia drugs work. Follow these general instructions for common medications, unless otherwise directed.     INSTRUCTIONS MEDICATION   Hold for 7 days before surgery  Supplements   Multivitamins   Naproxen (Aleve)  Ibuprofen (Advil, Motrin)  Aspirin (note: some medications can contain aspirin, like Dorita-Naturita)     Talk with the Preoperative  Assessment Center (PAC) about how to take these medications before surgery    Insulin or oral medications for diabetes   Blood pressure medications   Anticoagulant medications, including:    warfarin (Coumadin)   enoxaparin (Lovenox)   dabigatran (Pradaxa)   apixaban (Eliquis)   rivaroxaban (Xarelto)   Antiplatelet medications, including:   clopidogrel bisulfate (Plavix)   cilostazol (Pletal)      Okay to keep taking for pain, as needed    Acetaminophen (Tylenol)        1 Day Before Surgery    EATING AND DRINKING BEFORE SURGERY  Eat and drink as usual until 8 hours before your surgery arrival time. After that, no food or milk.   Drink clear liquids until 2 hours before your surgery arrival time. Clear liquids are liquids you can see through, like water, Gatorade, and Propel. You may also have black coffee and tea (no cream or milk).   Nothing by mouth within 2 hours of your surgery arrival time. This includes gum, candy, and breath mints.   If your care team tells you take medicine on the morning of surgery, it is okay to take medicine with a sip of water.   Do not drink alcohol for at least 24 hours before surgery. Do not use marijuana for 7 days prior to surgery.    SHOWERING INSTRUCTIONS  Shower or bathe the night before and morning of your surgery following the instructions on your handout,  Showering Before Surgery.  Only use the antiseptic soap from your neck to your toes (do not use on your hair or head). Your care team will clean the skin at your surgery site.   Don t shave or clip hair near your surgery site. We ll remove the hair if needed.     SMOKING AND NICOTINE  Don t smoke or vape the morning of surgery. You may chew nicotine gum up to 2 hours before surgery. A nicotine patch is okay.   We strongly recommend quitting smoking and other tobacco products. Nicotine can cause delayed healing and wound complications after surgery.       Your Surgery Day        PARKING: Self-park and  parking (24 hours,  7 days a week) are available at the hospital. Self-park and  rates are the same. If the Patient Visitors Ramp is full or if a patient or visitor has limited mobility, please follow the signs to the  located at the main entrance. For more information, please visit: [email or web address]      CHECK-IN: Enter through the main entrance of the hospital (near the Emergency Room) on [facility]. Staff at the entrance can provide you with directions. You will take the elevator to the 3rd floor and then follow the signs to the surgery check-in area.    WHAT TO BRING  Bring:  Photo ID and insurance card   Copy of your healthcare directive (if you have one)   Glasses with case (you can t wear contacts during surgery)   Hearing aids with case  A few personal items (pack lightly)   Cell phone and    Inhaler (if you use)   Eye drops (if you use)   If you have a pacemaker, ICD (defibrillator) or other implant, bring the ID card   If you have an implanted stimulator, please bring the remote control   If you have a legal guardian, bring a copy of the certified (court-stamped) guardianship papers  Leave at Home:  Remove any jewelry, including body piercing  Leave jewelry and other valuables at home     HOSPITAL VISITOR RESTRICTIONS  Please visit [email or web address] for the latest information about hospital visitor restrictions.    WORK AND FMLA / SHORT TERM DISABILITY  Most people take 6-12 weeks off work. This depends on the type of work that you do and the surgery you are having. We are happy to complete short-term disability or FMLA paperwork for you.    Notify your manager and HR department. They will want to know your surgery date and anticipated time off work. Talk with your employer to understand what benefits are available to you. Typically, your employer will provide you with paperwork that requires information from you and information from your healthcare  "provider.    Send paperwork to the clinic via fax or [email or web address]. The Neurosurgery clinic fax # is [phone].   Please do not give paperwork to staff or surgeons in the hospital.   Please include the following information when sending your paperwork:   Due date   Where we should send completed paperwork   Please allow at least 5-7 business days for paperwork completion and processing. Please communicate about paperwork via MyChart as much as possible. We can notify you once your paperwork is completed and signed.      IMAGING: You will have a CT scan on the day of surgery. Small stickers, called fiducials, will be placed on your head. Fiducials provide a \"map\" of the surgical area, which helps your surgeon navigate around important structures during the surgery. These markers are temporary and will come off after your surgery. Please ignore the check-in time for this appointment. Once you check in for surgery, hospital staff will bring you to this scan.    DURING SURGERY: Your family or friends (whoever you designate) will receive updates from the Operating Room nurses throughout your surgery, and the surgeons will call when the surgery is complete. Immediately after surgery, you will go to a post-anesthesia care unit (PACU) to wake up from anesthesia. Then, you will be moved to your hospital room.     HOSPITAL STAY: On average, your hospital stay will be between 2-3 days. It could be shorter or longer depending on your specific procedure, your health, and your recovery. Before going home, we will make sure your pain is managed, you are emptying your bladder without difficulty, your vital signs and labs are stable, and you are safe to care for yourself at home.      HOME RECOVERY:  Everyone's recovery is different based on their health condition, age, and overall health status. Plan to have an adult stay with you for at least 24 hours after you get home from the hospital. Arrange for help at home. It is common to " feel tired for the first few weeks (maybe months) and you will need to avoid some activities. Many people find that having someone help with household tasks, such as cleaning, cooking, and running errands is helpful. Make your home safe by removing tripping hazards and moving items you need to a place where you can easily reach them. You will need to avoid bending over with your head below your heart.      ACTIVITY:   Short, frequent walks around the house are encouraged. Moving your body is important for preventing blood clots, a risk after any surgery.   Avoid activities that increase the pressure in your head for 12 weeks after surgery. Extra pressure in your head can disrupt the healing of the internal surgical area and cause complications.  Do not lift, push, or pull more than 5-10 pounds (about a gallon of milk)  Avoid bending over with your head below your heart  Avoid straining (holding your breath while pushing or reaching)  Take care to prevent constipation, or straining to have a bowel movement (we will send you home with stool softeners)  Avoid drinking through straws  Cough and sneeze with your mouth open  Avoid sexual activity for 2-4 weeks. After this time, sexual activity is okay to resume when you feel comfortable, if you follow the activity restrictions above.     SLEEPING  Sleep with your head elevated (at least 30 degrees) for 2 weeks after surgery. You may find that sleeping in a recliner is helpful and more comfortable.   Please let your surgeon know if you have sleep apnea and use a CPAP machine.     INCISION CARE  Leave your incision open to air (do not cover the incision unless you have been told to do so). It is usually okay to shower once you go home. Avoid soaking the incision or submerging it under water, and be very gentle (do not scrub the incision). Try to use a gentle shampoo, such as a mild baby shampoo. Keep hair products away from the incision area until it is fully healed.    Check your incision every day for signs of infection - increasing redness, swelling or bogginess (fluid collection around or under the incision), drainage, separation of wound edges, or fever. If you can't see your incision, have a family member or friend monitor the incision daily, or take a photo so you can watch for changes.  Avoid wearing glasses for two weeks if they come in contact with the incision.  You may notice three small pin sites (one on your forehead and two on the back of your head) that hold your head steady during surgery. Leave these areas open to the air. These scabs normally heal on their own in a few weeks following surgery.     PAIN MANAGEMENT  It is normal to have pain after surgery. If you are experiencing severe pain at the incision site or severe headaches not controlled with pain medication, please let us know right away.   You may be sent home with a small amount of opioid pain medication. You should gradually reduce the amount of pain medication that you take as your pain improves.   Avoid ibuprofen (Advil) or naproxen (Aleve) for 1 week after surgery, unless your surgeon tells you this is okay to take.   It is okay to take acetaminophen (Tylenol) for pain. You can take Tylenol with the opioid pain medication, and some people find benefit in alternating between the opioid pain medication and Tylenol. (Example: 1 tablet of oxycodone at 8:00 am, 1 tablet of Tylenol at 10:00 am). Do not take more than 3,000 mg of acetaminophen in 24 hours.     DIET  A well-balanced diet is important for your recovery. Try to eat plenty of fiber (fruits, whole grains, beans, and vegetables can be good sources of fiber) to help prevent constipation.    It is important to stay hydrated after surgery to prevent dehydration and help with bowel movements. Drink plenty of water throughout the day.   Do not drink alcohol while taking opioid pain medication.     OTHER FREQUENTLY ASKED QUESTIONS:  [employer]  "Clinical Services: You might hear about a company called [employer], with whom we contract to provide monitoring of your nerves during surgery (called \"intraoperative neuromonitoring\"). You may receive a letter from [employer] after your surgery, and this company and letter are legitimate. For questions and more information, please visit: [email or web address]  COVID-19 and Influenza vaccines: Some people experience temporary side effects from vaccines, and we don't want to confuse these symptoms with side effects or complications from surgery (example: mild fever). Avoid getting your flu or COVID vaccine within 1 week prior to your surgery date, and no earlier than 4 weeks after your surgery.  Dental work: Avoid dental cleanings and dental work for at least 6 weeks after your surgery.  Driving: You should not drive if you are taking narcotic pain medication, you have had vision changes, or you have had a seizure and have been told not to drive. If you are dizzy after surgery, you should avoid driving until you feel better. Turning your head quickly, such as to check your blind spot in a car, can cause dizziness. When you begin driving again, start with short trips around your neighborhood, and gradually increase the distance as you feel comfortable and as your symptoms allow.   Hair care: Avoid coloring your hair within 1 week prior to your surgery. Do not dye or color your hair until the incision is completely healed (no scabbing or open areas). This could take 8 or more weeks after surgery. Avoid getting your hair cut immediately after surgery to avoid loose hair falling into the incision area as it is healing.   Travel: Avoid flying for 6-12 weeks after surgery. Avoid long car rides immediately after surgery. You are more at risk for blood clots after surgery, and long car rides put you at greater risk. If you must travel in the car, " make sure to stop frequently to walk and move your feet and legs as much as you can while sitting. Talk with your surgeons if you have questions about other travel plans.     CALL THE CLINIC IF YOU EXPERIENCE:  Drainage, swelling, fluid collection, or increased redness around the incision    Fever, or temperature of 101 degrees or higher    Nausea or vomiting  Stiff neck or extra sensitivity to light    Clear nasal drainage which you did not have before surgery, or a new salty/metallic taste   Vision changes   Pain not managed by your pain medication    Severe constipation or abdominal pain   Running low on prescription medication if you still need it to manage pain (please call at least 1-2 days in advance of running out of medications)   Any other symptoms or questions     CONTACT US  If you have a serious emergency, call 911 or come to the emergency room. If you can, come to the hospital where you had your surgery.     After Hours, Weekends, & Holidays    Phone  Notes    Hospital  (available 24/7)    [phone]      Ask to speak with the on-call     Neurosurgery resident    During Clinic Hours    Phone  Fax    Neurosurgery    [phone]       RN Care Coordinators:  We do our best to check voicemail frequently throughout the day. For urgent matters, please use the general clinic phone numbers listed above. Your call will be routed appropriately.    Yoli Richardson RN  RN Care Coordinator, Neurosurgery  Direct: [phone]      Ana Maria Platt MA, RN, PHN, [facility]   RN Care Coordinator & Skull Base    Direct: [phone]

## 2024-07-31 NOTE — NURSING NOTE
Nurse Teaching - Surgery  Relevant Diagnosis: Recurrent hemangiopericytoma   Teaching Topic: Pre/stormy/post operative instructions  Person(s) involved in teaching: Pt & spouse    Reviewed all instructions in AVS, including hospital visitor restrictions, NPO prior to surgery, showering before surgery instructions (received antiseptic surgical soap), healthcare directive, postoperative cares, activity restrictions, pain management, and postop appointments. Patient has writer's direct contact information and was encouraged to call with any questions or concerns prior to surgery.

## 2024-07-31 NOTE — NURSING NOTE
Chief Complaint   Patient presents with    RECHECK     Here for follow up, confirmed with patient     Emili Willard

## 2024-07-31 NOTE — LETTER
2024       RE: Leidy Ascencio  [address] [place] [address]       Dear Colleague,    Thank you for referring your patient, Leidy Ascencio, to the [facility] NEUROSURGERY CLINIC [place] at [facility]. Please see a copy of my visit note below.    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 Zhang,    It was a pleasure to see Ms. Ascencio in the [facility] today.   As you recall, Ms. Ascencio is a pleasant 63 year-old right-handed female who was found with an incidental 4.4cm right occipital mass and underwent a craniotomy in  at [facility] with Dr. Lujan. Pathology showed a hemangiopericytoma, WHO grade 2. Postoperatively she did well but did sustain a quadrantanopsia. She was followed with surveillance imaging and then in  it was found to have recurred. She underwent radiosurgery with 20Gy on 2020 with Dr. Song. Surveillance body imaging showed liver lesions that were biopsied and showed no evidence of hemangiopericytoma. Continued MRI surveillance of the brain showed growth of the radiated tumor on the latest MRI, so she is referred for a discussion of options. We met previously about re-resection, re-radiation, or resection with Gamma Tiles and returns for another visit to discuss this.    Review of Systems:   Pertinent items are noted in HPI or as in patient entered ROS below, remainder of complete ROS is negative.     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   Head: No signs of trauma.    Eyes: Conjunctivae are normal.  Mouth/Throat: Oropharynx moist.  Neck: Normal range of motion.    Resp: No respiratory distress.   MSK: Moves all extremities.  No obvious deformity.  Neuro: The patient is fully oriented and quite pleasant. Speech normal. Extraocular movements are intact without nystagmus. Visual fields with a left inferior quadrantanopsia. Facial sensation is intact in V1, V2, V3 distributions. Facial nerve function is normal, rated as a House Brackmann 1, without synkinesis.  Palate is symmetric. Shoulders are full strength. Tongue is midline. There is no pronator drift. Full strength in all extremities. Sensation intact throughout.  Psych: Normal mood and affect. Behavior is normal.    Incision: clean and well healed    Imaging:  We reviewed the results of the MRI scans in our system. The preoperative MRI from 4/2/2015 shows a 4.4cm right occipital tumor. The MRI around the time of radiosurgery in 2020 shows a 1cm growth that is parasagittal. Since then, the MRIs have shown growth of the mass to 1.5 x 1.5cm centered on the sagittal sinus on the most recent scan 7/22/2024.     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 Options include observation, chemotherapy with CNS penetrant medication, re-radiation or surgical resection  potentially with or without GammaTiles. Ideally the treatment of choice is gross total resection with negative margins for this type of tumor, however given the sinus involvement, this would increase the risk of a serious venous infarction. Since the tumor is growing more quickly over the past 3 months, we will assess the superior sagittal sinus to evaluate its patency. If growing slowly I would prefer a subtotal resection up to the sinus and then gamma tiles vs radiation to the remainder. Given its more rapidly growth over the past 3 months, I believe consideration of venous sinus resection for a more substantial tumor removal should be considered to maximize long term outcome.  We discussed the risks of surgery including bleeding, infection, neurologic injury, venous/major stroke, weakness, numbness, worsening visual field loss, death, need for additional procedures or operations.  CT-head to evaluate the craniotomy site  Will arrange an MRV for preop planning to evaluate status of the superior sagittal sinus.  Referral to Endovascular team for angio/venogram to assess the patency of the superior sagittal sinus  We indicated that we are working with our Radiation Oncology team for timing of Gamma Tiles as we have discussed in our multidisciplinary neurooncology tumor board.  She will meet with Dr. Bauer from radiation oncology shortly  She would like to proceed with surgery. We will place a case request for a biparieto-occipital craniotomy for tumor resection with Gamma Tiles with possible lumbar drain.  We will target a time in the next ~4 weeks if possible.  I would like to meet with her and her family again after the MRV study. Her daughter is an RN and I would like to discuss the surgical plan and options with her family at that point.  I encouraged Ms. Ascencio to contact with any questions or concerns or if we may be of assistance in any way.      It was a pleasure to participate in the care of your patient.  Please feel free to contact me at any point if I can be of any assistance for Ms. Ascencio.      30 minutes spent on the date of the encounter doing chart review, review of outside records, review of test results, interpretation of tests, patient visit, documentation and discussion with other provider(s)          Again, thank you for allowing me to participate in the care of your patient.      Sincerely,    Will Samayoa MD

## 2024-07-31 NOTE — LETTER
[facility]  [facility] NEUROSURGERY CLINIC [address]  3RD FLOOR  [facility] [address]  Phone: [phone]  Fax: [phone]          2024    RE:   Leidy Ascencio  [address] [place] [address]      Dear Colleague,    Thank you for referring your patient, Leidy Ascencio, to the [facility]. Please see a copy of my visit note below.      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 Zhang,    It was a pleasure to see Ms. Ascencio in the [facility] today.   As you recall, Ms. Ascencio is a pleasant 63 year-old right-handed female who was found with an incidental 4.4cm right occipital mass and underwent a craniotomy in  at [facility] with Dr. Lujan. Pathology showed a hemangiopericytoma, WHO grade 2. Postoperatively she did well but did sustain a quadrantanopsia. She was followed with surveillance imaging and then in  it was found to have recurred. She underwent radiosurgery with 20Gy on 2020 with Dr. Song. Surveillance body imaging showed liver lesions that were biopsied and showed no evidence of hemangiopericytoma. Continued MRI surveillance of the brain showed growth of the radiated tumor on the latest MRI, so she is referred for a discussion of options. We met previously about re-resection, re-radiation, or resection with Gamma Tiles and returns for another visit to discuss this.    Review of Systems:   Pertinent items are noted in HPI or as in patient entered ROS below, remainder of complete ROS is negative.     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   Head: No signs of trauma.    Eyes: Conjunctivae are normal.  Mouth/Throat: Oropharynx moist.  Neck: Normal range of motion.    Resp: No respiratory distress.   MSK: Moves all extremities.  No obvious deformity.  Neuro: The patient is fully oriented and quite pleasant. Speech normal. Extraocular movements are intact without nystagmus. Visual fields with a left inferior quadrantanopsia. Facial sensation is intact in V1, V2, V3 distributions. Facial nerve function is normal, rated as a House Brackmann 1, without synkinesis.  Palate is symmetric. Shoulders are full strength. Tongue is midline. There is no pronator drift. Full strength in all extremities. Sensation intact throughout.  Psych: Normal mood and affect. Behavior is normal.    Incision: clean and well healed    Imaging:  We reviewed the results of the MRI scans in our system. The preoperative MRI from 4/2/2015 shows a 4.4cm right occipital tumor. The MRI around the time of radiosurgery in 2020 shows a 1cm growth that is parasagittal. Since then, the MRIs have shown growth of the mass to 1.5 x 1.5cm centered on the sagittal sinus on the most recent scan 7/22/2024.     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 Options include observation, chemotherapy with CNS penetrant medication, re-radiation or surgical resection potentially with or without GammaTiles. Ideally the treatment of choice is gross total resection with negative margins for this type of tumor, however given the sinus involvement, this would increase the risk of a serious venous infarction. Since the tumor is growing more quickly over the past 3 months, we will assess the superior sagittal sinus to evaluate its patency. If growing slowly I would prefer a subtotal resection up to the sinus and then gamma tiles vs radiation to the remainder. Given its more rapidly growth over the past 3 months, I believe consideration of venous sinus resection for a more substantial tumor removal should be considered to maximize long term outcome.  We discussed the risks of surgery including bleeding, infection, neurologic injury, venous/major stroke, weakness, numbness, worsening visual field loss, death, need for additional procedures or operations.  CT-head to evaluate the craniotomy site  Will arrange an MRV for preop planning to evaluate status of the superior sagittal sinus.  Referral to Endovascular team for angio/venogram to assess the patency of the superior sagittal sinus  We indicated that we are working with our Radiation Oncology team for timing of Gamma Tiles as we have discussed in our multidisciplinary neurooncology tumor board.  She will meet with Dr. Bauer from radiation oncology shortly  She would like to proceed with surgery. We will place a case request for a biparieto-occipital craniotomy for tumor resection with Gamma Tiles with possible lumbar drain.  We will target a time in the next ~4 weeks if possible.  I would like to meet with her and her family again after the MRV study. Her daughter is an RN and I would like to discuss the surgical plan and options with her family at that point.  I encouraged Ms. Ascencio to contact  with any questions or concerns or if we may be of assistance in any way.      It was a pleasure to participate in the care of your patient. Please feel free to contact me at any point if I can be of any assistance for Ms. Ascencio.    Sincerely,  Will Samayoa MD       30 minutes spent on the date of the encounter doing chart review, review of outside records, review of test results, interpretation of tests, patient visit, documentation and discussion with other provider(s)          Again, thank you for allowing me to participate in the care of your patient.      Sincerely,    Will Samayoa MD

## 2024-08-01 ENCOUNTER — TELEPHONE (OUTPATIENT)
Dept: NEUROSURGERY | Facility: CLINIC | Age: 64
End: 2024-08-01
Payer: MEDICARE

## 2024-08-01 NOTE — TELEPHONE ENCOUNTER
Called patient to schedule surgery with Dr. Will Samayoa     Spoke with:  Priyanka (patient)    Date of Surgery: 9/6/2024    Arrival time Discussed with Patient:  No    Location of surgery: [facility]     Pre-Op H&P: 8/21/2024    Post-Op Appts: 9/18/2024     Imaging:  Yes - CT/CTA Scheduled: 9/6/2024     Discussed with patient PAC RN will provide arrival time and instructions for surgery at the time of the appointment: [[facility] only]: Yes    Packet sent out: Yes 08/01/24  via Received in clinic by 7/31/2024     Surgical soap: Receiving via Received in clinic by 8/21/2024     Vendor/Rep/Monitoring:   No     Additional Comments:      All patients questions were answered and patient was instructed to review surgical packet and call back with any questions or concerns.       Viridiana Thacker on 8/1/2024 at 11:54 AM

## 2024-08-02 NOTE — TELEPHONE ENCOUNTER
FUTURE VISIT INFORMATION      SURGERY INFORMATION:  Date: 24  Location: uu or  Surgeon:  Will Samayoa MD   Anesthesia Type:  general  Procedure: stealth assisted Biparieto-occipital craniotomy for tumor lumbar drain placement   Consult: ov 24    RECORDS REQUESTED FROM:       Primary Care Provider: Eduard    Most recent EKG+ Tracin/15/23    Most recent Cardiac Stress Test: 3/1/23    Most recent PFT's: 23

## 2024-08-05 DIAGNOSIS — E78.00 ELEVATED CHOLESTEROL: ICD-10-CM

## 2024-08-05 RX ORDER — ROSUVASTATIN CALCIUM 10 MG/1
10 TABLET, COATED ORAL DAILY
Qty: 90 TABLET | Refills: 0 | Status: SHIPPED | OUTPATIENT
Start: 2024-08-05

## 2024-08-07 ENCOUNTER — TELEPHONE (OUTPATIENT)
Dept: NEUROSURGERY | Facility: CLINIC | Age: 64
End: 2024-08-07
Payer: MEDICARE

## 2024-08-07 DIAGNOSIS — D48.19 HEMANGIOPERICYTOMA: Primary | ICD-10-CM

## 2024-08-07 NOTE — TELEPHONE ENCOUNTER
Received message from Dr. Cruz - schedule patient next week or the following week for an angiogram to evaluate the patency of the SSS for a recurrent hemangiopericytoma for Dr. Samayoa. Rare case in which he may need to resect SSS.     Spoke with patient. Informed:  [ ] Scheduling will reach out in the next few days to schedule at [facility] or [facility].  [ ] Will be at hospital for minimum of 5-6 hours, up to 10 hours.   [ ] Will need  home.  [ ] NPO instructions.  [ ] Post procedure restrictions.   [ ] Will send instructions via [employer] once scheduled.    Patient verbalized understanding. All questions answered. Contact information provided and encouraged to call with questions/concerns.    Bri Hernandez RN 8/7/2024 11:21 AM

## 2024-08-08 ENCOUNTER — TELEPHONE (OUTPATIENT)
Dept: NEUROSURGERY | Facility: CLINIC | Age: 64
End: 2024-08-08
Payer: MEDICARE

## 2024-08-08 NOTE — TELEPHONE ENCOUNTER
I called patient  in regards to IR procedure with Dr. Cruz    Procedure Date: 8/14/2024    Arrival: 0630    Loction: [facility]    Anesthesia Type: IV Sedation    Notes:         Viridiana Thacker on 8/8/2024 at 2:27 PM

## 2024-08-09 NOTE — PROGRESS NOTES
[facility]   RADIATION ONCOLOGY INITIAL CONSULTATION NOTE    Patient Name: Leidy Ascencio  Requesting Physician: Dr. 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     History Of Present Illness:     Leidy Ascencio initially presented 3/23/2015 to her primary care with headache. She noted a right temporal headache for a few months, and CT imaging demonstrated a mass at the posterior aspect of the right cerebral hemisphere warranting MRI. Subsequent MR imaging revealed a 3.5 x 4.2 x 3.8 cm right occipital/parietal mass consistent with a meningioma.     2015 Patient underwent a right occipital craniotomy at [place] with Dr. Lujan, and final pathology demonstrated a hemangiopericytoma, WHO grade 2. She did have quadrantanopsia following the procedure but recovery was otherwise uncomplicated. A CT chest, abdomen and pelvis and PET MRI also performed in that timeframe. The studies were negative and she is continue to follow with Dr. Mayberry with conservative management until 2019.     2019 - Brain MRI demonstrated a new 1.3 x 1.4 x 1.5 cm nodule of enhancement within the superior sagittal sinus compatible with tumor recurrence. Subsequent CT CAP demonstrated enlargement of segment VI liver lesion and recommended MRI.     2019 -Bone scan with no convincing evidence of skeletal metastases.    2019 - MRI liver with a 9 mm lesion in the lateral aspect of the junction of segments 7 and 8 of the liver shows homogeneous delayed enhancement and is new since .  This could represent a metastatic lesion.     1/6/2020 - She met with Dr. Zhang of Neuro-oncology who placed referrals to Dr. Song of Radiation Oncology for consideration of stereotactic radiosurgery and Dr. Fierro with surgical oncology for ablation.    1/14/2020- She underwent radiosurgery (GK) to the posterior midline brain/superior sagittal sinus using 20Gy on 1/14/2020 with Dr. Song.    1/15/2020 - She underwent laparoscopic ultrasound-guided microwave ablation of a liver lesion with Dr. Fierro. Final pathology was consistent with a benign cavernous hemangioma and no metastatic hemangiopericytoma or other malignancy was identified. No follow-up indicated.    Continued MRI surveillance of the brain demonstrated no changes until 4/2024.    4/15/2024 - She presented to Dr. Zhang, with mild worsening in headache frequency. MRI imaging demonstrated an increase in the size of the enhancing extra-axial nodular mass within the superior sagittal sinus with elevated cerebral blood volume consistent with disease recurrence. Referrals were placed for neurosurgery, and systemic staging imaging ordered. CT CAP imaging negative.      4/19/2024 - Patient saw Dr. Samayoa. Discussed options of observation with delayed surgery, conventional radiation, or surgery and usage of GammaTile. Planned for MRI in 3 months.    7/22/2024 - Patient saw Dr. Zhang, noting more frequent and severe headaches in the morning. Patient referred back to Dr. Samayoa to review risks/benefits of surgery and potential use of GammaTile therapy. Patient also referred to Dr. Bauer of Radiation Oncology.    7/31/2024 - Dr. Samayoa recommended assessing patency of superior sagittal sinus given quick rate of tumor growth over past 3 months, as sinus involvement increases risk of a serious venous infarction. Patient would like to proceed with surgery, and case request has been placed for a biparieto-occipital craniotomy for tumor resection with  Gamma Tiles.    8/14/2024 - Diagnostic cervicocerebral angiography and venography demonstrated Superior Sinus is Occluded     Surgery scheduled for 9/6/2024, may be rescheduled for 9/5    8/15/2024 - Patients presents to clinic today accompanied by her  Main to discuss the role of radiation in her care. Today, she reports a headache of 3-4/10 intensity. Following her procedure yesterday, she had a fairly severe headache that has improved. She has gotten intermittent headaches beginning around the same time period that her disease was noted to recur, that reach a max of 5/10. She tells us she also had a bad headache for about 1-2 months following her GK procedure. She also notes she has been under a fair amount of stress, as her step mother was recently buried. She reports she occasionally will have the sensation of chest pain and palpitations associated with anxiety episodes. She has also noticed a bump under her left arm, and has plans to reestablish with old PCP and will have it reevaluated then. She also has felt some neck tightness.    Current systemic therapy: N/A   Anti-epileptic: N/A  Steroids: Currently off dexamethasone     Brain ROS:  Headaches-   Reports intermittent HA  Seizures- Denies  Nausea/vomiting-  Denies  Changes in cognition/behavior-  Denies  Speech-  Denies  Vision/hearing changes- Denies  Gait symptoms or imbalance-  Denies, occasionally stumbles due to stable quadrantanopsia  Other focal neuro deficits-  Denies numbness or tingling      Past Medical History:   Past Medical History:   Diagnosis Date    Arthritis     Cancer (H)     COPD (chronic obstructive pulmonary disease) (H)     Depressive disorder     Hyperlipemia     Uncomplicated asthma      Past Surgical History:   Past Surgical History:   Procedure Laterality Date    APPENDECTOMY      COLONOSCOPY      COLONOSCOPY N/A 11/29/2022    Procedure: COLONOSCOPY, BIOPSY and polypectomy;  Surgeon: Rick Zavala MD;  Location:  GI     GYN SURGERY      3 cesections    HYSTERECTOMY VAGINAL, BILATERAL SALPINGO-OOPHERECTOMY, COMBINED  1988    hysterectomy + ovaries     LAPAROSCOPIC ABLATION LIVER TUMOR N/A 1/15/2020    Procedure: Laparoscopic ultrasound guided percutaneous microwave abalation of tumor x1; ultrasound guided Liver Biopsy x 4; Hepatic Ultrasound Intraoperatively;  Surgeon: Phan Fierro MD;  Location: [facility] OR    LAPAROSCOPY DIAGNOSTIC (GENERAL)  1/15/2020    Procedure: Laparoscopy diagnostic (general);  Surgeon: Phan Fierro MD;  Location: [facility] OR    ZZC EXCIS INFRATENT MENINGIOMA     Hx of ovarian cyst removal when she was 15    Past Radiation History:   Patient previously received radiation with 20Gy in 1 fraction (GK) on 1/14/2020     The patient denies a history of lupus, scleroderma, connective tissue disorders and collagen vascular disease. They have not had any other malignancy or chemotherapy.  She does not have a pacemaker    Allergies:   Allergies   Allergen Reactions    Other Drug Allergy (See Comments) Hives     Paxlovid     Atorvastatin     Penicillins     Sulfa Antibiotics     Sulfamethoxazole-Trimethoprim Rash       Medications:   Current Outpatient Medications   Medication Sig Dispense Refill    albuterol (PROAIR HFA/PROVENTIL HFA/VENTOLIN HFA) 108 (90 Base) MCG/ACT inhaler Inhale 2 puffs into the lungs every 6 hours 18 g 1    fluticasone-salmeterol (AIRDUO RESPICLICK) 113-14 MCG/ACT inhaler Inhale 1 puff into the lungs 2 times daily for 360 days 3 each 3    rosuvastatin (CRESTOR) 10 MG tablet Take 1 tablet (10 mg) by mouth daily 90 tablet 0    sertraline (ZOLOFT) 100 MG tablet Take 2 tablets (200 mg) by mouth daily 60 tablet 5    azelastine (ASTELIN) 0.1 % nasal spray Spray 1 spray into both nostrils 2 times daily 30 mL 8    hydrOXYzine (ATARAX) 25 MG tablet Take 1 tablet (25 mg) by mouth nightly as needed for itching (Patient not taking: Reported on 8/15/2024) 30 tablet 0    loratadine (CLARITIN) 10 MG tablet  Take 10 mg by mouth daily (Patient not taking: Reported on 8/15/2024)      montelukast (SINGULAIR) 10 MG tablet Take 1 tablet (10 mg) by mouth at bedtime for 360 days (Patient not taking: Reported on 8/15/2024) 90 tablet 3    triamcinolone (KENALOG) 0.1 % external cream Apply topically 2 times daily (Patient not taking: Reported on 8/15/2024) 80 g 0    triamcinolone (KENALOG) 0.1 % external ointment Apply topically 2 times daily (Patient not taking: Reported on 8/15/2024) 30 g 0    Vitamin D3 (VITAMIN D, CHOLECALCIFEROL,) 25 mcg (1000 units) tablet Take 25 mcg by mouth daily (Patient not taking: Reported on 8/15/2024)       Current Facility-Administered Medications   Medication Dose Route Frequency Provider Last Rate Last Admin    2 mL bupivacaine (MARCAINE) preservative free injection 0.5% (20 mL vial)  2 mL   Chase Mendoza DO   2 mL at 08/02/23 1524    lidocaine 1 % injection 2 mL  2 mL   Chase Mendoza DO   2 mL at 08/02/23 1524    triamcinolone (KENALOG-40) injection 40 mg  40 mg   Chase Mendoza DO   40 mg at 08/02/23 1524    triamcinolone (KENALOG-40) injection 40 mg  40 mg   Lauren Edmonds MD   40 mg at 12/07/20 1442       Family History:   Family History   Problem Relation Age of Onset    Coronary Artery Disease Mother     Hypertension Mother     Hyperlipidemia Mother     Arthritis Father     Cerebrovascular Disease Father     Alzheimer Disease Maternal Grandmother     Other Cancer Maternal Grandmother         lung    Diabetes Maternal Grandfather     Other Cancer Paternal Grandfather         meloma    Anxiety Disorder Daughter     Substance Abuse Son     Lung Cancer Maternal Aunt        Social History:   Originally from [place],  with three children, eight grandkids, works as a restaurant training, former smoker who quit in 2003    Social History     Socioeconomic History    Marital status:      Spouse name: Not on file    Number of children: Not on file    Years of education:  Not on file    Highest education level: Not on file   Occupational History    Not on file   Tobacco Use    Smoking status: Former     Current packs/day: 0.00     Average packs/day: 1 pack/day for 27.0 years (27.0 ttl pk-yrs)     Types: Cigarettes     Start date: 1976     Quit date: 2003     Years since quittin.5    Smokeless tobacco: Never   Vaping Use    Vaping status: Never Used   Substance and Sexual Activity    Alcohol use: Yes     Comment: when headaches get bad sometimes a wine or two a day maybe 5-6 drinks (wine) week    Drug use: No    Sexual activity: Yes     Partners: Male     Birth control/protection: Female Surgical   Other Topics Concern    Parent/sibling w/ CABG, MI or angioplasty before 65F 55M? No   Social History Narrative    Not on file     Social Determinants of Health     Financial Resource Strain: Low Risk  (10/31/2023)    Financial Resource Strain     Within the past 12 months, have you or your family members you live with been unable to get utilities (heat, electricity) when it was really needed?: No   Food Insecurity: Low Risk  (10/31/2023)    Food Insecurity     Within the past 12 months, did you worry that your food would run out before you got money to buy more?: No     Within the past 12 months, did the food you bought just not last and you didn t have money to get more?: No   Transportation Needs: Low Risk  (10/31/2023)    Transportation Needs     Within the past 12 months, has lack of transportation kept you from medical appointments, getting your medicines, non-medical meetings or appointments, work, or from getting things that you need?: No   Physical Activity: Not on file   Stress: Not on file   Social Connections: Not on file   Interpersonal Safety: Low Risk  (2023)    Interpersonal Safety     Do you feel physically and emotionally safe where you currently live?: Yes     Within the past 12 months, have you been hit, slapped, kicked or otherwise physically hurt by  someone?: No     Within the past 12 months, have you been humiliated or emotionally abused in other ways by your partner or ex-partner?: No   Housing Stability: Low Risk  (10/31/2023)    Housing Stability     Do you have housing? : Yes     Are you worried about losing your housing?: No       Review of Systems: Denies additional symptoms related to current diagnosis.      Karnofsky Performance Status: 90  ECOG Performance Status: 1    Vital Signs:  BP (!) 162/74 (BP Location: Left arm, Patient Position: Sitting, Cuff Size: Adult Regular)   Pulse 68   Temp 98.7  F (37.1  C) (Oral)   Wt 85 kg (187 lb 6.4 oz)   SpO2 96%   BMI 34.28 kg/m  .    Physical Exam:    General: NAD, patient well appearing. Patient is somewhat anxious  Extremities: No acute findings.   Skin: color, texture and turgor wnl. No rashes and lesions.  Neuro:   MS: AAO x 3, appropriately interactive, normal affect, speech fluent  CN: II,III -- PERRLA, stable left quadrantanopsia   III,IV,VI -- EOMI, no ptosis;   V -- sensation intact to LT/PP  VII -- no facial weakness/droop;   VIII -- hears finger rub equally bilaterally;   IX,X -- voice normal, palate elevates symmetrically,  XI -- SCM/trapezii 5/5 strength bilaterally;   XII -- tongue protrudes midline, no atrophy or fasciculation.  Motor: Normal tone, no tremor.   0-No movement. 1-Muscle contrac. 2-Moves on bed. 3-Antigrav. 4-Mildly weak. 5-Full.               Delt     Bicep   Tricep   Quad   Gastr  TA      ExHL   R  5  5  5  5  5  5  5    L  5  5  5  5  5  5  5          Pathology:   As above in HPI    4/22/2015 - Surgical Path Final Diagnosis:  BRAIN, RIGHT OCCIPITAL MASS, RESECTION: Solitary fibrous tumor  (hemangiopericytoma); please see comment  Comment: Within the central nervous system, solitary fibrous tumor and  hemangiopericytoma appear to exist on a spectrum, with overlapping microscopic  features. Both entities are associated with STAT6 gene mutations. By WHO (1)  and Niurka (2)  criteria, this constitutes a grade II lesion.      Last CBC with Diff:  WBC Count   Date Value Ref Range Status   08/14/2024 6.7 4.0 - 11.0 10e3/uL Final     RBC Count   Date Value Ref Range Status   08/14/2024 4.71 3.80 - 5.20 10e6/uL Final     Hemoglobin   Date Value Ref Range Status   08/14/2024 13.7 11.7 - 15.7 g/dL Final     Hematocrit   Date Value Ref Range Status   08/14/2024 41.8 35.0 - 47.0 % Final     MCV   Date Value Ref Range Status   08/14/2024 89 78 - 100 fL Final     MCH   Date Value Ref Range Status   08/14/2024 29.1 26.5 - 33.0 pg Final     MCHC   Date Value Ref Range Status   08/14/2024 32.8 31.5 - 36.5 g/dL Final     RDW   Date Value Ref Range Status   08/14/2024 13.1 10.0 - 15.0 % Final     Platelet Count   Date Value Ref Range Status   08/14/2024 266 150 - 450 10e3/uL Final        Last Comprehensive Metabolic Panel:  Sodium   Date Value Ref Range Status   08/14/2024 141 135 - 145 mmol/L Final   03/01/2021 139 133 - 144 mmol/L Final     Potassium   Date Value Ref Range Status   08/14/2024 3.8 3.4 - 5.3 mmol/L Final   08/25/2022 3.9 3.4 - 5.3 mmol/L Final   03/01/2021 3.9 3.4 - 5.3 mmol/L Final     Chloride   Date Value Ref Range Status   08/14/2024 104 98 - 107 mmol/L Final   08/25/2022 106 94 - 109 mmol/L Final   03/01/2021 106 94 - 109 mmol/L Final     Carbon Dioxide   Date Value Ref Range Status   03/01/2021 29 20 - 32 mmol/L Final     Carbon Dioxide (CO2)   Date Value Ref Range Status   08/14/2024 23 22 - 29 mmol/L Final   08/25/2022 29 20 - 32 mmol/L Final     Anion Gap   Date Value Ref Range Status   08/14/2024 14 7 - 15 mmol/L Final   08/25/2022 4 3 - 14 mmol/L Final   03/01/2021 4 3 - 14 mmol/L Final     Glucose   Date Value Ref Range Status   08/14/2024 119 (H) 70 - 99 mg/dL Final   08/25/2022 117 (H) 70 - 99 mg/dL Final   03/01/2021 117 (H) 70 - 99 mg/dL Final     Comment:     Fasting specimen     Urea Nitrogen   Date Value Ref Range Status   08/14/2024 21.1 8.0 - 23.0 mg/dL Final    08/25/2022 19 7 - 30 mg/dL Final   03/01/2021 18 7 - 30 mg/dL Final     Creatinine   Date Value Ref Range Status   08/14/2024 0.66 0.51 - 0.95 mg/dL Final   03/01/2021 0.72 0.52 - 1.04 mg/dL Final     GFR Estimate   Date Value Ref Range Status   08/14/2024 >90 >60 mL/min/1.73m2 Final     Comment:     eGFR calculated using 2021 CKD-EPI equation.   03/01/2021 89 >60 mL/min/[1.73_m2] Final     Comment:     Non  GFR Calc  Starting 12/18/2018, serum creatinine based estimated GFR (eGFR) will be   calculated using the Chronic Kidney Disease Epidemiology Collaboration   (CKD-EPI) equation.       Calcium   Date Value Ref Range Status   08/14/2024 9.2 8.8 - 10.4 mg/dL Final     Comment:     Reference intervals for this test were updated on 7/16/2024 to reflect our healthy population more accurately. There may be differences in the flagging of prior results with similar values performed with this method. Those prior results can be interpreted in the context of the updated reference intervals.   03/01/2021 9.4 8.5 - 10.1 mg/dL Final        Radiology:  As above in HPI    4/2/2015 - CT Sinus w/o Contrast  Impression:   1. Clear sinuses.   2. Mass at the posterior aspect right cerebral hemisphere not well   visualized by this exam. Enhanced brain MRI recommended.   3. I attempted to phone results of this exam but was unsuccessful.     4/3/2015 - MRI Brain w/o Contrast  Findings: There is a tumor measuring approximately 3.5 x 4.2 x 3.8 cm   in the right occipital/parietal region which is somewhat lobulated   with irregular borders and is consistent with a meningioma with   moderately extensive adjacent edema. The adjacent transverse sinus   and parasagittal sinus are patent.     No recent hemorrhage or infarction. The paranasal sinuses appear   essentially clear.     Impression: Meningioma on the right at the occipital/parietal   junction with moderately extensive adjacent edema and patent adjacent   superior  sagittal sinus.     12/11/2019 - Brain MRI w/ contrast  FINDINGS:  History of a right parieto-occipital craniotomy for resection of a grade 2 hemangiopericytoma in 2015. T2 isointense and enhancing nodule within the superior sagittal sinus medial to the surgical bed is essentially new since the prior exam. It measures 1.3 x 1.4 centimeters in axial plane and 1.5 centimeters in craniocaudal dimension. The dural leaflets of the superior sagittal sinus surrounding the tumor have a convex outward margin. Stable mature appearance of the resection cavity, with mild encephalomalacia and gliosis involving the right posterior parietal and occipital lobe. Small focus of linear enhancement at the posterior lateral margin is stable and likely vascular in etiology. No diffusion abnormalities. A few scattered supratentorial white matter T2 hyperintensities are unchanged.    IMPRESSION:  1. Essentially new nodule of enhancement within the superior sagittal sinus is compatible with tumor recurrence. It measures 1.3 x 1.4 centimeters in axial plane and 1.5 centimeters in craniocaudal dimension. No other sites of recurrent tumor identified.  2. Otherwise stable appearance of the right parieto-occipital craniotomy site.     4/15/2024 - MR Brain Perfusion w/wo contrast  Findings:  Postsurgical right parieto-occipital craniotomy changes for resection  of underlying mass with scattered hemosiderin deposition along the  resection site. Increased size of a T1 isointense and T2 hyperintense  nodular extra-axial enhancing lesion within the superior sagittal  sinus measuring 12 x 9 mm (series [id number], image 103), previously  measuring 9 x 6 mm on brain MR 10/16/2023. The superior sagittal sinus  otherwise remains patent. Stable adjacent T2/FLAIR hyperintense  signal. There is associated elevated cerebral blood volume and  perfusion mapping.     There is no mass effect, midline shift, or evidence of intracranial  hemorrhage. The ventricles are  proportionate to the cerebral sulci.  Normal major vascular intracranial flow-voids.     No abnormality of the skull marrow signal. The visualized portions of  paranasal sinuses, and mastoid air cells are relatively clear. The  orbits are grossly unremarkable.                                                   Impression:  1.  Increased size of enhancing extra-axial nodular mass within the  superior sagittal sinus with elevated cerebral blood volume consistent  with disease recurrence.  2.  Superior sagittal sinus otherwise appears patent.  [Consider Follow Up: Increased size of enhancing extra-axial nodular  mass within the superior sagittal sinus with elevated cerebral blood  volume consistent with disease recurrence.]    4/24/2024 - CT CAP  IMPRESSION: No suspicious neoplastic lesions in the chest or abdomen  or pelvis by CT. Prominent fatty hypertrophy of the ileocecal valve.  Unchanged benign-appearing right lower lobe 3 mm pulmonary nodule.  Atherosclerosis. Continued appearance of hepatic cysts.    7/22/2024 - MR Brain Perfusion  Findings:  Postsurgical changes of right parieto-occipital craniotomy for  resection of underlying mass with scattered hemosiderin deposition  along the resection cavity. A T1 and T2 hyperintense nodular  extra-axial enhancing mass within the superior sagittal sinus  measuring 1.7 x 1.0 x 1.5 cm, previously 1.4 x 0.9 x 1.1 cm and  previously measuring 9 x 6 mm on 10/16/2023. Stable adjacent T2  hyperintense signal surrounding the cavity and the lesion. On the  perfusion imaging, there is elevated relative cerebral blood volume in  the region of the lesion although not including all portions of the  lesion, which is nonspecific.     There is no mass effect, midline shift or extra-axial fluid  collection. Ventricles are proportionate to the cerebral sulci. The  ventricles are proportionate to the cerebral sulci. Diffusion weighted  imaging reveals no abnormal reduced diffusion. Flow  voids within the  major intracranial vessels are present.     No abnormality of the skull marrow signal  Visualized paranasal  sinuses and mastoid air cells are clear. Orbits are grossly  unremarkable.                                                             Impression:   1. Increased size of enhancing extra axial nodular mass within the  superior sagittal sinus with elevated relative cerebral blood volume,  suggesting disease recurrence.  2. No definite thrombus is identified within the visualized superior  sagittal sinus.    8/14/2024 - Diagnostic cervicocerebral angiography and venography   Impression:  There is external compression of the superior sagittal sinus posteriorly, centered near the craniotomy defect associated with known occipita tumor. There is no flow through this portion of the superior sagittal sinus. There are collateral vein network that courses around the body of the tumor that drain the anterior superior sagittal sinus and connect with the posterior sagittal sinus with subsequent normal venous anatomy following.     Radiation Oncology Pre-Treatment Evaluation:   Prior RT: Yes, 20 Gy/1fx GK in 2020  Pacemaker: No  Child-bearing potential (Yes/No): No  Pain: Mild Pain (3)/10  Pain plan: N/A  Intent of treatment: (currative/palliative): curative   Side Effects of Radiation: We discussed in detail the management of treatment side effects    Assessment:  Leidy MARILEE Ascencio is 64 year old with a right occipital hemangiopericytoma WHO grade 2, diagnosed following resection on 4/22/2015, s/p GK 1/2020, now with radiologic recurrence of the mass within the superior sagittal sinus as of 4/15/2024, and sinus occlusion noted on angiogram 8/14/2024.     Plan:   We recommend treatment with radiation therapy. The patient has already met with Dr. Samayoa regarding surgical options, and is scheduled for surgery 9/6/2024 with plans for concurrent Gamma Tile Therapy.    We had a detailed discussion with Leidy HUNT  Sonu and her  regarding the role of radiation therapy for the treatment of a recurrent hemangiopericytoma. As she has previously had surgery as well as GK therapy, and still shown progression of disease on imaging, we are recommending usage of Gamma Tile brachytherapy to directly target her tumor. We discussed the logistics, timing, risks, benefits, alternatives, and side effects associated with radiation therapy, which would be implanted utilizing a Gamma Tile during her upcoming surgery.     Leidy Ascencio would like to proceed with [facility] at [facility].  She will be  scheduled on 9/5/2024  for surgery and placement of Gamma Tile. The patient was encouraged to contact us with questions or concerns should they arise in the interim. All questions were answered to the patients's satisfaction, and they were provided with our contact information should any questions or concerns arise.    Connie Bradford, MS4  Radiation Oncology    We discussed brachytherapy in detail. Our research coordinator will be in touch to discuss  further with Priyanka.     A medical resident participated in the care of this patient and in the preparation of this note. I have verified and edited this note. I personally performed key elements of the physical exam and medical decision making for this patient.  I agree with the assessment and plan of care as documented in the note above.      The overall time I spent on direct patient care including self review of records, labs, and radiographic studies, as well as, direct face-to-face interaction with the patient and coordination of care with other providers was 75 minutes.        Izabel Bauer MD, PhD     Department of Radiation Oncology  [facility]     Referring Provider:  Lauren Zhang MD  [address] [place] [id number]

## 2024-08-12 NOTE — TELEPHONE ENCOUNTER
Action 2024 4:05 PM ABT   Action Taken Plan PDF emailed to CSS team and FWD to Andreas NASSAR (Dosim team)     Action 2024 1:15 PM ABT   Action Taken DIOGO received and sent to MARTÍN Barry for Plan PDFs and DICOM.    DIOGO sent to HIM for upload     Action 2024 1:54 PM ABT   Action Taken Called and spoke to MAX Barry, DIOGO needed.    Called and spoke to pt, state that she had GK with MAX Barry and states she can print and sign DIOGO.     DIOGO emailed to PT email: [email or web address]      MEDICAL RECORDS REQUEST   Radiation Oncology  [address] [place] [id number]  Fax: [phone]        FUTURE VISIT INFORMATION                                                   Leidy Streety, : 1960 scheduled for future visit at [facility] Radiation Oncology    RECORDS REQUESTED FOR VISIT                                                     BRAIN STATUS DETAILS   OFFICE NOTE from PCP [facility] 23: Dr. Valeriy Rowland   OFFICE NOTE from neuro oncologist Epic 24: Dr. Lauren Zhang   OFFICE NOTE from neuro/neuro surg [facility]24: Dr. Will Samayoa    Neuro:  19: Dr. Herman Mayberry   ED/HOSP ADMISSION NOTES [facility] 01/15/20: [facility]  04/22/15: [facility]   OPERATIVE/BIOPSY REPORTS [facility] 04/22/15: RIGHT OCCIPITAL CRANIOTOMY REMOVAL OF TUMOR    MEDICATION LIST [facility]    LABS     PATHOLOGY REPORTS Reports in [facility] Path Consult:  20: [id number]    [facility]:  04/22/15: [id number]   ANYTHING RELATED TO DIAGNOSIS Epic Most recent 23   IMAGING (NEED IMAGES & REPORT)     CT SCANS PACS 24-20: CT CAP  04/02/15: CT Sinus   MRI BRAIN/HEAD PACS 24-04/02/15: mr bRAIN   PREVIOUS RADIATION  Req  DIOGO  Trackin   WHAT HEALTHCARE FACILITY [facility] / MAX Barry   RADIATION ONCOLOGIST NOTES Epic Consult (EPIC):  01/10/20: Dr. Giles Lang    MRO:  20: Dr. Porfirio Hall   RADIATION  TREATMENT SUMMARY NOTES      *[facility]iation [facility] patients to [facility] with ATTN: MELIZA/Mykel Dosi/Physics    *only [facility] patient's, use [facility]

## 2024-08-14 ENCOUNTER — APPOINTMENT (OUTPATIENT)
Dept: INTERVENTIONAL RADIOLOGY/VASCULAR | Facility: CLINIC | Age: 64
End: 2024-08-14
Attending: NEUROLOGICAL SURGERY
Payer: MEDICARE

## 2024-08-14 ENCOUNTER — HOSPITAL ENCOUNTER (OUTPATIENT)
Facility: CLINIC | Age: 64
Discharge: HOME | End: 2024-08-14
Attending: NEUROLOGICAL SURGERY | Admitting: NEUROLOGICAL SURGERY
Payer: MEDICARE

## 2024-08-14 ENCOUNTER — APPOINTMENT (OUTPATIENT)
Dept: MEDSURG UNIT | Facility: CLINIC | Age: 64
End: 2024-08-14
Attending: NEUROLOGICAL SURGERY
Payer: MEDICARE

## 2024-08-14 VITALS
WEIGHT: 185.41 LBS | BODY MASS INDEX: 33.91 KG/M2 | OXYGEN SATURATION: 98 % | DIASTOLIC BLOOD PRESSURE: 78 MMHG | RESPIRATION RATE: 20 BRPM | HEART RATE: 57 BPM | TEMPERATURE: 98.2 F | SYSTOLIC BLOOD PRESSURE: 120 MMHG

## 2024-08-14 DIAGNOSIS — D48.19 HEMANGIOPERICYTOMA: ICD-10-CM

## 2024-08-14 LAB
ANION GAP SERPL CALCULATED.3IONS-SCNC: 14 MMOL/L (ref 7–15)
APTT PPP: 26 SECONDS (ref 22–38)
BUN SERPL-MCNC: 21.1 MG/DL (ref 8–23)
CALCIUM SERPL-MCNC: 9.2 MG/DL (ref 8.8–10.4)
CHLORIDE SERPL-SCNC: 104 MMOL/L (ref 98–107)
CREAT SERPL-MCNC: 0.66 MG/DL (ref 0.51–0.95)
EGFRCR SERPLBLD CKD-EPI 2021: >90 ML/MIN/1.73M2
ERYTHROCYTE [DISTWIDTH] IN BLOOD BY AUTOMATED COUNT: 13.1 % (ref 10–15)
GLUCOSE SERPL-MCNC: 119 MG/DL (ref 70–99)
HCO3 SERPL-SCNC: 23 MMOL/L (ref 22–29)
HCT VFR BLD AUTO: 41.8 % (ref 35–47)
HGB BLD-MCNC: 13.7 G/DL (ref 11.7–15.7)
INR BLD: 1.1 (ref 2–3)
INR PPP: 0.97 (ref 0.85–1.15)
MCH RBC QN AUTO: 29.1 PG (ref 26.5–33)
MCHC RBC AUTO-ENTMCNC: 32.8 G/DL (ref 31.5–36.5)
MCV RBC AUTO: 89 FL (ref 78–100)
PLATELET # BLD AUTO: 266 10E3/UL (ref 150–450)
POTASSIUM SERPL-SCNC: 3.8 MMOL/L (ref 3.4–5.3)
RBC # BLD AUTO: 4.71 10E6/UL (ref 3.8–5.2)
SODIUM SERPL-SCNC: 141 MMOL/L (ref 135–145)
WBC # BLD AUTO: 6.7 10E3/UL (ref 4–11)

## 2024-08-14 PROCEDURE — 36224 PLACE CATH CAROTD ART: CPT

## 2024-08-14 PROCEDURE — 250N000011 HC RX IP 250 OP 636: Performed by: STUDENT IN AN ORGANIZED HEALTH CARE EDUCATION/TRAINING PROGRAM

## 2024-08-14 PROCEDURE — C1887 CATHETER, GUIDING: HCPCS

## 2024-08-14 PROCEDURE — 85610 PROTHROMBIN TIME: CPT

## 2024-08-14 PROCEDURE — 272N000506 HC NEEDLE CR6

## 2024-08-14 PROCEDURE — C1760 CLOSURE DEV, VASC: HCPCS

## 2024-08-14 PROCEDURE — 85730 THROMBOPLASTIN TIME PARTIAL: CPT | Mod: GZ | Performed by: STUDENT IN AN ORGANIZED HEALTH CARE EDUCATION/TRAINING PROGRAM

## 2024-08-14 PROCEDURE — 76937 US GUIDE VASCULAR ACCESS: CPT

## 2024-08-14 PROCEDURE — 255N000002 HC RX 255 OP 636: Performed by: NEUROLOGICAL SURGERY

## 2024-08-14 PROCEDURE — 75870 VEIN X-RAY SKULL: CPT

## 2024-08-14 PROCEDURE — 99152 MOD SED SAME PHYS/QHP 5/>YRS: CPT | Mod: GC | Performed by: NEUROLOGICAL SURGERY

## 2024-08-14 PROCEDURE — 272N000192 HC ACCESSORY CR2

## 2024-08-14 PROCEDURE — 258N000003 HC RX IP 258 OP 636: Performed by: STUDENT IN AN ORGANIZED HEALTH CARE EDUCATION/TRAINING PROGRAM

## 2024-08-14 PROCEDURE — 85610 PROTHROMBIN TIME: CPT | Mod: 91 | Performed by: STUDENT IN AN ORGANIZED HEALTH CARE EDUCATION/TRAINING PROGRAM

## 2024-08-14 PROCEDURE — 272N000570 HC SHEATH CR7

## 2024-08-14 PROCEDURE — 75870 VEIN X-RAY SKULL: CPT | Mod: 26 | Performed by: NEUROLOGICAL SURGERY

## 2024-08-14 PROCEDURE — 36012 PLACE CATHETER IN VEIN: CPT

## 2024-08-14 PROCEDURE — 76937 US GUIDE VASCULAR ACCESS: CPT | Mod: 26 | Performed by: NEUROLOGICAL SURGERY

## 2024-08-14 PROCEDURE — 80048 BASIC METABOLIC PNL TOTAL CA: CPT | Performed by: STUDENT IN AN ORGANIZED HEALTH CARE EDUCATION/TRAINING PROGRAM

## 2024-08-14 PROCEDURE — 272N000566 HC SHEATH CR3

## 2024-08-14 PROCEDURE — 36224 PLACE CATH CAROTD ART: CPT | Mod: LT | Performed by: NEUROLOGICAL SURGERY

## 2024-08-14 PROCEDURE — 36415 COLL VENOUS BLD VENIPUNCTURE: CPT | Performed by: STUDENT IN AN ORGANIZED HEALTH CARE EDUCATION/TRAINING PROGRAM

## 2024-08-14 PROCEDURE — 272N000572 HC SHEATH CR9

## 2024-08-14 PROCEDURE — 272N000280 HC DEVICE COMPRESSION CR5

## 2024-08-14 PROCEDURE — 36011 PLACE CATHETER IN VEIN: CPT | Mod: GC | Performed by: NEUROLOGICAL SURGERY

## 2024-08-14 PROCEDURE — 99152 MOD SED SAME PHYS/QHP 5/>YRS: CPT

## 2024-08-14 PROCEDURE — 250N000011 HC RX IP 250 OP 636: Performed by: NEUROLOGICAL SURGERY

## 2024-08-14 PROCEDURE — 999N000133 HC STATISTIC PP CARE STAGE 2

## 2024-08-14 PROCEDURE — 250N000009 HC RX 250: Performed by: STUDENT IN AN ORGANIZED HEALTH CARE EDUCATION/TRAINING PROGRAM

## 2024-08-14 PROCEDURE — 85027 COMPLETE CBC AUTOMATED: CPT | Performed by: STUDENT IN AN ORGANIZED HEALTH CARE EDUCATION/TRAINING PROGRAM

## 2024-08-14 PROCEDURE — C1769 GUIDE WIRE: HCPCS

## 2024-08-14 PROCEDURE — 999N000142 HC STATISTIC PROCEDURE PREP ONLY

## 2024-08-14 RX ORDER — HEPARIN SODIUM 200 [USP'U]/100ML
1 INJECTION, SOLUTION INTRAVENOUS EVERY 5 MIN PRN
Status: DISCONTINUED | OUTPATIENT
Start: 2024-08-14 | End: 2024-08-14 | Stop reason: HOSPADM

## 2024-08-14 RX ORDER — IODIXANOL 320 MG/ML
100 INJECTION, SOLUTION INTRAVASCULAR ONCE
Status: COMPLETED | OUTPATIENT
Start: 2024-08-14 | End: 2024-08-14

## 2024-08-14 RX ORDER — LIDOCAINE 40 MG/G
CREAM TOPICAL
Status: DISCONTINUED | OUTPATIENT
Start: 2024-08-14 | End: 2024-08-14 | Stop reason: HOSPADM

## 2024-08-14 RX ORDER — SODIUM CHLORIDE 9 MG/ML
INJECTION, SOLUTION INTRAVENOUS ONCE
Status: COMPLETED | OUTPATIENT
Start: 2024-08-14 | End: 2024-08-14

## 2024-08-14 RX ORDER — NALOXONE HYDROCHLORIDE 0.4 MG/ML
0.4 INJECTION, SOLUTION INTRAMUSCULAR; INTRAVENOUS; SUBCUTANEOUS
Status: DISCONTINUED | OUTPATIENT
Start: 2024-08-14 | End: 2024-08-14 | Stop reason: HOSPADM

## 2024-08-14 RX ORDER — HEPARIN SODIUM 1000 [USP'U]/ML
5000 INJECTION, SOLUTION INTRAVENOUS; SUBCUTANEOUS
Status: COMPLETED | OUTPATIENT
Start: 2024-08-14 | End: 2024-08-14

## 2024-08-14 RX ORDER — FLUMAZENIL 0.1 MG/ML
0.2 INJECTION, SOLUTION INTRAVENOUS
Status: DISCONTINUED | OUTPATIENT
Start: 2024-08-14 | End: 2024-08-14 | Stop reason: HOSPADM

## 2024-08-14 RX ORDER — NALOXONE HYDROCHLORIDE 0.4 MG/ML
0.2 INJECTION, SOLUTION INTRAMUSCULAR; INTRAVENOUS; SUBCUTANEOUS
Status: DISCONTINUED | OUTPATIENT
Start: 2024-08-14 | End: 2024-08-14 | Stop reason: HOSPADM

## 2024-08-14 RX ORDER — FENTANYL CITRATE 50 UG/ML
25-50 INJECTION, SOLUTION INTRAMUSCULAR; INTRAVENOUS EVERY 5 MIN PRN
Status: DISCONTINUED | OUTPATIENT
Start: 2024-08-14 | End: 2024-08-14 | Stop reason: HOSPADM

## 2024-08-14 RX ADMIN — SODIUM CHLORIDE: 9 INJECTION, SOLUTION INTRAVENOUS at 08:01

## 2024-08-14 RX ADMIN — MIDAZOLAM 1 MG: 1 INJECTION INTRAMUSCULAR; INTRAVENOUS at 09:48

## 2024-08-14 RX ADMIN — HEPARIN SODIUM 5000 UNITS: 1000 INJECTION INTRAVENOUS; SUBCUTANEOUS at 10:10

## 2024-08-14 RX ADMIN — FENTANYL CITRATE 50 MCG: 50 INJECTION, SOLUTION INTRAMUSCULAR; INTRAVENOUS at 10:04

## 2024-08-14 RX ADMIN — MIDAZOLAM 1 MG: 1 INJECTION INTRAMUSCULAR; INTRAVENOUS at 10:02

## 2024-08-14 RX ADMIN — HEPARIN SODIUM 1 BAG: 200 INJECTION, SOLUTION INTRAVENOUS at 08:45

## 2024-08-14 RX ADMIN — FENTANYL CITRATE 50 MCG: 50 INJECTION, SOLUTION INTRAMUSCULAR; INTRAVENOUS at 09:07

## 2024-08-14 RX ADMIN — FENTANYL CITRATE 50 MCG: 50 INJECTION, SOLUTION INTRAMUSCULAR; INTRAVENOUS at 08:46

## 2024-08-14 RX ADMIN — IODIXANOL 150 ML: 320 INJECTION, SOLUTION INTRAVASCULAR at 10:43

## 2024-08-14 RX ADMIN — FENTANYL CITRATE 50 MCG: 50 INJECTION, SOLUTION INTRAMUSCULAR; INTRAVENOUS at 09:47

## 2024-08-14 RX ADMIN — HEPARIN SODIUM: 1000 INJECTION INTRAVENOUS; SUBCUTANEOUS at 08:47

## 2024-08-14 RX ADMIN — FENTANYL CITRATE 50 MCG: 50 INJECTION, SOLUTION INTRAMUSCULAR; INTRAVENOUS at 09:26

## 2024-08-14 RX ADMIN — MIDAZOLAM 1 MG: 1 INJECTION INTRAMUSCULAR; INTRAVENOUS at 08:53

## 2024-08-14 RX ADMIN — FENTANYL CITRATE 50 MCG: 50 INJECTION, SOLUTION INTRAMUSCULAR; INTRAVENOUS at 08:48

## 2024-08-14 RX ADMIN — MIDAZOLAM 2 MG: 1 INJECTION INTRAMUSCULAR; INTRAVENOUS at 08:46

## 2024-08-14 RX ADMIN — LIDOCAINE HYDROCHLORIDE 1 ML: 10 INJECTION, SOLUTION EPIDURAL; INFILTRATION; INTRACAUDAL; PERINEURAL at 08:45

## 2024-08-14 RX ADMIN — MIDAZOLAM 1 MG: 1 INJECTION INTRAMUSCULAR; INTRAVENOUS at 09:27

## 2024-08-14 ASSESSMENT — VISUAL ACUITY
OU: NORMAL ACUITY

## 2024-08-14 ASSESSMENT — ACTIVITIES OF DAILY LIVING (ADL)
ADLS_ACUITY_SCORE: 35

## 2024-08-14 NOTE — IR NOTE
Patient Name: Leidy Ascencio  Medical Record Number: [id number]  Today's Date: 8/14/2024    Procedure: Cerebral Angiogram & Venogram   - Superior Sinus is Occluded    Proceduralist: Jasmine Jerez MD    Procedure Start: 0845  Procedure end: 1030  Sedation Time: 0845 - 1030 (105 min)  Sedation Medications:  6 mg Versed and 300 mcg Fentanyl  5,000 units of Heparin    -6fr sheath removed from RRA w/ TR band arm  -5 fr sheath removed from RFA closed w/ Angioseal  -6fr sheath LFV closed w/ Exoseal    Flat bedrest from 7304-2228    Report given to: QIANA RN    Other Notes: Pt arrived to IR room 3 from . Consent reviewed. Pt denies any questions or concerns regarding procedure. Pt positioned supine and monitored per protocol. Pt tolerated procedure without any noted complications. Pt transferred back to .

## 2024-08-14 NOTE — PROGRESS NOTES
[facility]     Endovascular Surgical Neuroradiology Pre-Procedure Note      HPI:  Leidy Ascencio is a 64 year old female with an incidentally found 4.4cm right occipital mass and underwent craniotomy in 2016. 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 She underwent radiosurgery and repeat imaging showed increased size of the tumor. She presents for diagnostic angiogram to assess sinus patency to determine if total resection is possible or will result in potential complication if the sinus is involved.     Medical History:  Past Medical History:   Diagnosis Date    Arthritis     Cancer (H)     COPD (chronic obstructive pulmonary disease) (H)     Depressive disorder     Hyperlipemia     Uncomplicated asthma        Surgical History:  Past Surgical History:   Procedure Laterality Date    APPENDECTOMY      COLONOSCOPY      COLONOSCOPY N/A 11/29/2022    Procedure: COLONOSCOPY, BIOPSY and polypectomy;  Surgeon: Rick Zavala MD;  Location:  GI    GYN SURGERY      3 cesections    HYSTERECTOMY VAGINAL, BILATERAL SALPINGO-OOPHERECTOMY, COMBINED  1988    hysterectomy + ovaries     LAPAROSCOPIC ABLATION LIVER TUMOR N/A 1/15/2020    Procedure: Laparoscopic ultrasound guided percutaneous microwave abalation of tumor x1; ultrasound guided Liver Biopsy x 4; Hepatic Ultrasound Intraoperatively;  Surgeon: Phan Fierro MD;  Location: [facility] OR    LAPAROSCOPY DIAGNOSTIC (GENERAL)  1/15/2020    Procedure: Laparoscopy diagnostic (general);  Surgeon: Phan Fierro MD;  Location: [facility] OR    ZZC EXCIS INFRATENT MENINGIOMA         Family History:  Family History   Problem Relation Age of Onset    Coronary Artery Disease Mother     Hypertension Mother     Hyperlipidemia Mother     Arthritis Father     Cerebrovascular Disease Father     Alzheimer Disease Maternal Grandmother     Other Cancer Maternal Grandmother         lung    Diabetes Maternal  Grandfather     Other Cancer Paternal Grandfather         meloma    Anxiety Disorder Daughter     Substance Abuse Son        Social History:  Social History     Socioeconomic History    Marital status:      Spouse name: Not on file    Number of children: Not on file    Years of education: Not on file    Highest education level: Not on file   Occupational History    Not on file   Tobacco Use    Smoking status: Former     Current packs/day: 0.00     Average packs/day: 1 pack/day for 27.0 years (27.0 ttl pk-yrs)     Types: Cigarettes     Start date: 1976     Quit date: 2003     Years since quittin.5    Smokeless tobacco: Never   Vaping Use    Vaping status: Never Used   Substance and Sexual Activity    Alcohol use: Yes    Drug use: No    Sexual activity: Yes     Partners: Male     Birth control/protection: Female Surgical   Other Topics Concern    Parent/sibling w/ CABG, MI or angioplasty before 65F 55M? No   Social History Narrative    Not on file     Social Determinants of Health     Financial Resource Strain: Low Risk  (10/31/2023)    Financial Resource Strain     Within the past 12 months, have you or your family members you live with been unable to get utilities (heat, electricity) when it was really needed?: No   Food Insecurity: Low Risk  (10/31/2023)    Food Insecurity     Within the past 12 months, did you worry that your food would run out before you got money to buy more?: No     Within the past 12 months, did the food you bought just not last and you didn t have money to get more?: No   Transportation Needs: Low Risk  (10/31/2023)    Transportation Needs     Within the past 12 months, has lack of transportation kept you from medical appointments, getting your medicines, non-medical meetings or appointments, work, or from getting things that you need?: No   Physical Activity: Not on file   Stress: Not on file   Social Connections: Not on file   Interpersonal Safety: Low Risk   (11/7/2023)    Interpersonal Safety     Do you feel physically and emotionally safe where you currently live?: Yes     Within the past 12 months, have you been hit, slapped, kicked or otherwise physically hurt by someone?: No     Within the past 12 months, have you been humiliated or emotionally abused in other ways by your partner or ex-partner?: No   Housing Stability: Low Risk  (10/31/2023)    Housing Stability     Do you have housing? : Yes     Are you worried about losing your housing?: No       Allergies:  Allergies   Allergen Reactions    Other Drug Allergy (See Comments) Hives     Paxlovid     Atorvastatin     Penicillins     Sulfa Antibiotics     Sulfamethoxazole-Trimethoprim Rash       Is there a contrast allergy?  No    Medications:  Facility-Administered Medications Prior to Admission   Medication Dose Route Frequency Provider Last Rate Last Admin    2 mL bupivacaine (MARCAINE) preservative free injection 0.5% (20 mL vial)  2 mL   Chase Mendoza, DO   2 mL at 08/02/23 1524    lidocaine 1 % injection 2 mL  2 mL   Chase Mendoza, DO   2 mL at 08/02/23 1524    triamcinolone (KENALOG-40) injection 40 mg  40 mg   Chase Mendoza DO   40 mg at 08/02/23 1524    triamcinolone (KENALOG-40) injection 40 mg  40 mg   Lauren Edmonds MD   40 mg at 12/07/20 1442     Medications Prior to Admission   Medication Sig Dispense Refill Last Dose    albuterol (PROAIR HFA/PROVENTIL HFA/VENTOLIN HFA) 108 (90 Base) MCG/ACT inhaler Inhale 2 puffs into the lungs every 6 hours 18 g 1     azelastine (ASTELIN) 0.1 % nasal spray Spray 1 spray into both nostrils 2 times daily 30 mL 8     fluticasone-salmeterol (AIRDUO RESPICLICK) 113-14 MCG/ACT inhaler Inhale 1 puff into the lungs 2 times daily for 360 days 3 each 3     hydrOXYzine (ATARAX) 25 MG tablet Take 1 tablet (25 mg) by mouth nightly as needed for itching 30 tablet 0     loratadine (CLARITIN) 10 MG tablet Take 10 mg by mouth daily       montelukast (SINGULAIR) 10 MG  tablet Take 1 tablet (10 mg) by mouth at bedtime for 360 days 90 tablet 3     rosuvastatin (CRESTOR) 10 MG tablet Take 1 tablet (10 mg) by mouth daily 90 tablet 0     sertraline (ZOLOFT) 100 MG tablet Take 2 tablets (200 mg) by mouth daily 60 tablet 5     triamcinolone (KENALOG) 0.1 % external cream Apply topically 2 times daily 80 g 0     triamcinolone (KENALOG) 0.1 % external ointment Apply topically 2 times daily 30 g 0     Vitamin D3 (VITAMIN D, CHOLECALCIFEROL,) 25 mcg (1000 units) tablet Take 25 mcg by mouth daily      .    ROS:      PHYSICAL EXAMINATION  Vital Signs:  B/P: Data Unavailable,  T: Data Unavailable,  P: Data Unavailable,  R: Data Unavailable    Cardio:  RRR  Pulmonary:  no respiratory distress  Abdomen:  non-distended    Neurologic  Mental Status:  fully alert, attentive and oriented, follows commands, speech clear and fluent  Cranial Nerves:  EOMI with normal smooth pursuit, facial movements symmetric, hearing not formally tested but intact to conversation, palate elevation symmetric and uvula midline, no dysarthria, shoulder shrug strong bilaterally, tongue protrusion midline, right inferior quadranopsia  Motor:  no abnormal movements, normal tone throughout, normal muscle bulk, no pronator drift, strength 5/5 throughout upper and lower extremities  Sensory:  intact/symmetric to light touch and pin prick throughout upper and lower extremities  Coordination:  FNF and HS intact without dysmetria    Pre-procedure National Institutes of Health Stroke Scale:   Not applicable    LABS  (most recent Cr, BUN, GFR, PLT, INR, PTT within the past 7 days):  No lab results found in last 7 days.     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 She presents today for diagnostic angiogram to assess the patency of the superior sagittal sinus to aid in surgical  resection approach.     PLAN:   Diagnostic cerebral angiogram with or without venogram.      PRE-PROCEDURE SEDATION ASSESSMENT     Pre-Procedure Sedation Assessment done at 0655.    Expected Level:  Minimal Sedation    Indication:  Sedation is required to allow for neurointerventional procedure.    Consent obtained from patient after discussing the risks, benefits and alternatives.     PO Intake:  Appropriately NPO for procedure    ASA Class:  Class 2 - MILD SYSTEMIC DISEASE, NO ACUTE PROBLEMS, NO FUNCTIONAL LIMITATIONS.    Mallampati:  Grade 1:  Soft palate, uvula, tonsillar pillars, and posterior pharyngeal wall visible    History and physical reviewed and no updates needed. I have reviewed the lab findings, diagnostic data, medications, and the plan for sedation. I have determined this patient to be an appropriate candidate for the planned sedation and procedure and have reassessed the patient IMMEDIATELY PRIOR to sedation and procedure.    Patient was discussed with the Attending, Dr. Cruz  , who agrees with the plan.  Sonali Jerez MD  Endovascular Surgical Neuroradiology Fellow  [facility]  [phone]    Endovascular Surgical Neuroradiology staff is Dr. Cruz

## 2024-08-14 NOTE — PROGRESS NOTES
Prep complete for Cerebral Angiogram. Pending labs. Patients  Main at bedside will transport home post procedure.

## 2024-08-14 NOTE — PROGRESS NOTES
Patient arrived via cart from IR s/p cerebral angiogram. VSS. R TR BAND CDI, no hematoma,12 ml to air. Neuro check at baseline. RFA angioseal site CDI, no hematoma. Left venous groin site CDI, no hematoma. Flat bedrest until 1245. Patient denies pain, tolerating regular diet. Patients  Main at bedside.

## 2024-08-14 NOTE — PROGRESS NOTES
PIV discontinued. Discharge paperwork reviewed with patient and her . Escorted to lobby for transport home.

## 2024-08-14 NOTE — PROCEDURES
[facility]     Endovascular Surgical Neuroradiology Post-Procedure Note    Pre-Procedure Diagnosis:  Occipital hemangiopericytoma   Post-Procedure Diagnosis:  as above    Procedure(s):   Diagnostic cervicocerebral angiography and venography    Findings:  Multiple collateral tributaries that drain the anterior superior sagittal sinus and subsequent connection the the torcula and posterior sagittal sinus. The sagittal sinus centered around the mass appears to be occluded.     Plan:  bedrest 2 hours  Will discuss with Dr. Samayoa about surgical approach    Primary Surgeon:  Dr. Remi Cruz  Secondary Surgeon:  Not applicable  Secondary Surgeon Review:  None  Fellow:  Sonali Jerez MD  Additional Assistants:  none    Prior to the start of the procedure and with procedural staff participation, I verbally confirmed: the patient s identity using two indicators, relevant allergies, that the procedure was appropriate and matched the consent or emergent situation, and that the correct equipment/implants were available. Immediately prior to starting the procedure I conducted the Time Out with the procedural staff and re-confirmed the patient s name, procedure, and site/side. (The Joint Commission universal protocol was followed.)  Yes    PRU value: Not applicable    Anesthesia:  Conscious Sedation  Medications:  300mcg IV Fentanyl, 5000U IV Heparin, 20ml 1 % subQ Lidocaine, 6mg IV Midazolam, 2.5mg IA Verapamil, 200mcg IA nitroglycerin, 3000U IA Heparin  Puncture site:  Right Femoral Artery, Right Radial Artery, left femoral vein    Fluoroscopy time (minutes):  31.8  Radiation dose (mGy):   1129     Contrast amount (mL):   150      Estimated blood loss (mL):  25    Closure:  Device 6F Angioseal, 6F Exoseal, TR band    Disposition:  Home after recovery.        Sedation Post-Procedure Summary    Sedatives: Fentanyl and Midazolam (Versed)    Vital signs and pulse oximetry  were monitored and remained stable throughout the procedure, and sedation was maintained until the procedure was complete.  The patient was monitored by staff until sedation discharge criteria were met.    Patient tolerance:  Patient tolerated the procedure well with no immediate complications.    Time of sedation in minutes:  105 minutes from beginning to end of physician one to one monitoring.  Sonali Jerez MD  Endovascular Surgical Neuroradiology Fellow  [facility]  [phone]    Endovascular Surgical Neuroradiology staff is Dr. Cruz

## 2024-08-15 ENCOUNTER — PRE VISIT (OUTPATIENT)
Dept: RADIATION ONCOLOGY | Facility: CLINIC | Age: 64
End: 2024-08-15
Payer: MEDICARE

## 2024-08-15 ENCOUNTER — OFFICE VISIT (OUTPATIENT)
Dept: RADIATION ONCOLOGY | Facility: CLINIC | Age: 64
End: 2024-08-15
Attending: PSYCHIATRY & NEUROLOGY
Payer: COMMERCIAL

## 2024-08-15 ENCOUNTER — PATIENT OUTREACH (OUTPATIENT)
Dept: NEUROSURGERY | Facility: CLINIC | Age: 64
End: 2024-08-15
Payer: MEDICARE

## 2024-08-15 VITALS
BODY MASS INDEX: 34.28 KG/M2 | HEART RATE: 68 BPM | SYSTOLIC BLOOD PRESSURE: 162 MMHG | WEIGHT: 187.4 LBS | OXYGEN SATURATION: 96 % | TEMPERATURE: 98.7 F | DIASTOLIC BLOOD PRESSURE: 74 MMHG

## 2024-08-15 DIAGNOSIS — D43.3 HEMANGIOPERICYTOMA OF CNS, GRADE II (H): ICD-10-CM

## 2024-08-15 PROCEDURE — 99205 OFFICE O/P NEW HI 60 MIN: CPT | Mod: GC | Performed by: STUDENT IN AN ORGANIZED HEALTH CARE EDUCATION/TRAINING PROGRAM

## 2024-08-15 PROCEDURE — G0463 HOSPITAL OUTPT CLINIC VISIT: HCPCS | Performed by: STUDENT IN AN ORGANIZED HEALTH CARE EDUCATION/TRAINING PROGRAM

## 2024-08-15 PROCEDURE — 99417 PROLNG OP E/M EACH 15 MIN: CPT | Performed by: STUDENT IN AN ORGANIZED HEALTH CARE EDUCATION/TRAINING PROGRAM

## 2024-08-15 ASSESSMENT — PAIN SCALES - GENERAL: PAINLEVEL: MILD PAIN (3)

## 2024-08-15 NOTE — PROGRESS NOTES
"Considerations for radiation treatment   Pregnancy status: Female with hysterectomy   Implanted Cardiac Devices: No   Any previous radiation therapy: Yes, radiosurgery 2020    Oncology Rooming Note    August 15, 2024 9:16 AM   Leidy Ascencio is a 64 year old female who presents for:    Chief Complaint   Patient presents with    Oncology Clinic Visit     New consult for Radiation Therapy     Initial Vitals: There were no vitals taken for this visit. Estimated body mass index is 33.91 kg/m  as calculated from the following:    Height as of 7/31/24: 1.575 m (5' 2\").    Weight as of 8/14/24: 84.1 kg (185 lb 6.5 oz). There is no height or weight on file to calculate BSA.  Data Unavailable Comment: Data Unavailable   No LMP recorded. Patient has had a hysterectomy.  Allergies reviewed: Yes  Medications reviewed: Yes    Medications: Medication refills not needed today.  Pharmacy name entered into [employer]:    [facility] - [place], [place] - [address] [place] [address] [facility] - [place], [facility], [place] - [address]    Frailty Screening:   Is the patient here for a new oncology consult visit in cancer care? 2. No      Clinical concerns: Denies seizure activities, headaches frequently pain up to 5, no vision eyes L lower quadrant- has been stable. Gets tense in neck and head related to anxiety. Occasionally gets palpitations and chest pain related to anxiety. Has a bump under L arm. Getting more information about treatment.  Dr Bauer was notified.      Lauren Rod RN            "

## 2024-08-15 NOTE — LETTER
8/15/2024      Leidy Ascencio  [address] [place] [address]      Dear Colleague,    Thank you for referring your patient, Leidy Ascencio, to the [facility] RADIATION ONCOLOGY. Please see a copy of my visit note below.    [facility]   RADIATION ONCOLOGY INITIAL CONSULTATION NOTE    Patient Name: Leidy Ascencio  Requesting Physician: Dr. 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     History Of Present Illness:     Leidy Ascencio initially presented 3/23/2015 to her primary care with headache. She noted a right temporal headache for a few months, and CT imaging demonstrated a mass at the posterior aspect of the right cerebral hemisphere warranting MRI. Subsequent MR imaging revealed a 3.5 x 4.2 x 3.8 cm right occipital/parietal mass consistent with a meningioma.     2015 Patient underwent a right occipital craniotomy at [facility] with Dr. Lujan, and final pathology demonstrated a hemangiopericytoma, WHO grade 2. She did have quadrantanopsia following the procedure but recovery was otherwise uncomplicated. A CT chest, abdomen and pelvis and PET MRI also performed in that timeframe. The studies were negative and she is continue to follow with Dr. Mayberry with conservative management until 2019.     2019 - Brain MRI demonstrated a new 1.3 x 1.4 x 1.5 cm nodule of enhancement within the superior sagittal sinus compatible with tumor recurrence. Subsequent CT CAP demonstrated enlargement of segment VI liver lesion and recommended MRI.      12/13/2019 -Bone scan with no convincing evidence of skeletal metastases.    12/18/2019 - MRI liver with a 9 mm lesion in the lateral aspect of the junction of segments 7 and 8 of the liver shows homogeneous delayed enhancement and is new since 2015. This could represent a metastatic lesion.     1/6/2020 - She met with Dr. Zhang of Neuro-oncology who placed referrals to Dr. Song of Radiation Oncology for consideration of stereotactic radiosurgery and Dr. Fierro with surgical oncology for ablation.    1/14/2020- She underwent radiosurgery (GK) to the posterior midline brain/superior sagittal sinus using 20Gy on 1/14/2020 with Dr. Song.    1/15/2020 - She underwent laparoscopic ultrasound-guided microwave ablation of a liver lesion with Dr. Fierro. Final pathology was consistent with a benign cavernous hemangioma and no metastatic hemangiopericytoma or other malignancy was identified. No follow-up indicated.    Continued MRI surveillance of the brain demonstrated no changes until 4/2024.    4/15/2024 - She presented to Dr. Zhang, with mild worsening in headache frequency. MRI imaging demonstrated an increase in the size of the enhancing extra-axial nodular mass within the superior sagittal sinus with elevated cerebral blood volume consistent with disease recurrence. Referrals were placed for neurosurgery, and systemic staging imaging ordered. CT CAP imaging negative.      4/19/2024 - Patient saw Dr. Samayoa. Discussed options of observation with delayed surgery, conventional radiation, or surgery and usage of GammaTile. Planned for MRI in 3 months.    7/22/2024 - Patient saw Dr. Zhang, noting more frequent and severe headaches in the morning. Patient referred back to Dr. Samayoa to review risks/benefits of surgery and potential use of GammaTile therapy. Patient also referred to Dr. Bauer of Radiation Oncology.    7/31/2024 - Dr. Samayoa recommended assessing patency of superior sagittal sinus given  quick rate of tumor growth over past 3 months, as sinus involvement increases risk of a serious venous infarction. Patient would like to proceed with surgery, and case request has been placed for a biparieto-occipital craniotomy for tumor resection with Gamma Tiles.    8/14/2024 - Diagnostic cervicocerebral angiography and venography demonstrated Superior Sinus is Occluded     Surgery scheduled for 9/6/2024, may be rescheduled for 9/5    8/15/2024 - Patients presents to clinic today accompanied by her  Main to discuss the role of radiation in her care. Today, she reports a headache of 3-4/10 intensity. Following her procedure yesterday, she had a fairly severe headache that has improved. She has gotten intermittent headaches beginning around the same time period that her disease was noted to recur, that reach a max of 5/10. She tells us she also had a bad headache for about 1-2 months following her GK procedure. She also notes she has been under a fair amount of stress, as her step mother was recently buried. She reports she occasionally will have the sensation of chest pain and palpitations associated with anxiety episodes. She has also noticed a bump under her left arm, and has plans to reestablish with old PCP and will have it reevaluated then. She also has felt some neck tightness.    Current systemic therapy: N/A   Anti-epileptic: N/A  Steroids: Currently off dexamethasone     Brain ROS:  Headaches-   Reports intermittent HA  Seizures- Denies  Nausea/vomiting-  Denies  Changes in cognition/behavior-  Denies  Speech-  Denies  Vision/hearing changes- Denies  Gait symptoms or imbalance-  Denies, occasionally stumbles due to stable quadrantanopsia  Other focal neuro deficits-  Denies numbness or tingling      Past Medical History:   Past Medical History:   Diagnosis Date     Arthritis      Cancer (H)      COPD (chronic obstructive pulmonary disease) (H)      Depressive disorder      Hyperlipemia       Uncomplicated asthma      Past Surgical History:   Past Surgical History:   Procedure Laterality Date     APPENDECTOMY       COLONOSCOPY       COLONOSCOPY N/A 11/29/2022    Procedure: COLONOSCOPY, BIOPSY and polypectomy;  Surgeon: Rick Zavala MD;  Location:  GI     GYN SURGERY      3 cesections     HYSTERECTOMY VAGINAL, BILATERAL SALPINGO-OOPHERECTOMY, COMBINED  1988    hysterectomy + ovaries      LAPAROSCOPIC ABLATION LIVER TUMOR N/A 1/15/2020    Procedure: Laparoscopic ultrasound guided percutaneous microwave abalation of tumor x1; ultrasound guided Liver Biopsy x 4; Hepatic Ultrasound Intraoperatively;  Surgeon: Phan Fierro MD;  Location: [facility] OR     LAPAROSCOPY DIAGNOSTIC (GENERAL)  1/15/2020    Procedure: Laparoscopy diagnostic (general);  Surgeon: Phan Fierro MD;  Location: [facility] OR     ZZC EXCIS INFRATENT MENINGIOMA     Hx of ovarian cyst removal when she was 15    Past Radiation History:   Patient previously received radiation with 20Gy in 1 fraction (GK) on 1/14/2020     The patient denies a history of lupus, scleroderma, connective tissue disorders and collagen vascular disease. They have not had any other malignancy or chemotherapy.  She does not have a pacemaker    Allergies:   Allergies   Allergen Reactions     Other Drug Allergy (See Comments) Hives     Paxlovid      Atorvastatin      Penicillins      Sulfa Antibiotics      Sulfamethoxazole-Trimethoprim Rash       Medications:   Current Outpatient Medications   Medication Sig Dispense Refill     albuterol (PROAIR HFA/PROVENTIL HFA/VENTOLIN HFA) 108 (90 Base) MCG/ACT inhaler Inhale 2 puffs into the lungs every 6 hours 18 g 1     fluticasone-salmeterol (AIRDUO RESPICLICK) 113-14 MCG/ACT inhaler Inhale 1 puff into the lungs 2 times daily for 360 days 3 each 3     rosuvastatin (CRESTOR) 10 MG tablet Take 1 tablet (10 mg) by mouth daily 90 tablet 0     sertraline (ZOLOFT) 100 MG tablet Take 2 tablets (200 mg) by mouth daily 60 tablet 5      azelastine (ASTELIN) 0.1 % nasal spray Spray 1 spray into both nostrils 2 times daily 30 mL 8     hydrOXYzine (ATARAX) 25 MG tablet Take 1 tablet (25 mg) by mouth nightly as needed for itching (Patient not taking: Reported on 8/15/2024) 30 tablet 0     loratadine (CLARITIN) 10 MG tablet Take 10 mg by mouth daily (Patient not taking: Reported on 8/15/2024)       montelukast (SINGULAIR) 10 MG tablet Take 1 tablet (10 mg) by mouth at bedtime for 360 days (Patient not taking: Reported on 8/15/2024) 90 tablet 3     triamcinolone (KENALOG) 0.1 % external cream Apply topically 2 times daily (Patient not taking: Reported on 8/15/2024) 80 g 0     triamcinolone (KENALOG) 0.1 % external ointment Apply topically 2 times daily (Patient not taking: Reported on 8/15/2024) 30 g 0     Vitamin D3 (VITAMIN D, CHOLECALCIFEROL,) 25 mcg (1000 units) tablet Take 25 mcg by mouth daily (Patient not taking: Reported on 8/15/2024)       Current Facility-Administered Medications   Medication Dose Route Frequency Provider Last Rate Last Admin     2 mL bupivacaine (MARCAINE) preservative free injection 0.5% (20 mL vial)  2 mL   Chase Mendoza DO   2 mL at 08/02/23 1524     lidocaine 1 % injection 2 mL  2 mL   Chase Mendoza DO   2 mL at 08/02/23 1524     triamcinolone (KENALOG-40) injection 40 mg  40 mg   Chase Mendoza DO   40 mg at 08/02/23 1524     triamcinolone (KENALOG-40) injection 40 mg  40 mg   Lauren Edmonds MD   40 mg at 12/07/20 1442       Family History:   Family History   Problem Relation Age of Onset     Coronary Artery Disease Mother      Hypertension Mother      Hyperlipidemia Mother      Arthritis Father      Cerebrovascular Disease Father      Alzheimer Disease Maternal Grandmother      Other Cancer Maternal Grandmother         lung     Diabetes Maternal Grandfather      Other Cancer Paternal Grandfather         meloma     Anxiety Disorder Daughter      Substance Abuse Son      Lung Cancer Maternal Aunt         Social History:   Originally from [place],  with three children, eight grandkids, works as a restaurant training, former smoker who quit in     Social History     Socioeconomic History     Marital status:      Spouse name: Not on file     Number of children: Not on file     Years of education: Not on file     Highest education level: Not on file   Occupational History     Not on file   Tobacco Use     Smoking status: Former     Current packs/day: 0.00     Average packs/day: 1 pack/day for 27.0 years (27.0 ttl pk-yrs)     Types: Cigarettes     Start date: 1976     Quit date: 2003     Years since quittin.5     Smokeless tobacco: Never   Vaping Use     Vaping status: Never Used   Substance and Sexual Activity     Alcohol use: Yes     Comment: when headaches get bad sometimes a wine or two a day maybe 5-6 drinks (wine) week     Drug use: No     Sexual activity: Yes     Partners: Male     Birth control/protection: Female Surgical   Other Topics Concern     Parent/sibling w/ CABG, MI or angioplasty before 65F 55M? No   Social History Narrative     Not on file     Social Determinants of Health     Financial Resource Strain: Low Risk  (10/31/2023)    Financial Resource Strain      Within the past 12 months, have you or your family members you live with been unable to get utilities (heat, electricity) when it was really needed?: No   Food Insecurity: Low Risk  (10/31/2023)    Food Insecurity      Within the past 12 months, did you worry that your food would run out before you got money to buy more?: No      Within the past 12 months, did the food you bought just not last and you didn t have money to get more?: No   Transportation Needs: Low Risk  (10/31/2023)    Transportation Needs      Within the past 12 months, has lack of transportation kept you from medical appointments, getting your medicines, non-medical meetings or appointments, work, or from getting things that you need?: No    Physical Activity: Not on file   Stress: Not on file   Social Connections: Not on file   Interpersonal Safety: Low Risk  (11/7/2023)    Interpersonal Safety      Do you feel physically and emotionally safe where you currently live?: Yes      Within the past 12 months, have you been hit, slapped, kicked or otherwise physically hurt by someone?: No      Within the past 12 months, have you been humiliated or emotionally abused in other ways by your partner or ex-partner?: No   Housing Stability: Low Risk  (10/31/2023)    Housing Stability      Do you have housing? : Yes      Are you worried about losing your housing?: No       Review of Systems: Denies additional symptoms related to current diagnosis.      Karnofsky Performance Status: 90  ECOG Performance Status: 1    Vital Signs:  BP (!) 162/74 (BP Location: Left arm, Patient Position: Sitting, Cuff Size: Adult Regular)   Pulse 68   Temp 98.7  F (37.1  C) (Oral)   Wt 85 kg (187 lb 6.4 oz)   SpO2 96%   BMI 34.28 kg/m  .    Physical Exam:    General: NAD, patient well appearing. Patient is somewhat anxious  Extremities: No acute findings.   Skin: color, texture and turgor wnl. No rashes and lesions.  Neuro:   MS: AAO x 3, appropriately interactive, normal affect, speech fluent  CN: II,III -- PERRLA, stable left quadrantanopsia   III,IV,VI -- EOMI, no ptosis;   V -- sensation intact to LT/PP  VII -- no facial weakness/droop;   VIII -- hears finger rub equally bilaterally;   IX,X -- voice normal, palate elevates symmetrically,  XI -- SCM/trapezii 5/5 strength bilaterally;   XII -- tongue protrudes midline, no atrophy or fasciculation.  Motor: Normal tone, no tremor.   0-No movement. 1-Muscle contrac. 2-Moves on bed. 3-Antigrav. 4-Mildly weak. 5-Full.               Delt     Bicep   Tricep   Quad   Gastr  TA      ExHL   R  5  5  5  5  5  5  5    L  5  5  5  5  5  5  5          Pathology:   As above in HPI    4/22/2015 - Surgical Path Final Diagnosis:  BRAIN, RIGHT  OCCIPITAL MASS, RESECTION: Solitary fibrous tumor  (hemangiopericytoma); please see comment  Comment: Within the central nervous system, solitary fibrous tumor and  hemangiopericytoma appear to exist on a spectrum, with overlapping microscopic  features. Both entities are associated with STAT6 gene mutations. By WHO (1)  and Niurka (2) criteria, this constitutes a grade II lesion.      Last CBC with Diff:  WBC Count   Date Value Ref Range Status   08/14/2024 6.7 4.0 - 11.0 10e3/uL Final     RBC Count   Date Value Ref Range Status   08/14/2024 4.71 3.80 - 5.20 10e6/uL Final     Hemoglobin   Date Value Ref Range Status   08/14/2024 13.7 11.7 - 15.7 g/dL Final     Hematocrit   Date Value Ref Range Status   08/14/2024 41.8 35.0 - 47.0 % Final     MCV   Date Value Ref Range Status   08/14/2024 89 78 - 100 fL Final     MCH   Date Value Ref Range Status   08/14/2024 29.1 26.5 - 33.0 pg Final     MCHC   Date Value Ref Range Status   08/14/2024 32.8 31.5 - 36.5 g/dL Final     RDW   Date Value Ref Range Status   08/14/2024 13.1 10.0 - 15.0 % Final     Platelet Count   Date Value Ref Range Status   08/14/2024 266 150 - 450 10e3/uL Final        Last Comprehensive Metabolic Panel:  Sodium   Date Value Ref Range Status   08/14/2024 141 135 - 145 mmol/L Final   03/01/2021 139 133 - 144 mmol/L Final     Potassium   Date Value Ref Range Status   08/14/2024 3.8 3.4 - 5.3 mmol/L Final   08/25/2022 3.9 3.4 - 5.3 mmol/L Final   03/01/2021 3.9 3.4 - 5.3 mmol/L Final     Chloride   Date Value Ref Range Status   08/14/2024 104 98 - 107 mmol/L Final   08/25/2022 106 94 - 109 mmol/L Final   03/01/2021 106 94 - 109 mmol/L Final     Carbon Dioxide   Date Value Ref Range Status   03/01/2021 29 20 - 32 mmol/L Final     Carbon Dioxide (CO2)   Date Value Ref Range Status   08/14/2024 23 22 - 29 mmol/L Final   08/25/2022 29 20 - 32 mmol/L Final     Anion Gap   Date Value Ref Range Status   08/14/2024 14 7 - 15 mmol/L Final   08/25/2022 4 3 - 14  mmol/L Final   03/01/2021 4 3 - 14 mmol/L Final     Glucose   Date Value Ref Range Status   08/14/2024 119 (H) 70 - 99 mg/dL Final   08/25/2022 117 (H) 70 - 99 mg/dL Final   03/01/2021 117 (H) 70 - 99 mg/dL Final     Comment:     Fasting specimen     Urea Nitrogen   Date Value Ref Range Status   08/14/2024 21.1 8.0 - 23.0 mg/dL Final   08/25/2022 19 7 - 30 mg/dL Final   03/01/2021 18 7 - 30 mg/dL Final     Creatinine   Date Value Ref Range Status   08/14/2024 0.66 0.51 - 0.95 mg/dL Final   03/01/2021 0.72 0.52 - 1.04 mg/dL Final     GFR Estimate   Date Value Ref Range Status   08/14/2024 >90 >60 mL/min/1.73m2 Final     Comment:     eGFR calculated using 2021 CKD-EPI equation.   03/01/2021 89 >60 mL/min/[1.73_m2] Final     Comment:     Non  GFR Calc  Starting 12/18/2018, serum creatinine based estimated GFR (eGFR) will be   calculated using the Chronic Kidney Disease Epidemiology Collaboration   (CKD-EPI) equation.       Calcium   Date Value Ref Range Status   08/14/2024 9.2 8.8 - 10.4 mg/dL Final     Comment:     Reference intervals for this test were updated on 7/16/2024 to reflect our healthy population more accurately. There may be differences in the flagging of prior results with similar values performed with this method. Those prior results can be interpreted in the context of the updated reference intervals.   03/01/2021 9.4 8.5 - 10.1 mg/dL Final        Radiology:  As above in HPI    4/2/2015 - CT Sinus w/o Contrast  Impression:   1. Clear sinuses.   2. Mass at the posterior aspect right cerebral hemisphere not well   visualized by this exam. Enhanced brain MRI recommended.   3. I attempted to phone results of this exam but was unsuccessful.     4/3/2015 - MRI Brain w/o Contrast  Findings: There is a tumor measuring approximately 3.5 x 4.2 x 3.8 cm   in the right occipital/parietal region which is somewhat lobulated   with irregular borders and is consistent with a meningioma with   moderately  extensive adjacent edema. The adjacent transverse sinus   and parasagittal sinus are patent.     No recent hemorrhage or infarction. The paranasal sinuses appear   essentially clear.     Impression: Meningioma on the right at the occipital/parietal   junction with moderately extensive adjacent edema and patent adjacent   superior sagittal sinus.     12/11/2019 - Brain MRI w/ contrast  FINDINGS:  History of a right parieto-occipital craniotomy for resection of a grade 2 hemangiopericytoma in 2015. T2 isointense and enhancing nodule within the superior sagittal sinus medial to the surgical bed is essentially new since the prior exam. It measures 1.3 x 1.4 centimeters in axial plane and 1.5 centimeters in craniocaudal dimension. The dural leaflets of the superior sagittal sinus surrounding the tumor have a convex outward margin. Stable mature appearance of the resection cavity, with mild encephalomalacia and gliosis involving the right posterior parietal and occipital lobe. Small focus of linear enhancement at the posterior lateral margin is stable and likely vascular in etiology. No diffusion abnormalities. A few scattered supratentorial white matter T2 hyperintensities are unchanged.    IMPRESSION:  1. Essentially new nodule of enhancement within the superior sagittal sinus is compatible with tumor recurrence. It measures 1.3 x 1.4 centimeters in axial plane and 1.5 centimeters in craniocaudal dimension. No other sites of recurrent tumor identified.  2. Otherwise stable appearance of the right parieto-occipital craniotomy site.     4/15/2024 - MR Brain Perfusion w/wo contrast  Findings:  Postsurgical right parieto-occipital craniotomy changes for resection  of underlying mass with scattered hemosiderin deposition along the  resection site. Increased size of a T1 isointense and T2 hyperintense  nodular extra-axial enhancing lesion within the superior sagittal  sinus measuring 12 x 9 mm (series [id number], image [id number]),  previously  measuring 9 x 6 mm on brain MR 10/16/2023. The superior sagittal sinus  otherwise remains patent. Stable adjacent T2/FLAIR hyperintense  signal. There is associated elevated cerebral blood volume and  perfusion mapping.     There is no mass effect, midline shift, or evidence of intracranial  hemorrhage. The ventricles are proportionate to the cerebral sulci.  Normal major vascular intracranial flow-voids.     No abnormality of the skull marrow signal. The visualized portions of  paranasal sinuses, and mastoid air cells are relatively clear. The  orbits are grossly unremarkable.                                                   Impression:  1.  Increased size of enhancing extra-axial nodular mass within the  superior sagittal sinus with elevated cerebral blood volume consistent  with disease recurrence.  2.  Superior sagittal sinus otherwise appears patent.  [Consider Follow Up: Increased size of enhancing extra-axial nodular  mass within the superior sagittal sinus with elevated cerebral blood  volume consistent with disease recurrence.]    4/24/2024 - CT CAP  IMPRESSION: No suspicious neoplastic lesions in the chest or abdomen  or pelvis by CT. Prominent fatty hypertrophy of the ileocecal valve.  Unchanged benign-appearing right lower lobe 3 mm pulmonary nodule.  Atherosclerosis. Continued appearance of hepatic cysts.    7/22/2024 - MR Brain Perfusion  Findings:  Postsurgical changes of right parieto-occipital craniotomy for  resection of underlying mass with scattered hemosiderin deposition  along the resection cavity. A T1 and T2 hyperintense nodular  extra-axial enhancing mass within the superior sagittal sinus  measuring 1.7 x 1.0 x 1.5 cm, previously 1.4 x 0.9 x 1.1 cm and  previously measuring 9 x 6 mm on 10/16/2023. Stable adjacent T2  hyperintense signal surrounding the cavity and the lesion. On the  perfusion imaging, there is elevated relative cerebral blood volume in  the region of the lesion  although not including all portions of the  lesion, which is nonspecific.     There is no mass effect, midline shift or extra-axial fluid  collection. Ventricles are proportionate to the cerebral sulci. The  ventricles are proportionate to the cerebral sulci. Diffusion weighted  imaging reveals no abnormal reduced diffusion. Flow voids within the  major intracranial vessels are present.     No abnormality of the skull marrow signal  Visualized paranasal  sinuses and mastoid air cells are clear. Orbits are grossly  unremarkable.                                                             Impression:   1. Increased size of enhancing extra axial nodular mass within the  superior sagittal sinus with elevated relative cerebral blood volume,  suggesting disease recurrence.  2. No definite thrombus is identified within the visualized superior  sagittal sinus.    8/14/2024 - Diagnostic cervicocerebral angiography and venography   Impression:  There is external compression of the superior sagittal sinus posteriorly, centered near the craniotomy defect associated with known occipita tumor. There is no flow through this portion of the superior sagittal sinus. There are collateral vein network that courses around the body of the tumor that drain the anterior superior sagittal sinus and connect with the posterior sagittal sinus with subsequent normal venous anatomy following.     Radiation Oncology Pre-Treatment Evaluation:   Prior RT: Yes, 20 Gy/1fx GK in 2020  Pacemaker: No  Child-bearing potential (Yes/No): No  Pain: Mild Pain (3)/10  Pain plan: N/A  Intent of treatment: (currative/palliative): curative   Side Effects of Radiation: We discussed in detail the management of treatment side effects    Assessment:  Leidy Ascencio is 64 year old with a right occipital hemangiopericytoma WHO grade 2, diagnosed following resection on 4/22/2015, s/p GK 1/2020, now with radiologic recurrence of the mass within the superior sagittal  sinus as of 4/15/2024, and sinus occlusion noted on angiogram 8/14/2024.     Plan:   We recommend treatment with radiation therapy. The patient has already met with Dr. Samayoa regarding surgical options, and is scheduled for surgery 9/6/2024 with plans for concurrent Gamma Tile Therapy.    We had a detailed discussion with Leidy Ascencio and her  regarding the role of radiation therapy for the treatment of a recurrent hemangiopericytoma. As she has previously had surgery as well as GK therapy, and still shown progression of disease on imaging, we are recommending usage of Gamma Tile brachytherapy to directly target her tumor. We discussed the logistics, timing, risks, benefits, alternatives, and side effects associated with radiation therapy, which would be implanted utilizing a Gamma Tile during her upcoming surgery.     Leidy Ascencio would like to proceed with radiotherapy at [facility].  She will be  scheduled on 9/5/2024  for surgery and placement of Gamma Tile. The patient was encouraged to contact us with questions or concerns should they arise in the interim. All questions were answered to the patients's satisfaction, and they were provided with our contact information should any questions or concerns arise.    Connie Bradford, MS4  Radiation Oncology    We discussed brachytherapy in detail. Our research coordinator will be in touch to discuss  further with Priyanka.     A medical resident participated in the care of this patient and in the preparation of this note. I have verified and edited this note. I personally performed key elements of the physical exam and medical decision making for this patient.  I agree with the assessment and plan of care as documented in the note above.      The overall time I spent on direct patient care including self review of records, labs, and radiographic studies, as well as, direct face-to-face interaction with the patient and coordination of care with other providers  "was 75 minutes.        Izabel Bauer MD, PhD     Department of Radiation Oncology  [facility]     Referring Provider:  Lauren Zhang MD  [address] [place] [address]          Considerations for radiation treatment   Pregnancy status: Female with hysterectomy   Implanted Cardiac Devices: No   Any previous radiation therapy: Yes, radiosurgery 2020    Oncology Rooming Note    August 15, 2024 9:16 AM   Leidy Ascencio is a 64 year old female who presents for:    Chief Complaint   Patient presents with     Oncology Clinic Visit     New consult for Radiation Therapy     Initial Vitals: There were no vitals taken for this visit. Estimated body mass index is 33.91 kg/m  as calculated from the following:    Height as of 7/31/24: 1.575 m (5' 2\").    Weight as of 8/14/24: 84.1 kg (185 lb 6.5 oz). There is no height or weight on file to calculate BSA.  Data Unavailable Comment: Data Unavailable   No LMP recorded. Patient has had a hysterectomy.  Allergies reviewed: Yes  Medications reviewed: Yes    Medications: Medication refills not needed today.  Pharmacy name entered into [employer]:    [facility] - [place], [place] - [address] [place] [address] [facility] - [facility], [place] - [address] [facility]    Frailty Screening:   Is the patient here for a new oncology consult visit in cancer care? 2. No      Clinical concerns: Denies seizure activities, headaches frequently pain up to 5, no vision eyes L lower quadrant- has been stable. Gets tense in neck and head related to anxiety. Occasionally gets palpitations and chest pain related to anxiety. Has a bump under L arm. Getting more information about treatment.  Dr Bauer was notified.      Lauren Rod RN              Again, thank you for allowing me to participate in the care of your patient.        Sincerely,        Izabel Bauer MD PhD  "

## 2024-08-15 NOTE — PROGRESS NOTES
Endovascular Surgical Neuroradiology - Post Discharge Call    Admit: 8/14/24    Discharge: 8/14/24    Facility: [facility]    MD/Service: Dr. Cruz/ALEXA    Procedure Diagnosis:  Occipital hemangiopericytoma      Procedure(s):   Diagnostic cervicocerebral angiography and venography (right femoral artery, right radial artery, left femoral vein punctures)     Findings:  Multiple collateral tributaries that drain the anterior superior sagittal sinus and subsequent connection the the torcula and posterior sagittal sinus. The sagittal sinus centered around the mass appears to be occluded.      Plan: Follow-up with Dr. Samayoa about next steps for surgical resection. (Message sent to Dr. Samayoa to inform angio completed).     Spoke with patient. States she had a really bad headache yesterday. Better today. Drinking adequate fluids. Denies bleeding, drainage, pain, swelling, warmth, redness at puncture sites. Eating and drinking ok.     Patient aware of the plan and in agreement.     Patient has my contact information and was encouraged to call with questions/concerns.     Bri Hernandez RN 8/15/2024 9:56 AM

## 2024-08-20 ENCOUNTER — ALLIED HEALTH/NURSE VISIT (OUTPATIENT)
Dept: RADIATION ONCOLOGY | Facility: CLINIC | Age: 64
End: 2024-08-20
Payer: MEDICARE

## 2024-08-20 DIAGNOSIS — Z00.6 PATIENT IN CANCER RELATED RESEARCH STUDY: Primary | ICD-10-CM

## 2024-08-20 LAB
ABO/RH(D): NORMAL
ANTIBODY SCREEN: NEGATIVE
SPECIMEN EXPIRATION DATE: NORMAL

## 2024-08-20 NOTE — PROGRESS NOTES
Informed Consent Note: Observational Study GammaTile Surgically Targeted Radiation Therapy (STaRT) in Brain neoplasm    The consent form, including purpose, risks and benefits, was reviewed with Leidy Ascencio, and all questions were answered before signing the consent form.   Present during the discussion was Leidy Ascencio. A copy of the signed form was provided to the patient. No procedures specific to this study were performed prior to the patient signing the consent form.    Consent + HIPAA Version Date: 24-JUN-2024  Consent obtained by:  Luna Carcamo  Date: August 20, 2024

## 2024-08-21 ENCOUNTER — PRE VISIT (OUTPATIENT)
Dept: SURGERY | Facility: CLINIC | Age: 64
End: 2024-08-21

## 2024-08-21 ENCOUNTER — LAB (OUTPATIENT)
Dept: LAB | Facility: CLINIC | Age: 64
End: 2024-08-21
Payer: MEDICARE

## 2024-08-21 ENCOUNTER — ANESTHESIA EVENT (OUTPATIENT)
Dept: SURGERY | Facility: CLINIC | Age: 64
DRG: 023 | End: 2024-08-21
Payer: MEDICARE

## 2024-08-21 ENCOUNTER — OFFICE VISIT (OUTPATIENT)
Dept: SURGERY | Facility: CLINIC | Age: 64
End: 2024-08-21
Payer: MEDICARE

## 2024-08-21 VITALS
WEIGHT: 185 LBS | HEIGHT: 62 IN | TEMPERATURE: 98.4 F | OXYGEN SATURATION: 96 % | DIASTOLIC BLOOD PRESSURE: 76 MMHG | SYSTOLIC BLOOD PRESSURE: 113 MMHG | BODY MASS INDEX: 34.04 KG/M2 | HEART RATE: 60 BPM

## 2024-08-21 DIAGNOSIS — Z01.818 PRE-OP EVALUATION: ICD-10-CM

## 2024-08-21 DIAGNOSIS — Z01.818 PRE-OP EVALUATION: Primary | ICD-10-CM

## 2024-08-21 PROCEDURE — 86900 BLOOD TYPING SEROLOGIC ABO: CPT

## 2024-08-21 PROCEDURE — 36415 COLL VENOUS BLD VENIPUNCTURE: CPT | Performed by: PATHOLOGY

## 2024-08-21 PROCEDURE — 99204 OFFICE O/P NEW MOD 45 MIN: CPT | Performed by: NURSE PRACTITIONER

## 2024-08-21 ASSESSMENT — ENCOUNTER SYMPTOMS: ORTHOPNEA: 0

## 2024-08-21 ASSESSMENT — COPD QUESTIONNAIRES
CAT_SEVERITY: MILD
COPD: 1

## 2024-08-21 ASSESSMENT — PAIN SCALES - GENERAL: PAINLEVEL: NO PAIN (0)

## 2024-08-21 ASSESSMENT — LIFESTYLE VARIABLES: TOBACCO_USE: 1

## 2024-08-21 NOTE — H&P
Pre-Operative H & P     CC:  Preoperative exam to assess for increased cardiopulmonary risk while undergoing surgery and anesthesia.    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      Her history also includes  an incidental 4.4cm right occipital mass and underwent a craniotomy in 2015 at [facility] with Dr. Lujan. Pathology showed a hemangiopericytoma, Postoperatively she did well but did sustain a quadrantanopsia. She was followed with surveillance imaging and then in 2019 it was found to have recurred. She underwent radiosurgery with 20Gy on 1/14/2020 with Dr. Song. Surveillance body imaging showed liver lesions that were biopsied and showed no evidence of hemangiopericytoma. Continued MRI surveillance of the brain showed growth of the radiated tumor on the latest MRI. She consulted with Dr. Samayoa and presents today in preparation for the above recommended procedure.           History is obtained from the patient and chart review    Hx of abnormal bleeding or anti-platelet use: denies    Menstrual history: No LMP recorded. Patient has had a hysterectomy.:        Past Medical History  Past Medical History:   Diagnosis Date    Arthritis     Cancer (H)      COPD (chronic obstructive pulmonary disease) (H)     Depressive disorder     Hyperlipemia     Uncomplicated asthma        Past Surgical History  Past Surgical History:   Procedure Laterality Date    APPENDECTOMY      COLONOSCOPY      COLONOSCOPY N/A 11/29/2022    Procedure: COLONOSCOPY, BIOPSY and polypectomy;  Surgeon: Rick Zavala MD;  Location:  GI    GYN SURGERY      3 cesections    HYSTERECTOMY VAGINAL, BILATERAL SALPINGO-OOPHERECTOMY, COMBINED  1988    hysterectomy + ovaries     LAPAROSCOPIC ABLATION LIVER TUMOR N/A 1/15/2020    Procedure: Laparoscopic ultrasound guided percutaneous microwave abalation of tumor x1; ultrasound guided Liver Biopsy x 4; Hepatic Ultrasound Intraoperatively;  Surgeon: Phan Fierro MD;  Location: [facility] OR    LAPAROSCOPY DIAGNOSTIC (GENERAL)  1/15/2020    Procedure: Laparoscopy diagnostic (general);  Surgeon: Phan Fierro MD;  Location:  OR    [facility] EXCIS INFRATENT MENINGIOMA         Prior to Admission Medications  Current Outpatient Medications   Medication Sig Dispense Refill    Acetaminophen (TYLENOL 8 HOUR ARTHRITIS PAIN PO) Take by mouth as needed.      albuterol (PROAIR HFA/PROVENTIL HFA/VENTOLIN HFA) 108 (90 Base) MCG/ACT inhaler Inhale 2 puffs into the lungs every 6 hours 18 g 1    azelastine (ASTELIN) 0.1 % nasal spray Spray 1 spray into both nostrils 2 times daily (Patient taking differently: Spray 1 spray into both nostrils 2 times daily as needed.) 30 mL 8    fluticasone-salmeterol (AIRDUO RESPICLICK) 113-14 MCG/ACT inhaler Inhale 1 puff into the lungs 2 times daily for 360 days (Patient taking differently: Inhale 1 puff into the lungs every morning.) 3 each 3    Krill Oil 300 MG CAPS Take by mouth as needed.      loratadine (CLARITIN) 10 MG tablet Take 10 mg by mouth as needed.      rosuvastatin (CRESTOR) 10 MG tablet Take 1 tablet (10 mg) by mouth daily (Patient taking differently: Take 10 mg by mouth every morning.) 90 tablet 0    sertraline (ZOLOFT)  100 MG tablet Take 2 tablets (200 mg) by mouth daily (Patient taking differently: Take 200 mg by mouth every morning.) 60 tablet 5    hydrOXYzine (ATARAX) 25 MG tablet Take 1 tablet (25 mg) by mouth nightly as needed for itching (Patient not taking: Reported on 8/15/2024) 30 tablet 0    montelukast (SINGULAIR) 10 MG tablet Take 1 tablet (10 mg) by mouth at bedtime for 360 days (Patient not taking: Reported on 2024) 90 tablet 3    triamcinolone (KENALOG) 0.1 % external cream Apply topically 2 times daily (Patient not taking: Reported on 8/15/2024) 80 g 0    triamcinolone (KENALOG) 0.1 % external ointment Apply topically 2 times daily (Patient not taking: Reported on 8/15/2024) 30 g 0    Vitamin D3 (VITAMIN D, CHOLECALCIFEROL,) 25 mcg (1000 units) tablet Take 25 mcg by mouth daily (Patient not taking: Reported on 8/15/2024)         Allergies  Allergies   Allergen Reactions    Other Drug Allergy (See Comments) Hives     Paxlovid     Atorvastatin     Penicillins     Sulfa Antibiotics     Sulfamethoxazole-Trimethoprim Rash       Social History  Social History     Socioeconomic History    Marital status:      Spouse name: Not on file    Number of children: Not on file    Years of education: Not on file    Highest education level: Not on file   Occupational History    Not on file   Tobacco Use    Smoking status: Former     Current packs/day: 0.00     Average packs/day: 1 pack/day for 27.0 years (27.0 ttl pk-yrs)     Types: Cigarettes     Start date: 1976     Quit date: 2003     Years since quittin.6     Passive exposure: Past    Smokeless tobacco: Never   Vaping Use    Vaping status: Never Used   Substance and Sexual Activity    Alcohol use: Yes     Comment: 2-6 wine a week    Drug use: No    Sexual activity: Yes     Partners: Male     Birth control/protection: Female Surgical   Other Topics Concern    Parent/sibling w/ CABG, MI or angioplasty before 65F 55M? No   Social History Narrative    Not  on file     Social Determinants of Health     Financial Resource Strain: Low Risk  (10/31/2023)    Financial Resource Strain     Within the past 12 months, have you or your family members you live with been unable to get utilities (heat, electricity) when it was really needed?: No   Food Insecurity: Low Risk  (10/31/2023)    Food Insecurity     Within the past 12 months, did you worry that your food would run out before you got money to buy more?: No     Within the past 12 months, did the food you bought just not last and you didn t have money to get more?: No   Transportation Needs: Low Risk  (10/31/2023)    Transportation Needs     Within the past 12 months, has lack of transportation kept you from medical appointments, getting your medicines, non-medical meetings or appointments, work, or from getting things that you need?: No   Physical Activity: Not on file   Stress: Not on file   Social Connections: Not on file   Interpersonal Safety: Low Risk  (11/7/2023)    Interpersonal Safety     Do you feel physically and emotionally safe where you currently live?: Yes     Within the past 12 months, have you been hit, slapped, kicked or otherwise physically hurt by someone?: No     Within the past 12 months, have you been humiliated or emotionally abused in other ways by your partner or ex-partner?: No   Housing Stability: Low Risk  (10/31/2023)    Housing Stability     Do you have housing? : Yes     Are you worried about losing your housing?: No       Family History  Family History   Problem Relation Age of Onset    Coronary Artery Disease Mother     Hypertension Mother     Hyperlipidemia Mother     Arthritis Father     Cerebrovascular Disease Father     Alzheimer Disease Maternal Grandmother     Other Cancer Maternal Grandmother         lung    Diabetes Maternal Grandfather     Other Cancer Paternal Grandfather         meloma    Anxiety Disorder Daughter     Substance Abuse Son     Lung Cancer Maternal Aunt         Review of Systems  The complete review of systems is negative other than noted in the HPI or here.   Anesthesia Evaluation   Pt has had prior anesthetic. Type: General and MAC.    No history of anesthetic complications       ROS/MED HX  ENT/Pulmonary:     (+)     THOMPSON risk factors, snores loudly,          tobacco use, Past use,    Mild Persistent, asthma Last exacerbation: 2-3x monthly,  Treatment: Inhaler prn and Inhaled steroids,  mild,  COPD,              Neurologic: Comment: Recurrent occipital hemangiopericytoma s/p right craniotomy 4/22/2015 and SRS 1/14/2020      Cardiovascular:     (+) Dyslipidemia - -   -  - -           RANDLE.                      Previous cardiac testing   Echo: Date: Results:    Stress Test:  Date: 2023 Results:  FINAL CONCLUSIONS  1. EXERCISE Stress Echocardiogram is NORMAL.  2. Technically difficult - contrast was used to enhance endocardial  definition secondary to subotimal image quality.  3. The stress ECG was negative for ischemia.  4. Fair exercise tolerance  5. Frequent PVCs and 3 beat runs of NSVT during and early post exercise      ECG Reviewed:  Date: Results:    Cath:  Date: Results:   (-) taking anticoagulants/antiplatelets and orthopnea/PND   METS/Exercise Tolerance: 3 - Able to walk 1-2 blocks without stopping Comment: More limited by her knee before her breathing will slow her down   Hematologic:  - neg hematologic  ROS     Musculoskeletal:   (+)  arthritis,             GI/Hepatic:  - neg GI/hepatic ROS     Renal/Genitourinary:  - neg Renal ROS     Endo:     (+)               Obesity,       Psychiatric/Substance Use:     (+) psychiatric history depression    (-) alcohol abuse history   Infectious Disease:  - neg infectious disease ROS     Malignancy:  - neg malignancy ROS (+) Malignancy, History of Other.Other CA occipital hemangiopericytoma Active status post Surgery and Radiation.    Other: Comment: Left inferior quadrantanopsia             /76 (BP Location:  "Right arm, Patient Position: Sitting, Cuff Size: Adult Large)   Pulse 60   Temp 98.4  F (36.9  C) (Oral)   Ht 1.575 m (5' 2\")   Wt 83.9 kg (185 lb)   SpO2 96%   BMI 33.84 kg/m      Physical Exam   Constitutional: Awake, alert, cooperative, no apparent distress, and appears stated age.  Eyes: Pupils equal, round and reactive to light, extra ocular muscles intact, sclera clear, conjunctiva normal.  HENT: Normocephalic, oral pharynx with moist mucus membranes, good dentition. No goiter appreciated.   Respiratory: Clear to auscultation bilaterally, no crackles or wheezing.  Cardiovascular: Regular rate and rhythm, normal S1 and S2, and no murmur noted.  Carotids +2, no bruits. No edema. Palpable pulses to radial  DP and PT arteries.   GI: Normal bowel sounds, soft, non-distended, non-tender, no masses palpated, no hepatosplenomegaly.    Lymph/Hematologic: No cervical lymphadenopathy and no supraclavicular lymphadenopathy.  Genitourinary:  deferred  Skin: Warm and dry.  No rashes at anticipated surgical site.   Musculoskeletal: Full ROM of neck. There is no redness, warmth, or swelling of the joints. Gross motor strength is normal.    Neurologic: Awake, alert, oriented to name, place and time. Cranial nerves II-XII are grossly intact. Gait is normal.   Neuropsychiatric: Calm, cooperative. Normal affect.     Prior Labs/Diagnostic Studies   All labs and imaging personally reviewed   Lab Results   Component Value Date    WBC 6.7 08/14/2024     Lab Results   Component Value Date    RBC 4.71 08/14/2024     Lab Results   Component Value Date    HGB 13.7 08/14/2024     Lab Results   Component Value Date    HCT 41.8 08/14/2024     No components found for: \"MCT\"  Lab Results   Component Value Date    MCV 89 08/14/2024     Lab Results   Component Value Date    MCH 29.1 08/14/2024     Lab Results   Component Value Date    MCHC 32.8 08/14/2024     Lab Results   Component Value Date    RDW 13.1 08/14/2024     Lab Results "   Component Value Date     08/14/2024     Last Comprehensive Metabolic Panel:  Lab Results   Component Value Date     08/14/2024    POTASSIUM 3.8 08/14/2024    CHLORIDE 104 08/14/2024    CO2 23 08/14/2024    ANIONGAP 14 08/14/2024     (H) 08/14/2024    BUN 21.1 08/14/2024    CR 0.66 08/14/2024    GFRESTIMATED >90 08/14/2024    TANA 9.2 08/14/2024           IR Carotid Cerebral Angiogram 8/14/2024  Impression:  There is external compression of the superior sagittal sinus posteriorly, centered near the craniotomy defect associated with known occipita tumor. There is no flow through this portion of the superior sagittal sinus. There are collateral vein network that courses around the body of the tumor that drain the anterior superior sagittal sinus and connect with the posterior sagittal sinus with subsequent normal venous anatomy following.       MR Brain 7/22/52024  Impression:     1. Increased size of enhancing extra axial nodular mass within the  superior sagittal sinus with elevated relative cerebral blood volume,  suggesting disease recurrence.  2. No definite thrombus is identified within the visualized superior  sagittal sinus.    EKG/ stress test - if available please see in ROS above   No results found.      Latest Ref Rng & Units 5/8/2023     9:04 AM   PFT   FVC L 1.96    FEV1 L 1.39    FVC% % 75    FEV1% % 66      Mild obstruction with normal volumes and diffusion. ( Per pulmonary note 11/2023)    The patient's records and results personally reviewed by this provider.     Outside records reviewed from: Care Everywhere    LAB/DIAGNOSTIC STUDIES TODAY:    Type and Screen     Assessment    Leidy Ascencio is a 64 year old female seen as a PAC referral for risk assessment and optimization for anesthesia.    Plan/Recommendations  Pt will be optimized for the proposed procedure.  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  Will need to be reassessed day of surgery.  Mallampati: I  TM: > 3    Left inferior quadrantanopsia    CARDIAC  - No history of CAD, Hypertension, and Afib  Denies CP, dizziness, palpitations or syncopal episodes  Chronic stable RANDLE 2/2 COPD   DSE 2023 normal without ischemic changes.   - Hyperlipidemia  On statins  - METS (Metabolic Equivalents)  METS 3 able to walk 1-2 blocks without stopping.   Patient CANNOT perform 4 METS exercise without symptoms             Total Score: 1    Functional Capacity: Unable to complete 4 METS      RCRI-Low risk: Class 2 0.9% complication rate             Total Score: 1    RCRI: High Risk Surgery        PULMONARY    THOMPSON Low Risk             Total Score: 2    THOMPSON: Snores loudly    THOMPSON: Over 50 ys old      -- Asthma / COPD  Chronic stable RANDLE  Patient admits to not taking fluticasone-salmeterol as directed which is twice daily. She has been only doing once daily ( due to cost) and uses albuterol 2-3x monthly.   Recommend she start taking her medications as prescribed and discuss cost with her pulmonologist at her visit in November. She agrees to do so.     Follows with pulmonology last seen 11/2023    PFTs show very mild fixed obstruction and CT chest shows no clear evidence of airway nor airspace disease. There are no emphysematous changes. Although she has benefited from Advair, he cough is less productive but persistent.   Per pulmonary note 11/2023  - Chronic cough has improved on current medications. No new complaints no COPD/Asthma flares since last seen  PFTs show very mild fixed obstruction and CT chest shows no clear evidence of airway nor airspace disease. There are no emphysematous changes. \"      Not on home oxygen  - Tobacco History    History   Smoking Status    Former    Types: " "Cigarettes   Smokeless Tobacco    Never       GI    PONV High Risk  Total Score: 3           1 AN PONV: Pt is Female    1 AN PONV: Patient is not a current smoker    1 AN PONV: Intended Post Op Opioids        /RENAL  - Baseline Creatinine  WNL    ENDOCRINE    - BMI: Estimated body mass index is 33.84 kg/m  as calculated from the following:    Height as of this encounter: 1.575 m (5' 2\").    Weight as of this encounter: 83.9 kg (185 lb).  Obesity (BMI >30)  - No history of Diabetes Mellitus    HEME/ONC    occipital hemangiopericytoma   - solitary fibrous tumor Procedure scheduled as above.     Follows with Radiation Oncology last seen 8/15/2024   \"  recommend treatment with radiation therapy. \"     VTE Medium Risk 1.8%             Total Score: 6    VTE: Greater than 59 yrs old    VTE: Current cancer      - No history of abnormal bleeding or antiplatelet use.      MSK  Patient is NOT Frail             Total Score: 2    Frailty: Weight loss 10 lbs or greater    Frailty: Increased exhaustion          PSYCH  - depression managed on zoloft    Different anesthesia methods/types have been discussed with the patient, but they are aware that the final plan will be decided by the assigned anesthesia provider on the date of service.      The patient is optimized for their procedure. AVS with information on surgery time/arrival time, meds and NPO status given by nursing staff. No further diagnostic testing indicated.      On the day of service:     Prep time: 15 minutes  Visit time: 22 minutes  Documentation time: 10 minutes  ------------------------------------------  Total time: 47 minutes      CRISTA Garcia CNP  Preoperative Assessment Center  [facility]  Clinic and Surgery Center  Phone: [phone]  Fax: [phone]    "

## 2024-08-21 NOTE — PATIENT INSTRUCTIONS
Preparing for Your Surgery      Name:  Leidy Ascencio   MRN:  [id number]   :  1960   Today's Date:  2024       Arriving for surgery:  Surgery date:  2024  Arrival time:  5:00 AM    Please come to:     Please come to:       [facility]    [address] [place]  [address]     The [facility] [place] Patient/Visitor Ramp is at [address] [address]. Patients and visitors who self-park will receive the reduced hospital parking rate. If the Patient /Visitor Ramp is full, please follow the signs to the [employer] car park located at the main hospital entrance.       parking is available (24 hours/ 7 days a week)      Discounted parking pass options are available for patients and visitors. They can be purchased at the [facility] desk at the main hospital entrance.     -    Stop at the security desk and they will direct surgery patients to the Surgery Check in and Family [facility]. [phone]        - If you need directions, a wheelchair or an escort please stop at the Information/security desk in the lobby.     What can I eat or drink?  -  You may eat and drink normally up to 8 hours prior to arrival time. (Until 9:00 PM )  -  You may have clear liquids until 2 hours prior to arrival time. (Until 3:00 AM)    Examples of clear liquids:  Water  Clear broth  Juices (apple, white grape, white cranberry  and cider) without pulp  Noncarbonated, powder based beverages  (lemonade and Pillo-Aid)  Sodas (Sprite, 7-Up, ginger ale and seltzer)  Coffee or tea (without milk or cream)  Gatorade    -  No Alcohol or cannabis products for at least 24 hours before surgery.     Which medicines can I take?    Hold Aspirin for 7 days before surgery.   Hold Multivitamins for 7 days before surgery.  **Hold Supplements for 7 days before surgery. (Krill oil)  Hold Ibuprofen (Advil, Motrin) for 3 day(s) before  surgery--unless otherwise directed by surgeon.  Hold Naproxen (Aleve) for 4 days before surgery.    -  DO NOT take these medications the day of surgery:  Hydroxyzine  Vitamin D3    -  PLEASE TAKE these medications the day of surgery:  Tylenol if needed  Albuterol if needed  Airduo  Zoloft  Singular  Claritin  Crestor    How do I prepare myself?  - Please take 2 showers (one the night prior to surgery and one the morning of surgery) using Scrubcare or Hibiclens soap.    Use this soap only from the neck to your toes.     Leave the soap on your skin for one minute--then rinse thoroughly.      You may use your own shampoo and conditioner. No other hair products.   - Please remove all jewelry and body piercings.  - No lotions, deodorants or fragrance.  - No makeup or fingernail polish.   - Bring your ID and insurance card.    -If you use a CPAP machine, please bring the CPAP machine, tubing, and mask to hospital.    -If you have a Deep Brain Stimulator, Spinal Cord Stimulator, or any Neuro Stimulator device---you must bring the remote control to the hospital.      ALL PATIENTS GOING HOME THE SAME DAY OF SURGERY ARE REQUIRED TO HAVE A RESPONSIBLE ADULT TO DRIVE AND BE IN ATTENDANCE WITH THEM FOR 24 HOURS FOLLOWING SURGERY.    Covid testing policy as of 12/06/2022  Your surgeon will notify and schedule you for a COVID test if one is needed before surgery--please direct any questions or COVID symptoms to your surgeon      Questions or Concerns:    - For any questions regarding the day of surgery or your hospital stay, please contact the Pre Admission Nursing Office at [phone].       - If you have health changes between today and your surgery, please call your surgeon.       - For questions after surgery, please call your surgeons office.           Current Visitor Guidelines    You may have 2 visitors in the pre op area.    Visiting hours: 8 a.m. to 8:30 p.m.    Patients confirmed or suspected to have symptoms of COVID  19 or flu:     No visitors allowed for adult patients.   Children (under age 18) can have 1 named visitor.     People who are sick or showing symptoms of COVID 19 or flu:    Are not allowed to visit patients--we can only make exceptions in special situations.       Please follow these guidelines for your visit:          Please maintain social distance          Masking is optional--however at times you may be asked to wear a mask for the safety of yourself and others     Clean your hands with alcohol hand . Do this when you arrive at and leave the building and patient room,    And again after you touch your mask or anything in the room.     Go directly to and from the room you are visiting.     Stay in the patient s room during your visit. Limit going to other places in the hospital as much as possible     Leave bags and jackets at home or in the car.     For everyone s health, please don t come and go during your visit. That includes for smoking   during your visit.

## 2024-08-28 ENCOUNTER — TELEPHONE (OUTPATIENT)
Dept: NEUROSURGERY | Facility: CLINIC | Age: 64
End: 2024-08-28

## 2024-08-28 ENCOUNTER — OFFICE VISIT (OUTPATIENT)
Dept: URGENT CARE | Facility: URGENT CARE | Age: 64
End: 2024-08-28
Payer: MEDICARE

## 2024-08-28 VITALS
OXYGEN SATURATION: 97 % | TEMPERATURE: 96 F | SYSTOLIC BLOOD PRESSURE: 155 MMHG | HEART RATE: 87 BPM | RESPIRATION RATE: 16 BRPM | DIASTOLIC BLOOD PRESSURE: 77 MMHG | WEIGHT: 185 LBS | BODY MASS INDEX: 33.84 KG/M2

## 2024-08-28 DIAGNOSIS — R03.0 ELEVATED BP WITHOUT DIAGNOSIS OF HYPERTENSION: ICD-10-CM

## 2024-08-28 DIAGNOSIS — D43.3 HEMANGIOPERICYTOMA OF CNS, GRADE II (H): ICD-10-CM

## 2024-08-28 DIAGNOSIS — L02.412 ABSCESS OF LEFT AXILLA: Primary | ICD-10-CM

## 2024-08-28 PROCEDURE — 99213 OFFICE O/P EST LOW 20 MIN: CPT | Mod: 25 | Performed by: INTERNAL MEDICINE

## 2024-08-28 PROCEDURE — 10060 I&D ABSCESS SIMPLE/SINGLE: CPT | Performed by: INTERNAL MEDICINE

## 2024-08-28 RX ORDER — DOXYCYCLINE 100 MG/1
100 CAPSULE ORAL 2 TIMES DAILY
Qty: 14 CAPSULE | Refills: 0 | Status: ON HOLD | OUTPATIENT
Start: 2024-08-28 | End: 2024-09-06

## 2024-08-28 ASSESSMENT — PAIN SCALES - GENERAL: PAINLEVEL: MODERATE PAIN (5)

## 2024-08-28 NOTE — TELEPHONE ENCOUNTER
Patient called and left a voice mail for surgery scheduling asking if a specific antibiotic she is taking will compromise surgery. Please reach out to patient regarding question.        Viridiana Thacker on 8/28/2024 at 11:23 AM

## 2024-08-28 NOTE — PROGRESS NOTES
SUBJECTIVE:  Leidy Ascencio is an 64 year old female who presents for painful lump in left armpit.  Started a couple days ago and got bigger and painful.  Did have a small bump there for a while before but didn't hurt.  No fevers, chills, sweats.  No n/v/d.  No other spots on skin like that.  Tylenol didn't help.  Has brain surgery planned for next week due to a recurrent brain cancer.    PMH:   has a past medical history of Arthritis, Cancer (H), COPD (chronic obstructive pulmonary disease) (H), Depressive disorder, Hyperlipemia, and Uncomplicated asthma.  Patient Active Problem List   Diagnosis    Recurrent cold sores    Other insomnia    Atopic rhinitis    Major depressive disorder, recurrent episode, mild (H24)    Plantar fasciitis    Vitamin D deficiency    Quadrant hemianopsia, left    Hemangiopericytoma of CNS, grade II (H)    Hyperlipidemia LDL goal <130    Moderate episode of recurrent major depressive disorder (H)    Neoplasm of uncertain behavior of brain, supratentorial (H)    Class 2 severe obesity due to excess calories with serious comorbidity in adult (H)     Social History     Socioeconomic History    Marital status:      Spouse name: None    Number of children: None    Years of education: None    Highest education level: None   Tobacco Use    Smoking status: Former     Current packs/day: 0.00     Average packs/day: 1 pack/day for 27.0 years (27.0 ttl pk-yrs)     Types: Cigarettes     Start date: 1976     Quit date: 2003     Years since quittin.6     Passive exposure: Past    Smokeless tobacco: Never   Vaping Use    Vaping status: Never Used   Substance and Sexual Activity    Alcohol use: Yes     Comment: 2-6 wine a week    Drug use: No    Sexual activity: Yes     Partners: Male     Birth control/protection: Female Surgical   Other Topics Concern    Parent/sibling w/ CABG, MI or angioplasty before 65F 55M? No     Social Determinants of Health     Financial Resource Strain: Low  Risk  (10/31/2023)    Financial Resource Strain     Within the past 12 months, have you or your family members you live with been unable to get utilities (heat, electricity) when it was really needed?: No   Food Insecurity: Low Risk  (10/31/2023)    Food Insecurity     Within the past 12 months, did you worry that your food would run out before you got money to buy more?: No     Within the past 12 months, did the food you bought just not last and you didn t have money to get more?: No   Transportation Needs: Low Risk  (10/31/2023)    Transportation Needs     Within the past 12 months, has lack of transportation kept you from medical appointments, getting your medicines, non-medical meetings or appointments, work, or from getting things that you need?: No   Interpersonal Safety: Low Risk  (11/7/2023)    Interpersonal Safety     Do you feel physically and emotionally safe where you currently live?: Yes     Within the past 12 months, have you been hit, slapped, kicked or otherwise physically hurt by someone?: No     Within the past 12 months, have you been humiliated or emotionally abused in other ways by your partner or ex-partner?: No   Housing Stability: Low Risk  (10/31/2023)    Housing Stability     Do you have housing? : Yes     Are you worried about losing your housing?: No     Family History   Problem Relation Age of Onset    Coronary Artery Disease Mother     Hypertension Mother     Hyperlipidemia Mother     Arthritis Father     Cerebrovascular Disease Father     Alzheimer Disease Maternal Grandmother     Other Cancer Maternal Grandmother         lung    Diabetes Maternal Grandfather     Other Cancer Paternal Grandfather         meloma    Anxiety Disorder Daughter     Substance Abuse Son     Lung Cancer Maternal Aunt        ALLERGIES:  Other drug allergy (see comments), Atorvastatin, Penicillins, Sulfa antibiotics, and Sulfamethoxazole-trimethoprim    Current Outpatient Medications   Medication Sig Dispense  Refill    Acetaminophen (TYLENOL 8 HOUR ARTHRITIS PAIN PO) Take by mouth as needed.      albuterol (PROAIR HFA/PROVENTIL HFA/VENTOLIN HFA) 108 (90 Base) MCG/ACT inhaler Inhale 2 puffs into the lungs every 6 hours 18 g 1    azelastine (ASTELIN) 0.1 % nasal spray Spray 1 spray into both nostrils 2 times daily (Patient taking differently: Spray 1 spray into both nostrils 2 times daily as needed.) 30 mL 8    fluticasone-salmeterol (AIRDUO RESPICLICK) 113-14 MCG/ACT inhaler Inhale 1 puff into the lungs 2 times daily for 360 days (Patient taking differently: Inhale 1 puff into the lungs every morning.) 3 each 3    rosuvastatin (CRESTOR) 10 MG tablet Take 1 tablet (10 mg) by mouth daily (Patient taking differently: Take 10 mg by mouth every morning.) 90 tablet 0    sertraline (ZOLOFT) 100 MG tablet Take 2 tablets (200 mg) by mouth daily (Patient taking differently: Take 200 mg by mouth every morning.) 60 tablet 5    triamcinolone (KENALOG) 0.1 % external cream Apply topically 2 times daily 80 g 0    triamcinolone (KENALOG) 0.1 % external ointment Apply topically 2 times daily 30 g 0    Vitamin D3 (VITAMIN D, CHOLECALCIFEROL,) 25 mcg (1000 units) tablet Take 25 mcg by mouth daily.      Krill Oil 300 MG CAPS Take by mouth as needed. (Patient not taking: Reported on 8/28/2024)      loratadine (CLARITIN) 10 MG tablet Take 10 mg by mouth as needed. (Patient not taking: Reported on 8/28/2024)      montelukast (SINGULAIR) 10 MG tablet Take 1 tablet (10 mg) by mouth at bedtime for 360 days (Patient not taking: Reported on 8/21/2024) 90 tablet 3     Current Facility-Administered Medications   Medication Dose Route Frequency Provider Last Rate Last Admin    2 mL bupivacaine (MARCAINE) preservative free injection 0.5% (20 mL vial)  2 mL   Cahse Mendoza, DO   2 mL at 08/02/23 1524    lidocaine 1 % injection 2 mL  2 mL   Chase Mendoza, DO   2 mL at 08/02/23 1524    triamcinolone (KENALOG-40) injection 40 mg  40 mg   Chase Mendoza,  DO   40 mg at 08/02/23 1524    triamcinolone (KENALOG-40) injection 40 mg  40 mg   Lauren Edmonds MD   40 mg at 12/07/20 1442         ROS:  ROS is done and is negative for general/constitutional, eye, ENT, Respiratory, cardiovascular, GI, , Skin, musculoskeletal except as noted elsewhere.  All other review of systems negative except as noted elsewhere.      OBJECTIVE:  BP (!) 155/77   Pulse 87   Temp (!) 96  F (35.6  C) (Tympanic)   Resp 16   Wt 83.9 kg (185 lb)   SpO2 97%   BMI 33.84 kg/m    GENERAL APPEARANCE: Alert, in no acute distress  EYES: normal  NOSE:normal  OROPHARYNX:normal  NECK:No adenopathy,masses or thyromegaly  RESP: normal and clear to auscultation  CV:regular rate and rhythm and no murmurs, clicks, or gallops  SKIN: left axilla with 3cm palpable raised abscess with significant fluctuance palpable and moderate erythema and significant tenderness; no drainage; there is a broader area of erythema surrounding the abscess of approx 6cm.  MUSCULOSKELETAL:Musculoskeletal normal    PROCEDURE:  Abscess drainage - left axilla is cleaned, prepped and draped in sterile fashion.  4.5cc of 1% lidocaine with epi is injected.  Abscess is lances with scalpel and significant foul smelling, white and gray drainage expressed from abscess. Area is dressed.  No immediate complications    ASSESSMENT/PLAN:  ASSESSMENT / PLAN:    (L02.412) Abscess of left axilla  Comment: drained as above.  Start doxy  Plan: doxycycline hyclate (VIBRAMYCIN) 100 MG         capsule, DRAIN SKIN ABSCESS SIMPLE/SINGLE        Advised pt to contact her surgeon to inform of abscess drainage and abx treatment in case it would alter surgery plan.  Reviewed medication instructions and side effects. Follow up if experiences side effects.. I reviewed supportive care, otc meds to use if needed, expected course, and signs of concern.  Follow up as needed or if does not improve within 5 day(s) or if worsens in any way.  Reviewed red  flag symptoms and is to go to the ER if experiences any of these.      (D43.3) Hemangiopericytoma of CNS, grade II (H)  Comment:   Plan: has upcoming surgery scheduled for next week.  Advised to contact her surgeon to inform of abscess in case it alters surgical plan    (R03.0) Elevated BP without diagnosis of hypertension  Comment:   Plan: Recheck BP in 2-3 weeks with a nurse visit, [place] pharmacy visit, or a visit to primary care doctor.    See [facility] for orders, medications, letters, patient instructions    Shanelle Grimes M.D.

## 2024-08-29 ENCOUNTER — MYC MEDICAL ADVICE (OUTPATIENT)
Dept: NEUROSURGERY | Facility: CLINIC | Age: 64
End: 2024-08-29
Payer: MEDICARE

## 2024-08-29 NOTE — PROGRESS NOTES
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 Zhang,    It was a pleasure to see Ms. Ascencio in the [facility] today.   As you recall, Ms. Ascencio is a pleasant 64 year-old right-handed female who was found with an incidental 4.4cm right occipital mass and underwent a craniotomy in  at [facility] with Dr. Lujan. Pathology showed a hemangiopericytoma, WHO grade 2. Postoperatively she did well but did sustain a quadrantanopsia. She was followed with surveillance imaging and then in  it was found to have recurred. She underwent radiosurgery with 20Gy on 2020 with Dr. Song. Surveillance body imaging showed liver lesions that were biopsied and showed no evidence of hemangiopericytoma. Continued MRI surveillance of the brain showed growth of the radiated tumor on the latest MRI, so she is referred for a discussion of options. It has grown more rapidly since then on surveillance imaging. We have interrogated the venous sinus involvement with a venogram by Dr. Cruz which showed complete occlusion and collateral vein drainage. We met previously about re-resection, re-radiation, or resection with Gamma Tiles and returns for another visit to discuss this where she could bring family members including her daughter who is an RN.    Review of Systems:   Pertinent items are noted in HPI or as in patient entered ROS below, remainder of complete ROS is negative.     Medications: albuterol, azelastine spray, fluticasone-salmeterol, hydroxyzine, loratadine, montelukast, rosuvastatin, sertraline, triamcinolone cream, vitamin D    Allergies:  atorvastatin, penicillins, sulfa     Past Medical History: arthritis,  copd, depression, hyperlipidemia, asthma, solitary fibrous tumor s/p craniotomy at [facility] as above    Family History: noncontributory      Social History: originally from [place],  with three children, eight grandkids, works as a restaurant training, former smoker who quit in 2003    Physical Exam:   General: No acute distress.   Head: No signs of trauma.    Eyes: Conjunctivae are normal.  Mouth/Throat: Oropharynx moist.  Neck: Normal range of motion.    Resp: No respiratory distress.   MSK: Moves all extremities.  No obvious deformity.  Neuro: The patient is fully oriented and quite pleasant. Speech normal. Extraocular movements are intact without nystagmus. Visual fields with a left inferior quadrantanopsia. Facial sensation is intact in V1, V2, V3 distributions. Facial nerve function is normal, rated as a House Brackmann 1, without synkinesis.  Palate is symmetric. Shoulders are full strength. Tongue is midline. There is no pronator drift. Full strength in all extremities. Sensation intact throughout.  Psych: Normal mood and affect. Behavior is normal.    Incision: clean and well healed    Imaging:  We reviewed the results of the MRI scans in our system. The preoperative MRI from 4/2/2015 shows a 4.4cm right occipital tumor. The MRI around the time of radiosurgery in 2020 shows a 1cm growth that is parasagittal. Since then, the MRIs have shown growth of the mass to 1.5 x 1.5cm centered on the sagittal sinus on the most recent scan 7/22/2024.    The angio/venogram on 8/14/2024 was reviewed and shows complete occlusion on anterograde arterial->venous and transvenous retrograde views. There are collateral veins along the left side that are patent and outflow through the bilateral veins of [place].    The MRV 8/30/2024 was reviewed and shows complete venous occlusion along the posterior SSS and collateral venous drainage along the left margin and bilateral veins of [place].     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 Options include observation, chemotherapy with CNS penetrant medication, re-radiation or surgical resection potentially with or without GammaTiles. Ideally the treatment of choice is gross total resection with negative margins for this type of tumor, however given the sinus involvement, this would increase the risk of a serious venous infarction. We have carefully imaged the venous sinus and it appears occluded completely on the gold standard testing of an angio/venogram. In this case, I believe the best long term outcome would be if the tumor within the sinus is resected followed by Gamma Tile or postop radiation therapy. I think this will lead to better long term control than a subtotal resection up to the sinus and then gamma tiles vs radiation to the remainder. Given its more rapidly growth over the past 3 months and the prior radiosurgery treatment, I believe consideration of venous sinus resection for a more substantial tumor removal should be considered to maximize long term outcome. We discussed that preserving collateral veins on the left side will be performed and may limit total removal, and these areas will be targeted by the Gamma Tiles.  We discussed the risks of surgery including bleeding, infection, neurologic injury, venous/major stroke, weakness, numbness, worsening visual field loss, death, need for additional procedures or operations.  She would like to proceed with the plan above  CT-V today to evaluate for adjacent collaterals  She is scheduled on 9/5/2024 for a biparieto-occipital craniotomy for tumor resection with Gamma Tiles with  possible lumbar drain together with Dr. Izabel Bauer from [facility].  I encouraged Ms. Ascencio to contact with any questions or concerns or if we may be of assistance in any way.      It was a pleasure to participate in the care of your patient. Please feel free to contact me at any point if I can be of any assistance for Ms. Ascencio.    Sincerely,  Will Samayoa MD       30 minutes spent on the date of the encounter doing chart review, review of outside records, review of test results, interpretation of tests, patient visit, documentation and discussion with other provider(s)

## 2024-08-30 ENCOUNTER — HOSPITAL ENCOUNTER (OUTPATIENT)
Dept: MRI IMAGING | Facility: HOSPITAL | Age: 64
Discharge: HOME | End: 2024-08-30
Attending: NEUROLOGICAL SURGERY | Admitting: NEUROLOGICAL SURGERY
Payer: MEDICARE

## 2024-08-30 DIAGNOSIS — D43.3 HEMANGIOPERICYTOMA OF CNS, GRADE II (H): ICD-10-CM

## 2024-08-30 DIAGNOSIS — R90.0 INTRACRANIAL SPACE-OCCUPYING LESION FOUND ON DIAGNOSTIC IMAGING OF CENTRAL NERVOUS SYSTEM: ICD-10-CM

## 2024-08-30 PROCEDURE — A9585 GADOBUTROL INJECTION: HCPCS | Performed by: NEUROLOGICAL SURGERY

## 2024-08-30 PROCEDURE — 255N000002 HC RX 255 OP 636: Performed by: NEUROLOGICAL SURGERY

## 2024-08-30 PROCEDURE — 70545 MR ANGIOGRAPHY HEAD W/DYE: CPT | Mod: MG

## 2024-08-30 RX ORDER — GADOBUTROL 604.72 MG/ML
8.5 INJECTION INTRAVENOUS ONCE
Status: COMPLETED | OUTPATIENT
Start: 2024-08-30 | End: 2024-08-30

## 2024-08-30 RX ADMIN — GADOBUTROL 8.5 ML: 604.72 INJECTION INTRAVENOUS at 17:33

## 2024-09-04 ENCOUNTER — MYC MEDICAL ADVICE (OUTPATIENT)
Dept: NEUROSURGERY | Facility: CLINIC | Age: 64
End: 2024-09-04

## 2024-09-04 ENCOUNTER — ANCILLARY PROCEDURE (OUTPATIENT)
Dept: CT IMAGING | Facility: CLINIC | Age: 64
End: 2024-09-04
Attending: NEUROLOGICAL SURGERY
Payer: MEDICARE

## 2024-09-04 ENCOUNTER — TRANSCRIBE ORDERS (OUTPATIENT)
Dept: NEUROSURGERY | Facility: CLINIC | Age: 64
End: 2024-09-04

## 2024-09-04 ENCOUNTER — OFFICE VISIT (OUTPATIENT)
Dept: NEUROSURGERY | Facility: CLINIC | Age: 64
End: 2024-09-04
Payer: COMMERCIAL

## 2024-09-04 VITALS
OXYGEN SATURATION: 95 % | HEIGHT: 62 IN | WEIGHT: 183.7 LBS | HEART RATE: 80 BPM | BODY MASS INDEX: 33.8 KG/M2 | DIASTOLIC BLOOD PRESSURE: 77 MMHG | RESPIRATION RATE: 16 BRPM | SYSTOLIC BLOOD PRESSURE: 120 MMHG

## 2024-09-04 DIAGNOSIS — D43.3 HEMANGIOPERICYTOMA OF CNS, GRADE II (H): Primary | ICD-10-CM

## 2024-09-04 DIAGNOSIS — D49.2 SOLITARY FIBROUS TUMOR: Primary | ICD-10-CM

## 2024-09-04 PROCEDURE — G1010 CDSM STANSON: HCPCS | Performed by: STUDENT IN AN ORGANIZED HEALTH CARE EDUCATION/TRAINING PROGRAM

## 2024-09-04 PROCEDURE — 70496 CT ANGIOGRAPHY HEAD: CPT | Mod: MG | Performed by: STUDENT IN AN ORGANIZED HEALTH CARE EDUCATION/TRAINING PROGRAM

## 2024-09-04 PROCEDURE — 70498 CT ANGIOGRAPHY NECK: CPT | Mod: MG | Performed by: STUDENT IN AN ORGANIZED HEALTH CARE EDUCATION/TRAINING PROGRAM

## 2024-09-04 PROCEDURE — 99214 OFFICE O/P EST MOD 30 MIN: CPT | Mod: 24 | Performed by: NEUROLOGICAL SURGERY

## 2024-09-04 RX ORDER — IOPAMIDOL 755 MG/ML
70 INJECTION, SOLUTION INTRAVASCULAR ONCE
Status: COMPLETED | OUTPATIENT
Start: 2024-09-04 | End: 2024-09-04

## 2024-09-04 RX ADMIN — IOPAMIDOL 70 ML: 755 INJECTION, SOLUTION INTRAVASCULAR at 11:40

## 2024-09-04 ASSESSMENT — ENCOUNTER SYMPTOMS
ORTHOPNEA: 0
SEIZURES: 0

## 2024-09-04 ASSESSMENT — LIFESTYLE VARIABLES: TOBACCO_USE: 1

## 2024-09-04 NOTE — LETTER
[place] [facility] SK[facility]IT[facility]GERY  [facility] NEUROSURGERY CLINIC [address]  3RD FLOOR  [facility] [address]  Phone: [phone]  Fax: [phone]          2024    RE:   Leidy Ascencio  [address] [place] [address]      Dear Colleague,    Thank you for referring your patient, Leidy Ascencio, to the [facility]. Please see a copy of my visit note below.      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 Zhang,    It was a pleasure to see Ms. Ascencio in the [facility] today.   As you recall, Ms. Ascencio is a pleasant 64 year-old right-handed female who was found with an incidental 4.4cm right occipital mass and underwent a craniotomy in  at [place] with Dr. Lujan. Pathology showed a hemangiopericytoma, WHO grade 2. Postoperatively she did well but did sustain a quadrantanopsia. She was followed with surveillance imaging and then in  it was found to have recurred. She underwent radiosurgery with 20Gy on 2020 with Dr. Song. Surveillance body imaging showed liver lesions that were biopsied and showed no evidence of hemangiopericytoma. Continued MRI surveillance of the brain showed growth of the radiated tumor on the latest MRI, so she is referred for a discussion of options. It has grown more rapidly since then on surveillance imaging. We have interrogated the venous sinus involvement with a venogram by Dr. Cruz which showed complete occlusion and collateral vein drainage. We met previously about re-resection, re-radiation, or resection with Gamma Tiles and returns for another visit to discuss this  where she could bring family members including her daughter who is an RN.    Review of Systems:   Pertinent items are noted in HPI or as in patient entered ROS below, remainder of complete ROS is negative.     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   Head: No signs of trauma.    Eyes: Conjunctivae are normal.  Mouth/Throat: Oropharynx moist.  Neck: Normal range of motion.    Resp: No respiratory distress.   MSK: Moves all extremities.  No obvious deformity.  Neuro: The patient is fully oriented and quite pleasant. Speech normal. Extraocular movements are intact without nystagmus. Visual fields with a left inferior quadrantanopsia. Facial sensation is intact in V1, V2, V3 distributions. Facial nerve function is normal, rated as a House Brackmann 1, without synkinesis.  Palate is symmetric. Shoulders are full strength. Tongue is midline. There is no pronator drift. Full strength in all extremities. Sensation intact throughout.  Psych: Normal mood and affect. Behavior is normal.    Incision: clean and well healed    Imaging:  We reviewed the results of the MRI scans in our system. The preoperative MRI from 4/2/2015 shows a 4.4cm right occipital tumor. The MRI around the time of radiosurgery in 2020 shows a 1cm growth that is parasagittal. Since then, the MRIs have shown growth of the mass to 1.5 x 1.5cm centered on the sagittal sinus on the most recent scan 7/22/2024.    The  angio/venogram on 8/14/2024 was reviewed and shows complete occlusion on anterograde arterial->venous and transvenous retrograde views. There are collateral veins along the left side that are patent and outflow through the bilateral veins of Trolard.    The MRV 8/30/2024 was reviewed and shows complete venous occlusion along the posterior SSS and collateral venous drainage along the left margin and bilateral veins of Trolard.     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 Options include observation, chemotherapy with CNS penetrant medication, re-radiation or surgical resection potentially with or without GammaTiles. Ideally the treatment of choice is gross total resection with negative margins for this type of tumor, however given the sinus involvement, this would increase the risk of a serious venous infarction. We have carefully imaged the venous sinus and it appears occluded completely on the gold standard testing of an angio/venogram. In this case, I believe the best long term outcome would be if the tumor within the sinus is resected followed by Gamma Tile or postop radiation therapy. I think this will lead to better long term control than a subtotal resection up to the sinus and then gamma tiles vs radiation to the remainder. Given its more rapidly growth over the past 3 months and the prior radiosurgery treatment, I believe consideration of venous sinus resection for a more substantial tumor removal should be considered to maximize long term outcome. We discussed that preserving collateral veins on the left side will be performed and may limit total removal,  and these areas will be targeted by the Gamma Tiles.  We discussed the risks of surgery including bleeding, infection, neurologic injury, venous/major stroke, weakness, numbness, worsening visual field loss, death, need for additional procedures or operations.  She would like to proceed with the plan above  CT-V today to evaluate for adjacent collaterals  She is scheduled on 9/5/2024 for a biparieto-occipital craniotomy for tumor resection with Gamma Tiles with possible lumbar drain together with Dr. Izabel Buaer from [facility].  I encouraged Ms. Ascencio to contact with any questions or concerns or if we may be of assistance in any way.      It was a pleasure to participate in the care of your patient. Please feel free to contact me at any point if I can be of any assistance for Ms. Ascencio.    Sincerely,  Will Samayoa MD       30 minutes spent on the date of the encounter doing chart review, review of outside records, review of test results, interpretation of tests, patient visit, documentation and discussion with other provider(s)          Again, thank you for allowing me to participate in the care of your patient.      Sincerely,    Will Samayoa MD

## 2024-09-04 NOTE — PATIENT INSTRUCTIONS
You were seen today with Dr. Will Samayoa.    Next steps:  CTV today at 1 PM - please arrive at 12:45 PM  Surgery tomorrow 9/5 - please arrive at 5 AM  Post-op appointment with SRAVAN Dhaliwal on 9/18 at 12 PM      How to Contact Us  Send a [employer] message to your provider.   Call the clinic - your call will be routed appropriately.   Neurosurgery Clinic: [phone]  To speak directly to an RN Care Coordinator:  Yoli RN: [phone]  Ana Maria RN: [phone]    Note: We do our best to check voicemail frequently throughout the day and make every effort to return calls within 1-2 business days. For urgent matters, please use the general clinic phone numbers listed above.

## 2024-09-04 NOTE — LETTER
2024       RE: Leidy Ascencio  [address] [place] [address]     Dear Colleague,    Thank you for referring your patient, Leidy Ascencio, to the [facility] NEUROSURGERY CLINIC [place] at [facility]. Please see a copy of my visit note below.    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 Zhang,    It was a pleasure to see Ms. Ascencio in the [facility] today.   As you recall, Ms. Ascencio is a pleasant 64 year-old right-handed female who was found with an incidental 4.4cm right occipital mass and underwent a craniotomy in  at [place] with Dr. Lujan. Pathology showed a hemangiopericytoma, WHO grade 2. Postoperatively she did well but did sustain a quadrantanopsia. She was followed with surveillance imaging and then in  it was found to have recurred. She underwent radiosurgery with 20Gy on 2020 with Dr. Song. Surveillance body imaging showed liver lesions that were biopsied and showed no evidence of hemangiopericytoma. Continued MRI surveillance of the brain showed growth of the radiated tumor on the latest MRI, so she is referred for a discussion of options. It has grown more rapidly since then on surveillance imaging. We have interrogated the venous sinus involvement with a venogram by Dr. Cruz which showed complete occlusion and collateral vein drainage. We met previously about re-resection, re-radiation, or resection with Gamma Tiles and returns for another visit to discuss this where she could bring family members including her daughter who is an RN.    Review of Systems:   Pertinent items are noted in HPI or as in  patient entered ROS below, remainder of complete ROS is negative.     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   Head: No signs of trauma.    Eyes: Conjunctivae are normal.  Mouth/Throat: Oropharynx moist.  Neck: Normal range of motion.    Resp: No respiratory distress.   MSK: Moves all extremities.  No obvious deformity.  Neuro: The patient is fully oriented and quite pleasant. Speech normal. Extraocular movements are intact without nystagmus. Visual fields with a left inferior quadrantanopsia. Facial sensation is intact in V1, V2, V3 distributions. Facial nerve function is normal, rated as a House Brackmann 1, without synkinesis.  Palate is symmetric. Shoulders are full strength. Tongue is midline. There is no pronator drift. Full strength in all extremities. Sensation intact throughout.  Psych: Normal mood and affect. Behavior is normal.    Incision: clean and well healed    Imaging:  We reviewed the results of the MRI scans in our system. The preoperative MRI from 4/2/2015 shows a 4.4cm right occipital tumor. The MRI around the time of radiosurgery in 2020 shows a 1cm growth that is parasagittal. Since then, the MRIs have shown growth of the mass to 1.5 x 1.5cm centered on the sagittal sinus on the most recent scan 7/22/2024.    The angio/venogram on 8/14/2024 was reviewed and shows complete occlusion on anterograde arterial->venous and transvenous retrograde views. There are  collateral veins along the left side that are patent and outflow through the bilateral veins of Trolard.    The MRV 8/30/2024 was reviewed and shows complete venous occlusion along the posterior SSS and collateral venous drainage along the left margin and bilateral veins of Trolard.     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 Options include observation, chemotherapy with CNS penetrant medication, re-radiation or surgical resection potentially with or without GammaTiles. Ideally the treatment of choice is gross total resection with negative margins for this type of tumor, however given the sinus involvement, this would increase the risk of a serious venous infarction. We have carefully imaged the venous sinus and it appears occluded completely on the gold standard testing of an angio/venogram. In this case, I believe the best long term outcome would be if the tumor within the sinus is resected followed by Gamma Tile or postop radiation therapy. I think this will lead to better long term control than a subtotal resection up to the sinus and then gamma tiles vs radiation to the remainder. Given its more rapidly growth over the past 3 months and the prior radiosurgery treatment, I believe consideration of venous sinus resection for a more substantial tumor removal should be considered to maximize long term outcome. We discussed that preserving collateral veins on the left side will be performed and may limit total removal, and these areas will be targeted by the Gamma Tiles.  We discussed the risks of surgery including bleeding, infection, neurologic injury,  venous/major stroke, weakness, numbness, worsening visual field loss, death, need for additional procedures or operations.  She would like to proceed with the plan above  CT-V today to evaluate for adjacent collaterals  She is scheduled on 9/5/2024 for a biparieto-occipital craniotomy for tumor resection with Gamma Tiles with possible lumbar drain together with Dr. Izabel Bauer from [facility].  I encouraged Ms. Ascencio to contact with any questions or concerns or if we may be of assistance in any way.      It was a pleasure to participate in the care of your patient. Please feel free to contact me at any point if I can be of any assistance for Ms. Ascencio.    Sincerely,  Will Samayoa MD       30 minutes spent on the date of the encounter doing chart review, review of outside records, review of test results, interpretation of tests, patient visit, documentation and discussion with other provider(s)        Again, thank you for allowing me to participate in the care of your patient.      Sincerely,    Will Samayoa MD

## 2024-09-04 NOTE — DISCHARGE INSTRUCTIONS

## 2024-09-04 NOTE — ANESTHESIA PREPROCEDURE EVALUATION
Anesthesia Pre-Procedure Evaluation    Patient: Leidy Ascencio   MRN: [id number] : 1960        Procedure : Procedure(s):  stealth assisted Biparieto-occipital craniotomy for tumor (Gammatile)  lumbar drain placement          Leidy Ascencio is a 63 yo female with history of COPD, asthma, depression, hyperlipidemia, and occipital hemangiopericytoma s/p R craniotomy 2015 (c/b quadrantanopsia) and recurrence in 2019 s/p radiosurgery 2020. Recent surveillance imaging has now shown recurrence of occipital hemangiopericytoma with complete sinus occlusion and collateral vein drainage, so patient will undergo above stated procedure.      Past Medical History:   Diagnosis Date    Arthritis     Cancer (H)     COPD (chronic obstructive pulmonary disease) (H)     Depressive disorder     Hyperlipemia     Uncomplicated asthma       Past Surgical History:   Procedure Laterality Date    APPENDECTOMY      COLONOSCOPY      COLONOSCOPY N/A 2022    Procedure: COLONOSCOPY, BIOPSY and polypectomy;  Surgeon: Rick Zavala MD;  Location:  GI    GYN SURGERY      3 cesections    HYSTERECTOMY VAGINAL, BILATERAL SALPINGO-OOPHERECTOMY, COMBINED  1988    hysterectomy + ovaries     LAPAROSCOPIC ABLATION LIVER TUMOR N/A 1/15/2020    Procedure: Laparoscopic ultrasound guided percutaneous microwave abalation of tumor x1; ultrasound guided Liver Biopsy x 4; Hepatic Ultrasound Intraoperatively;  Surgeon: Phan Fierro MD;  Location: [facility] OR    LAPAROSCOPY DIAGNOSTIC (GENERAL)  1/15/2020    Procedure: Laparoscopy diagnostic (general);  Surgeon: Phan Fierro MD;  Location: [facility] OR    ZZC EXCIS INFRATENT MENINGIOMA        Allergies   Allergen Reactions    Other Drug Allergy (See Comments) Hives     Paxlovid     Atorvastatin     Penicillins     Sulfa Antibiotics     Sulfamethoxazole-Trimethoprim Rash      Social History     Tobacco Use    Smoking status: Former     Current packs/day: 0.00     Average packs/day: 1 pack/day for 27.0  years (27.0 ttl pk-yrs)     Types: Cigarettes     Start date: 1976     Quit date: 2003     Years since quittin.6     Passive exposure: Past    Smokeless tobacco: Never   Substance Use Topics    Alcohol use: Yes     Comment: 2-6 wine a week      Wt Readings from Last 1 Encounters:   24 83.9 kg (185 lb)        Anesthesia Evaluation   Pt has had prior anesthetic. Type: General and MAC.    No history of anesthetic complications       ROS/MED HX  ENT/Pulmonary: Comment:   #Left inferior quadrantanopsia    #Asthma, persistent: 2-3x monthly, inhaler prn and inhaled fluticasone-salmeterol daily  #COPD: no dependent on supp O2, chronic/stable RANDLE    PFT :  1. Mild Airflow Obstruction   2. Normal diffusing capacity.   3. The lung volumes are within normal limits.     CT Chest 24:  1. No clear evidence of airway nor airspace disease. There are no emphysematous changes.     (+)     THOMPSON risk factors, snores loudly,          tobacco use, Past use,                       Neurologic: Comment:   #Re-resection of recurrent occipital hemangiopericytoma with complete sinus occlusion and collateral vein drainage (s/p right craniotomy 2015 and radiosurgery 2020)    MRV brain 2024:  1.  Invasion of the inferior aspect of the superior sagittal sinus with meningioma. Superior/sinus is patent.  2.  Question nonocclusive thrombus in the posterior aspect of the superior sagittal sinus.     Carotid Cerebral Angiogram 2024:  1.  There is external compression of the superior sagittal sinus posteriorly, centered near the craniotomy defect associated with known occipita tumor. There is no flow through this portion of the superior sagittal sinus. There are collateral vein network that courses around the body of the tumor that drain the anterior superior sagittal sinus and connect with the posterior sagittal sinus with subsequent normal venous anatomy following.      (-) no seizures, no CVA and no TIA    Cardiovascular:     (+) Dyslipidemia (rosuvastatin) - -   -  - -           RANDLE.                      Previous cardiac testing   Echo: Date: Results:    Stress Test:  Date: 2023 Results:  FINAL CONCLUSIONS  1. EXERCISE Stress Echocardiogram is NORMAL.  2. Technically difficult - contrast was used to enhance endocardial  definition secondary to subotimal image quality.  3. The stress ECG was negative for ischemia.  4. Fair exercise tolerance  5. Frequent PVCs and 3 beat runs of NSVT during and early post exercise      ECG Reviewed:  Date: Results:    Cath:  Date: Results:   (-) taking anticoagulants/antiplatelets and orthopnea/PND   METS/Exercise Tolerance: 3 - Able to walk 1-2 blocks without stopping Comment: More limited by her knee before her breathing will slow her down   Hematologic:  - neg hematologic  ROS     Musculoskeletal:   (+)  arthritis,             GI/Hepatic:  - neg GI/hepatic ROS     Renal/Genitourinary:  - neg Renal ROS     Endo:     (+)               Obesity,    (-) Type I DM and Type II DM   Psychiatric/Substance Use:     (+) psychiatric history (sertraline) depression    (-) alcohol abuse history   Infectious Disease: Comment:   #Abscess of left axilla 8/28/24: s/p drainage and doxycycline       Malignancy: Comment:   #Brain tumor -  recurrent grade II right parieto-occipital hemangiopericytoma s/p gross total resection in 2015, radiotherapy 1/14/20. Now will undergo resection and radiation therapy thereafter.       Other:            Physical Exam    Airway        Mallampati: I   TM distance: > 3 FB   Neck ROM: full   Mouth opening: > 3 cm    Respiratory Devices and Support         Dental       (+) Minor Abnormalities - some fillings, tiny chips      Cardiovascular          Rhythm and rate: regular and normal     Pulmonary           breath sounds clear to auscultation           OUTSIDE LABS:  CBC:   Lab Results   Component Value Date    WBC 6.7 08/14/2024    WBC 5.8 11/07/2023    HGB 13.7  08/14/2024    HGB 13.2 11/07/2023    HCT 41.8 08/14/2024    HCT 40.4 11/07/2023     08/14/2024     11/07/2023     BMP:   Lab Results   Component Value Date     08/14/2024     11/07/2023    POTASSIUM 3.8 08/14/2024    POTASSIUM 4.1 11/07/2023    CHLORIDE 104 08/14/2024    CHLORIDE 101 11/07/2023    CO2 23 08/14/2024    CO2 27 11/07/2023    BUN 21.1 08/14/2024    BUN 13.8 11/07/2023    CR 0.66 08/14/2024    CR 0.69 11/07/2023     (H) 08/14/2024     (H) 11/07/2023     COAGS:   Lab Results   Component Value Date    PTT 26 08/14/2024    INR 0.97 08/14/2024     POC:   Lab Results   Component Value Date     (H) 01/15/2020     HEPATIC:   Lab Results   Component Value Date    ALBUMIN 4.9 11/07/2023    PROTTOTAL 7.4 11/07/2023    ALT 23 11/07/2023    AST 25 11/07/2023    ALKPHOS 71 11/07/2023    BILITOTAL 0.4 11/07/2023     OTHER:   Lab Results   Component Value Date    A1C 5.6 08/25/2022    TANA 9.2 08/14/2024    TSH 2.77 04/17/2023    SED 10 11/07/2023       Anesthesia Plan    ASA Status:  3    NPO Status:  NPO Appropriate    Anesthesia Type: General.     - Airway: ETT   Induction: Intravenous, Propofol.   Maintenance: Balanced.   Techniques and Equipment:     - Lines/Monitors: 2nd IV, Arterial Line, BIS     - Drips/Meds: Remifentanil, Phenylephrine     Consents            Postoperative Care    Pain management: IV analgesics, Oral pain medications, Multi-modal analgesia.   PONV prophylaxis: Ondansetron (or other 5HT-3), Dexamethasone or Solumedrol     Comments:    Other Comments: Plan to administer albuterol inhaler pre induction given hx of COPD and asthma.            Naomi Lockett MD    I have reviewed the pertinent notes and labs in the chart from the past 30 days and (re)examined the patient.  Any updates or changes from those notes are reflected in this note.              # Obesity: Estimated body mass index is 33.84 kg/m  as calculated from the following:     "Height as of 8/21/24: 1.575 m (5' 2\").    Weight as of 8/28/24: 83.9 kg (185 lb).      "

## 2024-09-04 NOTE — NURSING NOTE
PAPERWORK DOCUMENTATION    How paperwork is received: In Person  Type of paperwork: FMLA  Who is paperwork for: Caregiver, April Shayne, pt's daughter    Received date: 9/4/2024  Received by: SUSANNE Kent  Completed by: SUSANNE Kent  Completion date: September 4, 2024    Paperwork sent to: Patient via email (patient provided consent)  Sent to scan: No, family member's paperwork

## 2024-09-05 ENCOUNTER — HOSPITAL ENCOUNTER (INPATIENT)
Facility: CLINIC | Age: 64
LOS: 4 days | Discharge: HOME | DRG: 023 | End: 2024-09-09
Attending: NEUROLOGICAL SURGERY | Admitting: NEUROLOGICAL SURGERY
Payer: MEDICARE

## 2024-09-05 ENCOUNTER — HOSPITAL ENCOUNTER (OUTPATIENT)
Dept: CT IMAGING | Facility: CLINIC | Age: 64
Discharge: HOME | DRG: 023 | End: 2024-09-05
Attending: NEUROLOGICAL SURGERY | Admitting: NEUROLOGICAL SURGERY
Payer: MEDICARE

## 2024-09-05 ENCOUNTER — APPOINTMENT (OUTPATIENT)
Dept: CT IMAGING | Facility: CLINIC | Age: 64
DRG: 023 | End: 2024-09-05
Payer: MEDICARE

## 2024-09-05 ENCOUNTER — ONCOLOGY VISIT (OUTPATIENT)
Dept: RADIATION ONCOLOGY | Facility: CLINIC | Age: 64
End: 2024-09-05

## 2024-09-05 ENCOUNTER — ANESTHESIA (OUTPATIENT)
Dept: SURGERY | Facility: CLINIC | Age: 64
DRG: 023 | End: 2024-09-05
Payer: MEDICARE

## 2024-09-05 DIAGNOSIS — D49.2 SOLITARY FIBROUS TUMOR: ICD-10-CM

## 2024-09-05 DIAGNOSIS — Z98.890 S/P CRANIOTOMY: Primary | ICD-10-CM

## 2024-09-05 DIAGNOSIS — D43.3 HEMANGIOPERICYTOMA OF CNS, GRADE II (H): ICD-10-CM

## 2024-09-05 PROBLEM — D48.19 HEMANGIOPERICYTOMA: Status: ACTIVE | Noted: 2024-09-05

## 2024-09-05 LAB
BASE EXCESS BLDA CALC-SCNC: -2.4 MMOL/L (ref -3–3)
CA-I BLD-MCNC: 4.4 MG/DL (ref 4.4–5.2)
GLUCOSE BLD-MCNC: 129 MG/DL (ref 70–99)
GLUCOSE BLDC GLUCOMTR-MCNC: 113 MG/DL (ref 70–99)
HCO3 BLDA-SCNC: 22 MMOL/L (ref 21–28)
HGB BLD-MCNC: 11.7 G/DL (ref 11.7–15.7)
LACTATE BLD-SCNC: 2.8 MMOL/L (ref 0.7–2)
O2/TOTAL GAS SETTING VFR VENT: 60 %
OXYHGB MFR BLDA: 98 % (ref 92–100)
PCO2 BLDA: 35 MM HG (ref 35–45)
PH BLDA: 7.41 [PH] (ref 7.35–7.45)
PO2 BLDA: 230 MM HG (ref 80–105)
POTASSIUM BLD-SCNC: 3.8 MMOL/L (ref 3.4–5.3)
SAO2 % BLDA: >100 % (ref 96–97)
SODIUM BLD-SCNC: 137 MMOL/L (ref 135–145)

## 2024-09-05 PROCEDURE — 61510 CRNEC TREPH EXC BRN TUM STTL: CPT | Mod: GC | Performed by: NEUROLOGICAL SURGERY

## 2024-09-05 PROCEDURE — 250N000011 HC RX IP 250 OP 636

## 2024-09-05 PROCEDURE — 82330 ASSAY OF CALCIUM: CPT

## 2024-09-05 PROCEDURE — 370N000017 HC ANESTHESIA TECHNICAL FEE, PER MIN: Performed by: NEUROLOGICAL SURGERY

## 2024-09-05 PROCEDURE — 88307 TISSUE EXAM BY PATHOLOGIST: CPT | Mod: TC | Performed by: NEUROLOGICAL SURGERY

## 2024-09-05 PROCEDURE — 00U20KZ SUPPLEMENT DURA MATER WITH NONAUTOLOGOUS TISSUE SUBSTITUTE, OPEN APPROACH: ICD-10-PCS | Performed by: STUDENT IN AN ORGANIZED HEALTH CARE EDUCATION/TRAINING PROGRAM

## 2024-09-05 PROCEDURE — 70450 CT HEAD/BRAIN W/O DYE: CPT | Mod: MG

## 2024-09-05 PROCEDURE — 258N000003 HC RX IP 258 OP 636

## 2024-09-05 PROCEDURE — 8E09XBZ COMPUTER ASSISTED PROCEDURE OF HEAD AND NECK REGION: ICD-10-PCS | Performed by: NEUROLOGICAL SURGERY

## 2024-09-05 PROCEDURE — 272N000001 HC OR GENERAL SUPPLY STERILE: Performed by: NEUROLOGICAL SURGERY

## 2024-09-05 PROCEDURE — 70496 CT ANGIOGRAPHY HEAD: CPT | Mod: MG

## 2024-09-05 PROCEDURE — 250N000009 HC RX 250

## 2024-09-05 PROCEDURE — C1713 ANCHOR/SCREW BN/BN,TIS/BN: HCPCS | Performed by: NEUROLOGICAL SURGERY

## 2024-09-05 PROCEDURE — G1010 CDSM STANSON: HCPCS | Performed by: STUDENT IN AN ORGANIZED HEALTH CARE EDUCATION/TRAINING PROGRAM

## 2024-09-05 PROCEDURE — 88307 TISSUE EXAM BY PATHOLOGIST: CPT | Mod: 26 | Performed by: SPECIALIST

## 2024-09-05 PROCEDURE — 250N000013 HC RX MED GY IP 250 OP 250 PS 637

## 2024-09-05 PROCEDURE — 77778 APPLY INTERSTIT RADIAT COMPL: CPT | Mod: 26 | Performed by: NEUROLOGICAL SURGERY

## 2024-09-05 PROCEDURE — 999N000141 HC STATISTIC PRE-PROCEDURE NURSING ASSESSMENT: Performed by: NEUROLOGICAL SURGERY

## 2024-09-05 PROCEDURE — 00B70ZZ EXCISION OF CEREBRAL HEMISPHERE, OPEN APPROACH: ICD-10-PCS | Performed by: NEUROLOGICAL SURGERY

## 2024-09-05 PROCEDURE — 250N000011 HC RX IP 250 OP 636: Mod: JZ

## 2024-09-05 PROCEDURE — 250N000011 HC RX IP 250 OP 636: Performed by: NEUROLOGICAL SURGERY

## 2024-09-05 PROCEDURE — C1763 CONN TISS, NON-HUMAN: HCPCS | Performed by: NEUROLOGICAL SURGERY

## 2024-09-05 PROCEDURE — G1010 CDSM STANSON: HCPCS

## 2024-09-05 PROCEDURE — 70496 CT ANGIOGRAPHY HEAD: CPT | Mod: 26 | Performed by: STUDENT IN AN ORGANIZED HEALTH CARE EDUCATION/TRAINING PROGRAM

## 2024-09-05 PROCEDURE — 200N000002 HC R&B ICU UMMC

## 2024-09-05 PROCEDURE — 250N000009 HC RX 250: Performed by: NEUROLOGICAL SURGERY

## 2024-09-05 PROCEDURE — 272N000004 HC RX 272: Performed by: NEUROLOGICAL SURGERY

## 2024-09-05 PROCEDURE — 00H004Z INSERTION OF RADIOACTIVE ELEMENT, CESIUM-131 COLLAGEN IMPLANT INTO BRAIN, OPEN APPROACH: ICD-10-PCS | Performed by: NEUROLOGICAL SURGERY

## 2024-09-05 PROCEDURE — 69990 MICROSURGERY ADD-ON: CPT | Mod: 59 | Performed by: NEUROLOGICAL SURGERY

## 2024-09-05 PROCEDURE — 250N000009 HC RX 250: Performed by: STUDENT IN AN ORGANIZED HEALTH CARE EDUCATION/TRAINING PROGRAM

## 2024-09-05 PROCEDURE — 70498 CT ANGIOGRAPHY NECK: CPT | Mod: 26 | Performed by: STUDENT IN AN ORGANIZED HEALTH CARE EDUCATION/TRAINING PROGRAM

## 2024-09-05 PROCEDURE — 61510 CRNEC TREPH EXC BRN TUM STTL: CPT | Performed by: ANESTHESIOLOGY

## 2024-09-05 PROCEDURE — 61781 SCAN PROC CRANIAL INTRA: CPT | Mod: GC | Performed by: NEUROLOGICAL SURGERY

## 2024-09-05 PROCEDURE — 710N000010 HC RECOVERY PHASE 1, LEVEL 2, PER MIN: Performed by: NEUROLOGICAL SURGERY

## 2024-09-05 PROCEDURE — 360N000079 HC SURGERY LEVEL 6, PER MIN: Performed by: NEUROLOGICAL SURGERY

## 2024-09-05 PROCEDURE — 250N000025 HC SEVOFLURANE, PER MIN: Performed by: NEUROLOGICAL SURGERY

## 2024-09-05 DEVICE — IMP PLATE SYN BURR HOLE COVER 17MM 04.503.023: Type: IMPLANTABLE DEVICE | Site: CRANIAL | Status: FUNCTIONAL

## 2024-09-05 DEVICE — GRAFT DURAGEN 2X2" ID220: Type: IMPLANTABLE DEVICE | Site: BRAIN | Status: FUNCTIONAL

## 2024-09-05 DEVICE — IMP PLATE BURR HOLE COVER MATRIXNEURO 24MM 04.503.024: Type: IMPLANTABLE DEVICE | Site: CRANIAL | Status: FUNCTIONAL

## 2024-09-05 DEVICE — IMP SCR SYN MATRIX LOW PRO 1.5X04MM SELF DRILL 04.503.104.01: Type: IMPLANTABLE DEVICE | Site: CRANIAL | Status: FUNCTIONAL

## 2024-09-05 RX ORDER — LABETALOL HYDROCHLORIDE 5 MG/ML
10 INJECTION, SOLUTION INTRAVENOUS
Status: COMPLETED | OUTPATIENT
Start: 2024-09-05 | End: 2024-09-05

## 2024-09-05 RX ORDER — LIDOCAINE 40 MG/G
CREAM TOPICAL
Status: DISCONTINUED | OUTPATIENT
Start: 2024-09-05 | End: 2024-09-05 | Stop reason: HOSPADM

## 2024-09-05 RX ORDER — ONDANSETRON 2 MG/ML
INJECTION INTRAMUSCULAR; INTRAVENOUS PRN
Status: DISCONTINUED | OUTPATIENT
Start: 2024-09-05 | End: 2024-09-05

## 2024-09-05 RX ORDER — PROPOFOL 10 MG/ML
INJECTION, EMULSION INTRAVENOUS PRN
Status: DISCONTINUED | OUTPATIENT
Start: 2024-09-05 | End: 2024-09-05

## 2024-09-05 RX ORDER — ONDANSETRON 4 MG/1
4 TABLET, ORALLY DISINTEGRATING ORAL EVERY 6 HOURS PRN
Status: DISCONTINUED | OUTPATIENT
Start: 2024-09-05 | End: 2024-09-06

## 2024-09-05 RX ORDER — ACETAMINOPHEN 325 MG/1
975 TABLET ORAL EVERY 8 HOURS
Status: COMPLETED | OUTPATIENT
Start: 2024-09-05 | End: 2024-09-08

## 2024-09-05 RX ORDER — NALOXONE HYDROCHLORIDE 0.4 MG/ML
0.2 INJECTION, SOLUTION INTRAMUSCULAR; INTRAVENOUS; SUBCUTANEOUS
Status: DISCONTINUED | OUTPATIENT
Start: 2024-09-05 | End: 2024-09-09 | Stop reason: HOSPADM

## 2024-09-05 RX ORDER — NALOXONE HYDROCHLORIDE 0.4 MG/ML
0.4 INJECTION, SOLUTION INTRAMUSCULAR; INTRAVENOUS; SUBCUTANEOUS
Status: DISCONTINUED | OUTPATIENT
Start: 2024-09-05 | End: 2024-09-09 | Stop reason: HOSPADM

## 2024-09-05 RX ORDER — AMOXICILLIN 250 MG
1 CAPSULE ORAL 2 TIMES DAILY
Status: DISCONTINUED | OUTPATIENT
Start: 2024-09-05 | End: 2024-09-08

## 2024-09-05 RX ORDER — ALBUTEROL SULFATE 90 UG/1
AEROSOL, METERED RESPIRATORY (INHALATION) PRN
Status: DISCONTINUED | OUTPATIENT
Start: 2024-09-05 | End: 2024-09-05

## 2024-09-05 RX ORDER — FENTANYL CITRATE 50 UG/ML
25 INJECTION, SOLUTION INTRAMUSCULAR; INTRAVENOUS EVERY 5 MIN PRN
Status: DISCONTINUED | OUTPATIENT
Start: 2024-09-05 | End: 2024-09-05

## 2024-09-05 RX ORDER — HYDROMORPHONE HCL IN WATER/PF 6 MG/30 ML
0.2 PATIENT CONTROLLED ANALGESIA SYRINGE INTRAVENOUS
Status: DISCONTINUED | OUTPATIENT
Start: 2024-09-05 | End: 2024-09-08

## 2024-09-05 RX ORDER — DEXAMETHASONE SODIUM PHOSPHATE 4 MG/ML
4 INJECTION, SOLUTION INTRA-ARTICULAR; INTRALESIONAL; INTRAMUSCULAR; INTRAVENOUS; SOFT TISSUE
Status: DISCONTINUED | OUTPATIENT
Start: 2024-09-05 | End: 2024-09-05

## 2024-09-05 RX ORDER — ACETAMINOPHEN 325 MG/1
650 TABLET ORAL EVERY 4 HOURS PRN
Status: DISCONTINUED | OUTPATIENT
Start: 2024-09-08 | End: 2024-09-09 | Stop reason: HOSPADM

## 2024-09-05 RX ORDER — ONDANSETRON 2 MG/ML
4 INJECTION INTRAMUSCULAR; INTRAVENOUS EVERY 6 HOURS PRN
Status: DISCONTINUED | OUTPATIENT
Start: 2024-09-05 | End: 2024-09-06

## 2024-09-05 RX ORDER — PROCHLORPERAZINE MALEATE 5 MG
5 TABLET ORAL EVERY 6 HOURS PRN
Status: DISCONTINUED | OUTPATIENT
Start: 2024-09-05 | End: 2024-09-09 | Stop reason: HOSPADM

## 2024-09-05 RX ORDER — FENTANYL CITRATE 50 UG/ML
50 INJECTION, SOLUTION INTRAMUSCULAR; INTRAVENOUS EVERY 5 MIN PRN
Status: DISCONTINUED | OUTPATIENT
Start: 2024-09-05 | End: 2024-09-05

## 2024-09-05 RX ORDER — BISACODYL 10 MG
10 SUPPOSITORY, RECTAL RECTAL DAILY PRN
Status: DISCONTINUED | OUTPATIENT
Start: 2024-09-05 | End: 2024-09-09 | Stop reason: HOSPADM

## 2024-09-05 RX ORDER — LIDOCAINE HYDROCHLORIDE 20 MG/ML
INJECTION, SOLUTION INFILTRATION; PERINEURAL PRN
Status: DISCONTINUED | OUTPATIENT
Start: 2024-09-05 | End: 2024-09-05

## 2024-09-05 RX ORDER — CEFAZOLIN SODIUM/WATER 2 G/20 ML
2 SYRINGE (ML) INTRAVENOUS
Status: COMPLETED | OUTPATIENT
Start: 2024-09-05 | End: 2024-09-05

## 2024-09-05 RX ORDER — FENTANYL CITRATE 50 UG/ML
INJECTION, SOLUTION INTRAMUSCULAR; INTRAVENOUS PRN
Status: DISCONTINUED | OUTPATIENT
Start: 2024-09-05 | End: 2024-09-05

## 2024-09-05 RX ORDER — OXYCODONE HYDROCHLORIDE 10 MG/1
10 TABLET ORAL EVERY 4 HOURS PRN
Status: DISCONTINUED | OUTPATIENT
Start: 2024-09-05 | End: 2024-09-08 | Stop reason: ALTCHOICE

## 2024-09-05 RX ORDER — ACETAMINOPHEN 325 MG/1
975 TABLET ORAL ONCE
Status: COMPLETED | OUTPATIENT
Start: 2024-09-05 | End: 2024-09-05

## 2024-09-05 RX ORDER — HYDROMORPHONE HCL IN WATER/PF 6 MG/30 ML
0.2 PATIENT CONTROLLED ANALGESIA SYRINGE INTRAVENOUS EVERY 5 MIN PRN
Status: DISCONTINUED | OUTPATIENT
Start: 2024-09-05 | End: 2024-09-05

## 2024-09-05 RX ORDER — HYDROMORPHONE HCL IN WATER/PF 6 MG/30 ML
0.4 PATIENT CONTROLLED ANALGESIA SYRINGE INTRAVENOUS EVERY 5 MIN PRN
Status: DISCONTINUED | OUTPATIENT
Start: 2024-09-05 | End: 2024-09-05

## 2024-09-05 RX ORDER — HYDROMORPHONE HCL IN WATER/PF 6 MG/30 ML
0.4 PATIENT CONTROLLED ANALGESIA SYRINGE INTRAVENOUS
Status: DISCONTINUED | OUTPATIENT
Start: 2024-09-05 | End: 2024-09-08

## 2024-09-05 RX ORDER — ONDANSETRON 2 MG/ML
4 INJECTION INTRAMUSCULAR; INTRAVENOUS EVERY 30 MIN PRN
Status: DISCONTINUED | OUTPATIENT
Start: 2024-09-05 | End: 2024-09-05

## 2024-09-05 RX ORDER — ONDANSETRON 4 MG/1
4 TABLET, ORALLY DISINTEGRATING ORAL EVERY 30 MIN PRN
Status: DISCONTINUED | OUTPATIENT
Start: 2024-09-05 | End: 2024-09-05

## 2024-09-05 RX ORDER — SODIUM CHLORIDE, SODIUM LACTATE, POTASSIUM CHLORIDE, CALCIUM CHLORIDE 600; 310; 30; 20 MG/100ML; MG/100ML; MG/100ML; MG/100ML
INJECTION, SOLUTION INTRAVENOUS CONTINUOUS
Status: DISCONTINUED | OUTPATIENT
Start: 2024-09-05 | End: 2024-09-05 | Stop reason: HOSPADM

## 2024-09-05 RX ORDER — MANNITOL 20 G/100ML
INJECTION, SOLUTION INTRAVENOUS PRN
Status: DISCONTINUED | OUTPATIENT
Start: 2024-09-05 | End: 2024-09-05

## 2024-09-05 RX ORDER — POLYETHYLENE GLYCOL 3350 17 G/17G
17 POWDER, FOR SOLUTION ORAL DAILY
Status: DISCONTINUED | OUTPATIENT
Start: 2024-09-06 | End: 2024-09-08

## 2024-09-05 RX ORDER — EPHEDRINE SULFATE 50 MG/ML
INJECTION, SOLUTION INTRAMUSCULAR; INTRAVENOUS; SUBCUTANEOUS PRN
Status: DISCONTINUED | OUTPATIENT
Start: 2024-09-05 | End: 2024-09-05

## 2024-09-05 RX ORDER — LABETALOL HYDROCHLORIDE 5 MG/ML
10-20 INJECTION, SOLUTION INTRAVENOUS EVERY 10 MIN PRN
Status: DISCONTINUED | OUTPATIENT
Start: 2024-09-05 | End: 2024-09-09 | Stop reason: HOSPADM

## 2024-09-05 RX ORDER — SODIUM CHLORIDE, SODIUM LACTATE, POTASSIUM CHLORIDE, CALCIUM CHLORIDE 600; 310; 30; 20 MG/100ML; MG/100ML; MG/100ML; MG/100ML
INJECTION, SOLUTION INTRAVENOUS CONTINUOUS
Status: DISCONTINUED | OUTPATIENT
Start: 2024-09-05 | End: 2024-09-05

## 2024-09-05 RX ORDER — NALOXONE HYDROCHLORIDE 0.4 MG/ML
0.1 INJECTION, SOLUTION INTRAMUSCULAR; INTRAVENOUS; SUBCUTANEOUS
Status: DISCONTINUED | OUTPATIENT
Start: 2024-09-05 | End: 2024-09-05

## 2024-09-05 RX ORDER — SODIUM CHLORIDE 9 MG/ML
INJECTION, SOLUTION INTRAVENOUS CONTINUOUS
Status: DISCONTINUED | OUTPATIENT
Start: 2024-09-05 | End: 2024-09-08

## 2024-09-05 RX ORDER — DEXAMETHASONE SODIUM PHOSPHATE 4 MG/ML
INJECTION, SOLUTION INTRA-ARTICULAR; INTRALESIONAL; INTRAMUSCULAR; INTRAVENOUS; SOFT TISSUE PRN
Status: DISCONTINUED | OUTPATIENT
Start: 2024-09-05 | End: 2024-09-05

## 2024-09-05 RX ORDER — BUPIVACAINE HYDROCHLORIDE AND EPINEPHRINE 2.5; 5 MG/ML; UG/ML
INJECTION, SOLUTION INFILTRATION; PERINEURAL PRN
Status: DISCONTINUED | OUTPATIENT
Start: 2024-09-05 | End: 2024-09-05 | Stop reason: HOSPADM

## 2024-09-05 RX ORDER — HYDRALAZINE HYDROCHLORIDE 20 MG/ML
10-20 INJECTION INTRAMUSCULAR; INTRAVENOUS EVERY 30 MIN PRN
Status: DISCONTINUED | OUTPATIENT
Start: 2024-09-05 | End: 2024-09-09 | Stop reason: HOSPADM

## 2024-09-05 RX ORDER — OXYCODONE HYDROCHLORIDE 5 MG/1
5 TABLET ORAL EVERY 4 HOURS PRN
Status: DISCONTINUED | OUTPATIENT
Start: 2024-09-05 | End: 2024-09-08 | Stop reason: ALTCHOICE

## 2024-09-05 RX ORDER — METHOCARBAMOL 500 MG/1
500 TABLET, FILM COATED ORAL EVERY 6 HOURS PRN
Status: DISCONTINUED | OUTPATIENT
Start: 2024-09-05 | End: 2024-09-08

## 2024-09-05 RX ORDER — CEFAZOLIN SODIUM/WATER 2 G/20 ML
2 SYRINGE (ML) INTRAVENOUS SEE ADMIN INSTRUCTIONS
Status: DISCONTINUED | OUTPATIENT
Start: 2024-09-05 | End: 2024-09-05 | Stop reason: HOSPADM

## 2024-09-05 RX ORDER — LEVETIRACETAM 500 MG/1
500 TABLET ORAL 2 TIMES DAILY
Status: DISCONTINUED | OUTPATIENT
Start: 2024-09-05 | End: 2024-09-09 | Stop reason: HOSPADM

## 2024-09-05 RX ADMIN — HYDROMORPHONE HYDROCHLORIDE 0.2 MG: 0.2 INJECTION, SOLUTION INTRAMUSCULAR; INTRAVENOUS; SUBCUTANEOUS at 18:17

## 2024-09-05 RX ADMIN — FENTANYL CITRATE 50 MCG: 50 INJECTION INTRAMUSCULAR; INTRAVENOUS at 09:31

## 2024-09-05 RX ADMIN — PROPOFOL 20 MG: 10 INJECTION, EMULSION INTRAVENOUS at 12:30

## 2024-09-05 RX ADMIN — HYDROMORPHONE HYDROCHLORIDE 0.2 MG: 0.2 INJECTION, SOLUTION INTRAMUSCULAR; INTRAVENOUS; SUBCUTANEOUS at 16:16

## 2024-09-05 RX ADMIN — PROPOFOL 30 MG: 10 INJECTION, EMULSION INTRAVENOUS at 09:41

## 2024-09-05 RX ADMIN — METHOCARBAMOL 500 MG: 500 TABLET ORAL at 15:24

## 2024-09-05 RX ADMIN — ALBUTEROL SULFATE 2 PUFF: 108 INHALANT RESPIRATORY (INHALATION) at 07:16

## 2024-09-05 RX ADMIN — PHENYLEPHRINE HYDROCHLORIDE 100 MCG: 10 INJECTION INTRAVENOUS at 11:38

## 2024-09-05 RX ADMIN — FENTANYL CITRATE 50 MCG: 50 INJECTION, SOLUTION INTRAMUSCULAR; INTRAVENOUS at 14:26

## 2024-09-05 RX ADMIN — HYDROMORPHONE HYDROCHLORIDE 0.2 MG: 0.2 INJECTION, SOLUTION INTRAMUSCULAR; INTRAVENOUS; SUBCUTANEOUS at 15:12

## 2024-09-05 RX ADMIN — SODIUM CHLORIDE, POTASSIUM CHLORIDE, SODIUM LACTATE AND CALCIUM CHLORIDE: 600; 310; 30; 20 INJECTION, SOLUTION INTRAVENOUS at 06:14

## 2024-09-05 RX ADMIN — HYDROMORPHONE HYDROCHLORIDE 0.5 MG: 1 INJECTION, SOLUTION INTRAMUSCULAR; INTRAVENOUS; SUBCUTANEOUS at 13:37

## 2024-09-05 RX ADMIN — SODIUM CHLORIDE: 9 INJECTION, SOLUTION INTRAVENOUS at 18:02

## 2024-09-05 RX ADMIN — LABETALOL HYDROCHLORIDE 10 MG: 5 INJECTION, SOLUTION INTRAVENOUS at 19:53

## 2024-09-05 RX ADMIN — HYDROMORPHONE HYDROCHLORIDE 0.2 MG: 0.2 INJECTION, SOLUTION INTRAMUSCULAR; INTRAVENOUS; SUBCUTANEOUS at 14:56

## 2024-09-05 RX ADMIN — PROPOFOL 20 MG: 10 INJECTION, EMULSION INTRAVENOUS at 07:53

## 2024-09-05 RX ADMIN — FENTANYL CITRATE 50 MCG: 50 INJECTION INTRAMUSCULAR; INTRAVENOUS at 07:42

## 2024-09-05 RX ADMIN — PHENYLEPHRINE HYDROCHLORIDE 100 MCG: 10 INJECTION INTRAVENOUS at 08:20

## 2024-09-05 RX ADMIN — ONDANSETRON 4 MG: 2 INJECTION INTRAMUSCULAR; INTRAVENOUS at 18:24

## 2024-09-05 RX ADMIN — HYDRALAZINE HYDROCHLORIDE 20 MG: 20 INJECTION INTRAMUSCULAR; INTRAVENOUS at 23:27

## 2024-09-05 RX ADMIN — OXYCODONE HYDROCHLORIDE 5 MG: 5 TABLET ORAL at 17:33

## 2024-09-05 RX ADMIN — LABETALOL HYDROCHLORIDE 10 MG: 5 INJECTION, SOLUTION INTRAVENOUS at 16:35

## 2024-09-05 RX ADMIN — ACETAMINOPHEN 975 MG: 325 TABLET ORAL at 15:17

## 2024-09-05 RX ADMIN — DEXAMETHASONE SODIUM PHOSPHATE 10 MG: 4 INJECTION, SOLUTION INTRA-ARTICULAR; INTRALESIONAL; INTRAMUSCULAR; INTRAVENOUS; SOFT TISSUE at 07:43

## 2024-09-05 RX ADMIN — ONDANSETRON 4 MG: 2 INJECTION INTRAMUSCULAR; INTRAVENOUS at 13:40

## 2024-09-05 RX ADMIN — HYDROMORPHONE HYDROCHLORIDE 0.2 MG: 0.2 INJECTION, SOLUTION INTRAMUSCULAR; INTRAVENOUS; SUBCUTANEOUS at 15:48

## 2024-09-05 RX ADMIN — REMIFENTANIL HYDROCHLORIDE 0.1 MCG/KG/MIN: 1 INJECTION, POWDER, LYOPHILIZED, FOR SOLUTION INTRAVENOUS at 09:04

## 2024-09-05 RX ADMIN — PHENYLEPHRINE HYDROCHLORIDE 100 MCG: 10 INJECTION INTRAVENOUS at 11:54

## 2024-09-05 RX ADMIN — LIDOCAINE HYDROCHLORIDE 80 MG: 20 INJECTION, SOLUTION INFILTRATION; PERINEURAL at 07:42

## 2024-09-05 RX ADMIN — Medication 20 MG: at 09:48

## 2024-09-05 RX ADMIN — Medication 20 MG: at 10:32

## 2024-09-05 RX ADMIN — HYDROMORPHONE HYDROCHLORIDE 0.2 MG: 0.2 INJECTION, SOLUTION INTRAMUSCULAR; INTRAVENOUS; SUBCUTANEOUS at 18:41

## 2024-09-05 RX ADMIN — Medication 2 G: at 13:00

## 2024-09-05 RX ADMIN — PROPOFOL 140 MG: 10 INJECTION, EMULSION INTRAVENOUS at 07:42

## 2024-09-05 RX ADMIN — ONDANSETRON 4 MG: 2 INJECTION INTRAMUSCULAR; INTRAVENOUS at 23:27

## 2024-09-05 RX ADMIN — PHENYLEPHRINE HYDROCHLORIDE 0.3 MCG/KG/MIN: 10 INJECTION INTRAVENOUS at 08:55

## 2024-09-05 RX ADMIN — HYDRALAZINE HYDROCHLORIDE 20 MG: 20 INJECTION INTRAMUSCULAR; INTRAVENOUS at 15:54

## 2024-09-05 RX ADMIN — EPHEDRINE SULFATE 10 MG: 5 INJECTION INTRAVENOUS at 08:27

## 2024-09-05 RX ADMIN — FENTANYL CITRATE 50 MCG: 50 INJECTION INTRAMUSCULAR; INTRAVENOUS at 09:45

## 2024-09-05 RX ADMIN — MIDAZOLAM 1 MG: 1 INJECTION INTRAMUSCULAR; INTRAVENOUS at 07:37

## 2024-09-05 RX ADMIN — HYDROMORPHONE HYDROCHLORIDE 0.4 MG: 0.2 INJECTION, SOLUTION INTRAMUSCULAR; INTRAVENOUS; SUBCUTANEOUS at 15:28

## 2024-09-05 RX ADMIN — HYDROMORPHONE HYDROCHLORIDE 0.4 MG: 0.2 INJECTION, SOLUTION INTRAMUSCULAR; INTRAVENOUS; SUBCUTANEOUS at 14:39

## 2024-09-05 RX ADMIN — PROPOFOL 30 MG: 10 INJECTION, EMULSION INTRAVENOUS at 07:44

## 2024-09-05 RX ADMIN — FENTANYL CITRATE 50 MCG: 50 INJECTION, SOLUTION INTRAMUSCULAR; INTRAVENOUS at 14:19

## 2024-09-05 RX ADMIN — Medication 10 MG: at 15:43

## 2024-09-05 RX ADMIN — Medication 200 MG: at 13:49

## 2024-09-05 RX ADMIN — HYDROMORPHONE HYDROCHLORIDE 0.2 MG: 0.2 INJECTION, SOLUTION INTRAMUSCULAR; INTRAVENOUS; SUBCUTANEOUS at 15:20

## 2024-09-05 RX ADMIN — Medication 20 MG: at 12:19

## 2024-09-05 RX ADMIN — Medication 50 MG: at 07:42

## 2024-09-05 RX ADMIN — PROPOFOL 50 MCG/KG/MIN: 10 INJECTION, EMULSION INTRAVENOUS at 08:55

## 2024-09-05 RX ADMIN — PROCHLORPERAZINE EDISYLATE 5 MG: 5 INJECTION INTRAMUSCULAR; INTRAVENOUS at 19:59

## 2024-09-05 RX ADMIN — Medication 20 MG: at 11:32

## 2024-09-05 RX ADMIN — ACETAMINOPHEN 975 MG: 325 TABLET ORAL at 06:06

## 2024-09-05 RX ADMIN — HYDROMORPHONE HYDROCHLORIDE 0.4 MG: 0.2 INJECTION, SOLUTION INTRAMUSCULAR; INTRAVENOUS; SUBCUTANEOUS at 23:45

## 2024-09-05 RX ADMIN — MANNITOL 211 ML: 20 INJECTION, SOLUTION INTRAVENOUS at 09:57

## 2024-09-05 RX ADMIN — LABETALOL HYDROCHLORIDE 10 MG: 5 INJECTION, SOLUTION INTRAVENOUS at 19:01

## 2024-09-05 RX ADMIN — Medication 2 G: at 08:52

## 2024-09-05 ASSESSMENT — ACTIVITIES OF DAILY LIVING (ADL)
ADLS_ACUITY_SCORE: 20

## 2024-09-05 ASSESSMENT — VISUAL ACUITY: OU: NORMAL ACUITY

## 2024-09-05 NOTE — BRIEF OP NOTE
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]  Post-operative diagnosis Same as pre-operative diagnosis    Procedure: stealth assisted Biparieto-occipital craniotomy for tumor (Gammatile), N/A - Head    Surgeon: Surgeons and Role:     * Will Samayoa MD - Primary     * Tavares Cardoza MD - Resident - Assisting  Anesthesia: General   Estimated Blood Loss: 100cc    Drains: None  Specimens:   ID Type Source Tests Collected by Time Destination   1 : Posterior Sagital sinus tumor Tissue Sinus SURGICAL PATHOLOGY EXAM Will Samayoa MD 9/5/2024 12:40 PM    2 : Anterior falx margin 1 Tissue Sinus SURGICAL PATHOLOGY EXAM Will Samayoa MD 9/5/2024 12:43 PM    3 : Posterior falx margin 1 Tissue Sinus SURGICAL PATHOLOGY EXAM Will Samayoa MD 9/5/2024 12:44 PM    4 : Anterior falx margin 2 Tissue Sinus SURGICAL PATHOLOGY EXAM Will Samayoa MD 9/5/2024 12:45 PM    5 : Posterior falx margin 2 Tissue Sinus SURGICAL PATHOLOGY EXAM Will Samayoa MD 9/5/2024 12:46 PM      Findings:   Gross total resection with gamma tile placement .  Complications: None.  Implants:   Implant Name Type Inv. Item Serial No.  Lot No. LRB No. Used Action   GRAFT DURAGEN 2X2\"  - [id number] Other GRAFT DURAGEN 2X2\"   [id number] N/A 1 Implanted   Cranial Plates     NA N/A 7 Explanted   IMP SCR SYN MATRIX LOW PRO 1.5X04MM SELF DRILL [id number] - [id number] Metallic Hardware/[employer] IMP SCR SYN MATRIX LOW PRO 1.5X04MM SELF DRILL [id number]  SYNTHES-STRATEC NA N/A 23 Implanted   IMP PLATE SYN CHARLIE HOLE COVER 17MM 04.503.023 - [id number] Metallic Hardware/[employer] IMP PLATE SYN CHARLIE HOLE COVER 17MM [id number]  SYNTHES-STRATEC NA N/A 3 Implanted   IMP PLATE CHARLIE HOLE COVER MATRIXNEURO 24MM 04.503.024 - [id number] Metallic Hardware/[employer] IMP PLATE CHARLIE HOLE COVER " MATRIXNEURO 24MM 04.503.024  SYNTHES-STRATE NA N/A 2 Implanted     75cc hr IVF for 72 hours  CTH with thin cut  MRI w wo with 1mm thin cut  Keppra 7 days  Dex

## 2024-09-05 NOTE — LETTER
9/5/2024      Leidy Ascencio  [address] [place] [address]      Dear Colleague,    Thank you for referring your patient, Leidy Ascencio, to the [facility] RADIATION ONCOLOGY. Please see a copy of my visit note below.    No notes on file    Again, thank you for allowing me to participate in the care of your patient.        Sincerely,        Izabel Bauer MD PhD

## 2024-09-05 NOTE — ANESTHESIA POSTPROCEDURE EVALUATION
Patient: Leidy Ascencio    Procedure: Procedure(s):  stealth assisted Biparieto-occipital craniotomy for tumor (Gammatile)       Anesthesia Type:  General    Note:  Disposition: ICU            ICU Sign Out: Anesthesiologist/ICU physician sign out WAS performed   Postop Pain Control: Challenging            Sign Out: Well controlled pain   PONV: Yes            Sign Out: PONV/POV resolved with treatment   Neuro/Psych: Uneventful            Sign Out: Acceptable/Baseline neuro status   Airway/Respiratory: Uneventful            Sign Out: Acceptable/Baseline resp. status   CV/Hemodynamics: Uneventful            Sign Out: Acceptable CV status; No obvious hypovolemia; No obvious fluid overload   Other NRE: NONE   DID A NON-ROUTINE EVENT OCCUR?            Last vitals:  Vitals Value Taken Time   /80 09/05/24 1730   Temp 36.3  C (97.4  F) 09/05/24 1415   Pulse 80 09/05/24 1747   Resp 12 09/05/24 1747   SpO2 97 % 09/05/24 1747   Vitals shown include unfiled device data.    Electronically Signed By: Kristan Zamarripa MD  September 5, 2024  5:47 PM

## 2024-09-05 NOTE — ANESTHESIA PROCEDURE NOTES
Arterial Line Procedure Note    Pre-Procedure   Staff -        Anesthesiologist:  Elise Mccloud MD       Resident/Fellow: Naomi Lockett MD       Other Anesthesia Staff: José Vanegas MD       Performed By: resident and with residents       Procedure performed by resident/fellow/CRNA in presence of a teaching physician.         Location: OR       Pre-Anesthestic Checklist: patient identified, IV checked, monitors and equipment checked, pre-op evaluation and at physician/surgeon's request  Timeout:       Correct Patient: Yes        Correct Site: Yes   Line Placement:   This line was placed Pre Induction starting at 9/5/2024 9:16 AM and ending at 9/5/2024 9:16 AM  Procedure   Procedure: arterial line       Laterality: right       Insertion Site: radial.  Sterile Prep        Standard elements of sterile barrier followed       Skin prep: Chloraprep  Insertion/Injection        Technique: ultrasound guided        1. Ultrasound was used to evaluate the access site.       2. Artery evaluated via ultrasound for patency/adequacy.       3. Using real-time ultrasound the needle/catheter was observed entering the artery/vein.       Catheter Type/Size: 20 G, 1.75 in/4.5 cm quick cath (integral wire)  Narrative         Secured by: suture       Tegaderm dressing used.       Complications: None apparent,        Arterial waveform: Yes    Comments:  First attempt on L radial artery (ultrasound guided) - unable to smoothly pass wire without resistance (attempt x2 by resident). Second attempt by attending (Dr. Mccloud) on R radial artery (ultrasound guided) with success using quick cath. Appropriate wave form.

## 2024-09-05 NOTE — OP NOTE
[facility]  Department of Neurosurgery  Operative report    Preoperative diagnosis:  1.  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  Left inferior quadrantanopsia    Postoperative diagnosis:  1.  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  Left inferior quadrantanopsia    Procedures performed:  1.  Im-yrhbzir-mglxlfvgt craniotomy for resection of recurrent hemangiopericytoma occluding the posterior superior sagittal sinus  2.  Gamma tile placement to the falx and resection cavity  3.  Use of operative microscope  4.  Stereotactic neuronavigation    Surgeon: Will Samayoa MD    Assistant: Tavares Cardoza MD    Radiation Oncologists: Dr. Izabel Bauer, Dr. Rebekah Shi    Medical physicist:  Dr. Latha Patiño    Estimated blood loss: 100 cc    Anesthesia: General    Indications for procedure: Ms. Ascencio is a 64-year-old right-handed female with a history of a right occipital hemangiopericytoma that was initially resected at [facility] by Dr. Lujan in 2015.  Pathology showed a grade 2 hemangiopericytoma.  She had an inferior quadrantanopsia.  She is followed with surveillance imaging and a recurrence was seen so she underwent SRS with 20 Guillen in 2020 by Dr. Song.  She had then been followed again by surveillance imaging, and more recently her hemangiopericytoma appeared to have grown much more rapidly.  She was referred for management and we discussed the options for management. Since we suspected the venous sinus was occluded by the tumor, she underwent preoperative imaging and an angiogram/venogram confirming the occlusion.  After discussion of the risks, she agreed to proceed with surgical resection followed by gamma tile placement.  Informed consent was obtained.    Procedure in detail: After informed consent was obtained, the patient was brought to the operating room and placed  supine on the operating room table.  The anesthesia service performed an intubation and established intravenous and intra-arterial access.  The bed was turned 180 degrees.  The patient was placed into prone position with all pressure points carefully padded.  The prior curvilinear incision along the right parietal occipital region was outlined and a small hair shave was performed.  We extended the planned incision along its superior aspect to the contralateral side so that we could cross midline.  The scalp was then sterilely prepped and draped in usual fashion.  The patient received cefazolin, dexamethasone, and mannitol 0.5 g/kg.  A timeout was performed confirm details of the procedure.  Local anesthetic was administered to the new portion of the incision.    After timeout was performed, the began the approach.  The prior skin incision was opened and it was extended along its superior aspect of the contralateral side.  The skin flap was reflected inferolaterally.  The prior craniotomy bone flap was exposed, and this had been fixed into position using the CranioFIX Aesculap system.  We removed the 3 most medial CranioFIX covers.  We then drilled to remove the deeper plate for each of these.  We identified the level of the dura and advanced the epidural plane.  We then created a new right occipital craniotomy within the prior bone flap, since it had healed nicely and the position.  We then created new bur holes on the left occipital side of the superior sagittal sinus.  We then advanced the epidural plane and created a second bone flap over the sinus, ensuring that they would incorporate the entire occluded sinus.  Veins along the left occipital margin and the dura were protected using Surgicel.    After the exposure was completed, we then moved towards tumor resection.  We opened the prior dural substitute graft on the right side and identified the resection cavity and encephalomalacia.  We brought in the  operating microscope.  We took down the scar and arachnoid in this area and identified the tumor emanating from the superior sagittal sinus wall.  We exposed anterior and posterior to this along the superior sagittal sinus.  We then open the dura over the left occipital region.  We protected the cortex with patties.  We then identified the patent sinus using the Doppler probe and confirmed this with navigation.  We then through two 2-0 silk stitches and tied this around the posterior superior sagittal sinus just beyond the tumor.  We then through two 2-0 silk stitches and tied this around the anterior superior sagittal sinus just beyond the tumor.  We then used a 15 blade to resect the occluded sinus and the tumor within it.  We were able to visualize the tumor capsule both posteriorly and anteriorly as being removed from the sinus cavity along both margins.  We then liberated the tumor within the sinus as a single specimen by cutting the falx.  This was saved for permanent pathology. This appeared to be a near total resection, as I am suspicious for microscopic disease within the venous sinus margins beyond where I could safely access it. We then took margins -- anterior and posterior falx margins 1 and 2 -- and these were sent for permanent pathology.  We then coagulated the tied off sinuses on both sides, as well as the remaining falx.  We reinspected the area and found no evidence of residual tumor.  Hemostasis appeared excellent.    At this point, our radiation oncology colleagues during the procedure.  The gamma tiles were opened and radiation precautions were maintained per protocol.  The smooth surface of each Gamma tile was placed against the target. A total of 4 gamma tiles (2 whole gamma tiles and 2 cut in half into 4 pieces that were all used) were used.  She will hold on the tiles were used to line the posterior aspect of the resection cavity along the falx, and these were  by a thin layer of  Surgicel.  A half gamma tile piece was wrapped around the posterior cut edge of the superior sagittal sinus.  An additional two half gamma tile pieces were then lined along the anterior margin of the cut edge.  The remaining half gamma tile piece was then wrapped around the anterior superior sagittal sinus wall.  These were held into position using duraseal.     Having accomplished the goals of surgery, we then turned to closure.  A DuraGen onlay graft was placed to reconstruct the dura.  Hemostasis appeared excellent.  The bone flap was replaced using the Synthes plating system.  The galea was closed with interrupted Vicryl sutures.  The skin was closed with a 3-0 running nylon suture.  The wound was sterilely dressed.  The sterile drapes were removed.  The patient was released from the Valenzuela head orosco and the head of bed was replaced.  The patient was returned into supine position and returned to the anesthesia service.  The patient was extubated and brought to the postoperative care unit and to the ICU for monitoring overnight.    Upon conclusion of the procedure, I met with the patient's spouse and daughter for an update.  I was scrubbed and present through the critical portions of the procedure, and remained immediately available throughout.    Will Samayoa MD          [id number] Surgical Information  Study Name: A Multicenter Observational Study of GammaTile  Surgically Targeted Radiation Therapy (STaRT) in Intracranial Brain Neoplasms  [facility] #: [id number]    Date of Surgery: 9/5/2024  Subject Name: Leidy Ascencio    Total time to place tiles: 6 min 37 sec  Number of tiles implanted: 4  Number of tiles not used (if any): 2  Number of cut tiles (if any): 2  If tiles were used in a reverse orientation (i.e. bumps not toward operative bed): If yes, how many?  Smooth side for all four tiles against the target  - falx and any potential tumor within the venous sinus at the cut edge    Additional:  n/a    Intraoperative photo taken: Yes    Tumor location: posterior superior sagittal sinus  Start time of surgery: 9:31am  End time of surgery: 1:27pm  Extent of resection:  near total resection  GammaTile Lot number: [id number]  Sugical Bed Surface Area: 6.39cm2  GTV_Pre: 1.52cc

## 2024-09-05 NOTE — ANESTHESIA PROCEDURE NOTES
Airway       Patient location during procedure: OR       Procedure Start/Stop Times: 9/5/2024 7:46 AM  Staff -        Anesthesiologist:  Elise Mccloud MD       Resident/Fellow: Naomi Lockett MD       Performed By: resident and with residents       Procedure performed by resident/fellow/CRNA in presence of a teaching physician.    Consent for Airway        Urgency: elective  Indications and Patient Condition       Indications for airway management: stormy-procedural       Induction type:intravenous       Mask difficulty assessment: 1 - vent by mask    Final Airway Details       Final airway type: endotracheal airway       Successful airway: ETT - single  Endotracheal Airway Details        ETT size (mm): 7.0       Cuffed: yes       Successful intubation technique: direct laryngoscopy       DL Blade Type: MAC 3       Grade View of Cords: 1       Adjucts: stylet       Position: Left       Measured from: lips       Secured at (cm): 22       Bite block used: Soft    Post intubation assessment        Placement verified by: capnometry, equal breath sounds and chest rise        Number of attempts at approach: 1       Number of other approaches attempted: 0       Secured with: tape       Ease of procedure: easy       Dentition: Intact    Medication(s) Administered   Medication Administration Time: 9/5/2024 7:46 AM

## 2024-09-05 NOTE — ANESTHESIA CARE TRANSFER NOTE
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    Anesthesia Type:   General     Note:    Oropharynx: oropharynx clear of all foreign objects  Level of Consciousness: awake  Oxygen Supplementation: face mask  Level of Supplemental Oxygen (L/min / FiO2): 5  Independent Airway: airway patency satisfactory and stable  Dentition: dentition changed  Vital Signs Stable: post-procedure vital signs reviewed and stable  Report to RN Given: handoff report given  Patient transferred to: PACU    Handoff Report: Identifed the Patient, Identified the Reponsible Provider, Reviewed the pertinent medical history, Discussed the surgical course, Reviewed Intra-OP anesthesia mangement and issues during anesthesia, Set expectations for post-procedure period and Allowed opportunity for questions and acknowledgement of understanding      Vitals:  Vitals Value Taken Time   /112 09/05/24 1415   Temp     Pulse 76 09/05/24 1420   Resp 20 09/05/24 1420   SpO2 100 % 09/05/24 1420   Vitals shown include unfiled device data.    Electronically Signed By: Naomi Lockett MD  September 5, 2024  2:20 PM

## 2024-09-05 NOTE — OP NOTE
Radiation Oncology  OP Note      Procedure:  Insertion of  Gammatile brachytheapy into resection cavity of hemangiopericytoma     Preop Dx   hemangiopericytoma   POstop DX : same    Surgeon:  Will Samayoa   Radiation Oncologist :  Izabel Moore    Procedure.  Maximal safe resection was complete by Dr. Will Samayoa ( see his operative note for those details ).      A  total of 4 Gammatiles were sequentially placed along the walls of the resection cavities by Dr Samayoa .   Each tile has 4 seeds  ( 4 x 4= 16 seeds). All tiles were placed with smooth side towards the patient. Two tiles were cut.      Dr Latha Patiño, medical physicist, was present for the procedure.     I was responsible for handing the tiles off to Dr. Samayoa and for the supervision of the radioactive seeds.    Dr Patiño completed appropriate radiation surveys.      WIthin 24 hours a treatment planning CT scan and MRI will be performed for post implant dosimetry.    Estella Bauer [phone] pager

## 2024-09-06 ENCOUNTER — APPOINTMENT (OUTPATIENT)
Dept: OCCUPATIONAL THERAPY | Facility: CLINIC | Age: 64
DRG: 023 | End: 2024-09-06
Payer: MEDICARE

## 2024-09-06 ENCOUNTER — APPOINTMENT (OUTPATIENT)
Dept: MRI IMAGING | Facility: CLINIC | Age: 64
DRG: 023 | End: 2024-09-06
Payer: MEDICARE

## 2024-09-06 LAB
ANION GAP SERPL CALCULATED.3IONS-SCNC: 10 MMOL/L (ref 7–15)
BUN SERPL-MCNC: 13.6 MG/DL (ref 8–23)
CALCIUM SERPL-MCNC: 8.4 MG/DL (ref 8.8–10.4)
CHLORIDE SERPL-SCNC: 104 MMOL/L (ref 98–107)
CREAT SERPL-MCNC: 0.57 MG/DL (ref 0.51–0.95)
EGFRCR SERPLBLD CKD-EPI 2021: >90 ML/MIN/1.73M2
ERYTHROCYTE [DISTWIDTH] IN BLOOD BY AUTOMATED COUNT: 13.3 % (ref 10–15)
GLUCOSE BLDC GLUCOMTR-MCNC: 131 MG/DL (ref 70–99)
GLUCOSE BLDC GLUCOMTR-MCNC: 141 MG/DL (ref 70–99)
GLUCOSE SERPL-MCNC: 137 MG/DL (ref 70–99)
HCO3 SERPL-SCNC: 25 MMOL/L (ref 22–29)
HCT VFR BLD AUTO: 28.3 % (ref 35–47)
HGB BLD-MCNC: 9.3 G/DL (ref 11.7–15.7)
MAGNESIUM SERPL-MCNC: 1.9 MG/DL (ref 1.7–2.3)
MCH RBC QN AUTO: 29.2 PG (ref 26.5–33)
MCHC RBC AUTO-ENTMCNC: 32.9 G/DL (ref 31.5–36.5)
MCV RBC AUTO: 89 FL (ref 78–100)
PHOSPHATE SERPL-MCNC: 3.9 MG/DL (ref 2.5–4.5)
PLATELET # BLD AUTO: 248 10E3/UL (ref 150–450)
POTASSIUM SERPL-SCNC: 3.5 MMOL/L (ref 3.4–5.3)
RBC # BLD AUTO: 3.19 10E6/UL (ref 3.8–5.2)
SODIUM SERPL-SCNC: 139 MMOL/L (ref 135–145)
WBC # BLD AUTO: 14.2 10E3/UL (ref 4–11)

## 2024-09-06 PROCEDURE — 250N000011 HC RX IP 250 OP 636

## 2024-09-06 PROCEDURE — 70553 MRI BRAIN STEM W/O & W/DYE: CPT | Mod: MG

## 2024-09-06 PROCEDURE — 84100 ASSAY OF PHOSPHORUS: CPT | Performed by: NEUROLOGICAL SURGERY

## 2024-09-06 PROCEDURE — 97535 SELF CARE MNGMENT TRAINING: CPT | Mod: GO | Performed by: OCCUPATIONAL THERAPIST

## 2024-09-06 PROCEDURE — A9585 GADOBUTROL INJECTION: HCPCS

## 2024-09-06 PROCEDURE — 83735 ASSAY OF MAGNESIUM: CPT | Performed by: NEUROLOGICAL SURGERY

## 2024-09-06 PROCEDURE — 120N000002 HC R&B MED SURG/OB UMMC

## 2024-09-06 PROCEDURE — 85027 COMPLETE CBC AUTOMATED: CPT

## 2024-09-06 PROCEDURE — 999N000147 HC STATISTIC PT IP EVAL DEFER

## 2024-09-06 PROCEDURE — 97165 OT EVAL LOW COMPLEX 30 MIN: CPT | Mod: GO | Performed by: OCCUPATIONAL THERAPIST

## 2024-09-06 PROCEDURE — 258N000003 HC RX IP 258 OP 636: Performed by: STUDENT IN AN ORGANIZED HEALTH CARE EDUCATION/TRAINING PROGRAM

## 2024-09-06 PROCEDURE — 250N000013 HC RX MED GY IP 250 OP 250 PS 637

## 2024-09-06 PROCEDURE — 97530 THERAPEUTIC ACTIVITIES: CPT | Mod: GO | Performed by: OCCUPATIONAL THERAPIST

## 2024-09-06 PROCEDURE — 80048 BASIC METABOLIC PNL TOTAL CA: CPT

## 2024-09-06 PROCEDURE — G1010 CDSM STANSON: HCPCS | Performed by: RADIOLOGY

## 2024-09-06 PROCEDURE — 250N000011 HC RX IP 250 OP 636: Performed by: NEUROLOGICAL SURGERY

## 2024-09-06 PROCEDURE — 255N000002 HC RX 255 OP 636

## 2024-09-06 PROCEDURE — 258N000003 HC RX IP 258 OP 636

## 2024-09-06 PROCEDURE — 70553 MRI BRAIN STEM W/O & W/DYE: CPT | Mod: 26 | Performed by: RADIOLOGY

## 2024-09-06 RX ORDER — DEXAMETHASONE 4 MG/1
8 TABLET ORAL EVERY 8 HOURS SCHEDULED
Status: DISCONTINUED | OUTPATIENT
Start: 2024-09-06 | End: 2024-09-06

## 2024-09-06 RX ORDER — FLUTICASONE FUROATE AND VILANTEROL 100; 25 UG/1; UG/1
1 POWDER RESPIRATORY (INHALATION) DAILY
Status: DISCONTINUED | OUTPATIENT
Start: 2024-09-06 | End: 2024-09-09 | Stop reason: HOSPADM

## 2024-09-06 RX ORDER — SODIUM CHLORIDE 9 MG/ML
INJECTION, SOLUTION INTRAVENOUS CONTINUOUS
Status: DISCONTINUED | OUTPATIENT
Start: 2024-09-06 | End: 2024-09-06

## 2024-09-06 RX ORDER — LEVETIRACETAM 5 MG/ML
500 INJECTION INTRAVASCULAR ONCE
Status: COMPLETED | OUTPATIENT
Start: 2024-09-06 | End: 2024-09-06

## 2024-09-06 RX ORDER — LIDOCAINE 40 MG/G
CREAM TOPICAL
Status: DISCONTINUED | OUTPATIENT
Start: 2024-09-06 | End: 2024-09-09 | Stop reason: HOSPADM

## 2024-09-06 RX ORDER — ONDANSETRON 2 MG/ML
8 INJECTION INTRAMUSCULAR; INTRAVENOUS EVERY 6 HOURS PRN
Status: DISCONTINUED | OUTPATIENT
Start: 2024-09-06 | End: 2024-09-09 | Stop reason: HOSPADM

## 2024-09-06 RX ORDER — DEXAMETHASONE 4 MG/1
4 TABLET ORAL EVERY 8 HOURS SCHEDULED
Status: DISCONTINUED | OUTPATIENT
Start: 2024-09-06 | End: 2024-09-06

## 2024-09-06 RX ORDER — SERTRALINE HYDROCHLORIDE 100 MG/1
200 TABLET, FILM COATED ORAL EVERY MORNING
Status: DISCONTINUED | OUTPATIENT
Start: 2024-09-06 | End: 2024-09-09 | Stop reason: HOSPADM

## 2024-09-06 RX ORDER — MAGNESIUM SULFATE HEPTAHYDRATE 40 MG/ML
2 INJECTION, SOLUTION INTRAVENOUS ONCE
Status: COMPLETED | OUTPATIENT
Start: 2024-09-06 | End: 2024-09-06

## 2024-09-06 RX ORDER — AZELASTINE 1 MG/ML
1 SPRAY, METERED NASAL 2 TIMES DAILY PRN
Status: DISCONTINUED | OUTPATIENT
Start: 2024-09-06 | End: 2024-09-09 | Stop reason: HOSPADM

## 2024-09-06 RX ORDER — POTASSIUM CHLORIDE 7.45 MG/ML
10 INJECTION INTRAVENOUS
Status: COMPLETED | OUTPATIENT
Start: 2024-09-06 | End: 2024-09-06

## 2024-09-06 RX ORDER — DEXAMETHASONE SODIUM PHOSPHATE 4 MG/ML
8 INJECTION, SOLUTION INTRA-ARTICULAR; INTRALESIONAL; INTRAMUSCULAR; INTRAVENOUS; SOFT TISSUE EVERY 8 HOURS
Status: COMPLETED | OUTPATIENT
Start: 2024-09-06 | End: 2024-09-07

## 2024-09-06 RX ORDER — ROSUVASTATIN CALCIUM 10 MG/1
10 TABLET, COATED ORAL EVERY MORNING
Status: DISCONTINUED | OUTPATIENT
Start: 2024-09-06 | End: 2024-09-09 | Stop reason: HOSPADM

## 2024-09-06 RX ORDER — ONDANSETRON 4 MG/1
8 TABLET, ORALLY DISINTEGRATING ORAL EVERY 6 HOURS PRN
Status: DISCONTINUED | OUTPATIENT
Start: 2024-09-06 | End: 2024-09-09 | Stop reason: HOSPADM

## 2024-09-06 RX ORDER — GADOBUTROL 604.72 MG/ML
7.5 INJECTION INTRAVENOUS ONCE
Status: COMPLETED | OUTPATIENT
Start: 2024-09-06 | End: 2024-09-06

## 2024-09-06 RX ORDER — ALBUTEROL SULFATE 90 UG/1
2 AEROSOL, METERED RESPIRATORY (INHALATION) EVERY 6 HOURS
Status: DISCONTINUED | OUTPATIENT
Start: 2024-09-06 | End: 2024-09-09 | Stop reason: HOSPADM

## 2024-09-06 RX ADMIN — POTASSIUM CHLORIDE 10 MEQ: 7.46 INJECTION, SOLUTION INTRAVENOUS at 10:00

## 2024-09-06 RX ADMIN — ALBUTEROL SULFATE 2 PUFF: 90 AEROSOL, METERED RESPIRATORY (INHALATION) at 08:27

## 2024-09-06 RX ADMIN — POTASSIUM CHLORIDE 10 MEQ: 7.46 INJECTION, SOLUTION INTRAVENOUS at 08:25

## 2024-09-06 RX ADMIN — LEVETIRACETAM 500 MG: 500 TABLET, FILM COATED ORAL at 19:45

## 2024-09-06 RX ADMIN — SENNOSIDES AND DOCUSATE SODIUM 1 TABLET: 8.6; 5 TABLET ORAL at 19:45

## 2024-09-06 RX ADMIN — ONDANSETRON 8 MG: 2 INJECTION INTRAMUSCULAR; INTRAVENOUS at 02:47

## 2024-09-06 RX ADMIN — HYDROMORPHONE HYDROCHLORIDE 0.2 MG: 0.2 INJECTION, SOLUTION INTRAMUSCULAR; INTRAVENOUS; SUBCUTANEOUS at 21:16

## 2024-09-06 RX ADMIN — SODIUM CHLORIDE: 9 INJECTION, SOLUTION INTRAVENOUS at 06:57

## 2024-09-06 RX ADMIN — DEXAMETHASONE SODIUM PHOSPHATE 8 MG: 4 INJECTION, SOLUTION INTRA-ARTICULAR; INTRALESIONAL; INTRAMUSCULAR; INTRAVENOUS; SOFT TISSUE at 03:47

## 2024-09-06 RX ADMIN — FLUTICASONE FUROATE AND VILANTEROL TRIFENATATE 1 PUFF: 100; 25 POWDER RESPIRATORY (INHALATION) at 08:27

## 2024-09-06 RX ADMIN — ALBUTEROL SULFATE 2 PUFF: 90 AEROSOL, METERED RESPIRATORY (INHALATION) at 14:09

## 2024-09-06 RX ADMIN — HYDROMORPHONE HYDROCHLORIDE 0.4 MG: 0.2 INJECTION, SOLUTION INTRAMUSCULAR; INTRAVENOUS; SUBCUTANEOUS at 03:42

## 2024-09-06 RX ADMIN — ACETAMINOPHEN 975 MG: 325 TABLET ORAL at 23:25

## 2024-09-06 RX ADMIN — LEVETIRACETAM 500 MG: 5 INJECTION INTRAVENOUS at 02:47

## 2024-09-06 RX ADMIN — ROSUVASTATIN CALCIUM 10 MG: 10 TABLET, FILM COATED ORAL at 08:21

## 2024-09-06 RX ADMIN — SERTRALINE HYDROCHLORIDE 200 MG: 100 TABLET ORAL at 08:21

## 2024-09-06 RX ADMIN — DEXAMETHASONE SODIUM PHOSPHATE 8 MG: 4 INJECTION, SOLUTION INTRA-ARTICULAR; INTRALESIONAL; INTRAMUSCULAR; INTRAVENOUS; SOFT TISSUE at 12:48

## 2024-09-06 RX ADMIN — ACETAMINOPHEN 975 MG: 325 TABLET ORAL at 15:55

## 2024-09-06 RX ADMIN — PROCHLORPERAZINE EDISYLATE 5 MG: 5 INJECTION INTRAMUSCULAR; INTRAVENOUS at 00:41

## 2024-09-06 RX ADMIN — OXYCODONE HYDROCHLORIDE 5 MG: 5 TABLET ORAL at 19:46

## 2024-09-06 RX ADMIN — OXYCODONE HYDROCHLORIDE 5 MG: 5 TABLET ORAL at 12:47

## 2024-09-06 RX ADMIN — MAGNESIUM SULFATE HEPTAHYDRATE 2 G: 2 INJECTION, SOLUTION INTRAVENOUS at 06:27

## 2024-09-06 RX ADMIN — LEVETIRACETAM 500 MG: 500 TABLET, FILM COATED ORAL at 08:21

## 2024-09-06 RX ADMIN — DEXAMETHASONE SODIUM PHOSPHATE 8 MG: 4 INJECTION, SOLUTION INTRA-ARTICULAR; INTRALESIONAL; INTRAMUSCULAR; INTRAVENOUS; SOFT TISSUE at 19:45

## 2024-09-06 RX ADMIN — SODIUM CHLORIDE: 9 INJECTION, SOLUTION INTRAVENOUS at 21:11

## 2024-09-06 RX ADMIN — HYDROMORPHONE HYDROCHLORIDE 0.4 MG: 0.2 INJECTION, SOLUTION INTRAMUSCULAR; INTRAVENOUS; SUBCUTANEOUS at 06:57

## 2024-09-06 RX ADMIN — GADOBUTROL 7.5 ML: 604.72 INJECTION INTRAVENOUS at 12:25

## 2024-09-06 RX ADMIN — ACETAMINOPHEN 975 MG: 325 TABLET ORAL at 08:21

## 2024-09-06 RX ADMIN — POLYETHYLENE GLYCOL 3350 17 G: 17 POWDER, FOR SOLUTION ORAL at 08:21

## 2024-09-06 RX ADMIN — OXYCODONE HYDROCHLORIDE 5 MG: 5 TABLET ORAL at 14:12

## 2024-09-06 RX ADMIN — PROCHLORPERAZINE EDISYLATE 5 MG: 5 INJECTION INTRAMUSCULAR; INTRAVENOUS at 06:26

## 2024-09-06 RX ADMIN — SENNOSIDES AND DOCUSATE SODIUM 1 TABLET: 8.6; 5 TABLET ORAL at 08:21

## 2024-09-06 ASSESSMENT — ACTIVITIES OF DAILY LIVING (ADL)
ADLS_ACUITY_SCORE: 32
ADLS_ACUITY_SCORE: 32
ADLS_ACUITY_SCORE: 35
ADLS_ACUITY_SCORE: 28
ADLS_ACUITY_SCORE: 36
ADLS_ACUITY_SCORE: 32
PREVIOUS_RESPONSIBILITIES: MEAL PREP;HOUSEKEEPING;LAUNDRY;SHOPPING;YARDWORK;MEDICATION MANAGEMENT;FINANCES;DRIVING
ADLS_ACUITY_SCORE: 35
ADLS_ACUITY_SCORE: 32
ADLS_ACUITY_SCORE: 35
IADL_COMMENTS: FAMILY CAN ASSIST AS NEEDED
ADLS_ACUITY_SCORE: 35
ADLS_ACUITY_SCORE: 32
ADLS_ACUITY_SCORE: 28
ADLS_ACUITY_SCORE: 35
ADLS_ACUITY_SCORE: 35
ADLS_ACUITY_SCORE: 28
ADLS_ACUITY_SCORE: 35
ADLS_ACUITY_SCORE: 35
ADLS_ACUITY_SCORE: 36

## 2024-09-06 ASSESSMENT — VISUAL ACUITY
OU: BASELINE

## 2024-09-06 NOTE — PLAN OF CARE
Admitted/transferred from: PACU  Reason for admission/transfer: crani  2 RN skin assessment: completed by Valarie OLIVERA RN and Dov SHERIFF RN  Result of skin assessment and interventions/actions: posterior head incision - sutured  Height, weight, drug calc weight: done  Patient belongings (see Flowsheet)  MDRO education added to care plan: NA  ?

## 2024-09-06 NOTE — PLAN OF CARE
Major Shift Events: A&Ox4, DOS SANTOS, GAGANDEEP, follows commands. L visual field cut - present pre-op as well. Became more drowsy and fatigued around 0100 d/t pain&nausea, began moving slower/gen weakness in extremities. Afebrile. Sinus 70-80s. SBP goal < 140 and MAP > 65, gave PRN hydralazine x1 w/ good effect. Art line positional. Was on NC 2L, pt stated NC was making her more nauseous and was actively vomiting - removed and switched to RA, occasionally desats to upper 80s d/t COPD. Clear liquid diet. Nausea constant and emesis x3 (total OP > 800) - PRN zofran and compazine given, BMP drawn K 3.5. Unable to swallow PO meds d/t nausea - gave PRN dilaudid x3 for pain and headache. No BM, not passing flatus. Lau in place w/ AUO. No acute skin changes overnight - crani incision and dressing CDI and reinforced. Replaced Mg and K this AM, recheck ordered.    Plan: Plan for MRI in AM - checklist signed and sent. Continue q1h neuro checks. Continue pain/comfort management. Continue nausea/vomiting management. Will continue plan of care and notify the Neuro Surg team w/ any concerns or changes.    For vital signs and complete assessments, please see documentation flowsheets.     Goal Outcome Evaluation:  Plan of Care Reviewed With: patient  Overall Patient Progress: improving

## 2024-09-06 NOTE — PLAN OF CARE
Physical Therapy: Orders received. Chart reviewed and discussed with care team.? Physical Therapy not indicated due to patient demonstrating functional mobility near baseline with no acute PT needs per OT. OT following and will address impairments with mobility.? Defer discharge recommendations to OT and medical team.? Will complete orders.  Please re-consult PT if any new needs arise.

## 2024-09-06 NOTE — PROGRESS NOTES
09/06/24 1100   Appointment Info   Signing Clinician's Name / Credentials (OT) colby nicholas ot/l   Living Environment   People in Home child(hiwot), adult;other relative(s);significant other   Current Living Arrangements house   Home Accessibility stairs to enter home;stairs within home   Number of Stairs, Main Entrance 3   Number of Stairs, Within Home, Primary greater than 10 stairs   Transportation Anticipated car, drives self;family or friend will provide   Self-Care   Usual Activity Tolerance good   Current Activity Tolerance good   Regular Exercise No   Fall history within last six months no   Instrumental Activities of Daily Living (IADL)   Previous Responsibilities meal prep;housekeeping;laundry;shopping;yardwork;medication management;finances;driving   IADL Comments family can assist as needed   General Information   Referring Physician Enrique Fortune   Patient/Family Therapy Goal Statement (OT) Leidy Ascencio is a 64 year old female with a past medical history of COPD, asthma, depression, hyperlipidemia, and 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   Additional Occupational Profile Info/Pertinent History of Current Problem return to PLOF   Existing Precautions/Restrictions other (see comments)  (craniotomy)   General Observations and Info pt motivated in therapy.   Cognitive Status Examination   Orientation Status orientation to person, place and time   Cognitive Status Comments no concners.   Visual Perception   Impact of Vision Impairment on Function (Vision) no concners. pt at baseline with quadrantanopsia.   Sensory   Sensory Quick Adds sensation intact   Range of Motion Comprehensive   General Range of Motion no range of motion deficits identified   Strength Comprehensive (MMT)   General Manual Muscle Testing (MMT)  Assessment other (see comments)   Comment, General Manual Muscle Testing (MMT) Assessment general deconditioning   Coordination   Upper Extremity Coordination No deficits were identified   Bed Mobility   Bed Mobility other (see comments)   Comment (Bed Mobility) educaiton required.   Transfers   Transfers bed-chair transfer;sit-stand transfer   Transfer Skill: Bed to Chair/Chair to Bed   Bed-Chair [place] (Transfers) contact guard   Sit-Stand Transfer   Sit-Stand [place] (Transfers) contact guard   Balance   Balance Assessment standing dynamic balance   Balance Comments no concerns.   Activities of Daily Living   BADL Assessment/Intervention lower body dressing   Lower Body Dressing Assessment/Training   Comment, (Lower Body Dressing) education required.   Clinical Impression   Criteria for Skilled Therapeutic Interventions Met (OT) Yes, treatment indicated   OT Diagnosis decreased ADL I   Assessment of Occupational Performance 5 or more Performance Deficits   Identified Performance Deficits dressing, bathing, toileting, home making, leisure.   Planned Therapy Interventions (OT) ADL retraining;IADL retraining;bed mobility training;cognition;strengthening;transfer training;home program guidelines;progressive activity/exercise;risk factor education   Clinical Decision Making Complexity (OT) problem focused assessment/low complexity   Risk & Benefits of therapy have been explained evaluation/treatment results reviewed;care plan/treatment goals reviewed;risks/benefits reviewed;current/potential barriers reviewed;participants voiced agreement with care plan;participants included;patient   Clinical Impression Comments pt presents to OT with post surgical precautions and general deconditioning leading to decreased ADL I   OT Total Evaluation Time   OT Eval, Low Complexity Minutes (41386) 5   OT Goals   Therapy Frequency (OT) 5 times/week   OT Predicted Duration/Target Date for Goal Attainment 09/12/24   OT Goals  Hygiene/Grooming;Upper Body Dressing;Lower Body Dressing;Upper Body Bathing;Lower Body Bathing;Toilet Transfer/Toileting;Bed Mobility;Cognition;OT Goal 1   OT: Hygiene/Grooming independent;within precautions;while standing   OT: Upper Body Dressing Modified independent;within precautions   OT: Lower Body Dressing Modified independent;within precautions   OT: Upper Body Bathing Modified independent;within precautions   OT: Lower Body Bathing Modified independent;with precautions   OT: Bed Mobility Modified independent;supine to/from sitting;within precautions   OT: Toilet Transfer/Toileting Modified independent;toilet transfer;cleaning and garment management;within precautions   OT: Cognitive Patient/caregiver will verbalize understanding of cognitive assessment results/recommendations as needed for safe discharge planning   OT: Goal 1 pt will ascend/descend 10 stairs mod I   OT Discharge Planning   OT Plan stairs, LE dressing follwoup, toileting   OT Discharge Recommendation (DC Rec) home with assist   OT Rationale for DC Rec pt progressing well   OT Brief overview of current status Pt ambualting in [place] greater than household distances, understanding of precautions with dressing.   Total Session Time   Total Session Time (sum of timed and untimed services) 5

## 2024-09-06 NOTE — PHARMACY-ADMISSION MEDICATION HISTORY
Pharmacist Admission Medication History    Admission medication history is complete. The information provided in this note is only as accurate as the sources available at the time of the update.    Information Source(s): Clinic records and CareEverywhere/SureScripts and phone interview with patient's  Main    Pertinent Information: Only actively filling fluticasone-salmeterol, rosuvastatin, and sertraline.  Montelukast script was older and patient's  didn't think she was taking this    Changes made to PTA medication list:  Added: None  Deleted: one time injection orders (triamcinolone, bupivicaine), triamcinolone ointment  Changed: None    Allergies reviewed with patient and updates made in EHR: no.  Patient's  knew she was allergic to penicillin and thought it was a rash but unable to provide full allergy history regarding reactions    Medication History Completed By: Madelin Vasquez RPH 9/6/2024 9:17 AM    PTA Med List   Medication Sig Last Dose    fluticasone-salmeterol (AIRDUO RESPICLICK) 113-14 MCG/ACT inhaler Inhale 1 puff into the lungs 2 times daily for 360 days (Patient taking differently: Inhale 1 puff into the lungs every morning.) 9/4/2024    rosuvastatin (CRESTOR) 10 MG tablet Take 1 tablet (10 mg) by mouth daily (Patient taking differently: Take 10 mg by mouth every morning.) 9/4/2024    sertraline (ZOLOFT) 100 MG tablet Take 2 tablets (200 mg) by mouth daily (Patient taking differently: Take 200 mg by mouth every morning.) 9/4/2024

## 2024-09-06 NOTE — PROGRESS NOTES
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    She is currently post-operative day 1.    Plan:  - Serial neuro exams  - HOB>30  - Keppra 7d  - Dexamethasone 8mg q8hr, taper to be determined  - MRI with   - IVF NS at 75mL/hr for 72 hrs (until 9/8)    - SBP<140  - INR < 1.5  - plts >100  - Hgb > 8  - Advance diet as tolerated  - prn antiemetics  - bowel regimen: miralax, senna  - Electrolyte replacement protocol  - SCDs for DVT proph  - PT/OT  - Dispo: ICU        -----------------------------------  ROXANN TIDWELL MD  PGY2 Neurosurgery  On call NSGY Pager #[phone]  -----------------------------------    Interval History: OR as above. Significant post-operative nausea being managed by zofran and compazine. Post-operative neurological exam similar to pre-op with LEFT inferior quadrantanopsia.    Objective:   Temp:  [97.3  F (36.3  C)-99  F (37.2  C)] 98.8  F (37.1  C)  Pulse:  [69-86] 81  Resp:  [8-22] 18  BP: (106-157)/() 125/66  MAP:  [74 mmHg-135 mmHg] 128 mmHg  Arterial Line BP: ()/() 142/120  SpO2:  [87 %-100 %] 87 %  I/O last 3 completed shifts:  In: 2759.5 [P.O.:60; I.V.:2699.5]  Out: 2710 [Urine:1880; Emesis/NG output:830]    Gen: Appears comfortable, NAD  Wound: clean, dry, intact  Neurologic:  - Alert & Oriented to person, place, time, and situation  - Follows commands briskly  - Speech fluent, spontaneous. No aphasia or dysarthria.  - No gaze preference. Stable LEFT inferior quadrantanopia  - PERRL, EOMI  - Face symmetric with sensation intact to light touch  - Palate elevates symmetrically,  uvula midline, tongue protrudes midline  - Trapezii muscles 5/5 bilaterally  - No pronator drift  - No clonus     Del Tr Bi Gr   R 5 5 5 5   L 5 5 5 5    HF KE DF PF   R 5 5 5 5   L 5 5 5 5     Reflexes 2+ throughout    Sensation intact and symmetric to light touch throughout    LABS:  Recent Labs   Lab 09/06/24  0258 09/06/24  0049 09/05/24  1052     --  137   POTASSIUM 3.5  --  3.8   CHLORIDE 104  --   --    CO2 25  --   --    ANIONGAP 10  --   --    * 131* 129*   BUN 13.6  --   --    CR 0.57  --   --    TANA 8.4*  --   --        Recent Labs   Lab 09/06/24 0258   WBC 14.2*   RBC 3.19*   HGB 9.3*   HCT 28.3*   MCV 89   MCH 29.2   MCHC 32.9   RDW 13.3          IMAGING:  Recent Results (from the past 24 hour(s))   CT Head w/o Contrast - en route from PACU to patient care unit    Narrative    EXAM: CT HEAD W/O CONTRAST  9/5/2024 10:25 PM     HISTORY: s/p craniotomy for resection       COMPARISON: Head CT 9/5/2024, brain MRI 8/30/2034    TECHNIQUE: Using multidetector thin collimation helical acquisition  technique, axial, coronal and sagittal CT images from the skull base  to the vertex were obtained without intravenous contrast.   (topogram) image(s) also obtained and reviewed. STEALTH images  obtained.    FINDINGS:  Interval bilateral parieto-occipital craniotomy for underlying  superior sagittal sinus tumor resection. Within the parieto-occipital  resection cavity, there are multiple new gamma tiles, pneumocephalus  and fluid. There is trace hyperdensity along the cavity periphery  which could represent blood products or duraseal material. Scattered  intracranial pneumocephalus, predominantly within the resection cavity  and along the bifrontal lobes. Additional subcutaneous air over the  parietal scalp. Stable ventricular configuration. Clear basal  cisterns.    The visualized portions of the paranasal sinuses and mastoid air cells  are clear. Grossly normal orbits.       Impression     IMPRESSION:   Interval bilateral parieto-occipital craniotomy with gross total  resection of tumor near the superior sagittal sinus with gamma tiles  in the resection cavity. No acute post operative complication  identified.    I have personally reviewed the examination and initial interpretation  and I agree with the findings.    REGGIE HUERTA MD         SYSTEM ID:  [id number]

## 2024-09-06 NOTE — PROVIDER NOTIFICATION
Neurosurgery resident at bedside, discussed BP control, states no gtt needed and continue to treat as needed.

## 2024-09-06 NOTE — PLAN OF CARE
Major Shift Events:  Pt remains neurologically stable. No nausea after eating PO. Lau removed and voided. MRI completed. Family updated at bedside.  Plan: Continue to closely monitor critical neurological pt and notify NSG team of any changes. Increase activity as tolerated. Encourage pulmonary hygiene. For vital signs and complete assessments, please see documentation flowsheets.    Goal Outcome Evaluation:    Plan of Care Reviewed With: patient, spouse  Overall Patient Progress: improvingOverall Patient Progress: improving  Outcome Evaluation: Neuros stable

## 2024-09-07 ENCOUNTER — APPOINTMENT (OUTPATIENT)
Dept: OCCUPATIONAL THERAPY | Facility: CLINIC | Age: 64
DRG: 023 | End: 2024-09-07
Attending: NEUROLOGICAL SURGERY
Payer: MEDICARE

## 2024-09-07 LAB
ANION GAP SERPL CALCULATED.3IONS-SCNC: 8 MMOL/L (ref 7–15)
BUN SERPL-MCNC: 10 MG/DL (ref 8–23)
CALCIUM SERPL-MCNC: 8.5 MG/DL (ref 8.8–10.4)
CHLORIDE SERPL-SCNC: 106 MMOL/L (ref 98–107)
CREAT SERPL-MCNC: 0.59 MG/DL (ref 0.51–0.95)
EGFRCR SERPLBLD CKD-EPI 2021: >90 ML/MIN/1.73M2
ERYTHROCYTE [DISTWIDTH] IN BLOOD BY AUTOMATED COUNT: 14 % (ref 10–15)
GLUCOSE BLDC GLUCOMTR-MCNC: 124 MG/DL (ref 70–99)
GLUCOSE SERPL-MCNC: 133 MG/DL (ref 70–99)
HCO3 SERPL-SCNC: 26 MMOL/L (ref 22–29)
HCT VFR BLD AUTO: 26.4 % (ref 35–47)
HGB BLD-MCNC: 8.5 G/DL (ref 11.7–15.7)
MAGNESIUM SERPL-MCNC: 2.4 MG/DL (ref 1.7–2.3)
MCH RBC QN AUTO: 29.2 PG (ref 26.5–33)
MCHC RBC AUTO-ENTMCNC: 32.2 G/DL (ref 31.5–36.5)
MCV RBC AUTO: 91 FL (ref 78–100)
PHOSPHATE SERPL-MCNC: 3.4 MG/DL (ref 2.5–4.5)
PLATELET # BLD AUTO: 243 10E3/UL (ref 150–450)
POTASSIUM SERPL-SCNC: 3.9 MMOL/L (ref 3.4–5.3)
RBC # BLD AUTO: 2.91 10E6/UL (ref 3.8–5.2)
SODIUM SERPL-SCNC: 140 MMOL/L (ref 135–145)
WBC # BLD AUTO: 13.2 10E3/UL (ref 4–11)

## 2024-09-07 PROCEDURE — 85027 COMPLETE CBC AUTOMATED: CPT

## 2024-09-07 PROCEDURE — 83735 ASSAY OF MAGNESIUM: CPT | Performed by: NEUROLOGICAL SURGERY

## 2024-09-07 PROCEDURE — 250N000011 HC RX IP 250 OP 636

## 2024-09-07 PROCEDURE — 999N000248 HC STATISTIC IV INSERT WITH US BY RN

## 2024-09-07 PROCEDURE — 120N000002 HC R&B MED SURG/OB UMMC

## 2024-09-07 PROCEDURE — 97535 SELF CARE MNGMENT TRAINING: CPT | Mod: GO

## 2024-09-07 PROCEDURE — 80048 BASIC METABOLIC PNL TOTAL CA: CPT

## 2024-09-07 PROCEDURE — 250N000013 HC RX MED GY IP 250 OP 250 PS 637

## 2024-09-07 PROCEDURE — 84100 ASSAY OF PHOSPHORUS: CPT | Performed by: NEUROLOGICAL SURGERY

## 2024-09-07 RX ORDER — DEXAMETHASONE 2 MG/1
2 TABLET ORAL EVERY 12 HOURS SCHEDULED
Status: DISCONTINUED | OUTPATIENT
Start: 2024-09-10 | End: 2024-09-09 | Stop reason: HOSPADM

## 2024-09-07 RX ORDER — DEXAMETHASONE 1 MG
1 TABLET ORAL DAILY
Status: DISCONTINUED | OUTPATIENT
Start: 2024-09-14 | End: 2024-09-09 | Stop reason: HOSPADM

## 2024-09-07 RX ORDER — DEXAMETHASONE 4 MG/1
4 TABLET ORAL EVERY 12 HOURS SCHEDULED
Status: DISCONTINUED | OUTPATIENT
Start: 2024-09-08 | End: 2024-09-09 | Stop reason: HOSPADM

## 2024-09-07 RX ORDER — DEXAMETHASONE 1 MG
1 TABLET ORAL EVERY 12 HOURS SCHEDULED
Status: DISCONTINUED | OUTPATIENT
Start: 2024-09-12 | End: 2024-09-09 | Stop reason: HOSPADM

## 2024-09-07 RX ADMIN — METHOCARBAMOL 500 MG: 500 TABLET ORAL at 12:58

## 2024-09-07 RX ADMIN — HYDROMORPHONE HYDROCHLORIDE 0.4 MG: 0.2 INJECTION, SOLUTION INTRAMUSCULAR; INTRAVENOUS; SUBCUTANEOUS at 20:10

## 2024-09-07 RX ADMIN — HYDROMORPHONE HYDROCHLORIDE 0.4 MG: 0.2 INJECTION, SOLUTION INTRAMUSCULAR; INTRAVENOUS; SUBCUTANEOUS at 11:40

## 2024-09-07 RX ADMIN — FLUTICASONE FUROATE AND VILANTEROL TRIFENATATE 1 PUFF: 100; 25 POWDER RESPIRATORY (INHALATION) at 07:55

## 2024-09-07 RX ADMIN — LEVETIRACETAM 500 MG: 500 TABLET, FILM COATED ORAL at 07:54

## 2024-09-07 RX ADMIN — ALBUTEROL SULFATE 2 PUFF: 90 AEROSOL, METERED RESPIRATORY (INHALATION) at 04:08

## 2024-09-07 RX ADMIN — SERTRALINE HYDROCHLORIDE 200 MG: 100 TABLET ORAL at 07:54

## 2024-09-07 RX ADMIN — METHOCARBAMOL 500 MG: 500 TABLET ORAL at 18:22

## 2024-09-07 RX ADMIN — DEXAMETHASONE SODIUM PHOSPHATE 8 MG: 4 INJECTION, SOLUTION INTRA-ARTICULAR; INTRALESIONAL; INTRAMUSCULAR; INTRAVENOUS; SOFT TISSUE at 04:10

## 2024-09-07 RX ADMIN — SENNOSIDES AND DOCUSATE SODIUM 1 TABLET: 8.6; 5 TABLET ORAL at 07:54

## 2024-09-07 RX ADMIN — SENNOSIDES AND DOCUSATE SODIUM 1 TABLET: 8.6; 5 TABLET ORAL at 20:10

## 2024-09-07 RX ADMIN — DEXAMETHASONE SODIUM PHOSPHATE 8 MG: 4 INJECTION, SOLUTION INTRA-ARTICULAR; INTRALESIONAL; INTRAMUSCULAR; INTRAVENOUS; SOFT TISSUE at 20:11

## 2024-09-07 RX ADMIN — DEXAMETHASONE SODIUM PHOSPHATE 8 MG: 4 INJECTION, SOLUTION INTRA-ARTICULAR; INTRALESIONAL; INTRAMUSCULAR; INTRAVENOUS; SOFT TISSUE at 11:40

## 2024-09-07 RX ADMIN — ROSUVASTATIN CALCIUM 10 MG: 10 TABLET, FILM COATED ORAL at 07:54

## 2024-09-07 RX ADMIN — OXYCODONE HYDROCHLORIDE 10 MG: 10 TABLET ORAL at 18:22

## 2024-09-07 RX ADMIN — METHOCARBAMOL 500 MG: 500 TABLET ORAL at 04:07

## 2024-09-07 RX ADMIN — POLYETHYLENE GLYCOL 3350 17 G: 17 POWDER, FOR SOLUTION ORAL at 07:54

## 2024-09-07 RX ADMIN — HYDROMORPHONE HYDROCHLORIDE 0.4 MG: 0.2 INJECTION, SOLUTION INTRAMUSCULAR; INTRAVENOUS; SUBCUTANEOUS at 05:13

## 2024-09-07 RX ADMIN — ACETAMINOPHEN 975 MG: 325 TABLET ORAL at 16:17

## 2024-09-07 RX ADMIN — HYDROMORPHONE HYDROCHLORIDE 0.4 MG: 0.2 INJECTION, SOLUTION INTRAMUSCULAR; INTRAVENOUS; SUBCUTANEOUS at 07:50

## 2024-09-07 RX ADMIN — ALBUTEROL SULFATE 2 PUFF: 90 AEROSOL, METERED RESPIRATORY (INHALATION) at 07:55

## 2024-09-07 RX ADMIN — LEVETIRACETAM 500 MG: 500 TABLET, FILM COATED ORAL at 20:10

## 2024-09-07 RX ADMIN — HYDROMORPHONE HYDROCHLORIDE 0.4 MG: 0.2 INJECTION, SOLUTION INTRAMUSCULAR; INTRAVENOUS; SUBCUTANEOUS at 16:17

## 2024-09-07 RX ADMIN — OXYCODONE HYDROCHLORIDE 10 MG: 10 TABLET ORAL at 12:55

## 2024-09-07 RX ADMIN — ACETAMINOPHEN 975 MG: 325 TABLET ORAL at 07:54

## 2024-09-07 RX ADMIN — OXYCODONE HYDROCHLORIDE 10 MG: 10 TABLET ORAL at 04:07

## 2024-09-07 RX ADMIN — ALBUTEROL SULFATE 2 PUFF: 90 AEROSOL, METERED RESPIRATORY (INHALATION) at 15:12

## 2024-09-07 ASSESSMENT — ACTIVITIES OF DAILY LIVING (ADL)
ADLS_ACUITY_SCORE: 29
ADLS_ACUITY_SCORE: 28
ADLS_ACUITY_SCORE: 28
ADLS_ACUITY_SCORE: 29
ADLS_ACUITY_SCORE: 28
ADLS_ACUITY_SCORE: 29
ADLS_ACUITY_SCORE: 28
ADLS_ACUITY_SCORE: 29
ADLS_ACUITY_SCORE: 29
ADLS_ACUITY_SCORE: 28
ADLS_ACUITY_SCORE: 29
ADLS_ACUITY_SCORE: 28
ADLS_ACUITY_SCORE: 29
ADLS_ACUITY_SCORE: 28
ADLS_ACUITY_SCORE: 28
ADLS_ACUITY_SCORE: 29
ADLS_ACUITY_SCORE: 28
ADLS_ACUITY_SCORE: 29

## 2024-09-07 NOTE — PROGRESS NOTES
ICU End of Shift Summary. See flowsheets for vital signs and detailed assessment.    Changes this shift: A&O x4. Q4h neuro checks WNL. C/o severe headache, PRN robaxin, oxy and dilaudid and scheduled tylenol given with some relief per pt. SR. Maintaining MAP and SBP goals without intervention. Requiring 2L NC overnight. Voiding spontaneously. No BM. NS gtt infusing.     Plan: manage pain. Continue neuro checks. Continue plan of care.

## 2024-09-07 NOTE — PROGRESS NOTES
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    She is currently post-operative day 2    Plan:  - Serial neuro exams  - HOB>30  - Keppra 7d  - Dexamethasone 8mg q8hr, taper to be determined  - MRI completed  - IVF NS at 75mL/hr for 72 hrs (until 9/8)  - SBP<140  - INR < 1.5  - plts >100  - Hgb > 8  - Advance diet as tolerated  - prn antiemetics  - bowel regimen: miralax, senna  - Electrolyte replacement protocol  - SCDs for DVT proph  - PT/OT  - Dispo: TTF      -----------------------------------  ROXANN TIDWELL MD  PGY2 Neurosurgery  On call NSGY Pager #[phone]  -----------------------------------    Interval History: No acute events. Endorsed a moderate RIGHT frontal headache this morning. Post-operative nausea improved. Post-operative exam remains stable with LEFT inferior quadrantanopsia. Floor status.    Objective:   Temp:  [97.7  F (36.5  C)-99.1  F (37.3  C)] 97.7  F (36.5  C)  Pulse:  [67-98] 79  Resp:  [14-20] 16  BP: ()/(46-86) 97/46  MAP:  [82 mmHg-91 mmHg] 84 mmHg  Arterial Line BP: ()/(71-73) 100/71  SpO2:  [89 %-98 %] 97 %  I/O last 3 completed shifts:  In: 2819 [P.O.:1030; I.V.:1789]  Out: 2210 [Urine:2210]    Gen: Appears comfortable, NAD  Wound: clean, dry, intact  Neurologic:  - Alert & Oriented to person, place, time, and situation  - Follows commands briskly  - Speech fluent, spontaneous. No aphasia or dysarthria.  - No gaze preference. Stable LEFT inferior quadrantanopia  - PERRL, EOMI  - Face symmetric with sensation intact to light touch  - Palate elevates symmetrically, uvula  midline, tongue protrudes midline  - Trapezii muscles 5/5 bilaterally  - No pronator drift  - No clonus     Del Tr Bi Gr   R 5 5 5 5   L 5 5 5 5    HF KE DF PF   R 5 5 5 5   L 5 5 5 5     Reflexes 2+ throughout    Sensation intact and symmetric to light touch throughout    LABS:  Recent Labs   Lab 09/07/24  0510 09/07/24  0507 09/06/24  2335 09/06/24  0258 09/06/24  0049 09/05/24  1052   NA  --  140  --  139  --  137   POTASSIUM  --  3.9  --  3.5  --  3.8   CHLORIDE  --  106  --  104  --   --    CO2  --  26  --  25  --   --    ANIONGAP  --  8  --  10  --   --    * 133* 141* 137*   < > 129*   BUN  --  10.0  --  13.6  --   --    CR  --  0.59  --  0.57  --   --    TANA  --  8.5*  --  8.4*  --   --     < > = values in this interval not displayed.       Recent Labs   Lab 09/07/24  0507   WBC 13.2*   RBC 2.91*   HGB 8.5*   HCT 26.4*   MCV 91   MCH 29.2   MCHC 32.2   RDW 14.0          IMAGING:  Recent Results (from the past 24 hour(s))   MR Brain w/o & w Contrast    Narrative    EXAM: MR BRAIN W/O & W CONTRAST  9/6/2024 12:42 PM     HISTORY: s/p crani       COMPARISON: MRI 8/30/2024. CT 9/5/2024    TECHNIQUE: Multiplanar, multisequence MR imaging of the head without  intravenous contrast    CONTRAST: 7.5cc of Gadavist injected..    FINDINGS:  Postoperative changes of right biparieto-occipital craniotomy for  resection of underlying mass with placement of gamma tiles within the  resection cavity. Postcontrast enhancement and thickening of the dura  overlying the right greater than left cerebral convexities, likely  posttreatment/operative changes. Previously noted enhancing ovoid mass  is not visualized secondary to total resection. Susceptibility  artifact in the resection cavity corresponding to hemosiderin  staining. No abnormal focus of diffusion restriction intracranially.  Ventricles are proportionate to the cerebral sulci. No mass effect or  midline shift. Flow voids within the major intracranial vessels  are  within normal limits.    Normal skull marrow signal. Clear paranasal sinuses, mastoid air  cells. Nonfocal pituitary gland, sella, skull base and upper cervical  spinal structures on sagittal images. The orbits are normal.      Impression    IMPRESSION:    1. Postoperative change of parietal-occipital craniotomy and mass  resection with gamma tile placement. Dural thickening and enhancement  noted about the posterior cerebral convexities with greatest  involvement on the right, likely post-procedural reactive changes.  2. No acute intracranial abnormality.    I have personally reviewed the examination and initial interpretation  and I agree with the findings.    MARILOU MCMAHON MD         SYSTEM ID:  [id number]

## 2024-09-07 NOTE — PROGRESS NOTES
Admitted/transferred from: 4E  Reason for admission/transfer: Lateral  Patient status upon admission/transfer: A&Ox4, VSS on RA.  Plan: Continue q4h neuro checks, manage pain.  2 RN skin assessment: completed by SUSANNE Wilkins. Jahaira NANCE RN  Sexual Orientation and Gender Identity (SOGI) smartfom completed: Not done  Result of skin assessment and interventions/actions: See flowsheet  Height, weight, drug calc weight: Done  Patient belongings (see Flowsheet - Adult Profile for details): Cell phone with pt.   MDRO education (if applicable):  N/A

## 2024-09-07 NOTE — PROGRESS NOTES
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 Will Samayoa    -----------------------------------  Enrique Fortune MD  Neurosurgery  Pager: [phone]  Please page/[facility] on-call neurosurgery with questions  -----------------------------------    Objective:   Temp:  [97.7  F (36.5  C)-98.6  F (37  C)] 97.9  F (36.6  C)  Pulse:  [63-75] 75  Resp:  [16-20] 16  BP: ()/(59-67) 105/64  SpO2:  [87 %-96 %] 96 %  I/O last 3 completed shifts:  In: 1425 [I.V.:1425]  Out: -     Gen: Appears comfortable, NAD  Wound: clean, dry, intact  Neurologic:  - Alert & Oriented to person, place, time, and situation  - Follows commands briskly  - Speech fluent, spontaneous. No aphasia or dysarthria.  - No gaze preference. Stable LEFT inferior quadrantanopia  - PERRL, EOMI  - Face symmetric with sensation intact to light touch  - Palate elevates symmetrically, uvula midline, tongue protrudes midline  - Trapezii muscles 5/5 bilaterally  - No pronator drift  - No  clonus       Del Tr Bi Gr   R 5 5 5 5   L 5 5 5 5     HF KE DF PF   R 5 5 5 5   L 5 5 5 5      Reflexes 2+ throughout     Sensation intact and symmetric to light touch throughout    LABS:  Recent Labs   Lab 09/08/24  0647 09/07/24  0510 09/07/24  0507 09/06/24  2335 09/06/24  0258     --  140  --  139   POTASSIUM 3.3*  --  3.9  --  3.5   CHLORIDE 111*  --  106  --  104   CO2 22  --  26  --  25   ANIONGAP 9  --  8  --  10   * 124* 133*   < > 137*   BUN 10.5  --  10.0  --  13.6   CR 0.47*  --  0.59  --  0.57   TANA 7.4*  --  8.5*  --  8.4*    < > = values in this interval not displayed.     Recent Labs   Lab 09/08/24  0647   WBC 8.4   RBC 2.67*   HGB 7.9*   HCT 25.1*   MCV 94   MCH 29.6   MCHC 31.5   RDW 13.7          IMAGING:  No results found for this or any previous visit (from the past 24 hour(s)).

## 2024-09-07 NOTE — CARE PLAN
Transferred to: Unit 4C  at (time) 2300  Belongings: Cell phone.  took home the rest per patient   Lau removed? Yes  Central line removed? Yes  Chart and medications sent with patient Yes  Family notified: No: Patient stated she will notify

## 2024-09-08 LAB
ANION GAP SERPL CALCULATED.3IONS-SCNC: 9 MMOL/L (ref 7–15)
BUN SERPL-MCNC: 10.5 MG/DL (ref 8–23)
CALCIUM SERPL-MCNC: 7.4 MG/DL (ref 8.8–10.4)
CHLORIDE SERPL-SCNC: 111 MMOL/L (ref 98–107)
CREAT SERPL-MCNC: 0.47 MG/DL (ref 0.51–0.95)
EGFRCR SERPLBLD CKD-EPI 2021: >90 ML/MIN/1.73M2
ERYTHROCYTE [DISTWIDTH] IN BLOOD BY AUTOMATED COUNT: 13.7 % (ref 10–15)
GLUCOSE SERPL-MCNC: 111 MG/DL (ref 70–99)
HCO3 SERPL-SCNC: 22 MMOL/L (ref 22–29)
HCT VFR BLD AUTO: 25.1 % (ref 35–47)
HGB BLD-MCNC: 7.9 G/DL (ref 11.7–15.7)
MAGNESIUM SERPL-MCNC: 1.8 MG/DL (ref 1.7–2.3)
MCH RBC QN AUTO: 29.6 PG (ref 26.5–33)
MCHC RBC AUTO-ENTMCNC: 31.5 G/DL (ref 31.5–36.5)
MCV RBC AUTO: 94 FL (ref 78–100)
PHOSPHATE SERPL-MCNC: 3.4 MG/DL (ref 2.5–4.5)
PLATELET # BLD AUTO: 211 10E3/UL (ref 150–450)
POTASSIUM SERPL-SCNC: 3.3 MMOL/L (ref 3.4–5.3)
POTASSIUM SERPL-SCNC: 4.1 MMOL/L (ref 3.4–5.3)
RBC # BLD AUTO: 2.67 10E6/UL (ref 3.8–5.2)
SODIUM SERPL-SCNC: 142 MMOL/L (ref 135–145)
WBC # BLD AUTO: 8.4 10E3/UL (ref 4–11)

## 2024-09-08 PROCEDURE — 250N000013 HC RX MED GY IP 250 OP 250 PS 637

## 2024-09-08 PROCEDURE — 85027 COMPLETE CBC AUTOMATED: CPT

## 2024-09-08 PROCEDURE — 84132 ASSAY OF SERUM POTASSIUM: CPT | Performed by: NEUROLOGICAL SURGERY

## 2024-09-08 PROCEDURE — 80048 BASIC METABOLIC PNL TOTAL CA: CPT

## 2024-09-08 PROCEDURE — 250N000011 HC RX IP 250 OP 636

## 2024-09-08 PROCEDURE — 36415 COLL VENOUS BLD VENIPUNCTURE: CPT

## 2024-09-08 PROCEDURE — 250N000013 HC RX MED GY IP 250 OP 250 PS 637: Performed by: NEUROLOGICAL SURGERY

## 2024-09-08 PROCEDURE — 120N000002 HC R&B MED SURG/OB UMMC

## 2024-09-08 PROCEDURE — 83735 ASSAY OF MAGNESIUM: CPT | Performed by: NEUROLOGICAL SURGERY

## 2024-09-08 PROCEDURE — 250N000012 HC RX MED GY IP 250 OP 636 PS 637

## 2024-09-08 PROCEDURE — 258N000003 HC RX IP 258 OP 636

## 2024-09-08 PROCEDURE — 36415 COLL VENOUS BLD VENIPUNCTURE: CPT | Performed by: NEUROLOGICAL SURGERY

## 2024-09-08 PROCEDURE — 84100 ASSAY OF PHOSPHORUS: CPT | Performed by: NEUROLOGICAL SURGERY

## 2024-09-08 RX ORDER — POLYETHYLENE GLYCOL 3350 17 G/17G
17 POWDER, FOR SOLUTION ORAL 2 TIMES DAILY
Status: DISCONTINUED | OUTPATIENT
Start: 2024-09-08 | End: 2024-09-09 | Stop reason: HOSPADM

## 2024-09-08 RX ORDER — HYDROMORPHONE HYDROCHLORIDE 4 MG/1
2 TABLET ORAL EVERY 4 HOURS PRN
Status: DISCONTINUED | OUTPATIENT
Start: 2024-09-08 | End: 2024-09-09 | Stop reason: HOSPADM

## 2024-09-08 RX ORDER — HEPARIN SODIUM 5000 [USP'U]/.5ML
5000 INJECTION, SOLUTION INTRAVENOUS; SUBCUTANEOUS EVERY 8 HOURS
Status: DISCONTINUED | OUTPATIENT
Start: 2024-09-08 | End: 2024-09-09 | Stop reason: HOSPADM

## 2024-09-08 RX ORDER — AMOXICILLIN 250 MG
2 CAPSULE ORAL 2 TIMES DAILY
Status: DISCONTINUED | OUTPATIENT
Start: 2024-09-08 | End: 2024-09-09 | Stop reason: HOSPADM

## 2024-09-08 RX ORDER — METHOCARBAMOL 500 MG/1
500 TABLET, FILM COATED ORAL 3 TIMES DAILY
Status: DISCONTINUED | OUTPATIENT
Start: 2024-09-08 | End: 2024-09-09 | Stop reason: HOSPADM

## 2024-09-08 RX ORDER — MAGNESIUM OXIDE 400 MG/1
400 TABLET ORAL EVERY 4 HOURS
Status: COMPLETED | OUTPATIENT
Start: 2024-09-08 | End: 2024-09-08

## 2024-09-08 RX ORDER — POTASSIUM CHLORIDE 750 MG/1
40 TABLET, EXTENDED RELEASE ORAL ONCE
Status: COMPLETED | OUTPATIENT
Start: 2024-09-08 | End: 2024-09-08

## 2024-09-08 RX ADMIN — HYDROMORPHONE HYDROCHLORIDE 0.4 MG: 0.2 INJECTION, SOLUTION INTRAMUSCULAR; INTRAVENOUS; SUBCUTANEOUS at 08:36

## 2024-09-08 RX ADMIN — MAGNESIUM OXIDE TAB 400 MG (241.3 MG ELEMENTAL MG) 400 MG: 400 (241.3 MG) TAB at 16:14

## 2024-09-08 RX ADMIN — ACETAMINOPHEN 975 MG: 325 TABLET ORAL at 00:09

## 2024-09-08 RX ADMIN — SERTRALINE HYDROCHLORIDE 200 MG: 100 TABLET ORAL at 08:40

## 2024-09-08 RX ADMIN — HEPARIN SODIUM 5000 UNITS: 5000 INJECTION, SOLUTION INTRAVENOUS; SUBCUTANEOUS at 22:00

## 2024-09-08 RX ADMIN — POTASSIUM CHLORIDE 40 MEQ: 750 TABLET, EXTENDED RELEASE ORAL at 13:11

## 2024-09-08 RX ADMIN — ALBUTEROL SULFATE 2 PUFF: 90 AEROSOL, METERED RESPIRATORY (INHALATION) at 16:14

## 2024-09-08 RX ADMIN — HYDROMORPHONE HYDROCHLORIDE 2 MG: 4 TABLET ORAL at 13:11

## 2024-09-08 RX ADMIN — ALBUTEROL SULFATE 2 PUFF: 90 AEROSOL, METERED RESPIRATORY (INHALATION) at 06:28

## 2024-09-08 RX ADMIN — ALBUTEROL SULFATE 2 PUFF: 90 AEROSOL, METERED RESPIRATORY (INHALATION) at 00:32

## 2024-09-08 RX ADMIN — METHOCARBAMOL 500 MG: 500 TABLET ORAL at 13:12

## 2024-09-08 RX ADMIN — MAGNESIUM OXIDE TAB 400 MG (241.3 MG ELEMENTAL MG) 400 MG: 400 (241.3 MG) TAB at 13:12

## 2024-09-08 RX ADMIN — ROSUVASTATIN CALCIUM 10 MG: 10 TABLET, FILM COATED ORAL at 08:40

## 2024-09-08 RX ADMIN — SODIUM CHLORIDE: 9 INJECTION, SOLUTION INTRAVENOUS at 00:14

## 2024-09-08 RX ADMIN — POLYETHYLENE GLYCOL 3350 17 G: 17 POWDER, FOR SOLUTION ORAL at 19:47

## 2024-09-08 RX ADMIN — ALBUTEROL SULFATE 2 PUFF: 90 AEROSOL, METERED RESPIRATORY (INHALATION) at 21:59

## 2024-09-08 RX ADMIN — LEVETIRACETAM 500 MG: 500 TABLET, FILM COATED ORAL at 08:40

## 2024-09-08 RX ADMIN — SENNOSIDES AND DOCUSATE SODIUM 2 TABLET: 8.6; 5 TABLET ORAL at 08:40

## 2024-09-08 RX ADMIN — ACETAMINOPHEN 650 MG: 325 TABLET ORAL at 19:23

## 2024-09-08 RX ADMIN — POLYETHYLENE GLYCOL 3350 17 G: 17 POWDER, FOR SOLUTION ORAL at 08:46

## 2024-09-08 RX ADMIN — OXYCODONE HYDROCHLORIDE 5 MG: 5 TABLET ORAL at 06:34

## 2024-09-08 RX ADMIN — HYDROMORPHONE HYDROCHLORIDE 0.4 MG: 0.2 INJECTION, SOLUTION INTRAMUSCULAR; INTRAVENOUS; SUBCUTANEOUS at 01:43

## 2024-09-08 RX ADMIN — OXYCODONE HYDROCHLORIDE 10 MG: 10 TABLET ORAL at 00:37

## 2024-09-08 RX ADMIN — HYDROMORPHONE HYDROCHLORIDE 2 MG: 4 TABLET ORAL at 19:23

## 2024-09-08 RX ADMIN — DEXAMETHASONE 4 MG: 4 TABLET ORAL at 08:40

## 2024-09-08 RX ADMIN — DEXAMETHASONE 4 MG: 4 TABLET ORAL at 19:47

## 2024-09-08 RX ADMIN — LEVETIRACETAM 500 MG: 500 TABLET, FILM COATED ORAL at 19:47

## 2024-09-08 RX ADMIN — SENNOSIDES AND DOCUSATE SODIUM 2 TABLET: 8.6; 5 TABLET ORAL at 19:47

## 2024-09-08 RX ADMIN — HEPARIN SODIUM 5000 UNITS: 5000 INJECTION, SOLUTION INTRAVENOUS; SUBCUTANEOUS at 13:13

## 2024-09-08 RX ADMIN — METHOCARBAMOL 500 MG: 500 TABLET ORAL at 19:47

## 2024-09-08 RX ADMIN — ACETAMINOPHEN 975 MG: 325 TABLET ORAL at 08:39

## 2024-09-08 ASSESSMENT — ACTIVITIES OF DAILY LIVING (ADL)
ADLS_ACUITY_SCORE: 26
ADLS_ACUITY_SCORE: 26
ADLS_ACUITY_SCORE: 27
ADLS_ACUITY_SCORE: 27
ADLS_ACUITY_SCORE: 26
ADLS_ACUITY_SCORE: 26
ADLS_ACUITY_SCORE: 27
ADLS_ACUITY_SCORE: 27
ADLS_ACUITY_SCORE: 26
ADLS_ACUITY_SCORE: 27
ADLS_ACUITY_SCORE: 26
ADLS_ACUITY_SCORE: 27
ADLS_ACUITY_SCORE: 26
ADLS_ACUITY_SCORE: 27
ADLS_ACUITY_SCORE: 26

## 2024-09-08 ASSESSMENT — VISUAL ACUITY
OU: OTHER (SEE COMMENT)

## 2024-09-08 NOTE — PLAN OF CARE
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 Hx of COPD, asthma, depression, HLD, and occipital hemangiopericytoma (s/p R craniotomy 2015) and recurrence in 2019, s/p radiosurgery 2020.   Vitals: VSS on RA  Neuros: A&Ox4. Known stable left inferior quadrantanopia. Strengths 5/5  IV: PIV SL  Resp/trach: WNL  Diet: Regular- fair po intake  Bowel status: LBM PTA  : Voiding spontaneously  Skin: Crani incision DOLLY. Scattered scabs and bruising throughout. Bruise behind L ear and scattered throughout. Scabbing the to lower extremities. Old abdominal surgical scars.   Pain: Moderate HA this am. Dilaudid IV x1 and then changed to po for better pain control.  Activity: Independent in room.   Plan: Continue with current POC.      Arrived from: 4C

## 2024-09-08 NOTE — PLAN OF CARE
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 Hx of COPD, asthma, depression, HLD, and occipital hemangiopericytoma (s/p R craniotomy 2015) and recurrence in 2019, s/p radiosurgery 2020.   Vitals: VSS on RA  Neuros: A&Ox4. Known stable left inferior quadrantanopia. Strengths 5/5  IV: PIV infusing NS @ 75 mL/hr.   Resp/trach: WNL  Diet: Regular   Bowel status: LBM PTA  : Voiding spontaneously  Skin: Crani incision DOLLY. Scattered scabs and bruising throughout. Bruise behind L ear and scattered throughout. Scabbing the to lower extremities. Old abdominal surgical scars.   Pain: Moderate to severe HA managed with PRN oxycodone dilaudid and scheduled tylenol.   Activity: Independent in room.   Plan: Continue with current POC.     Arrived from:   Belongings/meds: phone and .   2 RN Skin Assessment Completed by: Mary SANTAMARIA and Sarah ROSAS   Non-intact findings documented (yes/no/NA): Yes

## 2024-09-08 NOTE — PLAN OF CARE
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 PMHx: COPD, asthma, depression, HLD, & occipital hemangiopericytoma (s/p R crani 2015) & recurrence in 2019, s/p readiosurgery 2020.  Vitals: VSS on RA.  Neuros: A&Ox4. Known stable L inferior quandrantanopia. Strengths 5/5 thru out. Denies n/t.  IV: PIV SL.  Labs/Electrolytes: Mag replaced during day & chanell.   Resp/trach: LSC - denies SOB.  Diet: Reg - good intake.  Bowel status: PTA - per pt report.  : Voids spont to bathroom.  Skin: Occipital crani inci (DOLLY & approx - no signs of drainage). Scattered scabs & bruising thru out. Bruise behind L ear. Old ABD surg scars.  Pain: 7/10 HA - trx w/ PRN PO dilaudid & tylenol.  Activity: Independent in room.  Social: No calls or visitors this chanell.  Plan: Con to monitor neuros & follow POC.  Education completed: Provided brief education about SCDs utilization.    Goal Outcome Evaluation:      Plan of Care Reviewed With: patient    Overall Patient Progress: no change    Outcome Evaluation: Medications managed this chanell.

## 2024-09-08 NOTE — PROGRESS NOTES
Pt was transferred to  room 6218 on W/C with IVF NS @75ml/hr.Neuro's intact, A/O x 4 and prn oxy administered for HA before transfer. Report called and given to receiving RN Mary Chambers. All belongings,meds and chart sent upstairs with pt.

## 2024-09-08 NOTE — PLAN OF CARE
ICU End of Shift Summary: See flowsheets for vital signs and detailed assessment    Changes this shift: A&Ox4. Q4h neuros intact. Continues to have left sided head pain- alternating given PRN IV Dilaudid, oxycodone, Robaxin and scheduled Tylenol. All vital signs stable. Denies nausea, SOB. Good appetite. Up ad juan c in room. Voiding spontaneously.     Plan: Transfer to  when room becomes available.

## 2024-09-09 VITALS
RESPIRATION RATE: 16 BRPM | SYSTOLIC BLOOD PRESSURE: 138 MMHG | HEIGHT: 62 IN | BODY MASS INDEX: 33.96 KG/M2 | WEIGHT: 184.53 LBS | TEMPERATURE: 98.4 F | OXYGEN SATURATION: 98 % | DIASTOLIC BLOOD PRESSURE: 79 MMHG | HEART RATE: 59 BPM

## 2024-09-09 LAB
ANION GAP SERPL CALCULATED.3IONS-SCNC: 9 MMOL/L (ref 7–15)
BUN SERPL-MCNC: 15 MG/DL (ref 8–23)
CALCIUM SERPL-MCNC: 8.9 MG/DL (ref 8.8–10.4)
CHLORIDE SERPL-SCNC: 101 MMOL/L (ref 98–107)
CREAT SERPL-MCNC: 0.62 MG/DL (ref 0.51–0.95)
EGFRCR SERPLBLD CKD-EPI 2021: >90 ML/MIN/1.73M2
ERYTHROCYTE [DISTWIDTH] IN BLOOD BY AUTOMATED COUNT: 13.4 % (ref 10–15)
GLUCOSE SERPL-MCNC: 114 MG/DL (ref 70–99)
HCO3 SERPL-SCNC: 27 MMOL/L (ref 22–29)
HCT VFR BLD AUTO: 26.5 % (ref 35–47)
HGB BLD-MCNC: 8.6 G/DL (ref 11.7–15.7)
MAGNESIUM SERPL-MCNC: 2.3 MG/DL (ref 1.7–2.3)
MCH RBC QN AUTO: 29.1 PG (ref 26.5–33)
MCHC RBC AUTO-ENTMCNC: 32.5 G/DL (ref 31.5–36.5)
MCV RBC AUTO: 90 FL (ref 78–100)
PHOSPHATE SERPL-MCNC: 4.7 MG/DL (ref 2.5–4.5)
PLATELET # BLD AUTO: 226 10E3/UL (ref 150–450)
POTASSIUM SERPL-SCNC: 4.4 MMOL/L (ref 3.4–5.3)
RBC # BLD AUTO: 2.96 10E6/UL (ref 3.8–5.2)
SODIUM SERPL-SCNC: 137 MMOL/L (ref 135–145)
WBC # BLD AUTO: 7.6 10E3/UL (ref 4–11)

## 2024-09-09 PROCEDURE — 84100 ASSAY OF PHOSPHORUS: CPT | Performed by: NEUROLOGICAL SURGERY

## 2024-09-09 PROCEDURE — 250N000013 HC RX MED GY IP 250 OP 250 PS 637

## 2024-09-09 PROCEDURE — 36415 COLL VENOUS BLD VENIPUNCTURE: CPT

## 2024-09-09 PROCEDURE — 80048 BASIC METABOLIC PNL TOTAL CA: CPT

## 2024-09-09 PROCEDURE — 85027 COMPLETE CBC AUTOMATED: CPT

## 2024-09-09 PROCEDURE — 250N000011 HC RX IP 250 OP 636

## 2024-09-09 PROCEDURE — 250N000012 HC RX MED GY IP 250 OP 636 PS 637

## 2024-09-09 PROCEDURE — 83735 ASSAY OF MAGNESIUM: CPT | Performed by: NEUROLOGICAL SURGERY

## 2024-09-09 RX ORDER — DEXAMETHASONE 1 MG
TABLET ORAL
Qty: 45 TABLET | Refills: 0 | Status: SHIPPED | OUTPATIENT
Start: 2024-09-09 | End: 2024-09-21

## 2024-09-09 RX ORDER — LEVETIRACETAM 500 MG/1
500 TABLET ORAL 2 TIMES DAILY
Qty: 8 TABLET | Refills: 0 | Status: CANCELLED | OUTPATIENT
Start: 2024-09-09 | End: 2024-09-13

## 2024-09-09 RX ORDER — LEVETIRACETAM 500 MG/1
500 TABLET ORAL 2 TIMES DAILY
Qty: 8 TABLET | Refills: 0 | Status: SHIPPED | OUTPATIENT
Start: 2024-09-09 | End: 2024-09-13

## 2024-09-09 RX ORDER — OXYCODONE HYDROCHLORIDE 5 MG/1
5-10 TABLET ORAL EVERY 6 HOURS PRN
Qty: 20 TABLET | Refills: 0 | Status: SHIPPED | OUTPATIENT
Start: 2024-09-09 | End: 2024-09-16

## 2024-09-09 RX ADMIN — HYDROMORPHONE HYDROCHLORIDE 2 MG: 4 TABLET ORAL at 08:42

## 2024-09-09 RX ADMIN — HYDROMORPHONE HYDROCHLORIDE 2 MG: 4 TABLET ORAL at 00:23

## 2024-09-09 RX ADMIN — POLYETHYLENE GLYCOL 3350 17 G: 17 POWDER, FOR SOLUTION ORAL at 07:33

## 2024-09-09 RX ADMIN — METHOCARBAMOL 500 MG: 500 TABLET ORAL at 06:06

## 2024-09-09 RX ADMIN — HYDROMORPHONE HYDROCHLORIDE 2 MG: 4 TABLET ORAL at 04:43

## 2024-09-09 RX ADMIN — SENNOSIDES AND DOCUSATE SODIUM 2 TABLET: 8.6; 5 TABLET ORAL at 07:33

## 2024-09-09 RX ADMIN — ACETAMINOPHEN 650 MG: 325 TABLET ORAL at 04:43

## 2024-09-09 RX ADMIN — ACETAMINOPHEN 650 MG: 325 TABLET ORAL at 00:23

## 2024-09-09 RX ADMIN — FLUTICASONE FUROATE AND VILANTEROL TRIFENATATE 1 PUFF: 100; 25 POWDER RESPIRATORY (INHALATION) at 07:34

## 2024-09-09 RX ADMIN — DEXAMETHASONE 4 MG: 4 TABLET ORAL at 07:33

## 2024-09-09 RX ADMIN — HEPARIN SODIUM 5000 UNITS: 5000 INJECTION, SOLUTION INTRAVENOUS; SUBCUTANEOUS at 06:02

## 2024-09-09 RX ADMIN — SERTRALINE HYDROCHLORIDE 200 MG: 100 TABLET ORAL at 07:33

## 2024-09-09 RX ADMIN — LEVETIRACETAM 500 MG: 500 TABLET, FILM COATED ORAL at 07:33

## 2024-09-09 RX ADMIN — ALBUTEROL SULFATE 2 PUFF: 90 AEROSOL, METERED RESPIRATORY (INHALATION) at 04:43

## 2024-09-09 RX ADMIN — ROSUVASTATIN CALCIUM 10 MG: 10 TABLET, FILM COATED ORAL at 07:33

## 2024-09-09 RX ADMIN — ACETAMINOPHEN 650 MG: 325 TABLET ORAL at 08:42

## 2024-09-09 ASSESSMENT — ACTIVITIES OF DAILY LIVING (ADL)
ADLS_ACUITY_SCORE: 26

## 2024-09-09 ASSESSMENT — VISUAL ACUITY
OU: OTHER (SEE COMMENT)
OU: OTHER (SEE COMMENT)

## 2024-09-09 NOTE — PLAN OF CARE
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 Hx of COPD, asthma, depression, HLD, and occipital hemangiopericytoma (s/p crani 2015) and recurrence in 2019, s/p radiosurgery 2020.   Vitals: VSS on RA  Neuros: A&Ox4, known stable L inferior quadrantanopia.   IV: PIV SL   Resp/trach: WNL  Diet: Regular   Bowel status: LBM PTA.  : Voiding spontaneously  Skin: Occipital crani incision DOLLY. Scattered scabs and bruising throughout. Bruis behind L ear. Old abdominal surgical scars.   Pain: Moderate to severe HA managed with PRN PO Dilaudid and Tylenol.   Activity: Independent in room.   Plan: Continue with current POC and pain management.

## 2024-09-09 NOTE — DISCHARGE SUMMARY
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  Cf-gaatdor-dncyakktb craniotomy for resection of recurrent hemangiopericytoma occluding the posterior superior sagittal sinus  2.  Gamma tile placement to the falx and resection cavity  3.  Use of operative microscope  4.  Stereotactic neuronavigation           Brief History of Illness:   Ms. Ascencio is a 64-year-old right-handed female with a history of a right occipital hemangiopericytoma that was initially resected at [place] by Dr. Lujan in 2015.  Pathology showed a grade 2 hemangiopericytoma.  She had an inferior quadrantanopsia.  She is followed with surveillance imaging and a recurrence was seen so she underwent SRS with 20 Guillen in 2020 by Dr. Song.  She had then been followed again by surveillance imaging, and more recently her hemangiopericytoma appeared to have grown much more rapidly.  She was referred for management and we discussed the options for management. Since we suspected the venous sinus was occluded by the tumor, she underwent preoperative imaging and an angiogram/venogram confirming the occlusion.  After discussion of the risks, she agreed to proceed with surgical resection followed by gamma tile placement.  Informed consent was obtained.            Hospital Course:     Patient underwent  above-mentioned procedure on 9/5/2024.  Following the procedure the patient was transferred to PACU.  The patient's course was uncomplicated.  Patient underwent CT and MR imaging revealing expected post-operative changes with placement of gamma tiles and gross total resection.  Patient was started on Keppra and Dexamethasone that will be continued after discharge, with Keppra for a total of 7 days and Dexamethasone to be tapered over a total of 14 day.  On post operative day 2 she was doing well and transferred to the floor. On post operative day 4, she was ambulating, voiding without a pritchett, eating a regular diet, pain was well controlled and therefore she was discharged home.              Discharge Medications:     Current Discharge Medication List        START taking these medications    Details   dexAMETHasone (DECADRON) 1 MG tablet Take 4 tablets (4 mg) by mouth every 12 hours for 3 days, THEN 2 tablets (2 mg) every 12 hours for 3 days, THEN 1 tablet (1 mg) every 12 hours for 3 days, THEN 1 tablet (1 mg) daily for 3 days.  Qty: 45 tablet, Refills: 0    Associated Diagnoses: S/P craniotomy      levETIRAcetam (KEPPRA) 500 MG tablet Take 1 tablet (500 mg) by mouth or NG Tube 2 times daily for 4 days.  Qty: 8 tablet, Refills: 0    Associated Diagnoses: S/P craniotomy      oxyCODONE (ROXICODONE) 5 MG tablet Take 1-2 tablets (5-10 mg) by mouth every 6 hours as needed for severe pain.  Qty: 20 tablet, Refills: 0    Associated Diagnoses: S/P craniotomy           CONTINUE these medications which have NOT CHANGED    Details   fluticasone-salmeterol (AIRDUO RESPICLICK) 113-14 MCG/ACT inhaler Inhale 1 puff into the lungs 2 times daily for 360 days  Qty: 3 each, Refills: 3      rosuvastatin (CRESTOR) 10 MG tablet Take 1 tablet (10 mg) by mouth daily  Qty: 90 tablet, Refills: 0    Associated Diagnoses: Elevated cholesterol      sertraline (ZOLOFT) 100 MG tablet Take 2 tablets (200 mg) by mouth daily  Qty: 60 tablet,  "Refills: 5    Associated Diagnoses: Major depressive disorder, recurrent episode, mild (H24)      Acetaminophen (TYLENOL 8 HOUR ARTHRITIS PAIN PO) Take by mouth as needed.      albuterol (PROAIR HFA/PROVENTIL HFA/VENTOLIN HFA) 108 (90 Base) MCG/ACT inhaler Inhale 2 puffs into the lungs every 6 hours  Qty: 18 g, Refills: 1    Comments: Pharmacy may dispense brand covered by insurance (Proair, or proventil or ventolin or generic albuterol inhaler)  Associated Diagnoses: Mild intermittent asthma without complication      azelastine (ASTELIN) 0.1 % nasal spray Spray 1 spray into both nostrils 2 times daily  Qty: 30 mL, Refills: 8    Associated Diagnoses: Seasonal allergic rhinitis due to other allergic trigger      Krill Oil 300 MG CAPS Take by mouth as needed.      loratadine (CLARITIN) 10 MG tablet Take 10 mg by mouth as needed.      montelukast (SINGULAIR) 10 MG tablet Take 1 tablet (10 mg) by mouth at bedtime for 360 days  Qty: 90 tablet, Refills: 3      triamcinolone (KENALOG) 0.1 % external cream Apply topically 2 times daily  Qty: 80 g, Refills: 0    Associated Diagnoses: Allergic dermatitis      Vitamin D3 (VITAMIN D, CHOLECALCIFEROL,) 25 mcg (1000 units) tablet Take 25 mcg by mouth daily.             Exam:   Physical Exam  /79   Pulse 59   Temp 98.4  F (36.9  C) (Oral)   Resp 16   Ht 1.575 m (5' 2\")   Wt 83.7 kg (184 lb 8.4 oz)   SpO2 98%   BMI 33.75 kg/m      Gen: Appears comfortable, NAD  Wound: clean, dry, intact  Neurologic:  - Alert & Oriented to person, place, time, and situation  - Follows commands briskly  - Speech fluent, spontaneous. No aphasia or dysarthria.  - No gaze preference. Stable LEFT inferior quadrantanopia  - PERRL, EOMI  - Face symmetric with sensation intact to light touch  - Palate elevates symmetrically, uvula midline, tongue protrudes midline  - Trapezii muscles 5/5 bilaterally  - No pronator drift  - No clonus       Del Tr Bi Gr   R 5 5 5 5   L 5 5 5 5     HF KE DF PF   R " 5 5 5 5   L 5 5 5 5      Reflexes 2+ throughout     Sensation intact and symmetric to light touch throughout         Discharge Instructions and Follow-Up:     Discharge diet: Regular   Discharge activity: You may advance activity as tolerated. No strenuous exercise or heay lifting greater than 10 lbs for 4 weeks or until seen and cleared in clinic.     Discharge follow-up: Follow-up with Neurosurgery [facility] in 2 weeks for wound check/post-hospital evaluation.    Follow-up Dr. Will Samayoa MD to be determined by Dr. Will Samayoa MD     Wound care: Ok to shower,however no scrubbing of the wound and no soaking of the wound, meaning no bathtubs or swimming pools. Pat dry only. Leave wound open to air.  Patient to have wound checked 2 weeks following surgery.    Wound location: Posterior scalp  Closure technique: Nylon  Dressing needs: None  Post-op care at follow-up: Keep dry and clean       Please call if you have:  1. increased pain, redness, drainage, swelling at your incision  2. fevers > 101.5 F degrees  3. with any questions or concerns.  You may reach the Neurosurgery clinic at [phone] during regular work hours. ER at [phone].    and ask for the Neurosurgery Resident on call at [phone], during off hours or weekends.         Discharge Disposition:     Discharged to home          ROXANN TIDWELL MD  PGY2 Neurosurgery    Luis Mercedes MD  Neurosurgery Resident  Pager: [phone]

## 2024-09-10 LAB
PATH REPORT.COMMENTS IMP SPEC: ABNORMAL
PATH REPORT.COMMENTS IMP SPEC: ABNORMAL
PATH REPORT.COMMENTS IMP SPEC: YES
PATH REPORT.FINAL DX SPEC: ABNORMAL
PATH REPORT.GROSS SPEC: ABNORMAL
PATH REPORT.MICROSCOPIC SPEC OTHER STN: ABNORMAL
PATH REPORT.RELEVANT HX SPEC: ABNORMAL
PHOTO IMAGE: ABNORMAL

## 2024-09-11 ENCOUNTER — PATIENT OUTREACH (OUTPATIENT)
Dept: CARE COORDINATION | Facility: CLINIC | Age: 64
End: 2024-09-11
Payer: MEDICARE

## 2024-09-11 DIAGNOSIS — D43.3 HEMANGIOPERICYTOMA OF CNS, GRADE II (H): Primary | ICD-10-CM

## 2024-09-11 NOTE — PROGRESS NOTES
[facility]:   [facility] Contact  [facility]/Voicemail     Clinical Data: Post-Discharge Outreach     Outreach attempted x 2.  Left message on patient's voicemail, providing [facility]'s central phone number of [phone] ([phone]) for questions/concerns and/or to schedule an appt with an [facility] provider, if they do not have a PCP.      Plan:  [facility] will do no further outreaches at this time.       Taisha Bello  Community Health Worker  [facility], [facility]  [phone]      *Connected Care Resource Team does NOT follow patient ongoing. Referrals are identified based on internal discharge reports and the outreach is to ensure patient has an understanding of their discharge instructions.

## 2024-09-17 ENCOUNTER — MYC MEDICAL ADVICE (OUTPATIENT)
Dept: PULMONOLOGY | Facility: CLINIC | Age: 64
End: 2024-09-17
Payer: MEDICARE

## 2024-09-17 DIAGNOSIS — J45.20 MILD INTERMITTENT ASTHMA WITHOUT COMPLICATION: Primary | ICD-10-CM

## 2024-09-17 DIAGNOSIS — J43.2 CENTRILOBULAR EMPHYSEMA (H): ICD-10-CM

## 2024-09-18 ENCOUNTER — OFFICE VISIT (OUTPATIENT)
Dept: NEUROSURGERY | Facility: CLINIC | Age: 64
End: 2024-09-18
Payer: MEDICARE

## 2024-09-18 VITALS
OXYGEN SATURATION: 98 % | RESPIRATION RATE: 16 BRPM | SYSTOLIC BLOOD PRESSURE: 114 MMHG | HEART RATE: 71 BPM | DIASTOLIC BLOOD PRESSURE: 72 MMHG

## 2024-09-18 DIAGNOSIS — B37.0 THRUSH, ORAL: ICD-10-CM

## 2024-09-18 DIAGNOSIS — D43.3 HEMANGIOPERICYTOMA OF CNS, GRADE II (H): Primary | ICD-10-CM

## 2024-09-18 PROCEDURE — 99024 POSTOP FOLLOW-UP VISIT: CPT | Performed by: PHYSICIAN ASSISTANT

## 2024-09-18 RX ORDER — NYSTATIN 100000/ML
500000 SUSPENSION, ORAL (FINAL DOSE FORM) ORAL 4 TIMES DAILY
Qty: 60 ML | Refills: 1 | Status: SHIPPED | OUTPATIENT
Start: 2024-09-18

## 2024-09-18 ASSESSMENT — PAIN SCALES - GENERAL: PAINLEVEL: NO PAIN (0)

## 2024-09-18 NOTE — PATIENT INSTRUCTIONS
Priyanka Samayoa would like to see you in 3 months, by video visit, without any imaging needed prior; just to check in. I'll have our schedulers reach out to set that up.    Please let us know if you need anything sooner.

## 2024-09-18 NOTE — PROGRESS NOTES
"  [facility]  Department of Neurosurgery  Center for Skull Base and Pituitary Surgery    Name: Leidy Ascencio  MRN: [id number]  Age: 64 year old  : 1960  2024      Chief Complaint:   Recurrent occipital hemangiopericytoma (WHO grade 2) s/p right craniotomy 2015 and SRS 2020 now s/p ql-vnahvry-qcalxdvhj craniotomy for resection with gamma tile placement to the falx and resection cavity 2024, postoperative visit    History of Present Illness:   Leidy Ascencio is a 64 year old right handed female with a history of a right occipital hemangiopericytoma that was initially resected at [place] by Dr. Lujan in . Pathology showed a grade 2 hemangiopericytoma. She had an inferior quadrantanopsia. She is followed with surveillance imaging and a recurrence was seen so she underwent SRS with 20 Guillen in  by Dr. Song. She had then been followed again by surveillance imaging, and more recently her hemangiopericytoma appeared to have grown much more rapidly. She was referred for management and we discussed the options for management. She recently underwent resection with gamma tile placement and is here for her postoperative visit. She's here today with her daughter, April. She feels well, though notes some overall fatigue and feeling \"weak\". She denies specific extremity weakness or paresthesias. Her headaches are minimal, controlled well with tylenol. She has not had new vision changes since surgery.     Review of Systems:   Pertinent items are noted in HPI or as in patient entered ROS below, remainder of complete ROS is negative.     Physical Exam:   /72 (BP Location: Right arm, Patient Position: Sitting)   Pulse 71   Resp 16   SpO2 98%    General: No acute distress.    Eyes: Conjunctivae are normal.  MSK: Moves all extremities.  No obvious deformity.  Neuro: The patient is fully oriented. Speech is normal. Extraocular movements are intact without nystagmus. Left inferior " quadrantanopia, stable. Facial sensation is intact in V1, V2, V3 distributions. Facial nerve function is normal, rated as a House Brackmann 1. Gait is normal.   Psych: Normal mood and affect. Behavior is normal.    Incision: Her occipital craniotomy incision is well healed. Sutures were removed today without complication.      Assessment:  Recurrent occipital hemangiopericytoma (WHO grade 2) s/p right craniotomy 4/22/2015 and SRS 1/14/2020 now s/p lv-zfrnoro-fgwrgdssl craniotomy for resection with gamma tile placement to the falx and resection cavity 9/5/2024, postoperative visit    Plan:  We reviewed wound care and follow up plans. We'll plan for her to meet with Dr. Samayoa to check in in 3 months, without imaging. She was encouraged to reach out sooner with any new concerns. I entered a PT order today to work on strength and stamina postoperatively. She has follow up with Dr. Bauer and imaging next month.        Katherine Hood PA-C  Department of Neurosurgery

## 2024-09-19 RX ORDER — FLUTICASONE PROPIONATE AND SALMETEROL 250; 50 UG/1; UG/1
1 POWDER RESPIRATORY (INHALATION) EVERY 12 HOURS
Qty: 3 EACH | Refills: 3 | Status: SHIPPED | OUTPATIENT
Start: 2024-09-19

## 2024-09-20 ENCOUNTER — THERAPY VISIT (OUTPATIENT)
Dept: PHYSICAL THERAPY | Facility: CLINIC | Age: 64
End: 2024-09-20
Attending: PHYSICIAN ASSISTANT
Payer: MEDICARE

## 2024-09-20 DIAGNOSIS — D43.3 HEMANGIOPERICYTOMA OF CNS, GRADE II (H): ICD-10-CM

## 2024-09-20 DIAGNOSIS — D48.19 HEMANGIOPERICYTOMA: Primary | ICD-10-CM

## 2024-09-20 PROCEDURE — 94618 PULMONARY STRESS TESTING: CPT | Performed by: PHYSICAL THERAPIST

## 2024-09-20 PROCEDURE — 97112 NEUROMUSCULAR REEDUCATION: CPT | Mod: GP | Performed by: PHYSICAL THERAPIST

## 2024-09-20 PROCEDURE — 97161 PT EVAL LOW COMPLEX 20 MIN: CPT | Mod: GP | Performed by: PHYSICAL THERAPIST

## 2024-09-20 NOTE — PROGRESS NOTES
PHYSICAL THERAPY EVALUATION  Type of Visit: Evaluation             Subjective       Presenting condition or subjective complaint: Weekness and wobbly after brain surgery  Pt is a 65 yo female who presents to PT for weakness and imbalance following brain surgery on 9/5/24. Pt has significant PMH of multiple other surgeries for same medical problem. Pt has been finding that she is recovering slowly with ongoing weakness, instability in gait requiring occ use of 4ww for increased stability, but no falls noted. Pt also reports significant fatigue, flako when she exerts herself more. Pt's primary goal is addressing her balance deficits.  Date of onset: 09/18/24    Past Medical History:   Diagnosis Date    Arthritis     Cancer (H)     COPD (chronic obstructive pulmonary disease) (H)     Depressive disorder     Hyperlipemia     Uncomplicated asthma       Past Surgical History:   Procedure Laterality Date    APPENDECTOMY      COLONOSCOPY      COLONOSCOPY N/A 11/29/2022    Procedure: COLONOSCOPY, BIOPSY and polypectomy;  Surgeon: Rick Zavala MD;  Location:  GI    CRANIOTOMY, USING OPTICAL TRACKING SYSTEM, FOR PLACEMENT OF GAMMATILE N/A 9/5/2024    Procedure: stealth assisted Biparieto-occipital craniotomy for tumor (Gammatile);  Surgeon: Will Samayoa MD;  Location: [facility] OR    GYN SURGERY      3 cesections    HYSTERECTOMY VAGINAL, BILATERAL SALPINGO-OOPHERECTOMY, COMBINED  1988    hysterectomy + ovaries     IR CAROTID CEREBRAL ANGIOGRAM BILATERAL  8/14/2024    LAPAROSCOPIC ABLATION LIVER TUMOR N/A 1/15/2020    Procedure: Laparoscopic ultrasound guided percutaneous microwave abalation of tumor x1; ultrasound guided Liver Biopsy x 4; Hepatic Ultrasound Intraoperatively;  Surgeon: Phan Fierro MD;  [facility]: [facility]    LAPAROSCOPY DIAGNOSTIC (GENERAL)  1/15/2020    Procedure: Laparoscopy diagnostic (general);  Surgeon: Phan Fierro MD;  Location:  OR    [facility] EXCIS INFRATENT MENINGIOMA          Prior  diagnostic imaging/testing results:       Prior therapy history for the same diagnosis, illness or injury: No      Prior Level of Function  Transfers:   Ambulation:   ADL:   IADL:     Living Environment  Social support: With a significant other or spouse   Type of home: House   Stairs to enter the home: Yes 3 Is there a railing: No     Ramp: No   Stairs inside the home: Yes 13 Is there a railing: Yes     Help at home:    Equipment owned:       Employment: Not Applicable    Hobbies/Interests:      Patient goals for therapy: Be stable    Pain assessment:      Objective      Cognitive Status Examination  Orientation:    Level of Consciousness:   Follows Commands and Answers Questions:   Personal Safety and Judgement:   Memory:     OBSERVATION:   INTEGUMENTARY:   POSTURE:   PALPATION:   RANGE OF MOTION:   STRENGTH:  Pt demos functional weakness with 5xSTS score below norms.    BED MOBILITY:     TRANSFERS:     WHEELCHAIR MOBILITY:     GAIT:   Level of [place]: WNL  Assistive Device(s): None  Gait Deviations: Stride length decreased  Lulú decreased  Toe in R  Gait Distance: 837' during 6MWT  Stairs: x10 with light use of rail, reciprocal pattern    BALANCE:     SPECIAL TESTS  Functional Gait Assessment (FGA) TOTAL SCORE: (MAXIMUM SCORE 30): 16    10 Meter Walk Test (Comfortable)     10 Meter Walk Test (Fast)     6 Minute Walk Test (6MWT)   837'     Did not require standing rest break, pt tolerating well, reports moderate effort   Lauren Balance Scale (BBS)     5 Times Sit-to-Stand (5TSTS)  18.59 sec, no UE support     Dynamic Gait Index (DGI)     Timed Up and Go (TUG) - sec    Single Leg Stance Right (sec) 3 sec, some pain noted in knee, significant ankle strategy   Single Leg Stance Left (sec) 10 sec, significant work noted in the ankle   Modified CTSIB Conditions (sec) Cond 1:   Cond 2:   Cond 4:   Cond 5 :    Romberg  (sec)    Sharpened Romberg (sec)    30 Second Sit to Stand (reps/height)    Mini-BESTest               SENSATION:     REFLEXES:   COORDINATION:   MUSCLE TONE:         Assessment & Plan   CLINICAL IMPRESSIONS  Medical Diagnosis: Hemangiopericytoma of CNS, grade II (D43.3)    Treatment Diagnosis: Weakness, impaired balance and mobility   Impression/Assessment:  Pt presents to PT with weakness in B LE, impaired balance putting her at risk of falls and injury requiring increased skilled care, and reduced activity tolerance limiting her participation in home and community activities. Pt would benefit from skilled PT interventions in order to address her weakness, impaired balance, and reduced activity tolerance so she may return to greater level of safe activity and greater participation in home and community activities for best enjoyment of life.    Clinical Decision Making (Complexity):  Clinical Presentation: Stable/Uncomplicated  Clinical Presentation Rationale: based on medical and personal factors listed in PT evaluation  Clinical Decision Making (Complexity): Low complexity    PLAN OF CARE  Treatment Interventions:  Interventions: Gait Training, Manual Therapy, Neuromuscular Re-education, Therapeutic Activity, Therapeutic Exercise    Long Term Goals     PT Goal 1  Goal Identifier: HEP  Goal Description: Pt will demo independence in performance and progression of strengthening and balance HEP in order to improve CLOF.  Target Date:  (Ongoing, updates as needed')  PT Goal 2  Goal Identifier: FGA  Goal Description: Pt will demo improved balance and mobility as shown by increased FGA score of at least 4 points in order to show significant improvement and reduced falls risk.  Goal Progress: 16/30 (eval)  Target Date: 11/15/24  PT Goal 3  Goal Identifier: Activity tolerance  Goal Description: Pt will demo improved confidence in gait and tolerance to community distances as shown by improved 6MWT score of at least 1037' with pt reporting mild effort for greater independence in and return to community  activities.  Goal Progress: Pt renaldo reduced confidence on her feet, 837' during 6MWT (eval)  Target Date: 11/15/24      Frequency of Treatment: 1x/week  Duration of Treatment: 8 weeks    Recommended Referrals to Other Professionals:   Education Assessment:   Learner/Method: Patient;Listening;Demonstration    Risks and benefits of evaluation/treatment have been explained.   Patient/Family/caregiver agrees with Plan of Care.     Evaluation Time:     PT Nida, Low Complexity Minutes (63381): 30       Signing Clinician: TIM Ybarra [facility]                                                                                   OUTPATIENT PHYSICAL THERAPY      PLAN OF TREATMENT FOR OUTPATIENT REHABILITATION   Patient's Last Name, First Name, Leidy Aldana YOB: 1960   Provider's Name   [facility]   Medical Record No.  [id number]     Onset Date: 09/18/24  Start of Care Date: 09/20/24     Medical Diagnosis:  Hemangiopericytoma of CNS, grade II (D43.3)      PT Treatment Diagnosis:  Weakness, impaired balance and mobility Plan of Treatment  Frequency/Duration: 1x/week/ 8 weeks    Certification date from 09/20/24 to 11/15/24         See note for plan of treatment details and functional goals     Gabe Zuniga, PT                         I CERTIFY THE NEED FOR THESE SERVICES FURNISHED UNDER        THIS PLAN OF TREATMENT AND WHILE UNDER MY CARE     (Physician attestation of this document indicates review and certification of the therapy plan).              Referring Provider:  Katherine Hood PA-C    Initial Assessment  See Epic Evaluation- Start of Care Date: 09/20/24

## 2024-09-25 ENCOUNTER — TELEPHONE (OUTPATIENT)
Dept: NEUROSURGERY | Facility: CLINIC | Age: 64
End: 2024-09-25
Payer: MEDICARE

## 2024-09-25 NOTE — TELEPHONE ENCOUNTER
Informed Elizabeth that pt needs to wait 6 weeks post-op for any dental cleanings/procedures. Pt is only 3 weeks post op currently.

## 2024-09-25 NOTE — TELEPHONE ENCOUNTER
[facility] Call Center    Phone Message    May a detailed message be left on voicemail: yes     Reason for Call: Other: Elizabeth from [facility] is calling and asking for a call back to discuss Pt care at the dental office if Pt is to get any type of procedures done or local anesthetics. Please call Ilana back to discuss.     Action Taken: Message routed to:  [facility] ([facility] Neurosurgery    Travel Screening: Not Applicable     Date of Service:

## 2024-10-04 NOTE — PROCEDURES
[facility]  Department of Radiation Oncology  [facility] [address] [place] [address]     Radiotherapy Treatment Summary          Date of Report:  2024             PATIENT: BENTON ZALDIVAR  MEDICAL RECORD NO: [id number]    : 1960     DIAGNOSIS: C72.8 00 Brain and CNS, overlapping lesion     INTENT OF RADIOTHERAPY: Cure            Details of the treatments summarized below are found in records kept in the Department of Radiation Oncology @ [facility].     Treatment Summary:  Radiation Oncology - Course: 2 Protocol:   Treatment Site Current Dose Modality Implant Date  Elapsed Days Fx.  2 SupSS  Cesium-131  2024   Permanent 1                Dose per Fraction: 6000 cGy   Total Dose:   6000 cGy     COMMENTS: Benton Zaldivar underwent a permanent radioactive brain implant by Dr. Will Samayoa for Brain and CNS, overlapping lesion.  A total of 4 GammaTiles were implanted (16 Cs-  131 seeds, 3.5U per seed).  The patient tolerated the treatment well and will be followed by Dr. Samayoa and Dr. Bauer.            PAIN MANAGEMENT: Patient's pain management was per inpatient team.        Attending  Physician  Izabel Bauer M.D. PhD     CC:   Will Samayoa MD PhD

## 2024-10-08 ENCOUNTER — PATIENT OUTREACH (OUTPATIENT)
Dept: CARE COORDINATION | Facility: CLINIC | Age: 64
End: 2024-10-08
Payer: COMMERCIAL

## 2024-10-14 NOTE — PROGRESS NOTES
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 She underwent subtotal re resection on 2024 with intraoperative gamma tile placement. A total of 4 tiles were placed for a prescribed dose of 6,000 cGy.     The patient  was last seen on 2024 and presents today for 6 week post-radiation follow up evaluation.      Interval History:     Last MRI brain with and without contrast on 10/17/24:  Impression:  1. Postoperative changes of right parietal-occipital craniotomy for  mass resection with gamma tile placement. Resolved posttreatment dural  thickening and enhancement along the resection site. Scattered  resorbing postsurgical right-sided subdural hemorrhages.  2. No suspicious intracranial findings.    Today, she specifically reports feeling well overall aside from headaches.  She describes headaches that are located in the occipital region and are present every day.  They feel similar to headaches she experienced in the months after prior radiation.  The worst they are 7/10 but improved to 2/10 with Tylenol.     Brain ROS:  Headaches-   as described above  Seizures- Denies  Nausea/vomiting-  Denies  Changes in cognition/behavior-  Denies  Speech-  Denies  Vision/hearing changes-stable left inferior quadrantanopia  Gait symptoms or imbalance-  Denies  Other focal neuro deficits-  Denies    Review of Systems:  Denies additional symptoms related to current diagnosis.      Current systemic therapy: None  Anti-epileptic: None  Steroids: None    Current Outpatient Medications   Medication Sig Dispense Refill     Acetaminophen (TYLENOL 8 HOUR ARTHRITIS PAIN PO) Take by mouth as needed.      albuterol (PROAIR HFA/PROVENTIL HFA/VENTOLIN HFA) 108 (90 Base) MCG/ACT inhaler Inhale 2 puffs into the lungs every 6 hours 18 g 1    azelastine (ASTELIN) 0.1 % nasal spray Spray 1 spray into both nostrils 2 times daily (Patient taking differently: Spray 1 spray into both nostrils 2 times daily as needed.) 30 mL 8    fluticasone-salmeterol (ADVAIR) 250-50 MCG/ACT inhaler Inhale 1 puff into the lungs every 12 hours. 3 each 3    Krill Oil 300 MG CAPS Take by mouth as needed.      loratadine (CLARITIN) 10 MG tablet Take 10 mg by mouth as needed.      montelukast (SINGULAIR) 10 MG tablet Take 1 tablet (10 mg) by mouth at bedtime for 360 days (Patient not taking: Reported on 10/17/2024) 90 tablet 3    rosuvastatin (CRESTOR) 10 MG tablet Take 1 tablet (10 mg) by mouth daily (Patient taking differently: Take 10 mg by mouth every morning.) 90 tablet 0    sertraline (ZOLOFT) 100 MG tablet Take 2 tablets (200 mg) by mouth daily (Patient taking differently: Take 200 mg by mouth every morning.) 60 tablet 5    triamcinolone (KENALOG) 0.1 % external cream Apply topically 2 times daily 80 g 0    Vitamin D3 (VITAMIN D, CHOLECALCIFEROL,) 25 mcg (1000 units) tablet Take 25 mcg by mouth daily.       No current facility-administered medications for this visit.        Allergies   Allergen Reactions    Other Drug Allergy (See Comments) Hives     Paxlovid     Atorvastatin     Penicillins     Sulfa Antibiotics     Sulfamethoxazole-Trimethoprim Rash       Physical Exam:      ECO  KPS: 90    BP (!) 158/82   Pulse 96   Wt 86.6 kg (191 lb)   SpO2 96%   BMI 34.93 kg/m    General: NAD, patient well appearing.  HEENT: Oropharynx is clear without erythema or exudates.   Extremities: No acute findings. No edema   Skin: color, texture and turgor wnl. No rashes and lesions. Incision well healed   Neuro:   MS: AAO x 3, appropriately interactive, normal affect,  speech fluent, moderately firm right neck musculature   CN: CN2-12 grossly intact, no focal neurological deficits noted.      Gait: Natural gait intact.    Last CBC with Diff  WBC Count   Date Value Ref Range Status   09/09/2024 7.6 4.0 - 11.0 10e3/uL Final     RBC Count   Date Value Ref Range Status   09/09/2024 2.96 (L) 3.80 - 5.20 10e6/uL Final     Hemoglobin   Date Value Ref Range Status   09/09/2024 8.6 (L) 11.7 - 15.7 g/dL Final     Hematocrit   Date Value Ref Range Status   09/09/2024 26.5 (L) 35.0 - 47.0 % Final     MCV   Date Value Ref Range Status   09/09/2024 90 78 - 100 fL Final     MCH   Date Value Ref Range Status   09/09/2024 29.1 26.5 - 33.0 pg Final     MCHC   Date Value Ref Range Status   09/09/2024 32.5 31.5 - 36.5 g/dL Final     RDW   Date Value Ref Range Status   09/09/2024 13.4 10.0 - 15.0 % Final     Platelet Count   Date Value Ref Range Status   09/09/2024 226 150 - 450 10e3/uL Final        Last Comprehensive Metabolic Panel:  Sodium   Date Value Ref Range Status   09/09/2024 137 135 - 145 mmol/L Final   03/01/2021 139 133 - 144 mmol/L Final     Potassium   Date Value Ref Range Status   09/09/2024 4.4 3.4 - 5.3 mmol/L Final   08/25/2022 3.9 3.4 - 5.3 mmol/L Final   03/01/2021 3.9 3.4 - 5.3 mmol/L Final     Potassium POCT   Date Value Ref Range Status   09/05/2024 3.8 3.4 - 5.3 mmol/L Final     Comment:     CRITICAL VALUES NOTED AND REPORTED TO MD     Chloride   Date Value Ref Range Status   09/09/2024 101 98 - 107 mmol/L Final   08/25/2022 106 94 - 109 mmol/L Final   03/01/2021 106 94 - 109 mmol/L Final     Carbon Dioxide   Date Value Ref Range Status   03/01/2021 29 20 - 32 mmol/L Final     Carbon Dioxide (CO2)   Date Value Ref Range Status   09/09/2024 27 22 - 29 mmol/L Final   08/25/2022 29 20 - 32 mmol/L Final     Anion Gap   Date Value Ref Range Status   09/09/2024 9 7 - 15 mmol/L Final   08/25/2022 4 3 - 14 mmol/L Final   03/01/2021 4 3 - 14 mmol/L Final     Glucose   Date Value Ref  Range Status   09/09/2024 114 (H) 70 - 99 mg/dL Final   08/25/2022 117 (H) 70 - 99 mg/dL Final   03/01/2021 117 (H) 70 - 99 mg/dL Final     Comment:     Fasting specimen     GLUCOSE BY METER POCT   Date Value Ref Range Status   09/07/2024 124 (H) 70 - 99 mg/dL Final     Urea Nitrogen   Date Value Ref Range Status   09/09/2024 15.0 8.0 - 23.0 mg/dL Final   08/25/2022 19 7 - 30 mg/dL Final   03/01/2021 18 7 - 30 mg/dL Final     Creatinine   Date Value Ref Range Status   09/09/2024 0.62 0.51 - 0.95 mg/dL Final   03/01/2021 0.72 0.52 - 1.04 mg/dL Final     GFR Estimate   Date Value Ref Range Status   09/09/2024 >90 >60 mL/min/1.73m2 Final     Comment:     eGFR calculated using 2021 CKD-EPI equation.   03/01/2021 89 >60 mL/min/[1.73_m2] Final     Comment:     Non  GFR Calc  Starting 12/18/2018, serum creatinine based estimated GFR (eGFR) will be   calculated using the Chronic Kidney Disease Epidemiology Collaboration   (CKD-EPI) equation.       Calcium   Date Value Ref Range Status   09/09/2024 8.9 8.8 - 10.4 mg/dL Final     Comment:     Reference intervals for this test were updated on 7/16/2024 to reflect our healthy population more accurately. There may be differences in the flagging of prior results with similar values performed with this method. Those prior results can be interpreted in the context of the updated reference intervals.   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 She underwent subtotal reresection on 9/05/2024 with intraoperative gamma tile placement. A total of 4 tiles were placed for a prescribed dose of 6,000 cGy.     The patient presents without evidence of disease recurrence.  She does report worsening headaches similar to  those that she had after her prior radiation course.  Previously these headaches improved with time.  MRI of the brain showed no concerning findings or changes and she has no new or worsening neurological deficits, beyond headache.  She feels the headaches are well-controlled with Tylenol.  Will continue to monitor her symptoms she was instructed to reach out if her headaches worsen or she begins to develop worrisome features.    1)  Follow up with our team with MRI brain with and without contrast prior in 2 months   2) follow up with Dr. Will Samayoa's team - recommended 3 months post op      All the patient's questions were answered to verbalized satisfaction. We instructed the patient to call with any questions or concerns in the interim.      Ambrose Cruz MD  PGY2  Department of Radiation Oncology  [facility]     A medical resident participated in the care of this patient and in the preparation of this note. I have verified and edited this note. I personally performed key elements of the physical exam and medical decision making for this patient.  I agree with the assessment and plan of care as documented in the note above.      The overall time I spent on direct patient care including self review of records, labs, and radiographic studies, as well as, direct face-to-face interaction with the patient and coordination of care with other providers was 30 minutes.        Izabel Bauer MD, PhD     Department of Radiation Oncology  [facility]

## 2024-10-17 ENCOUNTER — OFFICE VISIT (OUTPATIENT)
Dept: RADIATION ONCOLOGY | Facility: CLINIC | Age: 64
End: 2024-10-17
Attending: PSYCHIATRY & NEUROLOGY
Payer: MEDICARE

## 2024-10-17 ENCOUNTER — HOSPITAL ENCOUNTER (OUTPATIENT)
Dept: MRI IMAGING | Facility: CLINIC | Age: 64
Discharge: HOME | End: 2024-10-17
Attending: STUDENT IN AN ORGANIZED HEALTH CARE EDUCATION/TRAINING PROGRAM | Admitting: STUDENT IN AN ORGANIZED HEALTH CARE EDUCATION/TRAINING PROGRAM
Payer: MEDICARE

## 2024-10-17 VITALS
BODY MASS INDEX: 34.93 KG/M2 | WEIGHT: 191 LBS | OXYGEN SATURATION: 96 % | SYSTOLIC BLOOD PRESSURE: 158 MMHG | DIASTOLIC BLOOD PRESSURE: 82 MMHG | HEART RATE: 96 BPM

## 2024-10-17 DIAGNOSIS — D43.3 HEMANGIOPERICYTOMA OF CNS, GRADE II (H): Primary | ICD-10-CM

## 2024-10-17 DIAGNOSIS — D43.3 HEMANGIOPERICYTOMA OF CNS, GRADE II (H): ICD-10-CM

## 2024-10-17 PROCEDURE — A9585 GADOBUTROL INJECTION: HCPCS

## 2024-10-17 PROCEDURE — 70553 MRI BRAIN STEM W/O & W/DYE: CPT

## 2024-10-17 PROCEDURE — G0463 HOSPITAL OUTPT CLINIC VISIT: HCPCS | Performed by: STUDENT IN AN ORGANIZED HEALTH CARE EDUCATION/TRAINING PROGRAM

## 2024-10-17 PROCEDURE — 99024 POSTOP FOLLOW-UP VISIT: CPT | Mod: GC | Performed by: STUDENT IN AN ORGANIZED HEALTH CARE EDUCATION/TRAINING PROGRAM

## 2024-10-17 PROCEDURE — 255N000002 HC RX 255 OP 636

## 2024-10-17 PROCEDURE — 70553 MRI BRAIN STEM W/O & W/DYE: CPT | Mod: 26 | Performed by: STUDENT IN AN ORGANIZED HEALTH CARE EDUCATION/TRAINING PROGRAM

## 2024-10-17 RX ORDER — GADOBUTROL 604.72 MG/ML
8.5 INJECTION INTRAVENOUS ONCE
Status: COMPLETED | OUTPATIENT
Start: 2024-10-17 | End: 2024-10-17

## 2024-10-17 RX ADMIN — GADOBUTROL 8.5 ML: 604.72 INJECTION INTRAVENOUS at 12:18

## 2024-10-17 NOTE — LETTER
10/17/2024      Leidy Ascencio  [address] [place] [address]      Dear Colleague,    Thank you for referring your patient, Leidy Ascencio, to the [facility] RADIATION ONCOLOGY. Please see a copy of my visit note below.    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 She underwent subtotal re resection on 2024 with intraoperative gamma tile placement. A total of 4 tiles were placed for a prescribed dose of 6,000 cGy.     The patient  was last seen on 2024 and presents today for 6 week post-radiation follow up evaluation.      Interval History:     Last MRI brain with and without contrast on 10/17/24:  Impression:  1. Postoperative changes of right parietal-occipital craniotomy for  mass resection with gamma tile placement. Resolved posttreatment dural  thickening and enhancement along the resection site. Scattered  resorbing postsurgical right-sided subdural hemorrhages.  2. No suspicious intracranial findings.    Today, she specifically reports feeling well overall aside from headaches.  She describes headaches that are located in the occipital region and are present every day.  They feel similar to headaches she experienced in the months after prior radiation.  The worst they are 7/10 but improved to 2/10 with Tylenol.     Brain ROS:  Headaches-   as described above  Seizures- Denies  Nausea/vomiting-  Denies  Changes in cognition/behavior-  Denies  Speech-  Denies  Vision/hearing changes-stable left inferior quadrantanopia  Gait symptoms or imbalance-  Denies  Other focal  neuro deficits-  Denies    Review of Systems:  Denies additional symptoms related to current diagnosis.      Current systemic therapy: None  Anti-epileptic: None  Steroids: None    Current Outpatient Medications   Medication Sig Dispense Refill     Acetaminophen (TYLENOL 8 HOUR ARTHRITIS PAIN PO) Take by mouth as needed.       albuterol (PROAIR HFA/PROVENTIL HFA/VENTOLIN HFA) 108 (90 Base) MCG/ACT inhaler Inhale 2 puffs into the lungs every 6 hours 18 g 1     azelastine (ASTELIN) 0.1 % nasal spray Spray 1 spray into both nostrils 2 times daily (Patient taking differently: Spray 1 spray into both nostrils 2 times daily as needed.) 30 mL 8     fluticasone-salmeterol (ADVAIR) 250-50 MCG/ACT inhaler Inhale 1 puff into the lungs every 12 hours. 3 each 3     Krill Oil 300 MG CAPS Take by mouth as needed.       loratadine (CLARITIN) 10 MG tablet Take 10 mg by mouth as needed.       montelukast (SINGULAIR) 10 MG tablet Take 1 tablet (10 mg) by mouth at bedtime for 360 days (Patient not taking: Reported on 10/17/2024) 90 tablet 3     rosuvastatin (CRESTOR) 10 MG tablet Take 1 tablet (10 mg) by mouth daily (Patient taking differently: Take 10 mg by mouth every morning.) 90 tablet 0     sertraline (ZOLOFT) 100 MG tablet Take 2 tablets (200 mg) by mouth daily (Patient taking differently: Take 200 mg by mouth every morning.) 60 tablet 5     triamcinolone (KENALOG) 0.1 % external cream Apply topically 2 times daily 80 g 0     Vitamin D3 (VITAMIN D, CHOLECALCIFEROL,) 25 mcg (1000 units) tablet Take 25 mcg by mouth daily.       No current facility-administered medications for this visit.        Allergies   Allergen Reactions     Other Drug Allergy (See Comments) Hives     Paxlovid      Atorvastatin      Penicillins      Sulfa Antibiotics      Sulfamethoxazole-Trimethoprim Rash       Physical Exam:      ECO  KPS: 90    BP (!) 158/82   Pulse 96   Wt 86.6 kg (191 lb)   SpO2 96%   BMI 34.93 kg/m    General: NAD, patient well  appearing.  HEENT: Oropharynx is clear without erythema or exudates.   Extremities: No acute findings. No edema   Skin: color, texture and turgor wnl. No rashes and lesions. Incision well healed   Neuro:   MS: AAO x 3, appropriately interactive, normal affect, speech fluent, moderately firm right neck musculature   CN: CN2-12 grossly intact, no focal neurological deficits noted.      Gait: Natural gait intact.    Last CBC with Diff  WBC Count   Date Value Ref Range Status   09/09/2024 7.6 4.0 - 11.0 10e3/uL Final     RBC Count   Date Value Ref Range Status   09/09/2024 2.96 (L) 3.80 - 5.20 10e6/uL Final     Hemoglobin   Date Value Ref Range Status   09/09/2024 8.6 (L) 11.7 - 15.7 g/dL Final     Hematocrit   Date Value Ref Range Status   09/09/2024 26.5 (L) 35.0 - 47.0 % Final     MCV   Date Value Ref Range Status   09/09/2024 90 78 - 100 fL Final     MCH   Date Value Ref Range Status   09/09/2024 29.1 26.5 - 33.0 pg Final     MCHC   Date Value Ref Range Status   09/09/2024 32.5 31.5 - 36.5 g/dL Final     RDW   Date Value Ref Range Status   09/09/2024 13.4 10.0 - 15.0 % Final     Platelet Count   Date Value Ref Range Status   09/09/2024 226 150 - 450 10e3/uL Final        Last Comprehensive Metabolic Panel:  Sodium   Date Value Ref Range Status   09/09/2024 137 135 - 145 mmol/L Final   03/01/2021 139 133 - 144 mmol/L Final     Potassium   Date Value Ref Range Status   09/09/2024 4.4 3.4 - 5.3 mmol/L Final   08/25/2022 3.9 3.4 - 5.3 mmol/L Final   03/01/2021 3.9 3.4 - 5.3 mmol/L Final     Potassium POCT   Date Value Ref Range Status   09/05/2024 3.8 3.4 - 5.3 mmol/L Final     Comment:     CRITICAL VALUES NOTED AND REPORTED TO MD     Chloride   Date Value Ref Range Status   09/09/2024 101 98 - 107 mmol/L Final   08/25/2022 106 94 - 109 mmol/L Final   03/01/2021 106 94 - 109 mmol/L Final     Carbon Dioxide   Date Value Ref Range Status   03/01/2021 29 20 - 32 mmol/L Final     Carbon Dioxide (CO2)   Date Value Ref Range  Status   09/09/2024 27 22 - 29 mmol/L Final   08/25/2022 29 20 - 32 mmol/L Final     Anion Gap   Date Value Ref Range Status   09/09/2024 9 7 - 15 mmol/L Final   08/25/2022 4 3 - 14 mmol/L Final   03/01/2021 4 3 - 14 mmol/L Final     Glucose   Date Value Ref Range Status   09/09/2024 114 (H) 70 - 99 mg/dL Final   08/25/2022 117 (H) 70 - 99 mg/dL Final   03/01/2021 117 (H) 70 - 99 mg/dL Final     Comment:     Fasting specimen     GLUCOSE BY METER POCT   Date Value Ref Range Status   09/07/2024 124 (H) 70 - 99 mg/dL Final     Urea Nitrogen   Date Value Ref Range Status   09/09/2024 15.0 8.0 - 23.0 mg/dL Final   08/25/2022 19 7 - 30 mg/dL Final   03/01/2021 18 7 - 30 mg/dL Final     Creatinine   Date Value Ref Range Status   09/09/2024 0.62 0.51 - 0.95 mg/dL Final   03/01/2021 0.72 0.52 - 1.04 mg/dL Final     GFR Estimate   Date Value Ref Range Status   09/09/2024 >90 >60 mL/min/1.73m2 Final     Comment:     eGFR calculated using 2021 CKD-EPI equation.   03/01/2021 89 >60 mL/min/[1.73_m2] Final     Comment:     Non  GFR Calc  Starting 12/18/2018, serum creatinine based estimated GFR (eGFR) will be   calculated using the Chronic Kidney Disease Epidemiology Collaboration   (CKD-EPI) equation.       Calcium   Date Value Ref Range Status   09/09/2024 8.9 8.8 - 10.4 mg/dL Final     Comment:     Reference intervals for this test were updated on 7/16/2024 to reflect our healthy population more accurately. There may be differences in the flagging of prior results with similar values performed with this method. Those prior results can be interpreted in the context of the updated reference intervals.   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 She  underwent subtotal reresection on 9/05/2024 with intraoperative gamma tile placement. A total of 4 tiles were placed for a prescribed dose of 6,000 cGy.     The patient presents without evidence of disease recurrence.  She does report worsening headaches similar to those that she had after her prior radiation course.  Previously these headaches improved with time.  MRI of the brain showed no concerning findings or changes and she has no new or worsening neurological deficits, beyond headache.  She feels the headaches are well-controlled with Tylenol.  Will continue to monitor her symptoms she was instructed to reach out if her headaches worsen or she begins to develop worrisome features.    1)  Follow up with our team with MRI brain with and without contrast prior in 2 months   2) follow up with Dr. Will Smaayoa's team - recommended 3 months post op      All the patient's questions were answered to verbalized satisfaction. We instructed the patient to call with any questions or concerns in the interim.      Ambrose Cruz MD  PGY2  Department of Radiation Oncology  [facility]     A medical resident participated in the care of this patient and in the preparation of this note. I have verified and edited this note. I personally performed key elements of the physical exam and medical decision making for this patient.  I agree with the assessment and plan of care as documented in the note above.      The overall time I spent on direct patient care including self review of records, labs, and radiographic studies, as well as, direct face-to-face interaction with the patient and coordination of care with other providers was 30 minutes.        Izabel Bauer MD, PhD     Department of Radiation Oncology  [facility]       Oncology Rooming Note    October 17, 2024 3:13 PM   Leidy Ascencio is a 64 year old female who presents for:    Chief Complaint  "  Patient presents with     Cancer     Radiation follow up post gamma tile     Initial Vitals: BP (!) 158/82   Pulse 96   Wt 86.6 kg (191 lb)   SpO2 96%   BMI 34.93 kg/m   Estimated body mass index is 34.93 kg/m  as calculated from the following:    Height as of 24: 1.575 m (5' 2\").    Weight as of this encounter: 86.6 kg (191 lb). Body surface area is 1.95 meters squared.  Data Unavailable Comment: Data Unavailable   No LMP recorded. Patient has had a hysterectomy.  Allergies reviewed: Yes  Medications reviewed: Yes    Medications: Medication refills not needed today.  Pharmacy name entered into [employer]:    [facility] [place] - [address] [place] [address] [facility] [place], [facility], [place] - [address]    Frailty Screening:   Is the patient here for a new oncology consult visit in cancer care? 2. No      Clinical concerns: Here to followe up post MRI/ gamma tile          Shanelle Joy RN                FOLLOW-UP VISIT    Patient Name: Leidy Ascencio      : 1960     Age: 64 year old        ______________________________________________________________________________     Chief Complaint   Patient presents with     Cancer     Radiation follow up post gamma tile     BP (!) 158/82   Pulse 96   Wt 86.6 kg (191 lb)   SpO2 96%   BMI 34.93 kg/m       Date Radiation Completed: Gamma tile placement 24    Pain  Daily HA, Tylenol or Advil, only some relief.  Worst pain 7/10 down to 2/10 but is a constant pain. Head and neck.    Labs  Other Labs: No    Imaging  MRI: Brain today    Other Appointments: No    Residual Radiation side effect: Pt feel she has bump along the incision that is tender. Energy still coming back, walking a lot and balance is better. No visual complaints, no speach issues.     Additional Instructions: Here to discuss MRI results                Again, thank you for allowing me to participate in the care of your patient.  "       Sincerely,        Izabel Bauer MD PhD

## 2024-10-17 NOTE — PROGRESS NOTES
FOLLOW-UP VISIT    Patient Name: Leidy Ascencio      : 1960     Age: 64 year old        ______________________________________________________________________________     Chief Complaint   Patient presents with    Cancer     Radiation follow up post gamma tile     BP (!) 158/82   Pulse 96   Wt 86.6 kg (191 lb)   SpO2 96%   BMI 34.93 kg/m       Date Radiation Completed: Gamma tile placement 24    Pain  Daily HA, Tylenol or Advil, only some relief.  Worst pain 7/10 down to 2/10 but is a constant pain. Head and neck.    Labs  Other Labs: No    Imaging  MRI: Brain today    Other Appointments: No    Residual Radiation side effect: Pt feel she has bump along the incision that is tender. Energy still coming back, walking a lot and balance is better. No visual complaints, no speach issues.     Additional Instructions: Here to discuss MRI results

## 2024-10-17 NOTE — PROGRESS NOTES
"Oncology Rooming Note    October 17, 2024 3:13 PM   Leidy Ascencio is a 64 year old female who presents for:    Chief Complaint   Patient presents with    Cancer     Radiation follow up post gamma tile     Initial Vitals: BP (!) 158/82   Pulse 96   Wt 86.6 kg (191 lb)   SpO2 96%   BMI 34.93 kg/m   Estimated body mass index is 34.93 kg/m  as calculated from the following:    Height as of 9/5/24: 1.575 m (5' 2\").    Weight as of this encounter: 86.6 kg (191 lb). Body surface area is 1.95 meters squared.  Data Unavailable Comment: Data Unavailable   No LMP recorded. Patient has had a hysterectomy.  Allergies reviewed: Yes  Medications reviewed: Yes    Medications: Medication refills not needed today.  Pharmacy name entered into [employer]:    [facility] - [place] - [address] [address] [facility] - [place] - [address]    Frailty Screening:   Is the patient here for a new oncology consult visit in cancer care? 2. No      Clinical concerns: Here to followe up post MRI/ gamma tile          Shanelle Joy RN              " No

## 2024-10-18 ENCOUNTER — TELEPHONE (OUTPATIENT)
Dept: NEUROSURGERY | Facility: CLINIC | Age: 64
End: 2024-10-18
Payer: COMMERCIAL

## 2024-10-18 NOTE — TELEPHONE ENCOUNTER
Left Voicemail (1st Attempt) and Sent Mychart (1st Attempt) for the patient to call back and schedule the following:    Appointment type: Return tumor  Provider: Katherine Hood or Dr Samayoa  Return date: 3 months  Specialty phone number: [phone]  Additional appointment(s) needed: no imaging  Additonal Notes: Sched on a Wednesday

## 2024-10-22 ENCOUNTER — PATIENT OUTREACH (OUTPATIENT)
Dept: CARE COORDINATION | Facility: CLINIC | Age: 64
End: 2024-10-22
Payer: COMMERCIAL

## 2024-10-22 ENCOUNTER — THERAPY VISIT (OUTPATIENT)
Dept: PHYSICAL THERAPY | Facility: CLINIC | Age: 64
End: 2024-10-22
Attending: PHYSICIAN ASSISTANT
Payer: MEDICARE

## 2024-10-22 DIAGNOSIS — D48.19 HEMANGIOPERICYTOMA: Primary | ICD-10-CM

## 2024-10-22 PROCEDURE — 97112 NEUROMUSCULAR REEDUCATION: CPT | Mod: GP | Performed by: PHYSICAL THERAPIST

## 2024-10-22 PROCEDURE — 97110 THERAPEUTIC EXERCISES: CPT | Mod: GP | Performed by: PHYSICAL THERAPIST

## 2024-10-29 ENCOUNTER — THERAPY VISIT (OUTPATIENT)
Dept: PHYSICAL THERAPY | Facility: CLINIC | Age: 64
End: 2024-10-29
Attending: PHYSICIAN ASSISTANT
Payer: MEDICARE

## 2024-10-29 DIAGNOSIS — D48.19 HEMANGIOPERICYTOMA: Primary | ICD-10-CM

## 2024-10-29 PROCEDURE — 97112 NEUROMUSCULAR REEDUCATION: CPT | Mod: GP | Performed by: PHYSICAL THERAPIST

## 2024-10-29 PROCEDURE — 97110 THERAPEUTIC EXERCISES: CPT | Mod: GP | Performed by: PHYSICAL THERAPIST

## 2024-11-01 ENCOUNTER — TELEPHONE (OUTPATIENT)
Dept: NEUROSURGERY | Facility: CLINIC | Age: 64
End: 2024-11-01
Payer: COMMERCIAL

## 2024-11-01 NOTE — TELEPHONE ENCOUNTER
[facility] Call [facility]    Phone Message    May a detailed message be left on voicemail: yes     Reason for Call: Other: Patient calling stating she had surgery on Sept 4th with Dr. Samayoa.  She has a bulge at the site and it is soft.  Wanting to know if this is normal.  Please call her back to advise.      Action Taken: Message routed to:  [facility] ([facility] Neurosurgery     Travel Screening: Not Applicable     Date of Service:

## 2024-11-06 ENCOUNTER — OFFICE VISIT (OUTPATIENT)
Dept: PULMONOLOGY | Facility: CLINIC | Age: 64
End: 2024-11-06
Attending: INTERNAL MEDICINE
Payer: MEDICARE

## 2024-11-06 ENCOUNTER — OFFICE VISIT (OUTPATIENT)
Dept: NEUROSURGERY | Facility: CLINIC | Age: 64
End: 2024-11-06
Payer: MEDICARE

## 2024-11-06 VITALS
SYSTOLIC BLOOD PRESSURE: 123 MMHG | RESPIRATION RATE: 16 BRPM | DIASTOLIC BLOOD PRESSURE: 78 MMHG | OXYGEN SATURATION: 93 % | HEART RATE: 92 BPM

## 2024-11-06 VITALS
HEART RATE: 76 BPM | BODY MASS INDEX: 35.12 KG/M2 | WEIGHT: 192 LBS | SYSTOLIC BLOOD PRESSURE: 117 MMHG | OXYGEN SATURATION: 97 % | DIASTOLIC BLOOD PRESSURE: 79 MMHG

## 2024-11-06 DIAGNOSIS — J45.20 MILD INTERMITTENT ASTHMA WITHOUT COMPLICATION: ICD-10-CM

## 2024-11-06 DIAGNOSIS — Z48.89 ENCOUNTER FOR POST SURGICAL WOUND CHECK: ICD-10-CM

## 2024-11-06 DIAGNOSIS — D43.3 HEMANGIOPERICYTOMA OF CNS, GRADE II (H): Primary | ICD-10-CM

## 2024-11-06 PROCEDURE — 99215 OFFICE O/P EST HI 40 MIN: CPT | Performed by: INTERNAL MEDICINE

## 2024-11-06 PROCEDURE — 99024 POSTOP FOLLOW-UP VISIT: CPT | Performed by: PHYSICIAN ASSISTANT

## 2024-11-06 PROCEDURE — G2211 COMPLEX E/M VISIT ADD ON: HCPCS | Performed by: INTERNAL MEDICINE

## 2024-11-06 RX ORDER — ALBUTEROL SULFATE 90 UG/1
2 INHALANT RESPIRATORY (INHALATION) EVERY 6 HOURS
Qty: 18 G | Refills: 11 | Status: SHIPPED | OUTPATIENT
Start: 2024-11-06

## 2024-11-06 ASSESSMENT — ASTHMA QUESTIONNAIRES
ACT_TOTALSCORE: 22
ACUTE_EXACERBATION_TODAY: NO
QUESTION_3 LAST FOUR WEEKS HOW OFTEN DID YOUR ASTHMA SYMPTOMS (WHEEZING, COUGHING, SHORTNESS OF BREATH, CHEST TIGHTNESS OR PAIN) WAKE YOU UP AT NIGHT OR EARLIER THAN USUAL IN THE MORNING: NOT AT ALL
QUESTION_5 LAST FOUR WEEKS HOW WOULD YOU RATE YOUR ASTHMA CONTROL: WELL CONTROLLED
ACT_TOTALSCORE: 22
QUESTION_1 LAST FOUR WEEKS HOW MUCH OF THE TIME DID YOUR ASTHMA KEEP YOU FROM GETTING AS MUCH DONE AT WORK, SCHOOL OR AT HOME: NONE OF THE TIME
QUESTION_2 LAST FOUR WEEKS HOW OFTEN HAVE YOU HAD SHORTNESS OF BREATH: ONCE OR TWICE A WEEK
QUESTION_4 LAST FOUR WEEKS HOW OFTEN HAVE YOU USED YOUR RESCUE INHALER OR NEBULIZER MEDICATION (SUCH AS ALBUTEROL): ONCE A WEEK OR LESS

## 2024-11-06 ASSESSMENT — PAIN SCALES - GENERAL
PAINLEVEL_OUTOF10: NO PAIN (0)
PAINLEVEL_OUTOF10: NO PAIN (0)

## 2024-11-06 NOTE — PROGRESS NOTES
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 She recently underwent a craniotomy with gamma tile placement and notified us of a soft fluid collection near her incision site, so we brought her in today for a wound check. She notes that the fluid collection has been present for a couple of weeks. It fluctuates in size, today it is pretty small. It's not painful. She denies new fever, chills, or drainage from the site.      Physical Exam:   /78 (BP Location: Left arm, Patient Position: Sitting)   Pulse 92   Resp 16   SpO2 93%    General: No acute distress.    Eyes: Conjunctivae are normal.  MSK: Moves all extremities.  No obvious deformity.  Neuro: The patient is fully oriented. Speech is normal. Facial nerve function is normal, rated as a House Brackmann 1. Gait is normal.   Psych: Normal mood and affect. Behavior is normal.    Incision: Her craniotomy incision is well healed without erythema or drainage. She has a small, soft fluid collection at the top of her incision line. No induration or skin changes.     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 Reviewed warning signs to watch for. Rarely we need to intervene. We'll follow closely, she has scheduled follow up with me 12/4 and discussed reaching out sooner as needed.       Katherine Hood PA-C  Department of Neurosurgery

## 2024-11-06 NOTE — PROGRESS NOTES
Pulmonary Clinic Return Patient Visit  Reason for Visit: Cough  History of Present Illness  Leidy Ascencio is a 64 yr old female who presents for follow up for COPD/chronic cough. I last saw her in 2023  To briefly review, she was diagnosed with COPD/asthma following repeated spells of symptoms including productive cough, trouble breathing especially with the cough spasms and rhintis. Also some post nasal drip but not really a lot of nasal congestion/sinus pressure. She was treated for copd exacerbation with doxy and prednisone but no improvement. The inhalers don't seem to do much at that time.   When I initially saw her in clinic, PFTs showed very mild fixed obstruction and CT chest showed no clear evidence of airway nor airspace disease. There were no emphysematous changes. I thought that she may benefit from more robust bronchodilation and I switched her Flovent to Advair.  Today, she has no new complaints. Flovent has been tremendously helpful. There has been no COPD/asthma flares since the last clinic visit. Although she has benefited from Advair, her cough is less productive but persistent. She also tells me that her cough appears to be allergy related as she notices associated symptoms of itchy eyes and sneezing. Minimal SOB and no wheezing nor chest tightness. Denies any chest pain, pedal swellings or palpitations.   Remote hx of smoking,m quit 15 years ago, prior 27 pack years. Paternal uncle  from lung cancer (smoker). 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    Review of Systems:  10 of 14 systems reviewed and are negative unless otherwise stated in HPI.    Past Medical History:   Diagnosis Date    Arthritis     Cancer (H)     COPD (chronic obstructive pulmonary disease) (H)     Depressive disorder     Hyperlipemia     Uncomplicated asthma        Past Surgical History:   Procedure Laterality Date    APPENDECTOMY      COLONOSCOPY      COLONOSCOPY N/A 2022     Procedure: COLONOSCOPY, BIOPSY and polypectomy;  Surgeon: Rick Zavala MD;  Location: [facility]    CRANIOTOMY, USING OPTICAL TRACKING SYSTEM, FOR PLACEMENT OF GAMMATILE N/A 2024    Procedure: stealth assisted Biparieto-occipital craniotomy for tumor (Gammatile);  Surgeon: Will Samayoa MD;  Location: [facility]    GYN SURGERY      3 cesections    HYSTERECTOMY VAGINAL, BILATERAL SALPINGO-OOPHERECTOMY, COMBINED  1988    hysterectomy + ovaries     IR CAROTID CEREBRAL ANGIOGRAM BILATERAL  2024    LAPAROSCOPIC ABLATION LIVER TUMOR N/A 1/15/2020    Procedure: Laparoscopic ultrasound guided percutaneous microwave abalation of tumor x1; ultrasound guided Liver Biopsy x 4; Hepatic Ultrasound Intraoperatively;  Surgeon: Phan Fierro MD;  Location: [facility]    LAPAROSCOPY DIAGNOSTIC (GENERAL)  1/15/2020    Procedure: Laparoscopy diagnostic (general);  Surgeon: Phan Fierro MD;  Location: [facility] OR    ZZC EXCIS INFRATENT MENINGIOMA         Family History   Problem Relation Age of Onset    Coronary Artery Disease Mother     Hypertension Mother     Hyperlipidemia Mother     Arthritis Father     Cerebrovascular Disease Father     Alzheimer Disease Maternal Grandmother     Other Cancer Maternal Grandmother         lung    Diabetes Maternal Grandfather     Other Cancer Paternal Grandfather         meloma    Anxiety Disorder Daughter     Substance Abuse Son     Lung Cancer Maternal Aunt        Social History     Socioeconomic History    Marital status:      Spouse name: None    Number of children: None    Years of education: None    Highest education level: None   Tobacco Use    Smoking status: Former     Packs/day: 1.00     Years: 27.00     Pack years: 27.00     Types: Cigarettes     Quit date: 2003     Years since quittin.3    Smokeless tobacco: Never   Substance and Sexual Activity    Alcohol use: Yes    Drug use: No    Sexual activity: Yes     Partners: Male     Birth control/protection:  Female Surgical   Other Topics Concern    Parent/sibling w/ CABG, MI or angioplasty before 65F 55M? No     Social Determinants of Health     Intimate Partner Violence: Unknown (10/21/2022)    Humiliation, Afraid, Rape, and Kick questionnaire     Fear of Current or Ex-Partner: No     Emotionally Abused: No         Allergies   Allergen Reactions    Other Drug Allergy (See Comments) Hives     Paxlovid     Atorvastatin     Penicillins     Sulfa Antibiotics     Sulfamethoxazole-Trimethoprim Rash         Current Outpatient Medications:     Acetaminophen (TYLENOL 8 HOUR ARTHRITIS PAIN PO), Take by mouth as needed., Disp: , Rfl:     albuterol (PROAIR HFA/PROVENTIL HFA/VENTOLIN HFA) 108 (90 Base) MCG/ACT inhaler, Inhale 2 puffs into the lungs every 6 hours, Disp: 18 g, Rfl: 1    azelastine (ASTELIN) 0.1 % nasal spray, Spray 1 spray into both nostrils 2 times daily (Patient taking differently: Spray 1 spray into both nostrils 2 times daily as needed.), Disp: 30 mL, Rfl: 8    fluticasone-salmeterol (ADVAIR) 250-50 MCG/ACT inhaler, Inhale 1 puff into the lungs every 12 hours., Disp: 3 each, Rfl: 3    Krill Oil 300 MG CAPS, Take by mouth as needed., Disp: , Rfl:     loratadine (CLARITIN) 10 MG tablet, Take 10 mg by mouth as needed., Disp: , Rfl:     rosuvastatin (CRESTOR) 10 MG tablet, Take 1 tablet (10 mg) by mouth daily (Patient taking differently: Take 10 mg by mouth every morning.), Disp: 90 tablet, Rfl: 0    sertraline (ZOLOFT) 100 MG tablet, Take 2 tablets (200 mg) by mouth daily (Patient taking differently: Take 200 mg by mouth every morning.), Disp: 60 tablet, Rfl: 5    triamcinolone (KENALOG) 0.1 % external cream, Apply topically 2 times daily, Disp: 80 g, Rfl: 0    Vitamin D3 (VITAMIN D, CHOLECALCIFEROL,) 25 mcg (1000 units) tablet, Take 25 mcg by mouth daily., Disp: , Rfl:     montelukast (SINGULAIR) 10 MG tablet, Take 1 tablet (10 mg) by mouth at bedtime for 360 days (Patient not taking: Reported on 10/17/2024),  "Disp: 90 tablet, Rfl: 3      Physical Exam:  /79 (BP Location: Left arm, Patient Position: Sitting, Cuff Size: Adult Large)   Pulse 76   Wt 87.1 kg (192 lb)   SpO2 97%   BMI 35.12 kg/m    GENERAL: Well developed, well nourished, alert, and in no apparent distress.  HEENT: Normocephalic, atraumatic. PERRL, EOMI. Oral mucosa is moist. No perioral cyanosis.  NECK: supple, no masses, no thyromegaly.  RESP:  Normal respiratory effort.  CTAB.  No rales, wheezes, rhonchi.  No cyanosis or clubbing.  CV: Normal S1, S2, regular rhythm, normal rate. No murmur.  No LE edema.   ABDOMEN:  Soft, non-tender, non-distended.   SKIN: warm and dry. No rash.  NEURO: AAOx3.  Normal gait.  Fluent speech.  PSYCH: mentation appears normal.   Results:  PFTs: Mild obstruction with normal volumes and diffusion  Most Recent [facility] Pulmonary Function Testing    FVC-Pred   Date Value Ref Range Status   05/08/2023 2.61 L      FVC-Pre   Date Value Ref Range Status   05/08/2023 1.96 L      FVC-%Pred-Pre   Date Value Ref Range Status   05/08/2023 75 %      FEV1-Pre   Date Value Ref Range Status   05/08/2023 1.39 L      FEV1-%Pred-Pre   Date Value Ref Range Status   05/08/2023 66 %      FEV1FVC-Pred   Date Value Ref Range Status   05/08/2023 80 %      FEV1FVC-Pre   Date Value Ref Range Status   05/08/2023 71 %      No results found for: \"96634\"  FEFMax-Pred   Date Value Ref Range Status   05/08/2023 5.78 L/sec      FEFMax-Pre   Date Value Ref Range Status   05/08/2023 3.73 L/sec      FEFMax-%Pred-Pre   Date Value Ref Range Status   05/08/2023 64 %      ExpTime-Pre   Date Value Ref Range Status   05/08/2023 6.54 sec      FIFMax-Pre   Date Value Ref Range Status   05/08/2023 3.66 L/sec      FEV1FEV6-Pred   Date Value Ref Range Status   05/08/2023 80 %      FEV1FEV6-Pre   Date Value Ref Range Status   05/08/2023 71 %      No results found for: \"20055\"  Imaging (personally reviewed in clinic today): CT CHEST/ABDOMEN/PELVIS W CONTRAST: 9/6/2022 " 9:15 AM  IMPRESSION:  1.  A few larger left axillary lymph nodes may just be reactive to an inflammatory process. Recommend further attention to these at imaging follow-up.  2.  Remainder of the scan is stable with no new areas of disease.  3.  Stable right-sided pulmonary nodule.    Assessment and Plan:   COPD/asthma/Chronic cough  PFTs show very mild fixed obstruction and CT chest shows no clear evidence of airway nor airspace disease. ACT score is 22 today while on Advair. There are no emphysematous changes on imaging. She also appears to have allergy symptoms but her environmental allergen panel is negative. No need for Montelukast.   She will continue albuterol as needed.   Allergic Rhinitis  On Astelin    Lung Nodule  Stable thus far.    Questions and concerns were answered to the patient's satisfaction.  she was provided with my contact information should new questions or concerns arise in the interim.  she should return to clinic in 12 months   Up to date on vaccination    I spent 40 minutes on the date of the encounter doing chart review, history and exam, documentation and further coordination as noted above exclusive of time interpreting PFT, Chest Xray, CT Chest.     Binh Fair MD  Pulmonary, Critical Care and Sleep Medicine  [facility]  Pager: [phone]        The above note was dictated using voice recognition software and may include typographical errors. Please contact the author for any clarifications.

## 2024-11-18 ENCOUNTER — THERAPY VISIT (OUTPATIENT)
Dept: PHYSICAL THERAPY | Facility: CLINIC | Age: 64
End: 2024-11-18
Attending: PHYSICIAN ASSISTANT
Payer: MEDICARE

## 2024-11-18 DIAGNOSIS — D48.19 HEMANGIOPERICYTOMA: Primary | ICD-10-CM

## 2024-11-18 PROCEDURE — 97116 GAIT TRAINING THERAPY: CPT | Mod: GP | Performed by: PHYSICAL THERAPIST

## 2024-11-18 PROCEDURE — 97110 THERAPEUTIC EXERCISES: CPT | Mod: GP | Performed by: PHYSICAL THERAPIST

## 2024-11-18 NOTE — PROGRESS NOTES
[facility]                                                                                   OUTPATIENT PHYSICAL THERAPY    PLAN OF TREATMENT FOR OUTPATIENT REHABILITATION   Patient's Last Name, First Name, Leidy Aldana YOB: 1960   Provider's Name   [facility]   Medical Record No.  [id number]     Onset Date: 09/18/24  Start of Care Date: 09/20/24     Medical Diagnosis:  Hemangiopericytoma of CNS, grade II (D43.3)      PT Treatment Diagnosis:  Weakness, impaired balance and mobility Plan of Treatment  Frequency/Duration: 1x/week/ 4 weeks    Certification date from 11/15/24 to 12/13/24         See note for plan of treatment details and functional goals     Gabe Zuniga PT                         I CERTIFY THE NEED FOR THESE SERVICES FURNISHED UNDER        THIS PLAN OF TREATMENT AND WHILE UNDER MY CARE     (Physician attestation of this document indicates review and certification of the therapy plan).              Referring Provider:  Katherine Hood PA-C    Initial Assessment  See Epic Evaluation- Start of Care Date: 09/20/24            PLAN  Continue therapy per current plan of care.  Pt is making significant gains in her balance and gait tolerance, however, would continue to benefit from skilled PT interventions in order to assess her adherence and performance of her HEP to produce and maintain gains in functional mobility and strength.    Beginning/End Dates of Progress Note Reporting Period:  09/20/24 to 11/18/2024    Referring Provider:  Katherine Hood PA-C       11/18/24 0500   Appointment Info   Signing clinician's name / credentials Gabe Zuniga PT   Total/Authorized Visits 4/10   Visits Used 4   Medical Diagnosis Hemangiopericytoma of CNS, grade II (D43.3)   PT Tx Diagnosis Weakness, impaired balance and mobility   Progress Note/Certification   Start of Care Date 09/20/24   Onset of illness/injury or Date of Surgery  09/18/24   Therapy Frequency 1x/week   Predicted Duration 4 weeks   Certification date from 11/15/24   Certification date to 12/13/24   Progress Note Due Date 12/13/24   Progress Note Completed Date 11/18/24   GOALS   PT Goals 2;3   PT Goal 1   Goal Identifier HEP   Goal Description Pt will demo independence in performance and progression of strengthening and balance HEP in order to improve CLOF.   Goal Progress Pt has been performing HEP, but not consistent in all strength tasks. Pt would benefit from   Target Date   (Ongoing, updates as needed')   PT Goal 2   Goal Identifier FGA   Goal Description Pt will demo improved balance and mobility as shown by increased FGA score of at least 4 points in order to show significant improvement and reduced falls risk.   Goal Progress 27/30   Target Date 11/15/24   Date Met 11/18/24   PT Goal 3   Goal Identifier Activity tolerance   Goal Description Pt will demo improved confidence in gait and tolerance to community distances as shown by improved 6MWT score of at least 1037' with pt reporting mild effort for greater independence in and return to community activities.   Goal Progress 1293' without complaints of unsteadiness, has some mild pain in R knee.   Target Date 11/15/24   Date Met 11/18/24   Subjective Report   Subjective Report Pt reports a lump on her head that is draining, doctors have told her to monitor, but nothing serious at this time. Pt reports getting dizzy when bending over forward.   Objective Measures   Objective Measures Objective Measure 1   Objective Measure 1   Objective Measure 5xSTS = 10.2 sec without UE support   Treatment Interventions (PT)   Interventions Therapeutic Procedure/Exercise;Gait Training   Therapeutic Procedure/Exercise   Therapeutic Procedures: strength, endurance, ROM, flexibility minutes (33270) 25   Ther Proc 1 Strength and mobility HEP   Ther Proc 1 - Details Reviewed squats and technique, pt noted to have more ant knee translation  today, corrected with verbal and hands on cuing. Pt then instructed in several yoga poses for strength and flexibility. Starting with crescent lunge, cued for positioning and setup, transition to warrior 2 and then triangle pose for hip and spine mobility, LE strength. Pt feeling some balance challenge, some challenge to strength. Finally, introduced to chair pose for LE strength, pt holding for 5 seconds with mod effort and mm burn.   Skilled Intervention Selection and instruction   Patient Response/Progress Pt tolerating well, overall showing good strength.   Gait Training   Gait Training Minutes, includes stair climbing (05915) 20   Gait 1 FGA, 6MWT   Gait 1 - Details Reassessed pt goals, pt scoring significantly better on FGA, indicating low falls risk. Pt also scoring significantly better on 6MWT, walking >50% farther on this assessment. Pt more confident in her walking. Discussed ways to keep pt safe, scanning her enviroment more thoroughly with pt reporting only looking straight ahead and not noticing her peripherals for obstacles.   Skilled Intervention Reassessment, education   Patient Response/Progress Pt significantly better in her objective measures, receptive to education.   Education   Learner/Method Patient;Listening;Demonstration   Plan   Home program Hip strength, balance   Plan for next session Continue to challenge balance, incorporate strength   Total Session Time   Timed Code Treatment Minutes 45   Total Treatment Time (sum of timed and untimed services) 45

## 2024-12-03 NOTE — PROGRESS NOTES
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 She underwent gamma tile placement as above and is following with Radiation Oncology. I saw her a few weeks ago after she called with concerns of soft fluid collection near her incision site. Today she is here with her , Main. She feels well. The swelling near her surgical site has continued to wax and wane in size. This is only painful when it gets very swollen, she otherwise denies pain, fever, chills, or skin changes.     Review of Systems:   Pertinent items are noted in HPI or as in patient entered ROS below, remainder of complete ROS is negative.     Physical Exam:   /79 (BP Location: Right arm, Patient Position: Sitting, Cuff Size: Adult Regular)   Pulse 94   SpO2 95%   General: No acute distress.    Eyes: Conjunctivae are normal.  MSK: Moves all extremities.  No obvious deformity.  Neuro: The patient is fully oriented. Speech is normal. Facial nerve function is normal, rated as a House Brackmann 1. Gait is normal.   Psych: Normal mood and affect. Behavior is normal.    Incision: Her right craniotomy incision is well healed. There is a soft swelling underneath the site, please see media picture taken today. No induration or erythema. No drainage from the incision.     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 We discussed warning signs for which she should return including progressive enlargement of the area, new pain, drainage from her incision site, or fever/chills unexplained by other sources. We'll otherwise see her back in conjunction with Radiation Oncology in 4-6 months.        Katherine Hood PA-C  Department of Neurosurgery

## 2024-12-04 ENCOUNTER — OFFICE VISIT (OUTPATIENT)
Dept: NEUROSURGERY | Facility: CLINIC | Age: 64
End: 2024-12-04
Payer: MEDICARE

## 2024-12-04 VITALS — HEART RATE: 94 BPM | OXYGEN SATURATION: 95 % | DIASTOLIC BLOOD PRESSURE: 79 MMHG | SYSTOLIC BLOOD PRESSURE: 139 MMHG

## 2024-12-04 DIAGNOSIS — D48.19 HEMANGIOPERICYTOMA: Primary | ICD-10-CM

## 2024-12-04 DIAGNOSIS — Z48.89 ENCOUNTER FOR POST SURGICAL WOUND CHECK: ICD-10-CM

## 2024-12-04 NOTE — NURSING NOTE
Chief Complaint   Patient presents with    RECHECK     Return tumor neurosurg - 3 month follow up     Gustavo Swartz

## 2024-12-30 NOTE — PROGRESS NOTES
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 She underwent subtotal re resection on 2024 with intraoperative gamma tile placement. A total of 4 tiles were placed for a prescribed dose of 6,000 cGy. The patient was last seen on 10/17/2024 and presents today for 4 month post-radiation follow up evaluation.      Interval History:     MRI brain with and without contrast on 10/17/24:  Impression:  1. Postoperative changes of right parietal-occipital craniotomy for  mass resection with gamma tile placement. Resolved posttreatment dural  thickening and enhancement along the resection site. Scattered  resorbing postsurgical right-sided subdural hemorrhages.  2. No suspicious intracranial findings.    MRIb on 2025  Impression:  1. Postoperative changes of right parietal-occipital craniotomy for  mass resection with gamma tile placement. No evidence of residual  disease. No new suspicious enhancement.  2. Previously seen scattered resorbing postsurgical right-sided  subdural hemorrhages at the convexity are now completely resolved.    Today, she specifically reports feeling well overall aside from continued headaches. Last visit, she was describing headaches 2-7/10 in severity, today, she reports occasional headaches that are about 5/10 in severity. They are bilateral, and worsened with head movements that build pressure in her posterior scalp. She was recommended to increase her Zoloft to better control her headaches, but the patient states she began having worsening tremors, so she is presently taking 1.5  tabs daily.    Brain ROS:  Headaches-   as described above  Seizures- Denies  Nausea/vomiting-  Denies  Changes in cognition/behavior-  Denies  Speech-  Denies  Vision/hearing changes- stable left inferior quadrantanopia  Gait symptoms or imbalance-  Denies  Other focal neuro deficits-  Denies    Review of Systems:  Denies additional symptoms related to current diagnosis.      Current systemic therapy: None  Anti-epileptic: None  Steroids: None    Current Outpatient Medications   Medication Sig Dispense Refill    Acetaminophen (TYLENOL 8 HOUR ARTHRITIS PAIN PO) Take by mouth as needed.      albuterol (PROAIR HFA/PROVENTIL HFA/VENTOLIN HFA) 108 (90 Base) MCG/ACT inhaler Inhale 2 puffs into the lungs every 6 hours. 18 g 11    azelastine (ASTELIN) 0.1 % nasal spray Spray 1 spray into both nostrils 2 times daily 30 mL 8    fluticasone-salmeterol (ADVAIR) 250-50 MCG/ACT inhaler Inhale 1 puff into the lungs every 12 hours. 3 each 3    Krill Oil 300 MG CAPS Take by mouth as needed.      loratadine (CLARITIN) 10 MG tablet Take 10 mg by mouth as needed.      rosuvastatin (CRESTOR) 10 MG tablet Take 1 tablet (10 mg) by mouth daily 90 tablet 0    sertraline (ZOLOFT) 100 MG tablet Take 2 tablets (200 mg) by mouth daily (Patient taking differently: Take 200 mg by mouth every morning. 1.5 tabs once a day) 60 tablet 5    triamcinolone (KENALOG) 0.1 % external cream Apply topically 2 times daily 80 g 0    Vitamin D3 (VITAMIN D, CHOLECALCIFEROL,) 25 mcg (1000 units) tablet Take 25 mcg by mouth daily.       No current facility-administered medications for this visit.        Allergies   Allergen Reactions    Other Drug Allergy (See Comments) Hives     Paxlovid     Atorvastatin     Penicillins     Sulfa Antibiotics     Sulfamethoxazole-Trimethoprim Rash       Physical Exam:      ECO  KPS: 90    /74 (BP Location: Right arm)   Pulse 77   Resp 16   Wt 87.5 kg (192 lb 12.8 oz)   SpO2 96%   BMI 35.26 kg/m    General: NAD,  patient well appearing.  HEENT: Oropharynx is clear without erythema or exudates.   Extremities: No acute findings. No edema   Skin: color, texture and turgor wnl. No rashes and lesions. 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   Gait: Natural gait intact.    Last CBC with Diff  WBC Count   Date Value Ref Range Status   09/09/2024 7.6 4.0 - 11.0 10e3/uL Final     RBC Count   Date Value Ref Range Status   09/09/2024 2.96 (L) 3.80 - 5.20 10e6/uL Final     Hemoglobin   Date Value Ref Range Status   09/09/2024 8.6 (L) 11.7 - 15.7 g/dL Final     Hematocrit   Date Value Ref Range Status   09/09/2024 26.5 (L) 35.0 - 47.0 % Final     MCV   Date Value Ref Range Status   09/09/2024 90 78 - 100 fL Final     MCH   Date Value Ref Range Status   09/09/2024 29.1 26.5 - 33.0 pg Final     MCHC   Date Value Ref Range Status   09/09/2024 32.5 31.5 - 36.5 g/dL Final     RDW   Date Value Ref Range Status   09/09/2024 13.4 10.0 - 15.0 % Final     Platelet Count   Date Value Ref Range Status   09/09/2024 226 150 - 450 10e3/uL Final        Last Comprehensive Metabolic Panel:  Sodium   Date Value Ref Range Status   09/09/2024 137 135 - 145 mmol/L Final   03/01/2021 139 133 - 144 mmol/L Final     Potassium   Date Value Ref Range Status   09/09/2024 4.4 3.4 - 5.3 mmol/L Final   08/25/2022 3.9 3.4 - 5.3 mmol/L Final   03/01/2021 3.9 3.4 - 5.3 mmol/L Final     Potassium POCT   Date Value Ref Range Status   09/05/2024 3.8 3.4 - 5.3 mmol/L Final     Comment:     CRITICAL VALUES NOTED AND REPORTED TO MD     Chloride   Date Value Ref Range Status   09/09/2024 101 98 - 107 mmol/L Final   08/25/2022 106 94 - 109 mmol/L Final   03/01/2021 106 94 - 109 mmol/L Final     Carbon Dioxide   Date Value Ref Range Status    03/01/2021 29 20 - 32 mmol/L Final     Carbon Dioxide (CO2)   Date Value Ref Range Status   09/09/2024 27 22 - 29 mmol/L Final   08/25/2022 29 20 - 32 mmol/L Final     Anion Gap   Date Value Ref Range Status   09/09/2024 9 7 - 15 mmol/L Final   08/25/2022 4 3 - 14 mmol/L Final   03/01/2021 4 3 - 14 mmol/L Final     Glucose   Date Value Ref Range Status   09/09/2024 114 (H) 70 - 99 mg/dL Final   08/25/2022 117 (H) 70 - 99 mg/dL Final   03/01/2021 117 (H) 70 - 99 mg/dL Final     Comment:     Fasting specimen     GLUCOSE BY METER POCT   Date Value Ref Range Status   09/07/2024 124 (H) 70 - 99 mg/dL Final     Urea Nitrogen   Date Value Ref Range Status   09/09/2024 15.0 8.0 - 23.0 mg/dL Final   08/25/2022 19 7 - 30 mg/dL Final   03/01/2021 18 7 - 30 mg/dL Final     Creatinine   Date Value Ref Range Status   09/09/2024 0.62 0.51 - 0.95 mg/dL Final   03/01/2021 0.72 0.52 - 1.04 mg/dL Final     GFR Estimate   Date Value Ref Range Status   09/09/2024 >90 >60 mL/min/1.73m2 Final     Comment:     eGFR calculated using 2021 CKD-EPI equation.   03/01/2021 89 >60 mL/min/[1.73_m2] Final     Comment:     Non  GFR Calc  Starting 12/18/2018, serum creatinine based estimated GFR (eGFR) will be   calculated using the Chronic Kidney Disease Epidemiology Collaboration   (CKD-EPI) equation.       Calcium   Date Value Ref Range Status   09/09/2024 8.9 8.8 - 10.4 mg/dL Final     Comment:     Reference intervals for this test were updated on 7/16/2024 to reflect our healthy population more accurately. There may be differences in the flagging of prior results with similar values performed with this method. Those prior results can be interpreted in the context of the updated reference intervals.   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 She underwent subtotal re resection on 9/05/2024 with intraoperative gamma tile placement. A total of 4 tiles were placed for a prescribed dose of 6,000 cGy. The patient was last seen on 10/17/2024 and presents today for 4 month post-radiation. The patient presents without evidence of disease recurrence during her 4 month follow-up today. The patient will get her next MRI in 3 months and follow with NSGY. We will see her in follow up for the next interval or after she sees Dr. Zhang. We have updated the NSGY team regarding Ms. Ascencio's excellent status.    For her headache, we discussed possibly being able to try  a short course of steroids in the future should her headache not improve.     Carla Brown MD MS PGY3  Seen with Dr. Bauer    A medical resident participated in the care of this patient and in the preparation of this note. I have verified and edited this note. I personally performed key elements of the physical exam and medical decision making for this patient.  I agree with the assessment and plan of care as documented in the note above.      The overall time I spent on direct patient care including self review of records, labs, and radiographic studies, as well as, direct face-to-face interaction with the patient and coordination of care with other providers was 30 minutes.      Izabel Bauer MD, PhD     Department of Radiation Oncology  [facility]

## 2025-01-09 ENCOUNTER — OFFICE VISIT (OUTPATIENT)
Dept: RADIATION ONCOLOGY | Facility: CLINIC | Age: 65
End: 2025-01-09
Attending: PSYCHIATRY & NEUROLOGY
Payer: MEDICARE

## 2025-01-09 VITALS
HEART RATE: 77 BPM | RESPIRATION RATE: 16 BRPM | WEIGHT: 192.8 LBS | BODY MASS INDEX: 35.26 KG/M2 | SYSTOLIC BLOOD PRESSURE: 148 MMHG | DIASTOLIC BLOOD PRESSURE: 74 MMHG | OXYGEN SATURATION: 96 %

## 2025-01-09 DIAGNOSIS — D43.3 HEMANGIOPERICYTOMA OF CNS, GRADE II (H): Primary | ICD-10-CM

## 2025-01-09 PROCEDURE — G0463 HOSPITAL OUTPT CLINIC VISIT: HCPCS | Performed by: STUDENT IN AN ORGANIZED HEALTH CARE EDUCATION/TRAINING PROGRAM

## 2025-01-09 PROCEDURE — 99214 OFFICE O/P EST MOD 30 MIN: CPT | Mod: GC | Performed by: STUDENT IN AN ORGANIZED HEALTH CARE EDUCATION/TRAINING PROGRAM

## 2025-01-09 NOTE — LETTER
2025      Leidy Ascencio  [address] [place] [address]      Dear Colleague,    Thank you for referring your patient, Leidy Ascencio, to the [facility] RADIATION ONCOLOGY. Please see a copy of my visit note below.    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 She underwent subtotal re resection on 2024 with intraoperative gamma tile placement. A total of 4 tiles were placed for a prescribed dose of 6,000 cGy. The patient was last seen on 10/17/2024 and presents today for 4 month post-radiation follow up evaluation.      Interval History:     MRI brain with and without contrast on 10/17/24:  Impression:  1. Postoperative changes of right parietal-occipital craniotomy for  mass resection with gamma tile placement. Resolved posttreatment dural  thickening and enhancement along the resection site. Scattered  resorbing postsurgical right-sided subdural hemorrhages.  2. No suspicious intracranial findings.    MRIb on 2025  Impression:  1. Postoperative changes of right parietal-occipital craniotomy for  mass resection with gamma tile placement. No evidence of residual  disease. No new suspicious enhancement.  2. Previously seen scattered resorbing postsurgical right-sided  subdural hemorrhages at the convexity are now completely resolved.    Today, she specifically reports feeling well overall aside from continued headaches. Last visit, she was describing headaches 2-7/10 in severity, today, she reports occasional headaches that are about 5/10 in severity. They are bilateral,  and worsened with head movements that build pressure in her posterior scalp. She was recommended to increase her Zoloft to better control her headaches, but the patient states she began having worsening tremors, so she is presently taking 1.5 tabs daily.    Brain ROS:  Headaches-   as described above  Seizures- Denies  Nausea/vomiting-  Denies  Changes in cognition/behavior-  Denies  Speech-  Denies  Vision/hearing changes- stable left inferior quadrantanopia  Gait symptoms or imbalance-  Denies  Other focal neuro deficits-  Denies    Review of Systems:  Denies additional symptoms related to current diagnosis.      Current systemic therapy: None  Anti-epileptic: None  Steroids: None    Current Outpatient Medications   Medication Sig Dispense Refill     Acetaminophen (TYLENOL 8 HOUR ARTHRITIS PAIN PO) Take by mouth as needed.       albuterol (PROAIR HFA/PROVENTIL HFA/VENTOLIN HFA) 108 (90 Base) MCG/ACT inhaler Inhale 2 puffs into the lungs every 6 hours. 18 g 11     azelastine (ASTELIN) 0.1 % nasal spray Spray 1 spray into both nostrils 2 times daily 30 mL 8     fluticasone-salmeterol (ADVAIR) 250-50 MCG/ACT inhaler Inhale 1 puff into the lungs every 12 hours. 3 each 3     Krill Oil 300 MG CAPS Take by mouth as needed.       loratadine (CLARITIN) 10 MG tablet Take 10 mg by mouth as needed.       rosuvastatin (CRESTOR) 10 MG tablet Take 1 tablet (10 mg) by mouth daily 90 tablet 0     sertraline (ZOLOFT) 100 MG tablet Take 2 tablets (200 mg) by mouth daily (Patient taking differently: Take 200 mg by mouth every morning. 1.5 tabs once a day) 60 tablet 5     triamcinolone (KENALOG) 0.1 % external cream Apply topically 2 times daily 80 g 0     Vitamin D3 (VITAMIN D, CHOLECALCIFEROL,) 25 mcg (1000 units) tablet Take 25 mcg by mouth daily.       No current facility-administered medications for this visit.        Allergies   Allergen Reactions     Other Drug Allergy (See Comments) Hives     Paxlovid      Atorvastatin       Penicillins      Sulfa Antibiotics      Sulfamethoxazole-Trimethoprim Rash       Physical Exam:      ECO  KPS: 90    /74 (BP Location: Right arm)   Pulse 77   Resp 16   Wt 87.5 kg (192 lb 12.8 oz)   SpO2 96%   BMI 35.26 kg/m    General: NAD, patient well appearing.  HEENT: Oropharynx is clear without erythema or exudates.   Extremities: No acute findings. No edema   Skin: color, texture and turgor wnl. No rashes and lesions. 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   Gait: Natural gait intact.    Last CBC with Diff  WBC Count   Date Value Ref Range Status   2024 7.6 4.0 - 11.0 10e3/uL Final     RBC Count   Date Value Ref Range Status   2024 2.96 (L) 3.80 - 5.20 10e6/uL Final     Hemoglobin   Date Value Ref Range Status   2024 8.6 (L) 11.7 - 15.7 g/dL Final     Hematocrit   Date Value Ref Range Status   2024 26.5 (L) 35.0 - 47.0 % Final     MCV   Date Value Ref Range Status   2024 90 78 - 100 fL Final     MCH   Date Value Ref Range Status   2024 29.1 26.5 - 33.0 pg Final     MCHC   Date Value Ref Range Status   2024 32.5 31.5 - 36.5 g/dL Final     RDW   Date Value Ref Range Status   2024 13.4 10.0 - 15.0 % Final     Platelet Count   Date Value Ref Range Status   2024 226 150 - 450 10e3/uL Final        Last Comprehensive Metabolic Panel:  Sodium   Date Value Ref Range Status   2024 137 135 - 145 mmol/L Final   2021 139 133 - 144 mmol/L Final     Potassium   Date Value Ref Range Status   2024 4.4 3.4 - 5.3 mmol/L Final   2022 3.9 3.4 - 5.3 mmol/L Final   2021 3.9 3.4 - 5.3 mmol/L Final     Potassium POCT   Date Value Ref Range Status   2024 3.8 3.4 - 5.3 mmol/L Final     Comment:      CRITICAL VALUES NOTED AND REPORTED TO MD     Chloride   Date Value Ref Range Status   09/09/2024 101 98 - 107 mmol/L Final   08/25/2022 106 94 - 109 mmol/L Final   03/01/2021 106 94 - 109 mmol/L Final     Carbon Dioxide   Date Value Ref Range Status   03/01/2021 29 20 - 32 mmol/L Final     Carbon Dioxide (CO2)   Date Value Ref Range Status   09/09/2024 27 22 - 29 mmol/L Final   08/25/2022 29 20 - 32 mmol/L Final     Anion Gap   Date Value Ref Range Status   09/09/2024 9 7 - 15 mmol/L Final   08/25/2022 4 3 - 14 mmol/L Final   03/01/2021 4 3 - 14 mmol/L Final     Glucose   Date Value Ref Range Status   09/09/2024 114 (H) 70 - 99 mg/dL Final   08/25/2022 117 (H) 70 - 99 mg/dL Final   03/01/2021 117 (H) 70 - 99 mg/dL Final     Comment:     Fasting specimen     GLUCOSE BY METER POCT   Date Value Ref Range Status   09/07/2024 124 (H) 70 - 99 mg/dL Final     Urea Nitrogen   Date Value Ref Range Status   09/09/2024 15.0 8.0 - 23.0 mg/dL Final   08/25/2022 19 7 - 30 mg/dL Final   03/01/2021 18 7 - 30 mg/dL Final     Creatinine   Date Value Ref Range Status   09/09/2024 0.62 0.51 - 0.95 mg/dL Final   03/01/2021 0.72 0.52 - 1.04 mg/dL Final     GFR Estimate   Date Value Ref Range Status   09/09/2024 >90 >60 mL/min/1.73m2 Final     Comment:     eGFR calculated using 2021 CKD-EPI equation.   03/01/2021 89 >60 mL/min/[1.73_m2] Final     Comment:     Non  GFR Calc  Starting 12/18/2018, serum creatinine based estimated GFR (eGFR) will be   calculated using the Chronic Kidney Disease Epidemiology Collaboration   (CKD-EPI) equation.       Calcium   Date Value Ref Range Status   09/09/2024 8.9 8.8 - 10.4 mg/dL Final     Comment:     Reference intervals for this test were updated on 7/16/2024 to reflect our healthy population more accurately. There may be differences in the flagging of prior results with similar values performed with this method. Those prior results can be interpreted in the context of the updated  reference intervals.   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 She underwent subtotal re resection on 9/05/2024 with intraoperative gamma tile placement. A total of 4 tiles were placed for a prescribed dose of 6,000 cGy. The patient was last seen on 10/17/2024 and presents today for 4 month post-radiation. The patient presents without evidence of disease recurrence during her 4 month follow-up today. The patient will get her next MRI in 3 months and follow with NSGY. We will see her in follow up for the next interval or after she sees Dr. Zhang. We have updated the NSGY team regarding Ms. Ascencio's excellent status.    For her headache, we discussed possibly being able to try  a short course of steroids in the future should her headache not improve.     Carla Brown MD MS PGY3  Seen with Dr. Bauer    A medical resident participated in the care of this patient and in the preparation of this note. I have verified and edited this note. I personally performed key elements of the physical exam and medical decision making for this patient.  I agree with the assessment and plan of care as documented in the note above.      The overall time I spent on direct patient care including self review of records, labs, and radiographic studies, as well as, direct face-to-face interaction with the patient and coordination of care with other providers was 30 minutes.      Izabel Bauer MD, PhD     Department of Radiation Oncology  [facility]           Oncology Rooming Note    January 9, 2025 1:47 PM   Leidy Ascencio is a 64 year old female who presents for:    Chief Complaint   Patient presents with     Oncology  "Clinic Visit     Radiation Oncology Follow-Up       Initial Vitals: /74 (BP Location: Right arm)   Pulse 77   Resp 16   Wt 87.5 kg (192 lb 12.8 oz)   SpO2 96%   BMI 35.26 kg/m   Estimated body mass index is 35.26 kg/m  as calculated from the following:    Height as of 24: 1.575 m (5' 2\").    Weight as of this encounter: 87.5 kg (192 lb 12.8 oz). Body surface area is 1.96 meters squared.  No Pain (0) Comment: Data Unavailable   No LMP recorded. Patient has had a hysterectomy.    Allergies reviewed: Yes    Medications reviewed: Yes    Medications: Medication refills not needed today.  Pharmacy name entered into [employer]:    [facility] - [place] - [address] [place] [address] [facility] [place], [facility], [place] - [address] [facility]    Frailty Screening:   Is the patient here for a new oncology consult visit in cancer care? 2. No    FOLLOW-UP VISIT    Patient Name: Leidy Ascencio      : 1960     Age: 64 year old        ______________________________________________________________________________     Chief Complaint   Patient presents with     Oncology Clinic Visit     Radiation Oncology Follow-Up       /74 (BP Location: Right arm)   Pulse 77   Resp 16   Wt 87.5 kg (192 lb 12.8 oz)   SpO2 96%   BMI 35.26 kg/m       Date Radiation Completed:   Juancarlos: RICARDO Occipital hemangiopericytoma gamma tile placement 24. 20 SRS 20Gy.     Pain  Denies    Labs  None    Imaging  MR brain wo/w contrast, w/ perfusion     Other Appointments: None  MD Name:  Appointment Date:    MD Name: Appointment Date:   MD Name: Appointment Date:   Other Appointment Notes:     Residual Radiation side effect:  daily headaches, worsening bilat hand tremor (L>R), enlarging posterior head \"bump\", decreased peripheral vision bilat eyes (stable)       Clinical concerns: Patient is here for a Radiation Oncology Follow Up.     Dr. Bauer was notified.      Camilla Mora RN  Radiation " Oncology            Again, thank you for allowing me to participate in the care of your patient.        Sincerely,        Izabel Bauer MD PhD    Electronically signed

## 2025-01-09 NOTE — PROGRESS NOTES
"Oncology Rooming Note    2025 1:47 PM   Leidy Ascencio is a 64 year old female who presents for:    Chief Complaint   Patient presents with    Oncology Clinic Visit     Radiation Oncology Follow-Up       Initial Vitals: /74 (BP Location: Right arm)   Pulse 77   Resp 16   Wt 87.5 kg (192 lb 12.8 oz)   SpO2 96%   BMI 35.26 kg/m   Estimated body mass index is 35.26 kg/m  as calculated from the following:    Height as of 24: 1.575 m (5' 2\").    Weight as of this encounter: 87.5 kg (192 lb 12.8 oz). Body surface area is 1.96 meters squared.  No Pain (0) Comment: Data Unavailable   No LMP recorded. Patient has had a hysterectomy.    Allergies reviewed: Yes    Medications reviewed: Yes    Medications: Medication refills not needed today.  Pharmacy name entered into [employer]:    [facility] - [place], [place] - [address] [place] [address] [facility] [place], [facility], [place] - [address] [facility]    Frailty Screening:   Is the patient here for a new oncology consult visit in cancer care? 2. No    FOLLOW-UP VISIT    Patient Name: Leidy Ascencio      : 1960     Age: 64 year old        ______________________________________________________________________________     Chief Complaint   Patient presents with    Oncology Clinic Visit     Radiation Oncology Follow-Up       /74 (BP Location: Right arm)   Pulse 77   Resp 16   Wt 87.5 kg (192 lb 12.8 oz)   SpO2 96%   BMI 35.26 kg/m       Date Radiation Completed:   Bauer: R Occipital hemangiopericytoma gamma tile placement 24. 20 SRS 20Gy.     Pain  Denies    Labs  None    Imaging  MR brain wo/w contrast, w/ perfusion     Other Appointments: None  MD Name:  Appointment Date:    MD Name: Appointment Date:   MD Name: Appointment Date:   Other Appointment Notes:     Residual Radiation side effect:  daily headaches, worsening bilat hand tremor (L>R), enlarging posterior head \"bump\", decreased peripheral vision " bilat eyes (stable)       Clinical concerns: Patient is here for a Radiation Oncology Follow Up.     Dr. Bauer was notified.      Camilla Mora RN  Radiation Oncology

## 2025-01-21 DIAGNOSIS — D43.3: Primary | ICD-10-CM

## 2025-04-09 ENCOUNTER — ANCILLARY PROCEDURE (OUTPATIENT)
Dept: CT IMAGING | Facility: CLINIC | Age: 65
End: 2025-04-09
Attending: PSYCHIATRY & NEUROLOGY
Payer: MEDICARE

## 2025-04-09 DIAGNOSIS — D43.3: ICD-10-CM

## 2025-04-09 PROCEDURE — 71260 CT THORAX DX C+: CPT | Performed by: STUDENT IN AN ORGANIZED HEALTH CARE EDUCATION/TRAINING PROGRAM

## 2025-04-09 PROCEDURE — 74177 CT ABD & PELVIS W/CONTRAST: CPT | Performed by: STUDENT IN AN ORGANIZED HEALTH CARE EDUCATION/TRAINING PROGRAM

## 2025-04-09 RX ORDER — IOPAMIDOL 755 MG/ML
106 INJECTION, SOLUTION INTRAVASCULAR ONCE
Status: COMPLETED | OUTPATIENT
Start: 2025-04-09 | End: 2025-04-09

## 2025-04-09 RX ADMIN — IOPAMIDOL 106 ML: 755 INJECTION, SOLUTION INTRAVASCULAR at 10:04

## 2025-04-11 NOTE — PROGRESS NOTES
NEURO-ONCOLOGY VISIT  Apr 14, 2025    CHIEF COMPLAINT: Ms. 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 She completed GammaKnife treatment in 1/2020.     Priyanka had been managed on imaging surveillance and imaging through 10/2023 demonstrated no evidence of cancer recurrence. However, imaging in 4/2024 showed a subtle increase in enhancement within the superior sagittal sinus and unfortunately, repeat imaging in 7/2024 demonstrated a further increase in the size of the enhancing extra axial nodular mass.     Priyanka underwent resection plus placement of GammaTiles on 9/5/2024. Pathology confirmed recurrent solitary fibrous tumor, CNS WHO grade 2. Repeat MR brain imaging from 10/2024 and 1/2025 showed no new concerns and no evidence of residual enhancement.     Imaging in 4/2025 demonstrated likely evolving postoperative changes with two subcentimeter foci of enhancement along the medial surface of the left occipital lobe that had increased in conspicuity compared to recent prior exams. The plan is for continued observation with close imaging surveillance.    To date, CT body imaging, most recently completed in 4/2025 and performed annually, has been without any concerning findings for metastatic disease.     I met with Priyanka and Main () today for this follow-up visit.     HISTORY OF PRESENT ILLNESS  -Priyanka continues to note frequent headaches, in which there is tightness about her head.   Occurring 2-3 times a week.    Pain lasts for 1 day.    Pain can be severe 8-9/10. Must rest and takes Tylenol/ ibuprofen.    Associated neck pain and nausea. No vision changes.    Open to a referral to the [facility].   -Still very fatigued.   -Stable visual field cut.  -Denies changes in strength or coordination.    No changes in sensation.    -Mood is good; on Zoloft. Had a tremor with 200mg dosing.   -Issues with multi-tasking. Working part time.       MEDICATIONS   Current Outpatient Medications   Medication Sig Dispense Refill    Acetaminophen (TYLENOL 8 HOUR ARTHRITIS PAIN PO) Take by mouth as needed.      albuterol (PROAIR HFA/PROVENTIL HFA/VENTOLIN HFA) 108 (90 Base) MCG/ACT inhaler Inhale 2 puffs into the lungs every 6 hours. 18 g 11    azelastine (ASTELIN) 0.1 % nasal spray Spray 1 spray into both nostrils 2 times daily 30 mL 8    fluticasone-salmeterol (ADVAIR) 250-50 MCG/ACT inhaler Inhale 1 puff into the lungs every 12 hours. 3 each 3    Krill Oil 300 MG CAPS Take by mouth as needed.      loratadine (CLARITIN) 10 MG tablet Take 10 mg by mouth as needed.      rosuvastatin (CRESTOR) 10 MG tablet Take 1 tablet (10 mg) by mouth daily 90 tablet 0    sertraline (ZOLOFT) 100 MG tablet Take 2 tablets (200 mg) by mouth daily (Patient taking differently: Take 200 mg by mouth every morning. 1.5 tabs once a day) 60 tablet 5    triamcinolone (KENALOG) 0.1 % external cream Apply topically 2 times daily 80 g 0    Vitamin D3 (VITAMIN D, CHOLECALCIFEROL,) 25 mcg (1000 units) tablet Take 25 mcg by mouth daily.       DRUG ALLERGIES   Allergies   Allergen Reactions    Other Drug Allergy (See Comments) Hives     Paxlovid     Atorvastatin     Penicillins     Sulfa Antibiotics     Sulfamethoxazole-Trimethoprim Rash       ONCOLOGIC HISTORY  -2015 PRESENTATION: Headaches.  -2015 MR brain imaging with a right occipital mass   -4/22/2015 SURGERY: Resection by Dr. Lujan   PATHOLOGY: Hemangiopericytoma, grade 2.   -2015 CT chest, abdomen and pelvis negative for malignancy.  -2015 PET MRI negative for malignancy.   Conservative management per Dr. Herman Mayberry.    -8/1/2017 CT chest abdomen and pelvis and bone scan negative for malignancy.   -12/7/2017 MRB with no evidence of tumor recurrence.  -12/6/2018 MRB with no evidence of tumor recurrence.    -12/11/2019 MRB with  a new nodule of enhancement within the superior sagittal sinus, concerning for tumor recurrence. Stable appearance of the right parieto-occipital craniotomy site.  -12/13/2019 Bone scan with no convincing evidence of skeletal metastases.  -12/18/2019 MR liver with a 9 mm lesion in the lateral aspect of the junction of segments 7 and 8 of the liver shows homogeneous delayed enhancement and is new since 2015. This could represent a metastatic lesion.     -1/6/2020 NEURO-ONC: Referral to radiation oncology and GI oncology surgery. Signed consent for next generation sequencing.   -1/14/2020 RADS: GammaKnife to lesion (1 fraction).   -1/15/2020 LIVER LESION:  Laparoscopic ultrasound-guided microwave ablation of liver tumor by Dr. Phan Fierro.   PATHOLOGY: Cavernous hemangioma of the liver, benign:    -No metastatic hemangiopericytoma or other malignancy identified    -Underlying steatohepatitis, no significant fibrosis    -No follow-up needed.  -2/28/2020 NEURO-ONC/ MRB: Clinically stable. Headaches with migraine features; increasing Zoloft. Imaging with positive response to radiation.   -3/9/2020 Evaluated by Dr. Wright; Neuro-psych testing with mild weaknesses were noted in aspects of speeded visual processing, and general cognitive speed. Repeat in 3/2021.  -6/1/2020 NEURO-ONC/ MRB: Clinically well; for insomnia trial of melatonin. Imaging with positive treatment effect.   -6/1/2020 NEURO-ONC/ MRB: Clinically well. Imaging with no concerns.   -9/14/2020 NEURO-ONC/ MRB: Clinically well. Imaging of MRI brain and CT CAP with no concerns.  -12/14/2020 NEURO-ONC/ MRB: Clinically well. MR imaging of the brain with no concerns.  -3/12/2021 NEURO-ONC/ MRB: Clinically well. MRI of the brain with no concerns.  -9/10/2021 NEURO-ONC/ MRB: Clinically well. MRI of the brain and CT CAP with no concerns.  -3/7/2022 NEURO-ONC/ MRB: Clinically well. MRI of the brain with no concerns.  -10/21/2022 NEURO-ONC/ MRB: Clinically well; signs  of carpal tunnel in the left wrist. MRI of the brain and CT CAP with no concerns.  -10/21/2022 NEURO-ONC/ MRB: Clinically well; signs of carpal tunnel in the left wrist. MRI of the brain and CT CAP with no concerns.  -4/17/2023 NEURO-ONC/ MRB: Mood is down, less motivation, more issues with concentration. Carpal tunnel of left improving with behavior change. Fatigue labs today; recommended follow-up with primary care provider. MRI of the brain with no concerns.   -10/16/2023 NEURO-ONC/ MRB: Energy improving. No new neurological concerns. MRI of the brain with no concerns. CT CAP with no concerns.  -4/15/2024 NEURO-ONC/ MRB: Mild worsening in headache frequency in the setting of more stress. Imaging with an increased size of the enhancing extra-axial nodular mass within the superior sagittal sinus with elevated cerebral blood volume consistent with disease recurrence. Referral to neurosurgery. Repeat CT imaging for systemic staging now.   -7/22/2024 NEURO-ONC/ MRB: AM headaches in the setting of snoring. Increased anxiety; dose increase Zoloft to 200mg daily. Imaging with a further increase in the size of the enhancing extra axial nodular mass. Referral back to neurosurgery and to radiation oncology. CT body imaging imaging in April was negative.    -9/5/2025 SURGERY/ RADS: Resection with Gamma Title placement.   PATHOLOGY: Recurrent solitary fibrous tumor, CNS WHO grade 2.   Post-operative MR brain imaging from 9/6 with postoperative change of parietal-occipital craniotomy and mass  resection with gamma tile placement. Dural thickening and enhancement noted about the posterior cerebral convexities with greatest involvement on the right, likely post-procedural reactive changes. No acute intracranial abnormality.  -10/17/2024 MR brain imaging with postoperative changes of right parietal-occipital craniotomy for mass resection with gamma tile placement. Resolved posttreatment dural thickening and enhancement along the  "resection site. Scattered resorbing postsurgical right-sided subdural hemorrhages. No suspicious intracranial findings.  -1/9/2025 MR brain imaging with postoperative changes of right parietal-occipital craniotomy for mass resection with gamma tile placement. No evidence of residual disease. No new suspicious enhancement. Previously seen scattered resorbing postsurgical right-sided subdural hemorrhages at the convexity are now completely resolved.  -4/14/2025 NEURO-ONC/ MRB: Ongoing frequent and severe headaches; Referral to the [facility]. Zoloft decreased to 100mg daily. Imaging with two subcentimeter foci of enhancement along the medial surface of the left occipital lobe that increased in conspicuity; most consistent with treatment-induced injury. Repeat imaging in 3 months.     SOCIAL HISTORY   Tobacco use: Former smoker; quit in 2003. 27 pack years.  . 2 daughter, 1 son.   Employment: [employer]; HR and trains managers.        PHYSICAL EXAMINATION  /81 (BP Location: Right arm, Patient Position: Sitting, Cuff Size: Adult Regular)   Pulse 74   Temp 98.5  F (36.9  C) (Oral)   Resp 12   Wt 85.1 kg (187 lb 11.2 oz)   SpO2 95%   BMI 34.33 kg/m     Wt Readings from Last 2 Encounters:   04/14/25 85.1 kg (187 lb 11.2 oz)   01/09/25 87.5 kg (192 lb 12.8 oz)      Ht Readings from Last 2 Encounters:   09/05/24 1.575 m (5' 2\")   09/04/24 1.575 m (5' 2\")      KPS     -Generally well appearing.  -Respiratory: No audible wheezing. Normal respiratory effort.   -Psychiatric: Normal mood and affect. Pleasant, talkative.  -Neurologic:   MENTAL STATUS:     Alert, oriented.   Recall: Intact.    Speech fluent.    Comprehension intact.     CRANIAL NERVES:    Left inferior homonymous quadrantanopia.   Symmetric facial movements.   Hearing intact.   With normal phonation, no dysfunction tongue.   GAIT: Steady, unassisted.      MEDICAL RECORDS  Personally reviewed; Neurosurgery clinic " "note with Dr. Samayoa from April. Recommendations included; \"We indicated that the decision is not urgent, and that our team is determining if conventional radiation and/or surgery with gammatiles are options. I will discuss with our neurooncology tumor board to discuss this further. We will be in touch with Ms. Ascencio after our discussion. She may benefit from a visit with our Radiation Oncologists as a next step.\"     LABS  Personally reviewed all available lab results; Pathology results from September.     IMAGING  Personally reviewed MR brain imaging from 4/12 and compared to prior imaging.     Formal read; \"1.  Evolving postoperative changes related to parieto-occipital craniotomy for mass resection. Two subcentimeter foci of enhancement along the medial surface of the left occipital lobe have increased in conspicuity compared to recent prior exams. These may represent evolving postoperative/post therapeutic change; however, locally recurrent neoplasm is not excluded and close attention to these areas on follow-up imaging is advised. 2.  No other significant interval change or superimposed acute intracranial process.\"    Imaging shown to Marlin.       4/9/2025 CT c/a/p IMPRESSION: No evidence of recurrent or metastatic disease in the chest, abdomen, or pelvis.      IMPRESSION  On date of service, 41 minutes was spent in clinic and 16 minutes was spent preparing for the visit through extensive chart review and coordinating care for this high complexity visit. The following is in explanation for the recommendations used to define the plan.       Since I last met with Priyanka, she has underwent a craniotomy for resection and placement of gamma tiles. Pathology confirmed a recurrent solitary fibrous tumor, CNS WHO grade 2. Today on history and examination, she remains largely asymptomatic, though she is again noting more frequent and severe headaches. There are migrainous components as well as tension-type " features. Given the increase in frequency and the reduction in quality of life, Priyanka is open to the [facility] for evaluation and recommendations on management. On examination, there is tenderness about the neck, temporal, and suboccipital ridge muscles. Priyanka remains fatigued, which can also contribute to headaches. Even with the dose reduction in Zoloft, her mood is good.    Of note, WHO grade 2 solitary fibrous tumor (previously called a hemangiopericytoma) are considered malignant tumors. Standard treatment options include surgery and radiation therapy. With no good surgical option, Priyanka completed stereotactic radiation therapy in 1/2020. Imaging in 2023 and 2024 showed a continued subtle increase in the size of the enhancing extra-axial nodular mass within the superior sagittal sinus. As a result, Priyanka underwent a surgery for resection and gamma tile placement in 9/2024. While post-operative imaging from October and January showed no new suspicious enhancement, on recent imaging there are two subcentimeter foci of enhancement along the medial surface of the left occipital lobe that increased in conspicuity. This is likely related to post-treatment changes. I personally reviewed Priyanka's imaging and case at Brain Tumor Conference, in which Dr. Bauer and Yao were in attendance, and all were in agreement with this impression. Dr. Bauer confirmed gamma tile placement next to this imaging finding. Therefore, the plan remains for repeat imaging per NCCN guidelines in 3 months.     Priyanka has consented to next generation sequencing. Today we discussed the risks (lack or actionable mutations, sending pathology off site, out-of-pocket costs, etc) as well as the benefits of next generation sequencing (NGS) through [employer] and Priyanka is agreeable to this testing. The purpose of NGS is to evaluate for targetable driving mutations of her cancer and thus, hopefully  expand treatment options by way of using targeted therapy. I will have [facility] finalize the consent for NGS and pathology will be sent. Additional treatment options include Pazopanib (Votrient) or temozolomide plus bevacizumab.    Extracranial metastatic disease can occur; most commonly in the bone, lung, and liver. Prior MR liver imaging showed a lesion. Priyanka was referred to Dr. Phan Fierro with surgical oncology. Dr. Fierro performed a  laparoscopic ultrasound-guided microwave ablation of a liver lesion on 1/15/2020. Pathology was consistent with a benign cavernous hemangioma and no metastatic hemangiopericytoma or other malignancy was identified. She is under annual systemic imaging surveillance with a CT C/A/P with contrast. This scan was recently completed in April 2025 and was negative. I informed Priyanka of these results today.     PROBLEM LIST  Hemangiopericytoma, WHO grade 2  Liver lesion  Depressed mood  Sleep issues  Headache    PLAN  -CANCER-DIRECTED THERAPY-  -As above; Imaging surveillance - repeat imaging in 3-4 months.   Repeat CT imaging for systemic staging next due in 4/2026.     -LIVER LESION-  -Treated. No lesion-specific follow-up needed.   -CT c/a/p done annually for systemic surveillance imaging.    Imaging in 2025 with no concerns.     -STEROIDS-  -Currently off dexamethasone.    -SEIZURE MANAGEMENT-  -While this patient is at increased risk of having seizures, given the lack of seizure history, there is no indication to prescribe an antiepileptic at this time.     -CARPAL TUNNEL-  -Left wrist > right. Occasional right wrist jerking.   -Improved with decreased strain at work.  -Recommended bracing overnight.     -Quality of life/ MOOD/ FATIGUE/ HEADACHE-  -Referral to primary care provider for further evaluation/treatment of mood/fatigue.   -4/2023; TSH (normal), Vitamin D (pending), Vitamin B12 (normal).   -History of anxiety/ low mood.   On Zoloft to 100mg daily.   Did not tolerate 150-200mg  dosing due to tremors.   -Continue with routine exercise.     -HEADACHES-  -Referral to the [facility].    Migrainous and tension-type components.   -Triggers; fatigue, work-related stress, muscle tension. Prior craniotomy and brain radiation.   -Can consider a referral to sleep medicine for a possible sleep study to evaluate for sleep apnea.    -MEMORY LOSS/ WORD FINDING ISSUES-  -Evaluated by Dr. Phan Wright in 3/2020 and 4/2021. Neuro-psych testing with mild weaknesses noted in aspects of speeded visual processing and general cognitive speed. Stable findings when comparing prior tests.  -Repeat testing if clinically indicated.     Return to clinic in August + imaging.     In the meantime, Priyanka knows to call with questions or concerns or to report new complaints and can be seen sooner if needed.    The longitudinal plan of care for the diagnosis(es)/condition(s) as documented were addressed during this visit. Due to the added complexity in care, I will continue to support Priyanka in the subsequent management and with ongoing continuity of care.     Lauren Zhang MD  Neuro-oncology

## 2025-04-12 ENCOUNTER — ANCILLARY PROCEDURE (OUTPATIENT)
Dept: MRI IMAGING | Facility: CLINIC | Age: 65
End: 2025-04-12
Attending: PSYCHIATRY & NEUROLOGY
Payer: MEDICARE

## 2025-04-12 DIAGNOSIS — D49.7: ICD-10-CM

## 2025-04-12 PROCEDURE — 70553 MRI BRAIN STEM W/O & W/DYE: CPT | Performed by: RADIOLOGY

## 2025-04-12 PROCEDURE — A9585 GADOBUTROL INJECTION: HCPCS | Performed by: RADIOLOGY

## 2025-04-12 RX ORDER — GADOBUTROL 604.72 MG/ML
10 INJECTION INTRAVENOUS ONCE
Status: COMPLETED | OUTPATIENT
Start: 2025-04-12 | End: 2025-04-12

## 2025-04-12 RX ADMIN — GADOBUTROL 9 ML: 604.72 INJECTION INTRAVENOUS at 08:18

## 2025-04-14 ENCOUNTER — PATIENT OUTREACH (OUTPATIENT)
Dept: ONCOLOGY | Facility: CLINIC | Age: 65
End: 2025-04-14

## 2025-04-14 ENCOUNTER — TUMOR CONFERENCE (OUTPATIENT)
Dept: ONCOLOGY | Facility: CLINIC | Age: 65
End: 2025-04-14
Payer: MEDICARE

## 2025-04-14 ENCOUNTER — ONCOLOGY VISIT (OUTPATIENT)
Dept: ONCOLOGY | Facility: CLINIC | Age: 65
End: 2025-04-14
Attending: PSYCHIATRY & NEUROLOGY
Payer: MEDICARE

## 2025-04-14 VITALS
WEIGHT: 187.7 LBS | TEMPERATURE: 98.5 F | HEART RATE: 74 BPM | BODY MASS INDEX: 34.33 KG/M2 | SYSTOLIC BLOOD PRESSURE: 138 MMHG | OXYGEN SATURATION: 95 % | DIASTOLIC BLOOD PRESSURE: 81 MMHG | RESPIRATION RATE: 12 BRPM

## 2025-04-14 DIAGNOSIS — Z98.890 H/O CRANIOTOMY: ICD-10-CM

## 2025-04-14 DIAGNOSIS — G43.709 CHRONIC MIGRAINE WITHOUT AURA WITHOUT STATUS MIGRAINOSUS, NOT INTRACTABLE: ICD-10-CM

## 2025-04-14 DIAGNOSIS — D49.7: Primary | ICD-10-CM

## 2025-04-14 DIAGNOSIS — G44.229 CHRONIC TENSION-TYPE HEADACHE, NOT INTRACTABLE: ICD-10-CM

## 2025-04-14 PROCEDURE — 99215 OFFICE O/P EST HI 40 MIN: CPT | Performed by: PSYCHIATRY & NEUROLOGY

## 2025-04-14 PROCEDURE — G0463 HOSPITAL OUTPT CLINIC VISIT: HCPCS | Performed by: PSYCHIATRY & NEUROLOGY

## 2025-04-14 ASSESSMENT — PAIN SCALES - GENERAL: PAINLEVEL_OUTOF10: NO PAIN (0)

## 2025-04-14 NOTE — PROGRESS NOTES
[facility]: Cancer Care                                                                                          Caris submitted on path [id number] per Dr. Zhang.    Alessandra Arenas RN

## 2025-04-14 NOTE — NURSING NOTE
"Oncology Rooming Note    April 14, 2025 11:28 AM   Leidy Ascencio is a 64 year old female who presents for:    Chief Complaint   Patient presents with    Oncology Clinic Visit     Hemangiopericytoma of CNS     Initial Vitals: /81 (BP Location: Right arm, Patient Position: Sitting, Cuff Size: Adult Regular)   Pulse 74   Temp 98.5  F (36.9  C) (Oral)   Resp 12   Wt 85.1 kg (187 lb 11.2 oz)   SpO2 95%   BMI 34.33 kg/m   Estimated body mass index is 34.33 kg/m  as calculated from the following:    Height as of 9/5/24: 1.575 m (5' 2\").    Weight as of this encounter: 85.1 kg (187 lb 11.2 oz). Body surface area is 1.93 meters squared.  No Pain (0) Comment: Data Unavailable   No LMP recorded. Patient has had a hysterectomy.  Allergies reviewed: Yes  Medications reviewed: Yes    Medications: Medication refills not needed today.  Pharmacy name entered into [facility]:    [facility] - [place], [place] - [address] [place] [address] [facility] - [place], [facility], [place] - [address]    Frailty Screening:   Is the patient here for a new oncology consult visit in cancer care? 2. No    PHQ9:  Did this patient require a PHQ9?: No      Clinical concerns: none    Ninfa Frazier              "

## 2025-04-14 NOTE — LETTER
4/14/2025      Leidy Ascencio  [address] [place] [address]      Dear Colleague,    Thank you for referring your patient, Leidy Ascencio, to the [facility]. Please see a copy of my visit note below.    NEURO-ONCOLOGY VISIT  Apr 14, 2025    CHIEF COMPLAINT: Ms. 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 She completed GammaKnife treatment in 1/2020.     Priyanka had been managed on imaging surveillance and imaging through 10/2023 demonstrated no evidence of cancer recurrence. However, imaging in 4/2024 showed a subtle increase in enhancement within the superior sagittal sinus and unfortunately, repeat imaging in 7/2024 demonstrated a further increase in the size of the enhancing extra axial nodular mass.     Priyanka underwent resection plus placement of GammaTiles on 9/5/2024. Pathology confirmed recurrent solitary fibrous tumor, CNS WHO grade 2. Repeat MR brain imaging from 10/2024 and 1/2025 showed no new concerns and no evidence of residual enhancement.     Imaging in 4/2025 demonstrated likely evolving postoperative changes with two subcentimeter foci of enhancement along the medial surface of the left occipital lobe that had increased in conspicuity compared to recent prior exams. The plan is for continued observation with close imaging surveillance.    To date, CT body imaging, most recently completed in 4/2025 and performed annually, has been without any concerning findings for metastatic disease.     I met with Priyanka and Main () today for this follow-up visit.     HISTORY OF PRESENT ILLNESS  -Priyanka continues to note frequent headaches, in which there is tightness about her head.   Occurring 2-3 times a week.    Pain lasts for 1 day.    Pain can be severe 8-9/10. Must rest and takes Tylenol/ ibuprofen.     Associated neck pain and nausea. No vision changes.    Open to a referral to the [facility].   -Still very fatigued.   -Stable visual field cut.  -Denies changes in strength or coordination.    No changes in sensation.   -Mood is good; on Zoloft. Had a tremor with 200mg dosing.   -Issues with multi-tasking. Working part time.       MEDICATIONS   Current Outpatient Medications   Medication Sig Dispense Refill     Acetaminophen (TYLENOL 8 HOUR ARTHRITIS PAIN PO) Take by mouth as needed.       albuterol (PROAIR HFA/PROVENTIL HFA/VENTOLIN HFA) 108 (90 Base) MCG/ACT inhaler Inhale 2 puffs into the lungs every 6 hours. 18 g 11     azelastine (ASTELIN) 0.1 % nasal spray Spray 1 spray into both nostrils 2 times daily 30 mL 8     fluticasone-salmeterol (ADVAIR) 250-50 MCG/ACT inhaler Inhale 1 puff into the lungs every 12 hours. 3 each 3     Krill Oil 300 MG CAPS Take by mouth as needed.       loratadine (CLARITIN) 10 MG tablet Take 10 mg by mouth as needed.       rosuvastatin (CRESTOR) 10 MG tablet Take 1 tablet (10 mg) by mouth daily 90 tablet 0     sertraline (ZOLOFT) 100 MG tablet Take 2 tablets (200 mg) by mouth daily (Patient taking differently: Take 200 mg by mouth every morning. 1.5 tabs once a day) 60 tablet 5     triamcinolone (KENALOG) 0.1 % external cream Apply topically 2 times daily 80 g 0     Vitamin D3 (VITAMIN D, CHOLECALCIFEROL,) 25 mcg (1000 units) tablet Take 25 mcg by mouth daily.       DRUG ALLERGIES   Allergies   Allergen Reactions     Other Drug Allergy (See Comments) Hives     Paxlovid      Atorvastatin      Penicillins      Sulfa Antibiotics      Sulfamethoxazole-Trimethoprim Rash       ONCOLOGIC HISTORY  -2015 PRESENTATION: Headaches.  -2015 MR brain imaging with a right occipital mass   -4/22/2015 SURGERY: Resection by Dr. Lujan   PATHOLOGY: Hemangiopericytoma, grade 2.   -2015 CT chest, abdomen and pelvis negative for malignancy.  -2015 PET MRI negative for malignancy.    Conservative management per Dr. Herman Mayberry.    -8/1/2017 CT chest abdomen and pelvis and bone scan negative for malignancy.   -12/7/2017 MRB with no evidence of tumor recurrence.  -12/6/2018 MRB with no evidence of tumor recurrence.    -12/11/2019 MRB with a new nodule of enhancement within the superior sagittal sinus, concerning for tumor recurrence. Stable appearance of the right parieto-occipital craniotomy site.  -12/13/2019 Bone scan with no convincing evidence of skeletal metastases.  -12/18/2019 MR liver with a 9 mm lesion in the lateral aspect of the junction of segments 7 and 8 of the liver shows homogeneous delayed enhancement and is new since 2015. This could represent a metastatic lesion.     -1/6/2020 NEURO-ONC: Referral to radiation oncology and GI oncology surgery. Signed consent for next generation sequencing.   -1/14/2020 RADS: GammaKnife to lesion (1 fraction).   -1/15/2020 LIVER LESION:  Laparoscopic ultrasound-guided microwave ablation of liver tumor by Dr. Phan Fierro.   PATHOLOGY: Cavernous hemangioma of the liver, benign:    -No metastatic hemangiopericytoma or other malignancy identified    -Underlying steatohepatitis, no significant fibrosis    -No follow-up needed.  -2/28/2020 NEURO-ONC/ MRB: Clinically stable. Headaches with migraine features; increasing Zoloft. Imaging with positive response to radiation.   -3/9/2020 Evaluated by Dr. Wright; Neuro-psych testing with mild weaknesses were noted in aspects of speeded visual processing, and general cognitive speed. Repeat in 3/2021.  -6/1/2020 NEURO-ONC/ MRB: Clinically well; for insomnia trial of melatonin. Imaging with positive treatment effect.   -6/1/2020 NEURO-ONC/ MRB: Clinically well. Imaging with no concerns.   -9/14/2020 NEURO-ONC/ MRB: Clinically well. Imaging of MRI brain and CT CAP with no concerns.  -12/14/2020 NEURO-ONC/ MRB: Clinically well. MR imaging of the brain with no concerns.  -3/12/2021 NEURO-ONC/ MRB: Clinically  well. MRI of the brain with no concerns.  -9/10/2021 NEURO-ONC/ MRB: Clinically well. MRI of the brain and CT CAP with no concerns.  -3/7/2022 NEURO-ONC/ MRB: Clinically well. MRI of the brain with no concerns.  -10/21/2022 NEURO-ONC/ MRB: Clinically well; signs of carpal tunnel in the left wrist. MRI of the brain and CT CAP with no concerns.  -10/21/2022 NEURO-ONC/ MRB: Clinically well; signs of carpal tunnel in the left wrist. MRI of the brain and CT CAP with no concerns.  -4/17/2023 NEURO-ONC/ MRB: Mood is down, less motivation, more issues with concentration. Carpal tunnel of left improving with behavior change. Fatigue labs today; recommended follow-up with primary care provider. MRI of the brain with no concerns.   -10/16/2023 NEURO-ONC/ MRB: Energy improving. No new neurological concerns. MRI of the brain with no concerns. CT CAP with no concerns.  -4/15/2024 NEURO-ONC/ MRB: Mild worsening in headache frequency in the setting of more stress. Imaging with an increased size of the enhancing extra-axial nodular mass within the superior sagittal sinus with elevated cerebral blood volume consistent with disease recurrence. Referral to neurosurgery. Repeat CT imaging for systemic staging now.   -7/22/2024 NEURO-ONC/ MRB: AM headaches in the setting of snoring. Increased anxiety; dose increase Zoloft to 200mg daily. Imaging with a further increase in the size of the enhancing extra axial nodular mass. Referral back to neurosurgery and to radiation oncology. CT body imaging imaging in April was negative.    -9/5/2025 SURGERY/ RADS: Resection with Gamma Title placement.   PATHOLOGY: Recurrent solitary fibrous tumor, CNS WHO grade 2.   Post-operative MR brain imaging from 9/6 with postoperative change of parietal-occipital craniotomy and mass  resection with gamma tile placement. Dural thickening and enhancement noted about the posterior cerebral convexities with greatest involvement on the right, likely  "post-procedural reactive changes. No acute intracranial abnormality.  -10/17/2024 MR brain imaging with postoperative changes of right parietal-occipital craniotomy for mass resection with gamma tile placement. Resolved posttreatment dural thickening and enhancement along the resection site. Scattered resorbing postsurgical right-sided subdural hemorrhages. No suspicious intracranial findings.  -1/9/2025 MR brain imaging with postoperative changes of right parietal-occipital craniotomy for mass resection with gamma tile placement. No evidence of residual disease. No new suspicious enhancement. Previously seen scattered resorbing postsurgical right-sided subdural hemorrhages at the convexity are now completely resolved.  -4/14/2025 NEURO-ONC/ MRB: Ongoing frequent and severe headaches; Referral to the [facility]. Zoloft decreased to 100mg daily. Imaging with two subcentimeter foci of enhancement along the medial surface of the left occipital lobe that increased in conspicuity; most consistent with treatment-induced injury. Repeat imaging in 3 months.     SOCIAL HISTORY   Tobacco use: Former smoker; quit in 2003. 27 pack years.  . 2 daughter, 1 son.   Employment: [employer]; [employer] managers.        PHYSICAL EXAMINATION  /81 (BP Location: Right arm, Patient Position: Sitting, Cuff Size: Adult Regular)   Pulse 74   Temp 98.5  F (36.9  C) (Oral)   Resp 12   Wt 85.1 kg (187 lb 11.2 oz)   SpO2 95%   BMI 34.33 kg/m     Wt Readings from Last 2 Encounters:   04/14/25 85.1 kg (187 lb 11.2 oz)   01/09/25 87.5 kg (192 lb 12.8 oz)      Ht Readings from Last 2 Encounters:   09/05/24 1.575 m (5' 2\")   09/04/24 1.575 m (5' 2\")      KPS     -Generally well appearing.  -Respiratory: No audible wheezing. Normal respiratory effort.   -Psychiatric: Normal mood and affect. Pleasant, talkative.  -Neurologic:   MENTAL STATUS:     Alert, oriented.   Recall: Intact.    Speech fluent. " "   Comprehension intact.     CRANIAL NERVES:    Left inferior homonymous quadrantanopia.   Symmetric facial movements.   Hearing intact.   With normal phonation, no dysfunction tongue.   GAIT: Steady, unassisted.      MEDICAL RECORDS  Personally reviewed; Neurosurgery clinic note with Dr. Samayoa from April. Recommendations included; \"We indicated that the decision is not urgent, and that our team is determining if conventional radiation and/or surgery with gammatiles are options. I will discuss with our neurooncology tumor board to discuss this further. We will be in touch with Ms. Ascencio after our discussion. She may benefit from a visit with our Radiation Oncologists as a next step.\"     LABS  Personally reviewed all available lab results; Pathology results from September.     IMAGING  Personally reviewed MR brain imaging from 4/12 and compared to prior imaging.     Formal read; \"1.  Evolving postoperative changes related to parieto-occipital craniotomy for mass resection. Two subcentimeter foci of enhancement along the medial surface of the left occipital lobe have increased in conspicuity compared to recent prior exams. These may represent evolving postoperative/post therapeutic change; however, locally recurrent neoplasm is not excluded and close attention to these areas on follow-up imaging is advised. 2.  No other significant interval change or superimposed acute intracranial process.\"    Imaging shown to Marlin.       4/9/2025 CT c/a/p IMPRESSION: No evidence of recurrent or metastatic disease in the chest, abdomen, or pelvis.      IMPRESSION  On date of service, 41 minutes was spent in clinic and 16 minutes was spent preparing for the visit through extensive chart review and coordinating care for this high complexity visit. The following is in explanation for the recommendations used to define the plan.       Since I last met with Priyanka, she has underwent a craniotomy for resection and placement " of gamma tiles. Pathology confirmed a recurrent solitary fibrous tumor, CNS WHO grade 2. Today on history and examination, she remains largely asymptomatic, though she is again noting more frequent and severe headaches. There are migrainous components as well as tension-type features. Given the increase in frequency and the reduction in quality of life, Priyanka is open to the [facility] for evaluation and recommendations on management. On examination, there is tenderness about the neck, temporal, and suboccipital ridge muscles. Priyanka remains fatigued, which can also contribute to headaches. Even with the dose reduction in Zoloft, her mood is good.    Of note, WHO grade 2 solitary fibrous tumor (previously called a hemangiopericytoma) are considered malignant tumors. Standard treatment options include surgery and radiation therapy. With no good surgical option, Priyanka completed stereotactic radiation therapy in 1/2020. Imaging in 2023 and 2024 showed a continued subtle increase in the size of the enhancing extra-axial nodular mass within the superior sagittal sinus. As a result, Priyanka underwent a surgery for resection and gamma tile placement in 9/2024. While post-operative imaging from October and January showed no new suspicious enhancement, on recent imaging there are two subcentimeter foci of enhancement along the medial surface of the left occipital lobe that increased in conspicuity. This is likely related to post-treatment changes. I personally reviewed Priyanka's imaging and case at Brain Tumor Conference, in which Dr. Bauer and Yao were in attendance, and all were in agreement with this impression. Dr. Bauer confirmed gamma tile placement next to this imaging finding. Therefore, the plan remains for repeat imaging per NCCN guidelines in 3 months.     Priyanka has consented to next generation sequencing. Today we discussed the risks (lack or actionable mutations, sending  pathology off site, out-of-pocket costs, etc) as well as the benefits of next generation sequencing (NGS) through [employer] and Priyanka is agreeable to this testing. The purpose of NGS is to evaluate for targetable driving mutations of her cancer and thus, hopefully expand treatment options by way of using targeted therapy. I will have [facility] finalize the consent for NGS and pathology will be sent. Additional treatment options include Pazopanib (Votrient) or temozolomide plus bevacizumab.    Extracranial metastatic disease can occur; most commonly in the bone, lung, and liver. Prior MR liver imaging showed a lesion. Priyanka was referred to Dr. Phan Fierro with surgical oncology. Dr. Fierro performed a  laparoscopic ultrasound-guided microwave ablation of a liver lesion on 1/15/2020. Pathology was consistent with a benign cavernous hemangioma and no metastatic hemangiopericytoma or other malignancy was identified. She is under annual systemic imaging surveillance with a CT C/A/P with contrast. This scan was recently completed in April 2025 and was negative. I informed Priyanka of these results today.     PROBLEM LIST  Hemangiopericytoma, WHO grade 2  Liver lesion  Depressed mood  Sleep issues  Headache    PLAN  -CANCER-DIRECTED THERAPY-  -As above; Imaging surveillance - repeat imaging in 3-4 months.   Repeat CT imaging for systemic staging next due in 4/2026.     -LIVER LESION-  -Treated. No lesion-specific follow-up needed.   -CT c/a/p done annually for systemic surveillance imaging.    Imaging in 2025 with no concerns.     -STEROIDS-  -Currently off dexamethasone.    -SEIZURE MANAGEMENT-  -While this patient is at increased risk of having seizures, given the lack of seizure history, there is no indication to prescribe an antiepileptic at this time.     -CARPAL TUNNEL-  -Left wrist > right. Occasional right wrist jerking.   -Improved with decreased strain at work.  -Recommended bracing overnight.     -Quality  of life/ MOOD/ FATIGUE/ HEADACHE-  -Referral to primary care provider for further evaluation/treatment of mood/fatigue.   -4/2023; TSH (normal), Vitamin D (pending), Vitamin B12 (normal).   -History of anxiety/ low mood.   On Zoloft to 100mg daily.   Did not tolerate 150-200mg dosing due to tremors.   -Continue with routine exercise.     -HEADACHES-  -Referral to the [facility].    Migrainous and tension-type components.   -Triggers; fatigue, work-related stress, muscle tension. Prior craniotomy and brain radiation.   -Can consider a referral to sleep medicine for a possible sleep study to evaluate for sleep apnea.    -MEMORY LOSS/ WORD FINDING ISSUES-  -Evaluated by Dr. Phan Wright in 3/2020 and 4/2021. Neuro-psych testing with mild weaknesses noted in aspects of speeded visual processing and general cognitive speed. Stable findings when comparing prior tests.  -Repeat testing if clinically indicated.     Return to clinic in August + imaging.     In the meantime, Priyanka knows to call with questions or concerns or to report new complaints and can be seen sooner if needed.    The longitudinal plan of care for the diagnosis(es)/condition(s) as documented were addressed during this visit. Due to the added complexity in care, I will continue to support Priyanka in the subsequent management and with ongoing continuity of care.     Lauren Zhang MD  Neuro-oncology      Again, thank you for allowing me to participate in the care of your patient.        Sincerely,        Lauren Zhang MD    Electronically signed

## 2025-04-16 NOTE — CONFIDENTIAL NOTE
NEUROLOGY - PRE VISIT PLANNING             Referring Provider Reason for Referral Office Visit Notes   Lauren Zhang MD   [facility] Chronic migraine without aura without status migrainosus, not intractable   Chronic tension-type headache, not intractable  Solitary fibrous neoplasm of central nervous system, grade 2   H/O craniotomy  4/14/2024        IMAGING/NOTES/SCANS Status/ Location  DATE   MRI/MRA(HEAD, NECK, SPINE) Internal 4/12/2025,  1/9/2025, 10/17/2024   9/6/2024,  4/15/2024,  10/16/2023   4/17/2023,  10/21/2022    CT/CTA   Internal  9/5/2024    EMG N/A    EEG N/A    LABS Internal    Neuropsychological testing  Internal  Phan Wright, PhD LP  03/06/2020,  4/30/2021    Additional Testing/Notes N/A      Does patient have C2C?  Year last updated Action     YES   [x]   2016   Please update at appointment if outdated more than 5 years       NO     []   N/A   Please complete C2C at appointment

## 2025-04-30 LAB — SPECIMEN STATUS: NORMAL

## 2025-05-19 ENCOUNTER — OFFICE VISIT (OUTPATIENT)
Dept: NEUROLOGY | Facility: CLINIC | Age: 65
End: 2025-05-19
Payer: MEDICARE

## 2025-05-19 ENCOUNTER — PRE VISIT (OUTPATIENT)
Dept: NEUROLOGY | Facility: CLINIC | Age: 65
End: 2025-05-19

## 2025-05-19 VITALS
BODY MASS INDEX: 33.67 KG/M2 | WEIGHT: 184.1 LBS | HEART RATE: 82 BPM | SYSTOLIC BLOOD PRESSURE: 122 MMHG | DIASTOLIC BLOOD PRESSURE: 76 MMHG | OXYGEN SATURATION: 97 %

## 2025-05-19 DIAGNOSIS — G44.86 CERVICOGENIC HEADACHE: Primary | ICD-10-CM

## 2025-05-19 DIAGNOSIS — Z98.890 H/O CRANIOTOMY: ICD-10-CM

## 2025-05-19 DIAGNOSIS — D49.7: ICD-10-CM

## 2025-05-19 PROCEDURE — 3074F SYST BP LT 130 MM HG: CPT | Performed by: PSYCHIATRY & NEUROLOGY

## 2025-05-19 PROCEDURE — 3078F DIAST BP <80 MM HG: CPT | Performed by: PSYCHIATRY & NEUROLOGY

## 2025-05-19 PROCEDURE — 99214 OFFICE O/P EST MOD 30 MIN: CPT | Performed by: PSYCHIATRY & NEUROLOGY

## 2025-05-19 RX ORDER — GABAPENTIN 100 MG/1
100 CAPSULE ORAL 3 TIMES DAILY
Qty: 90 CAPSULE | Refills: 5 | Status: SHIPPED | OUTPATIENT
Start: 2025-05-19

## 2025-05-19 RX ORDER — TIZANIDINE 2 MG/1
2 TABLET ORAL 2 TIMES DAILY PRN
Qty: 30 TABLET | Refills: 3 | Status: SHIPPED | OUTPATIENT
Start: 2025-05-19

## 2025-05-19 NOTE — LETTER
2025      Leidy Ascencio  [address] [place] [address]      Dear Colleague,    Thank you for referring your patient, Leidy Ascencio, to the [facility]. Please see a copy of my visit note below.      Neurology Consultation    Patient Name:  Leidy Ascencio  MRN:  [id number]    :  1960  Date of Service:  May 19, 2025  Primary care provider:  Madelin Castro        Chief Complaint: Headaches     History of Present Illness:     Leidy Ascencio is a 64 year old right handed female who presents for evaluation of headaches.     - History: Denies prior history of headaches.  Started having headaches in  and that when imaging was done that showed R occipital mass. After first surgery, she felt like the headaches improved.  Flared up after radiation.  Had them regularly 4-6 months after this.  Headaches then were intermittent until after surgery in 2024.  Headache interestingly resolved for a week when she went down to [place] to take care of her mom who had shingles.  Stress level was increased at that time.  No other differences.    - Location: Varies and can come anywhere, especially in the occipital area, neck, vertex   - Quality: Can be achy or throbbing   - Duration: Near constant but waxes and wanes in intensity, can reach 10/10 pain level   - Frequency: Daily, bad headache 2-4 times/week.     - Associated symptoms: Denies photophobia, sometimes phonophobia.  Denies nausea and vomiting.  Denies allodynia. Sometimes head feels too heavy.  Maybe mild improvement with laying down but overall no significant change with position.  Sometimes worse with movement/activity.  Denies associated weakness, tingling.  Has peripheral vision loss related to tumor/resection.  Denies changes in vision related to her headaches.  Denies fever, unexplained weight loss, or jaw claudication.    - Alleviating Factors: Has not appreciated any major alleviating factors   - Triggers: Has  not identified any specific triggers maybe being on computer too much   - Birth Control/Menses: Not on hormone replacement   - Co-morbidities: + Depression, anxiety has gotten worse as she has gotten older, takes sertraline.  Denies history of sleep apnea.   - Risk Factors: Denies family history of headaches.   - Prior Workup: See below   - Headache hygiene: Typically denies problems with insomnia, although can sometimes has difficulty following asleep. Has been taking OTC pain relievers. 1 cup of coffee/day.  Stays well hydrated.      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 S/p GammaKnife in 1/2020. Surveillance imaging stable until 4/2024 showing increased enhancement and in 7/2024 increased size of enhancing extra-axial nodular mass.  S/p resection and placement of GammaTiles on 9/5/2024. Repeat imaging in 10/2024 and 1/2025 stable. Repeat imaging in 4/2025 showed evolving postoperative changes related to parieto-occipital craniotomy for mass resection. Two subcentimeter foci of enhancement along the medial surface of the left occipital lobe have increased in conspicuity compared to recent prior exams. Repeat imaging in 7/2025 is already scheduled. Whole body CT and PET MRI have been unrevealing in the past for any other involvement. Most recent CT body in 4/2025 reassuring.     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  Quit smoking 30 years ago.  Glass of wine occasionally.  Denies recreational drug use     Physical Exam:  /76   Pulse 82   Wt 83.5 kg (184 lb 1.6 oz)   SpO2 97%   BMI 33.67 kg/m      General:  No acute distress  Head and neck: No tenderness to palpation, small non-mobile protrusion of the right occipital area. Soft large spot slightly to the right of the midline  Cardiovascular: Normal rate.  Lung: Respirations are non-labored.  Extremities: Normal range of motion  Integumentary: Warm and dry    Neurologic:  Mental status : alert, fund of knowledge appropriate for visit    LANGUAGE: Speech fluent, no dysarthria     CN:  II- pupils equal and reactive, decreased lower/altitudinal visual field deficit  funduscopic exam was very difficult unable to appreciate fundi   III, IV, VI- extraocular movements intact  V- sensation intact bilaterally  VII- face symmetric  VIII- hearing intact, no nystagmus  IX, X- palate elevates symmetrically  XI- sternocleidomastoid 5/5  XII- tongue midline    MOTOR : intact bulk and tone  5/5 strength in all ext    SENSORY : intact to grossly to temperature and vibration in BUE and BLE     REFLEXES:       Right Left   Triceps 2+ 2+   Biceps 2+ 2+   Brachioradialis 2+ 2+   Knee jerk 3 3+   Ankle jerk 2+ 2+   Cook absent     MOVEMENT/COORDINATION: finger to nose, finger taps, and heel to shin intact bilaterally     GAIT : Largely normal     Cognition and Speech: Speech clear and coherent.    Psychiatric: Cooperative, Appropriate mood & affect.     Assessment/Plan:   Leidy Ascencio is a 64 year old right handed female who presents for evaluation of headaches.  Suspect a major musculoskeletal component to her headaches. They seem more cervicogenic although not necessarily classic for them. It's not uncommon to get headaches with intracranial surgery and  radiation but especially cervicogenic type headaches for occipital surgeries. No major migrainous features.  Possible medication overuse component although headaches are not really classic for that either. Her tumor is being closely monitor by oncology, oncology radiation, and neurosurgery with repeat imaging in 7/2025.  Would like to refer her to PT. Will also trial low dose Gabapentin and tizanidine to see if this helps.  Counseled on possible SE.      Plan  > Refer to PT  > Start Gabapentin 100 mg TID, asked patient to reach out in a few weeks and will titrate as needed   > Tizanidine 2 mg BID PRN  > Follow-up in 2 months     I spent 55 minutes providing care for this patient, including reviewing imaging, labs, and notes as well as providing counseling and coordination of care for the above issues.      Again, thank you for allowing me to participate in the care of your patient.        Sincerely,        Estella Sebastian, DO    Electronically signed

## 2025-05-19 NOTE — PROGRESS NOTES
Neurology Consultation    Patient Name:  Leidy Ascencio  MRN:  [id number]    :  1960  Date of Service:  May 19, 2025  Primary care provider:  Madelin Castro        Chief Complaint: Headaches     History of Present Illness:     Leidy Ascencio is a 64 year old right handed female who presents for evaluation of headaches.     - History: Denies prior history of headaches.  Started having headaches in  and that when imaging was done that showed R occipital mass. After first surgery, she felt like the headaches improved.  Flared up after radiation.  Had them regularly 4-6 months after this.  Headaches then were intermittent until after surgery in 2024.  Headache interestingly resolved for a week when she went down to [place] to take care of her mom who had shingles.  Stress level was increased at that time.  No other differences.    - Location: Varies and can come anywhere, especially in the occipital area, neck, vertex   - Quality: Can be achy or throbbing   - Duration: Near constant but waxes and wanes in intensity, can reach 10/10 pain level   - Frequency: Daily, bad headache 2-4 times/week.     - Associated symptoms: Denies photophobia, sometimes phonophobia.  Denies nausea and vomiting.  Denies allodynia. Sometimes head feels too heavy.  Maybe mild improvement with laying down but overall no significant change with position.  Sometimes worse with movement/activity.  Denies associated weakness, tingling.  Has peripheral vision loss related to tumor/resection.  Denies changes in vision related to her headaches.  Denies fever, unexplained weight loss, or jaw claudication.    - Alleviating Factors: Has not appreciated any major alleviating factors   - Triggers: Has not identified any specific triggers maybe being on computer too much   - Birth Control/Menses: Not on hormone replacement   - Co-morbidities: + Depression, anxiety has gotten worse as she has gotten older, takes sertraline.  Denies history of  sleep apnea.   - Risk Factors: Denies family history of headaches.   - Prior Workup: See below   - Headache hygiene: Typically denies problems with insomnia, although can sometimes has difficulty following asleep. Has been taking OTC pain relievers. 1 cup of coffee/day.  Stays well hydrated.      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 S/p GammaKnife in 1/2020. Surveillance imaging stable until 4/2024 showing increased enhancement and in 7/2024 increased size of enhancing extra-axial nodular mass.  S/p resection and placement of GammaTiles on 9/5/2024. Repeat imaging in 10/2024 and 1/2025 stable. Repeat imaging in 4/2025 showed evolving postoperative changes related to parieto-occipital craniotomy for mass resection. Two subcentimeter foci of enhancement along the medial surface of the left occipital lobe have increased in conspicuity compared to recent prior exams. Repeat imaging in 7/2025 is already scheduled. Whole body CT and PET MRI have been unrevealing in the past for any other involvement. Most recent CT body in 4/2025 reassuring.     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  Quit smoking 30 years ago.  Glass of wine occasionally.  Denies recreational drug use     Physical Exam:  /76    Pulse 82   Wt 83.5 kg (184 lb 1.6 oz)   SpO2 97%   BMI 33.67 kg/m      General:  No acute distress  Head and neck: No tenderness to palpation, small non-mobile protrusion of the right occipital area. Soft large spot slightly to the right of the midline  Cardiovascular: Normal rate.  Lung: Respirations are non-labored.  Extremities: Normal range of motion  Integumentary: Warm and dry    Neurologic:  Mental status : alert, fund of knowledge appropriate for visit    LANGUAGE: Speech fluent, no dysarthria     CN:  II- pupils equal and reactive, decreased lower/altitudinal visual field deficit  funduscopic exam was very difficult unable to appreciate fundi   III, IV, VI- extraocular movements intact  V- sensation intact bilaterally  VII- face symmetric  VIII- hearing intact, no nystagmus  IX, X- palate elevates symmetrically  XI- sternocleidomastoid 5/5  XII- tongue midline    MOTOR : intact bulk and tone  5/5 strength in all ext    SENSORY : intact to grossly to temperature and vibration in BUE and BLE     REFLEXES:       Right Left   Triceps 2+ 2+   Biceps 2+ 2+   Brachioradialis 2+ 2+   Knee jerk 3 3+   Ankle jerk 2+ 2+   Cook absent     MOVEMENT/COORDINATION: finger to nose, finger taps, and heel to shin intact bilaterally     GAIT : Largely normal     Cognition and Speech: Speech clear and coherent.    Psychiatric: Cooperative, Appropriate mood & affect.     Assessment/Plan:   Leidy Ascencio is a 64 year old right handed female who presents for evaluation of headaches.  Suspect a major musculoskeletal component to her headaches. They seem more cervicogenic although not necessarily classic for them. It's not uncommon to get headaches with intracranial surgery and radiation but especially cervicogenic type headaches for occipital surgeries. No major migrainous features.  Possible medication overuse component although headaches are not really classic for that either. Her tumor is being closely monitor by oncology,  oncology radiation, and neurosurgery with repeat imaging in 7/2025.  Would like to refer her to PT. Will also trial low dose Gabapentin and tizanidine to see if this helps.  Counseled on possible SE.      Plan  > Refer to PT  > Start Gabapentin 100 mg TID, asked patient to reach out in a few weeks and will titrate as needed   > Tizanidine 2 mg BID PRN  > Follow-up in 2 months     I spent 55 minutes providing care for this patient, including reviewing imaging, labs, and notes as well as providing counseling and coordination of care for the above issues.

## 2025-05-21 ENCOUNTER — THERAPY VISIT (OUTPATIENT)
Dept: PHYSICAL THERAPY | Facility: CLINIC | Age: 65
End: 2025-05-21
Attending: PSYCHIATRY & NEUROLOGY
Payer: MEDICARE

## 2025-05-21 DIAGNOSIS — G44.86 CERVICOGENIC HEADACHE: ICD-10-CM

## 2025-05-21 PROCEDURE — 97110 THERAPEUTIC EXERCISES: CPT | Mod: GP | Performed by: PHYSICAL THERAPIST

## 2025-05-21 PROCEDURE — 97161 PT EVAL LOW COMPLEX 20 MIN: CPT | Mod: GP | Performed by: PHYSICAL THERAPIST

## 2025-05-21 NOTE — PROGRESS NOTES
PHYSICAL THERAPY EVALUATION  Type of Visit: Evaluation       Fall Risk Screen:  Have you fallen 2 or more times in the past year?: No  Have you fallen and had an injury in the past year?: No  Is patient receiving Physical Therapy Services?: No    Subjective         Presenting condition or subjective complaint: Headaches  Date of onset: 05/19/25 (PT referral datte; symptoms began November 2024, worsening of symptoms)    Relevant medical history: Arthritis; Cancer; COPD; Depression; Hearing problems; Menopause; Migraines or headaches; Overweight; Radiation treatment; Severe headaches; Vision problems   Dates & types of surgery: Brain surgery sept 2024    Prior diagnostic imaging/testing results: MRI; CT scan     Prior therapy history for the same diagnosis, illness or injury: No      Prior Level of Function  Transfers: Independent  Ambulation: Independent  ADL: Independent      Living Environment  Social support: With a significant other or spouse   Type of home: House; Basement   Stairs to enter the home: Yes 3 Is there a railing: No     Ramp: No   Stairs inside the home: Yes 13 Is there a railing: Yes     Help at home: None  Equipment owned:       Employment: No    Hobbies/Interests: Reading gardening playing games cooking    Patient goals for therapy: Live with no pain    Pain assessment: See objective evaluation for additional pain details     Objective   CERVICAL SPINE EVALUATION  Functional disability score (NDI):  40%  VAS score (0-10): 5/10, worst 10/10, at best 2/10    ADDITIONAL HISTORY:  Present symptoms:  upper cx, occipital, frontal headaches, may also be felt temporal or parietal regions.  Her head feels heavy.   Currently feeling symptoms on the R side of her head (that is where her headaches did start).  Notes that she has intermittent R UE symptoms that will travel down the arm into the back of her hands/fingers.  Her R foot has been locking up at night over the past 2 weeks.   Pain quality: Aching,  Throbbing, and heaviness of head  Paresthesia (yes/no):  yes, R UE into the back of the hand    Symptoms (improving/unchanging/worsening):  worsening  Symptoms commenced as a result of: uncertain; symptoms commenced after neck surgery, ? radiation   Condition occurred in the following environment:  home    Symptoms at onset (neck/arm/forearm/headache): neck pain and headaches  Constant symptoms (neck/arm/forearm/headache): neck and headache  Intermittent symptoms (neck/arm/forearm/headache): R UE symptoms, R foot locking up (cramping)    Symptoms are made worse with the following: wakes with a headache (left side sleeper)  Symptoms are made better with the following: as the day progresses, heat around the neck and sitting in the sun (warmth of the sun).  Will take Advil or Tylenol if needed.   Recently started Gabapentin; unknown results yet.  Muscle relaxant last night- still woke as frequently and still woke with a headache this morning    Disturbed sleep (yes/no): yes (3 times a night) Usually can fall back to sleep other times toss and turn before able to go back to sleep  Number of pillows: 1 or 2  Sleeping postures (prone/sup/side R/L): L side lying    Previous history: did have headaches prior to having the tumor removed originally.  Did not have a HA prior to the 2nd tumor removal; HA developed 2 months after the surgery, in November of 2024.  Previous treatments: none    Specific Questions: (as reported by the patient)  Dizziness/Tinnitus/Nausea/Swallowing (pos/neg): none  Gait/Upper Limbs (normal/abnormal): normal  Medications (nil/NSAIDS/anlag/steroids/anticoag/other):  please see MyChart  Medical allergies:  Atorvastatin, penicillin, sulfa medications   General health (excellent/good/fair/poor):  fair  Imaging (None/Xray/MRI/Other):  CT scan 4/9/2025 - Mild multilevel degenerative changes of the spine. No  suspicious lesions.  Recent or major surgery (yes/no): 2015 brain surgery, gamma knife radiation   and that is when the headaches started; tumor returned and had surgery and 2nd surgery 2024 and has radiation tiles placed.    Night pain (yes/no): wakes due to HA and needing to use the rest room  Accidents (yes/no): no  Unexplained weight loss (yes/no): no  Barriers at home: no  Other red flags: no    Postural Observation:   Sitting: Slump  Protruded head: Yes Lateral deviation:  Nil.  Change of posture: No effect Wry Neck: Nil  Other observations/functional baselines:  protrusion of soft spot on the back R of her head.  Not able to lay flat; tends to turn head to the L    Neurological:  Motor Deficit:  Strong and painfree   Reflexes:   Brisk and symmetrical UE and B LEs  Sensory Deficit:  symmetrical to light touch   Neurodynamic tests:  (-) seated ULTT    Movement Loss:   Mikel Mod Min Nil Symptoms   Protrusion    x No effect   Flexion   48  Pulling on the R   Retraction    x Inc headache during, NW after    Extension   55  Pulling on the R   Lateral flexion R   40  PDM on R with return to neutral    Lateral flexion L   35  Stretch on the Rt   Rotation R   58  Tightness on the R   Rotation L   72  No effect     Test Movements:   During: produces, abolishes, increases, decreases, no effect, centralizing, peripheralizing  After: better, worse, no better, no worse, no effect, centralized, peripheralized    Symptomatic response Mechanical response    During testing After testing Effect - increased ROM, decreased ROM, or key functional test No Effect   Pretest symptoms sittin/10 R temporal headache with symptoms at the top of the neck and occipital region     Rep PRO       Rep RET Increases    No Worse     x   Rep RET EXT                Pretest symptoms lyin/10 R temporal headache with symptoms at the top of the neck and occipital region   During testing After testing Effect - increased ROM, decreased ROM, or key functional test No Effect   Rep RET Increases    Worse->NW     x   Rep RET EXT          If required, pretest symptoms sittin/10 R temporal headache with symptoms at the top of the neck and occipital region    During testing After testing Effect - increased ROM, decreased ROM, or key functional test No Effect   Rep LF-R No Effect  Inc ROM with reps    No Effect  Tightness on R was a bit better    Increased neck rotation ROM     Rep LF-L       Rep ROT-R Supine - increased R sided pain and HA Worse  No Worse     x   Rep ROT-L       Rep FLEX         Static Tests:     Other Tests: thoracic extension over chair - No effect during, NE after, increased neck rotation in both directions.    Provisional Classification:  Derangement - Asymmetrical, unilateral, symptoms above elbow vs mechanically inconclusive    Potential Drivers of Pain and/or Disability: Comorbidities    Principle of Management:  Education:  discussed and had patient improve her sitting posture with use of a rolled towel supporting her thoracic region.  Had patient lay down in L side lying with neck supported to assess more of a neutral neck posture.  Discussed centralization/peripheralization of symptoms to help guide self treatment.  If develops increased/more frequent R UE symptoms, then discontinue that exercise.  Monitor symptoms and ROM with exercises.        Equipment provided:  rolled towel to assist with posture when driving.    Mechanical therapy (Y/N):  yes   Extension principle:  thoracic extension 10 reps every 2 hours if not making progress with lateral bending R     Lateral principle:  lateral bend to the R, 10 reps, 6 times a day; if not noticing changes in symptoms, nor ROM then add thoracic extension.    Flexion principle:     Other:      Assessment & Plan   CLINICAL IMPRESSIONS  Medical Diagnosis: cevicogenic headache    Treatment Diagnosis: cervicogenic headache   Impression/Assessment: Patient is a 64 year old female with headache and neck complaints.  The following significant findings have been identified: Pain,  Decreased ROM/flexibility, Decreased joint mobility, Impaired muscle performance, Decreased activity tolerance, and Impaired posture. These impairments interfere with their ability to perform self care tasks, work tasks, and recreational activities as compared to previous level of function.     Clinical Decision Making (Complexity):  Clinical Presentation: Evolving/Changing  Clinical Presentation Rationale: based on medical and personal factors listed in PT evaluation  Clinical Decision Making (Complexity): Low complexity    PLAN OF CARE  Treatment Interventions:  Interventions: Manual Therapy, Neuromuscular Re-education, Therapeutic Activity, Therapeutic Exercise, Self-Care/Home Management    Long Term Goals     PT Goal 1  Goal Identifier: headache  Goal Description: symptoms will go from constant to intermittent  Rationale: to maximize safety and independence with performance of ADLs and functional tasks;to maximize safety and independence within the home;to maximize safety and independence with self cares  Goal Progress: constant symptoms for the neck and head  Target Date: 06/18/25  PT Goal 2  Goal Identifier: sleep  Goal Description: patient will be able to wake w/o a headache 5 of 7 days a week  Rationale: to maximize safety and independence with self cares  Goal Progress: wakes with headache every morning  Target Date: 07/02/25  PT Goal 3  Goal Identifier: sleep  Goal Description: patient will be able to sleep through the night without waking due to her neck or head pain, 4 of 7 nights a week  Rationale:  (restorative sleep pattern)  Goal Progress: wakes at least 3 times a night  Target Date: 07/16/25      Frequency of Treatment: 1 x / wk  Duration of Treatment: 8 weeks    Recommended Referrals to Other Professionals:   Education Assessment:   Learner/Method: (P) Patient;No Barriers to Learning;Pictures/Video;Demonstration;Reading;Listening  Education Comments: (P) printed PTRx and put on phone    Risks and  benefits of evaluation/treatment have been explained.   Patient/Family/caregiver agrees with Plan of Care.     Evaluation Time:     PT Eval, Low Complexity Minutes (38543): (P) 24  Evaluation Only     Signing Clinician: Betty Easley, PT        [facility]                                                                                   OUTPATIENT PHYSICAL THERAPY      PLAN OF TREATMENT FOR OUTPATIENT REHABILITATION   Patient's Last Name, First Name, Leidy Aldana YOB: 1960   Provider's Name   [facility]   Medical Record No.  [id number]     Onset Date: 05/19/25 (PT referral datte; symptoms began November 2024, worsening of symptoms)  Start of Care Date: 05/21/25     Medical Diagnosis:  cevicogenic headache      PT Treatment Diagnosis:  cervicogenic headache Plan of Treatment  Frequency/Duration: 1 x / wk/ 8 weeks    Certification date from 05/21/25 to 07/16/25         See note for plan of treatment details and functional goals     Betty Easley, PT                         I CERTIFY THE NEED FOR THESE SERVICES FURNISHED UNDER        THIS PLAN OF TREATMENT AND WHILE UNDER MY CARE     (Physician attestation of this document indicates review and certification of the therapy plan).              Referring Provider:  Estella Sebastian    Initial Assessment  See Epic Evaluation- Start of Care Date: 05/21/25

## 2025-05-28 ENCOUNTER — THERAPY VISIT (OUTPATIENT)
Dept: PHYSICAL THERAPY | Facility: CLINIC | Age: 65
End: 2025-05-28
Payer: MEDICARE

## 2025-05-28 DIAGNOSIS — G44.86 CERVICOGENIC HEADACHE: Primary | ICD-10-CM

## 2025-05-28 PROCEDURE — 97110 THERAPEUTIC EXERCISES: CPT | Mod: GP | Performed by: PHYSICAL THERAPIST

## 2025-06-10 ENCOUNTER — THERAPY VISIT (OUTPATIENT)
Dept: PHYSICAL THERAPY | Facility: CLINIC | Age: 65
End: 2025-06-10
Attending: PSYCHIATRY & NEUROLOGY
Payer: MEDICARE

## 2025-06-10 DIAGNOSIS — G44.86 CERVICOGENIC HEADACHE: Primary | ICD-10-CM

## 2025-06-10 PROCEDURE — 97110 THERAPEUTIC EXERCISES: CPT | Mod: GP | Performed by: PHYSICAL THERAPIST

## 2025-06-10 PROCEDURE — 97140 MANUAL THERAPY 1/> REGIONS: CPT | Mod: GP | Performed by: PHYSICAL THERAPIST

## 2025-06-17 ENCOUNTER — THERAPY VISIT (OUTPATIENT)
Dept: PHYSICAL THERAPY | Facility: CLINIC | Age: 65
End: 2025-06-17
Payer: MEDICARE

## 2025-06-17 DIAGNOSIS — G44.86 CERVICOGENIC HEADACHE: Primary | ICD-10-CM

## 2025-06-17 PROCEDURE — 97110 THERAPEUTIC EXERCISES: CPT | Mod: GP | Performed by: PHYSICAL THERAPIST

## 2025-06-17 PROCEDURE — 97140 MANUAL THERAPY 1/> REGIONS: CPT | Mod: GP | Performed by: PHYSICAL THERAPIST

## 2025-06-23 ENCOUNTER — THERAPY VISIT (OUTPATIENT)
Dept: PHYSICAL THERAPY | Facility: CLINIC | Age: 65
End: 2025-06-23
Payer: MEDICARE

## 2025-06-23 DIAGNOSIS — G44.86 CERVICOGENIC HEADACHE: Primary | ICD-10-CM

## 2025-06-23 PROCEDURE — 97110 THERAPEUTIC EXERCISES: CPT | Mod: GP | Performed by: PHYSICAL THERAPIST

## 2025-06-23 PROCEDURE — 97140 MANUAL THERAPY 1/> REGIONS: CPT | Mod: GP | Performed by: PHYSICAL THERAPIST

## 2025-07-10 NOTE — PROGRESS NOTES
NEURO-ONCOLOGY VISIT  Jul 14, 2025    CHIEF COMPLAINT: Ms. 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 She completed GammaKnife treatment in 1/2020.     Priyanka had been managed on imaging surveillance and imaging through 10/2023 demonstrated no evidence of cancer recurrence. However, imaging in 4/2024 showed a subtle increase in enhancement within the superior sagittal sinus and unfortunately, repeat imaging in 7/2024 demonstrated a further increase in the size of the enhancing extra axial nodular mass.     Priyanka underwent resection plus placement of GammaTiles on 9/5/2024. Pathology confirmed recurrent solitary fibrous tumor, CNS WHO grade 2. Repeat MR brain imaging from 10/2024 and 1/2025 showed no new concerns and no evidence of residual enhancement.     MR brain imaging in 4/2025 demonstrated likely evolving postoperative changes with two subcentimeter foci of enhancement along the medial surface of the left occipital lobe that on repeat imaging in 7/2025, one lesion resolved and the other slightly increased in size and in conspicuity compared to the prior exams. The plan is for continued observation with close imaging surveillance.    To date, CT body imaging, most recently completed in 4/2025 and performed annually, has been without any concerning findings for metastatic disease.     I met with Priyanka and Main () today for this follow-up visit.     HISTORY OF PRESENT ILLNESS  -Priyanka had a nice birthday celebration earlier this month.    Thankfully, her mother, who is 86 years old and lives in [place], has recovered well from her prior illness.   -With the help of the headache clinic, her headaches are less frequent and less severe.    The pain is more in the neck/ shoulders.    Using gabapentin and a muscle relaxer with benefit.   -Less fatigued.    -Stable visual field cut.   Can have periods of blurred vision, worse at night.   -Denies changes in strength or coordination.    No changes in sensation.   -Mood is good; on Zoloft.      MEDICATIONS   Current Outpatient Medications   Medication Sig Dispense Refill    sertraline (ZOLOFT) 100 MG tablet Take 1 tablet (100 mg) by mouth daily.      Acetaminophen (TYLENOL 8 HOUR ARTHRITIS PAIN PO) Take by mouth as needed.      albuterol (PROAIR HFA/PROVENTIL HFA/VENTOLIN HFA) 108 (90 Base) MCG/ACT inhaler Inhale 2 puffs into the lungs every 6 hours. 18 g 11    azelastine (ASTELIN) 0.1 % nasal spray Spray 1 spray into both nostrils 2 times daily 30 mL 8    fluticasone-salmeterol (ADVAIR) 250-50 MCG/ACT inhaler Inhale 1 puff into the lungs every 12 hours. 3 each 3    gabapentin (NEURONTIN) 100 MG capsule Take 1 capsule (100 mg) by mouth 3 times daily. 90 capsule 5    Krill Oil 300 MG CAPS Take by mouth as needed.      loratadine (CLARITIN) 10 MG tablet Take 10 mg by mouth as needed.      rosuvastatin (CRESTOR) 10 MG tablet Take 1 tablet (10 mg) by mouth daily 90 tablet 0    tiZANidine (ZANAFLEX) 2 MG tablet Take 1 tablet (2 mg) by mouth 2 times daily as needed for muscle spasms. 30 tablet 3    triamcinolone (KENALOG) 0.1 % external cream Apply topically 2 times daily 80 g 0    Vitamin D3 (VITAMIN D, CHOLECALCIFEROL,) 25 mcg (1000 units) tablet Take 25 mcg by mouth daily.       DRUG ALLERGIES   Allergies   Allergen Reactions    Other Drug Allergy (See Comments) Hives     Paxlovid     Atorvastatin     Penicillins     Sulfa Antibiotics     Sulfamethoxazole-Trimethoprim Rash       ONCOLOGIC HISTORY  -2015 PRESENTATION: Headaches.  -2015 MR brain imaging with a right occipital mass   -4/22/2015 SURGERY: Resection by Dr. Lujan   PATHOLOGY: Hemangiopericytoma, grade 2.   -2015 CT chest, abdomen and pelvis negative for malignancy.  -2015 PET MRI negative for malignancy.   Conservative management per Dr. Sutton  Jefe.    -8/1/2017 CT chest abdomen and pelvis and bone scan negative for malignancy.   -12/7/2017 MRB with no evidence of tumor recurrence.  -12/6/2018 MRB with no evidence of tumor recurrence.    -12/11/2019 MRB with a new nodule of enhancement within the superior sagittal sinus, concerning for tumor recurrence. Stable appearance of the right parieto-occipital craniotomy site.  -12/13/2019 Bone scan with no convincing evidence of skeletal metastases.  -12/18/2019 MR liver with a 9 mm lesion in the lateral aspect of the junction of segments 7 and 8 of the liver shows homogeneous delayed enhancement and is new since 2015. This could represent a metastatic lesion.     -1/6/2020 NEURO-ONC: Referral to radiation oncology and GI oncology surgery. Signed consent for next generation sequencing.   -1/14/2020 RADS: GammaKnife to lesion (1 fraction).   -1/15/2020 LIVER LESION:  Laparoscopic ultrasound-guided microwave ablation of liver tumor by Dr. Phan Fierro.   PATHOLOGY: Cavernous hemangioma of the liver, benign:    -No metastatic hemangiopericytoma or other malignancy identified    -Underlying steatohepatitis, no significant fibrosis    -No follow-up needed.  -2/28/2020 NEURO-ONC/ MRB: Clinically stable. Headaches with migraine features; increasing Zoloft. Imaging with positive response to radiation.   -3/9/2020 Evaluated by Dr. Wright; Neuro-psych testing with mild weaknesses were noted in aspects of speeded visual processing, and general cognitive speed. Repeat in 3/2021.  -6/1/2020 NEURO-ONC/ MRB: Clinically well; for insomnia trial of melatonin. Imaging with positive treatment effect.   -6/1/2020 NEURO-ONC/ MRB: Clinically well. Imaging with no concerns.   -9/14/2020 NEURO-ONC/ MRB: Clinically well. Imaging of MRI brain and CT CAP with no concerns.  -12/14/2020 NEURO-ONC/ MRB: Clinically well. MR imaging of the brain with no concerns.  -3/12/2021 NEURO-ONC/ MRB: Clinically well. MRI of the brain with no  concerns.  -9/10/2021 NEURO-ONC/ MRB: Clinically well. MRI of the brain and CT CAP with no concerns.  -3/7/2022 NEURO-ONC/ MRB: Clinically well. MRI of the brain with no concerns.  -10/21/2022 NEURO-ONC/ MRB: Clinically well; signs of carpal tunnel in the left wrist. MRI of the brain and CT CAP with no concerns.  -10/21/2022 NEURO-ONC/ MRB: Clinically well; signs of carpal tunnel in the left wrist. MRI of the brain and CT CAP with no concerns.  -4/17/2023 NEURO-ONC/ MRB: Mood is down, less motivation, more issues with concentration. Carpal tunnel of left improving with behavior change. Fatigue labs today; recommended follow-up with primary care provider. MRI of the brain with no concerns.   -10/16/2023 NEURO-ONC/ MRB: Energy improving. No new neurological concerns. MRI of the brain with no concerns. CT CAP with no concerns.  -4/15/2024 NEURO-ONC/ MRB: Mild worsening in headache frequency in the setting of more stress. Imaging with an increased size of the enhancing extra-axial nodular mass within the superior sagittal sinus with elevated cerebral blood volume consistent with disease recurrence. Referral to neurosurgery. Repeat CT imaging for systemic staging now.   -7/22/2024 NEURO-ONC/ MRB: AM headaches in the setting of snoring. Increased anxiety; dose increase Zoloft to 200mg daily. Imaging with a further increase in the size of the enhancing extra axial nodular mass. Referral back to neurosurgery and to radiation oncology. CT body imaging imaging in April was negative.    -9/5/2025 SURGERY/ RADS: Resection with Gamma Title placement.   PATHOLOGY: Recurrent solitary fibrous tumor, CNS WHO grade 2.   Next generation sequencing throught [facility];   Microsatellite Instability (MSI) Seq DNA-Tumor Stable  Tumor Mutational [place] (TMB) Seq DNA-Tumor Result: Low (2)  Genomic Loss of Heterozygosity (EVELIO) Seq DNA-Tumor Low - 1% of tested genomic segments exhibited EVELIO   Genes Tested with Pathogenic or Likely Pathogenic  Alterations  NAB2 Seq RNA-Tumor Pathogenic Fusion NAB2-STAT6 6  PTCH1 Seq DNA-Tumor Pathogenic Variant p.G15fs 1 c.44delG 10  Post-operative MR brain imaging from 9/6 with postoperative change of parietal-occipital craniotomy and mass  resection with gamma tile placement. Dural thickening and enhancement noted about the posterior cerebral convexities with greatest involvement on the right, likely post-procedural reactive changes. No acute intracranial abnormality.  -10/17/2024 MR brain imaging with postoperative changes of right parietal-occipital craniotomy for mass resection with gamma tile placement. Resolved posttreatment dural thickening and enhancement along the resection site. Scattered resorbing postsurgical right-sided subdural hemorrhages. No suspicious intracranial findings.  -1/9/2025 MR brain imaging with postoperative changes of right parietal-occipital craniotomy for mass resection with gamma tile placement. No evidence of residual disease. No new suspicious enhancement. Previously seen scattered resorbing postsurgical right-sided subdural hemorrhages at the convexity are now completely resolved.  -4/14/2025 NEURO-ONC/ MRB: Ongoing frequent and severe headaches; Referral to the [facility]. Zoloft decreased to 100mg daily. Imaging with two subcentimeter foci of enhancement along the medial surface of the left occipital lobe that increased in conspicuity; most consistent with treatment-induced injury. Repeat imaging in 3 months.   -7/14/2025 NEURO-ONC/ MRB: Less frequent and severe headaches, less fatigued. Imaging resolution of one lesion about the medial aspect of the resection cavity and the other slightly increased in size and in conspicuity compared to the prior exams; most consistent with treatment-induced injury. Repeat imaging in 3 months.     SOCIAL HISTORY   Tobacco use: Former smoker; quit in 2003. 27 pack years.  . 2 daughter, 1 son.   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        PHYSICAL EXAMINATION  BP (!) 154/76   Pulse 86   Temp 97.9  F (36.6  C) (Oral)   Resp 16   Wt 84.8 kg (187 lb)   SpO2 98%   BMI 34.20 kg/m     Wt Readings from Last 2 Encounters:   07/14/25 84.8 kg (187 lb)   05/19/25 83.5 kg (184 lb 1.6 oz)      Ht Readings from Last 2 Encounters:   09/05/24 1.575 m (5' 2\")   09/04/24 1.575 m (5' 2\")      KPS     -Generally well appearing.  -Respiratory: No audible wheezing. Normal respiratory effort.   -Psychiatric: Normal mood and affect. Pleasant, talkative.  -Neurologic:   MENTAL STATUS:     Alert, oriented.   Recall: Intact.    Speech fluent.    Comprehension intact.     CRANIAL NERVES:    Left inferior homonymous quadrantanopia.   Symmetric facial movements.   Hearing intact.   With normal phonation, no dysfunction tongue.   GAIT: Steady, unassisted.      MEDICAL RECORDS  Personally reviewed; Neurology/ Headache Clinic note in May, indicated for management of headaches. Recommendations included; \"Refer to PT. Start Gabapentin 100 mg TID, asked patient to reach out in a few weeks and will titrate as needed. Tizanidine 2 mg BID PRN.\"    LABS  Personally reviewed all available lab results; No recent results.     IMAGING  Personally reviewed MR brain imaging from today and compared to post-operative imaging.    Formal read; \"There is increasing nodular enhancement with associated vasogenic edema along the medial resection margin, currently measuring up to 9 mm. No definite associated increased cerebral blood volume. Findings could represent posttreatment change versus tumor progression. Attention is recommended on follow-up imaging.\"     Imaging shown to Marlin.       IMPRESSION  On date of service, 37 minutes was spent in clinic and 9 minutes was spent preparing for the visit through extensive chart review and coordinating care for this high complexity visit. The following is in explanation for the recommendations used to define " the plan.       For management of her headaches, Priyanka was referred to the [facility] for evaluation and since starting gabapentin + a muscle relaxer, her headache severity and frequency has greatly improved. Priyanka also notes that she is less fatigued. Otherwise, her mood remains good and she has no new neurological concerns.    Of note, WHO grade 2 solitary fibrous tumor (previously called a hemangiopericytoma) are considered malignant tumors. Standard treatment options include surgery and radiation therapy. With no good surgical option, Priyanka completed stereotactic radiation therapy in 1/2020. Imaging in 2023 and 2024 showed a continued subtle increase in the size of the enhancing extra-axial nodular mass within the superior sagittal sinus. As a result, Priyanka underwent a surgery for resection and GammaTile placement in 9/2024.     Imaging from today showed resolution of the small lesion about the medial aspect of the resection cavity and the other lesion has slightly increased in size and in conspicuity compared to the prior exams. These findings remain most consistent with treatment-induced injury, though cancer recurrence remains on the differential. I personally reviewed Priyanka's imaging and case at Brain Tumor Conference, in which Dr. Bauer and Yao were in attendance, and all were in agreement with this impression. We discussed that there is a GammaTile located just inferior to this area of contrast enhancement that could account for this injury. Moreover, imaging at the time of cancer recurrent in 7/2024 demonstrated a significant increase in perfusion and imaging from today has no such increase in perfusion. Therefore, the plan remains for repeat imaging per NCCN guidelines in 3 months.     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 While research is ongoing to develop therapies specific to these mutation,  neither are currently targetable. Additional treatment options include Pazopanib (Votrient) or temozolomide plus bevacizumab.    Extracranial metastatic disease can occur; most commonly in the bone, lung, and liver. Prior MR liver imaging showed a lesion. Priyanka was referred to Dr. Phan Fierro with surgical oncology. Dr. Fierro performed a  laparoscopic ultrasound-guided microwave ablation of a liver lesion on 1/15/2020. Pathology was consistent with a benign cavernous hemangioma and no metastatic hemangiopericytoma or other malignancy was identified. She is under annual systemic imaging surveillance with a CT C/A/P with contrast. This scan was recently completed in April 2025 and was negative.    PROBLEM LIST  Hemangiopericytoma, WHO grade 2  Liver lesion  Depressed mood  Sleep issues  Headache    PLAN  -CANCER-DIRECTED THERAPY-  -As above; Imaging surveillance - repeat imaging in 3 months.   Repeat CT imaging for systemic staging next due in 4/2026.     -LIVER LESION-  -Treated. No lesion-specific follow-up needed.   -CT c/a/p done annually for systemic surveillance imaging.    Imaging in 2025 with no concerns.     -STEROIDS-  -Currently off dexamethasone.    -SEIZURE MANAGEMENT-  -While this patient is at increased risk of having seizures, given the lack of seizure history, there is no indication to prescribe an antiepileptic at this time.     -CARPAL TUNNEL-  -Left wrist > right. Occasional right wrist jerking.   -Improved with decreased strain at work.  -Recommended bracing overnight.     -Quality of life/ MOOD/ FATIGUE/ HEADACHE-  -Primary care provider for further evaluation/treatment of mood/fatigue.   -History of anxiety/ low mood.   On Zoloft to 100mg daily.   Did not tolerate 150-200mg dosing due to tremors.   -Continue with routine exercise.     -HEADACHES-  -Following with the [facility].    Migrainous and tension-type components.   -Triggers; fatigue, work-related stress,  muscle tension. Prior craniotomy and brain radiation.   -Can consider a referral to sleep medicine for a possible sleep study to evaluate for sleep apnea.    -MEMORY LOSS/ WORD FINDING ISSUES-  -Evaluated by Dr. Phan Wright in 3/2020 and 4/2021. Neuro-psych testing with mild weaknesses noted in aspects of speeded visual processing and general cognitive speed. Stable findings when comparing prior tests.  -Repeat testing if clinically indicated.     Return to clinic in 10/2025 + imaging.     In the meantime, Priyanka knows to call with questions or concerns or to report new complaints and can be seen sooner if needed.    The longitudinal plan of care for the diagnosis(es)/condition(s) as documented were addressed during this visit. Due to the added complexity in care, I will continue to support Priyanka in the subsequent management and with ongoing continuity of care.     Lauren Zhang MD  Neuro-oncology

## 2025-07-14 ENCOUNTER — ONCOLOGY VISIT (OUTPATIENT)
Dept: ONCOLOGY | Facility: CLINIC | Age: 65
End: 2025-07-14
Attending: PSYCHIATRY & NEUROLOGY
Payer: MEDICARE

## 2025-07-14 ENCOUNTER — APPOINTMENT (OUTPATIENT)
Dept: ONCOLOGY | Facility: CLINIC | Age: 65
End: 2025-07-14
Payer: MEDICARE

## 2025-07-14 ENCOUNTER — ANCILLARY PROCEDURE (OUTPATIENT)
Dept: MRI IMAGING | Facility: CLINIC | Age: 65
End: 2025-07-14
Attending: PSYCHIATRY & NEUROLOGY
Payer: MEDICARE

## 2025-07-14 VITALS
DIASTOLIC BLOOD PRESSURE: 76 MMHG | BODY MASS INDEX: 34.2 KG/M2 | RESPIRATION RATE: 16 BRPM | SYSTOLIC BLOOD PRESSURE: 154 MMHG | WEIGHT: 187 LBS | TEMPERATURE: 97.9 F | OXYGEN SATURATION: 98 % | HEART RATE: 86 BPM

## 2025-07-14 DIAGNOSIS — F33.0 MAJOR DEPRESSIVE DISORDER, RECURRENT EPISODE, MILD: ICD-10-CM

## 2025-07-14 DIAGNOSIS — D49.7: ICD-10-CM

## 2025-07-14 DIAGNOSIS — D49.7: Primary | ICD-10-CM

## 2025-07-14 PROCEDURE — 99215 OFFICE O/P EST HI 40 MIN: CPT | Performed by: PSYCHIATRY & NEUROLOGY

## 2025-07-14 PROCEDURE — G0463 HOSPITAL OUTPT CLINIC VISIT: HCPCS | Performed by: PSYCHIATRY & NEUROLOGY

## 2025-07-14 RX ORDER — SERTRALINE HYDROCHLORIDE 100 MG/1
100 TABLET, FILM COATED ORAL DAILY
COMMUNITY
Start: 2025-07-14

## 2025-07-14 RX ORDER — GADOBUTROL 604.72 MG/ML
10 INJECTION INTRAVENOUS ONCE
Status: COMPLETED | OUTPATIENT
Start: 2025-07-14 | End: 2025-07-14

## 2025-07-14 RX ADMIN — GADOBUTROL 8.5 ML: 604.72 INJECTION INTRAVENOUS at 08:41

## 2025-07-14 ASSESSMENT — PAIN SCALES - GENERAL: PAINLEVEL_OUTOF10: NO PAIN (0)

## 2025-07-14 NOTE — DISCHARGE INSTRUCTIONS
MRI Contrast Discharge Instructions    The IV contrast you received today will pass out of your body in your  urine. This will happen in the next 24 hours. You will not feel this process.  Your urine will not change color.    Drink at least 4 extra glasses of water or juice today (unless your doctor  has restricted your fluids). This reduces the stress on your kidneys.  You may take your regular medicines.    If you are on dialysis: It is best to have dialysis today.    If you have a reaction: Most reactions happen right away. If you have  any new symptoms after leaving the hospital (such as hives or swelling),  call your hospital at the correct number below. Or call your family doctor.  If you have breathing distress or wheezing, call 911.    Special instructions: ***    I have read and understand the above information.    Signature:______________________________________ Date:___________    Staff:__________________________________________ Date:___________     Time:__________    [facility] Departments:    ___[place]: [phone]  ___[facility]: [phone]  ___[place]: [phone] ___[facility]: [phone]  ___[facility]: [phone]  ___[facility]: [phone]  ___[phone]  ___[facility]: [phone]  ___[phone]

## 2025-07-14 NOTE — NURSING NOTE
"Oncology Rooming Note    July 14, 2025 10:59 AM   Leidy Ascencio is a 65 year old female who presents for:    Chief Complaint   Patient presents with    Oncology Clinic Visit     Solitary fibrous neoplasm of central nervous system, grade 2     Initial Vitals: BP (!) 154/76   Pulse 86   Temp 97.9  F (36.6  C) (Oral)   Resp 16   Wt 84.8 kg (187 lb)   SpO2 98%   BMI 34.20 kg/m   Estimated body mass index is 34.2 kg/m  as calculated from the following:    Height as of 9/5/24: 1.575 m (5' 2\").    Weight as of this encounter: 84.8 kg (187 lb). Body surface area is 1.93 meters squared.  No Pain (0) Comment: Data Unavailable   No LMP recorded. Patient has had a hysterectomy.  Allergies reviewed: Yes  Medications reviewed: Yes    Medications: Medication refills not needed today.  Pharmacy name entered into [email or web address]: [facility] - [place], [place] - [address] [place] [address]    Frailty Screening:   Is the patient here for a new oncology consult visit in cancer care? 2. No    PHQ9:  Did this patient require a PHQ9?: No      Clinical concerns:        Loli Oneill              "

## 2025-07-14 NOTE — LETTER
7/14/2025      Leidy Ascencio  [address] [place] [address]      Dear Colleague,    Thank you for referring your patient, Leidy Ascencio, to the [facility]. Please see a copy of my visit note below.    NEURO-ONCOLOGY VISIT  Jul 14, 2025    CHIEF COMPLAINT: Ms. 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 She completed GammaKnife treatment in 1/2020.     Priyanka had been managed on imaging surveillance and imaging through 10/2023 demonstrated no evidence of cancer recurrence. However, imaging in 4/2024 showed a subtle increase in enhancement within the superior sagittal sinus and unfortunately, repeat imaging in 7/2024 demonstrated a further increase in the size of the enhancing extra axial nodular mass.     Priyanka underwent resection plus placement of GammaTiles on 9/5/2024. Pathology confirmed recurrent solitary fibrous tumor, CNS WHO grade 2. Repeat MR brain imaging from 10/2024 and 1/2025 showed no new concerns and no evidence of residual enhancement.     MR brain imaging in 4/2025 demonstrated likely evolving postoperative changes with two subcentimeter foci of enhancement along the medial surface of the left occipital lobe that on repeat imaging in 7/2025, one lesion resolved and the other slightly increased in size and in conspicuity compared to the prior exams. The plan is for continued observation with close imaging surveillance.    To date, CT body imaging, most recently completed in 4/2025 and performed annually, has been without any concerning findings for metastatic disease.     I met with Priyanka and Main () today for this follow-up visit.     HISTORY OF PRESENT ILLNESS  -Priyanka had a nice birthday celebration earlier this month.    Thankfully, her mother, who is 86 years old and lives in [place], has  recovered well from her prior illness.   -With the help of the headache clinic, her headaches are less frequent and less severe.    The pain is more in the neck/ shoulders.    Using gabapentin and a muscle relaxer with benefit.   -Less fatigued.   -Stable visual field cut.   Can have periods of blurred vision, worse at night.   -Denies changes in strength or coordination.    No changes in sensation.   -Mood is good; on Zoloft.      MEDICATIONS   Current Outpatient Medications   Medication Sig Dispense Refill     sertraline (ZOLOFT) 100 MG tablet Take 1 tablet (100 mg) by mouth daily.       Acetaminophen (TYLENOL 8 HOUR ARTHRITIS PAIN PO) Take by mouth as needed.       albuterol (PROAIR HFA/PROVENTIL HFA/VENTOLIN HFA) 108 (90 Base) MCG/ACT inhaler Inhale 2 puffs into the lungs every 6 hours. 18 g 11     azelastine (ASTELIN) 0.1 % nasal spray Spray 1 spray into both nostrils 2 times daily 30 mL 8     fluticasone-salmeterol (ADVAIR) 250-50 MCG/ACT inhaler Inhale 1 puff into the lungs every 12 hours. 3 each 3     gabapentin (NEURONTIN) 100 MG capsule Take 1 capsule (100 mg) by mouth 3 times daily. 90 capsule 5     Krill Oil 300 MG CAPS Take by mouth as needed.       loratadine (CLARITIN) 10 MG tablet Take 10 mg by mouth as needed.       rosuvastatin (CRESTOR) 10 MG tablet Take 1 tablet (10 mg) by mouth daily 90 tablet 0     tiZANidine (ZANAFLEX) 2 MG tablet Take 1 tablet (2 mg) by mouth 2 times daily as needed for muscle spasms. 30 tablet 3     triamcinolone (KENALOG) 0.1 % external cream Apply topically 2 times daily 80 g 0     Vitamin D3 (VITAMIN D, CHOLECALCIFEROL,) 25 mcg (1000 units) tablet Take 25 mcg by mouth daily.       DRUG ALLERGIES   Allergies   Allergen Reactions     Other Drug Allergy (See Comments) Hives     Paxlovid      Atorvastatin      Penicillins      Sulfa Antibiotics      Sulfamethoxazole-Trimethoprim Rash       ONCOLOGIC HISTORY  -2015 PRESENTATION: Headaches.  -2015 MR brain imaging with a right  occipital mass   -4/22/2015 SURGERY: Resection by Dr. Lujan   PATHOLOGY: Hemangiopericytoma, grade 2.   -2015 CT chest, abdomen and pelvis negative for malignancy.  -2015 PET MRI negative for malignancy.   Conservative management per Dr. Herman Mayberry.    -8/1/2017 CT chest abdomen and pelvis and bone scan negative for malignancy.   -12/7/2017 MRB with no evidence of tumor recurrence.  -12/6/2018 MRB with no evidence of tumor recurrence.    -12/11/2019 MRB with a new nodule of enhancement within the superior sagittal sinus, concerning for tumor recurrence. Stable appearance of the right parieto-occipital craniotomy site.  -12/13/2019 Bone scan with no convincing evidence of skeletal metastases.  -12/18/2019 MR liver with a 9 mm lesion in the lateral aspect of the junction of segments 7 and 8 of the liver shows homogeneous delayed enhancement and is new since 2015. This could represent a metastatic lesion.     -1/6/2020 NEURO-ONC: Referral to radiation oncology and GI oncology surgery. Signed consent for next generation sequencing.   -1/14/2020 RADS: GammaKnife to lesion (1 fraction).   -1/15/2020 LIVER LESION:  Laparoscopic ultrasound-guided microwave ablation of liver tumor by Dr. Phan Fierro.   PATHOLOGY: Cavernous hemangioma of the liver, benign:    -No metastatic hemangiopericytoma or other malignancy identified    -Underlying steatohepatitis, no significant fibrosis    -No follow-up needed.  -2/28/2020 NEURO-ONC/ MRB: Clinically stable. Headaches with migraine features; increasing Zoloft. Imaging with positive response to radiation.   -3/9/2020 Evaluated by Dr. Wright; Neuro-psych testing with mild weaknesses were noted in aspects of speeded visual processing, and general cognitive speed. Repeat in 3/2021.  -6/1/2020 NEURO-ONC/ MRB: Clinically well; for insomnia trial of melatonin. Imaging with positive treatment effect.   -6/1/2020 NEURO-ONC/ MRB: Clinically well. Imaging with no concerns.   -9/14/2020  NEURO-ONC/ MRB: Clinically well. Imaging of MRI brain and CT CAP with no concerns.  -12/14/2020 NEURO-ONC/ MRB: Clinically well. MR imaging of the brain with no concerns.  -3/12/2021 NEURO-ONC/ MRB: Clinically well. MRI of the brain with no concerns.  -9/10/2021 NEURO-ONC/ MRB: Clinically well. MRI of the brain and CT CAP with no concerns.  -3/7/2022 NEURO-ONC/ MRB: Clinically well. MRI of the brain with no concerns.  -10/21/2022 NEURO-ONC/ MRB: Clinically well; signs of carpal tunnel in the left wrist. MRI of the brain and CT CAP with no concerns.  -10/21/2022 NEURO-ONC/ MRB: Clinically well; signs of carpal tunnel in the left wrist. MRI of the brain and CT CAP with no concerns.  -4/17/2023 NEURO-ONC/ MRB: Mood is down, less motivation, more issues with concentration. Carpal tunnel of left improving with behavior change. Fatigue labs today; recommended follow-up with primary care provider. MRI of the brain with no concerns.   -10/16/2023 NEURO-ONC/ MRB: Energy improving. No new neurological concerns. MRI of the brain with no concerns. CT CAP with no concerns.  -4/15/2024 NEURO-ONC/ MRB: Mild worsening in headache frequency in the setting of more stress. Imaging with an increased size of the enhancing extra-axial nodular mass within the superior sagittal sinus with elevated cerebral blood volume consistent with disease recurrence. Referral to neurosurgery. Repeat CT imaging for systemic staging now.   -7/22/2024 NEURO-ONC/ MRB: AM headaches in the setting of snoring. Increased anxiety; dose increase Zoloft to 200mg daily. Imaging with a further increase in the size of the enhancing extra axial nodular mass. Referral back to neurosurgery and to radiation oncology. CT body imaging imaging in April was negative.    -9/5/2025 SURGERY/ RADS: Resection with Gamma Title placement.   PATHOLOGY: Recurrent solitary fibrous tumor, CNS WHO grade 2.   Next generation sequencing throught [facility];   Microsatellite Instability (MSI)  Seq DNA-Tumor Stable  Tumor Mutational [place] (TMB) Seq DNA-Tumor Result: Low (2)  Genomic Loss of Heterozygosity (EVELIO) Seq DNA-Tumor Low - 1% of tested genomic segments exhibited EVELIO   Genes Tested with Pathogenic or Likely Pathogenic Alterations  NAB2 Seq RNA-Tumor Pathogenic Fusion NAB2-STAT6 6  PTCH1 Seq DNA-Tumor Pathogenic Variant p.G15fs 1 c.44delG 10  Post-operative MR brain imaging from 9/6 with postoperative change of parietal-occipital craniotomy and mass  resection with gamma tile placement. Dural thickening and enhancement noted about the posterior cerebral convexities with greatest involvement on the right, likely post-procedural reactive changes. No acute intracranial abnormality.  -10/17/2024 MR brain imaging with postoperative changes of right parietal-occipital craniotomy for mass resection with gamma tile placement. Resolved posttreatment dural thickening and enhancement along the resection site. Scattered resorbing postsurgical right-sided subdural hemorrhages. No suspicious intracranial findings.  -1/9/2025 MR brain imaging with postoperative changes of right parietal-occipital craniotomy for mass resection with gamma tile placement. No evidence of residual disease. No new suspicious enhancement. Previously seen scattered resorbing postsurgical right-sided subdural hemorrhages at the convexity are now completely resolved.  -4/14/2025 NEURO-ONC/ MRB: Ongoing frequent and severe headaches; Referral to the [facility]. Zoloft decreased to 100mg daily. Imaging with two subcentimeter foci of enhancement along the medial surface of the left occipital lobe that increased in conspicuity; most consistent with treatment-induced injury. Repeat imaging in 3 months.   -7/14/2025 NEURO-ONC/ MRB: Less frequent and severe headaches, less fatigued. Imaging resolution of one lesion about the medial aspect of the resection cavity and the other slightly increased in size and in conspicuity compared  "to the prior exams; most consistent with treatment-induced injury. Repeat imaging in 3 months.     SOCIAL HISTORY   Tobacco use: Former smoker; quit in 2003. 27 pack years.  . 2 daughter, 1 son.   Employment: [employer]; [employer] managers.        PHYSICAL EXAMINATION  BP (!) 154/76   Pulse 86   Temp 97.9  F (36.6  C) (Oral)   Resp 16   Wt 84.8 kg (187 lb)   SpO2 98%   BMI 34.20 kg/m     Wt Readings from Last 2 Encounters:   07/14/25 84.8 kg (187 lb)   05/19/25 83.5 kg (184 lb 1.6 oz)      Ht Readings from Last 2 Encounters:   09/05/24 1.575 m (5' 2\")   09/04/24 1.575 m (5' 2\")      KPS     -Generally well appearing.  -Respiratory: No audible wheezing. Normal respiratory effort.   -Psychiatric: Normal mood and affect. Pleasant, talkative.  -Neurologic:   MENTAL STATUS:     Alert, oriented.   Recall: Intact.    Speech fluent.    Comprehension intact.     CRANIAL NERVES:    Left inferior homonymous quadrantanopia.   Symmetric facial movements.   Hearing intact.   With normal phonation, no dysfunction tongue.   GAIT: Steady, unassisted.      MEDICAL RECORDS  Personally reviewed; Neurology/ Headache Clinic note in May, indicated for management of headaches. Recommendations included; \"Refer to PT. Start Gabapentin 100 mg TID, asked patient to reach out in a few weeks and will titrate as needed. Tizanidine 2 mg BID PRN.\"    LABS  Personally reviewed all available lab results; No recent results.     IMAGING  Personally reviewed MR brain imaging from today and compared to post-operative imaging.    Formal read; \"There is increasing nodular enhancement with associated vasogenic edema along the medial resection margin, currently measuring up to 9 mm. No definite associated increased cerebral blood volume. Findings could represent posttreatment change versus tumor progression. Attention is recommended on follow-up imaging.\"     Imaging shown to Marlin.       IMPRESSION  On date of " service, 37 minutes was spent in clinic and 9 minutes was spent preparing for the visit through extensive chart review and coordinating care for this high complexity visit. The following is in explanation for the recommendations used to define the plan.       For management of her headaches, Priyanka was referred to the [facility] for evaluation and since starting gabapentin + a muscle relaxer, her headache severity and frequency has greatly improved. Priyanka also notes that she is less fatigued. Otherwise, her mood remains good and she has no new neurological concerns.    Of note, WHO grade 2 solitary fibrous tumor (previously called a hemangiopericytoma) are considered malignant tumors. Standard treatment options include surgery and radiation therapy. With no good surgical option, Priyanka completed stereotactic radiation therapy in 1/2020. Imaging in 2023 and 2024 showed a continued subtle increase in the size of the enhancing extra-axial nodular mass within the superior sagittal sinus. As a result, Priyanka underwent a surgery for resection and GammaTile placement in 9/2024.     Imaging from today showed resolution of the small lesion about the medial aspect of the resection cavity and the other lesion has slightly increased in size and in conspicuity compared to the prior exams. These findings remain most consistent with treatment-induced injury, though cancer recurrence remains on the differential. I personally reviewed Priyanka's imaging and case at Brain Tumor Conference, in which Dr. Bauer and Yao were in attendance, and all were in agreement with this impression. We discussed that there is a GammaTile located just inferior to this area of contrast enhancement that could account for this injury. Moreover, imaging at the time of cancer recurrent in 7/2024 demonstrated a significant increase in perfusion and imaging from today has no such increase in perfusion. Therefore, the plan  remains for repeat imaging per NCCN guidelines in 3 months.     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 While research is ongoing to develop therapies specific to these mutation, neither are currently targetable. Additional treatment options include Pazopanib (Votrient) or temozolomide plus bevacizumab.    Extracranial metastatic disease can occur; most commonly in the bone, lung, and liver. Prior MR liver imaging showed a lesion. Priyanka was referred to Dr. Phan Fierro with surgical oncology. Dr. Fierro performed a  laparoscopic ultrasound-guided microwave ablation of a liver lesion on 1/15/2020. Pathology was consistent with a benign cavernous hemangioma and no metastatic hemangiopericytoma or other malignancy was identified. She is under annual systemic imaging surveillance with a CT C/A/P with contrast. This scan was recently completed in April 2025 and was negative.    PROBLEM LIST  Hemangiopericytoma, WHO grade 2  Liver lesion  Depressed mood  Sleep issues  Headache    PLAN  -CANCER-DIRECTED THERAPY-  -As above; Imaging surveillance - repeat imaging in 3 months.   Repeat CT imaging for systemic staging next due in 4/2026.     -LIVER LESION-  -Treated. No lesion-specific follow-up needed.   -CT c/a/p done annually for systemic surveillance imaging.    Imaging in 2025 with no concerns.     -STEROIDS-  -Currently off dexamethasone.    -SEIZURE MANAGEMENT-  -While this patient is at increased risk of having seizures, given the lack of seizure history, there is no indication to prescribe an antiepileptic at this time.     -CARPAL TUNNEL-  -Left wrist > right. Occasional right wrist jerking.   -Improved with decreased strain at work.  -Recommended bracing overnight.     -Quality of life/ MOOD/ FATIGUE/ HEADACHE-  -Primary care provider for further evaluation/treatment of mood/fatigue.   -History of anxiety/ low mood.   On Zoloft to 100mg daily.   Did not  tolerate 150-200mg dosing due to tremors.   -Continue with routine exercise.     -HEADACHES-  -Following with the [facility].    Migrainous and tension-type components.   -Triggers; fatigue, work-related stress, muscle tension. Prior craniotomy and brain radiation.   -Can consider a referral to sleep medicine for a possible sleep study to evaluate for sleep apnea.    -MEMORY LOSS/ WORD FINDING ISSUES-  -Evaluated by Dr. Phan Wright in 3/2020 and 4/2021. Neuro-psych testing with mild weaknesses noted in aspects of speeded visual processing and general cognitive speed. Stable findings when comparing prior tests.  -Repeat testing if clinically indicated.     Return to clinic in 10/2025 + imaging.     In the meantime, Priyanka knows to call with questions or concerns or to report new complaints and can be seen sooner if needed.    The longitudinal plan of care for the diagnosis(es)/condition(s) as documented were addressed during this visit. Due to the added complexity in care, I will continue to support Priyanka in the subsequent management and with ongoing continuity of care.     Lauren Zhang MD  Neuro-oncology      Again, thank you for allowing me to participate in the care of your patient.        Sincerely,        Lauren Zhang MD    Electronically signed

## 2025-07-15 ENCOUNTER — THERAPY VISIT (OUTPATIENT)
Dept: PHYSICAL THERAPY | Facility: CLINIC | Age: 65
End: 2025-07-15
Payer: MEDICARE

## 2025-07-15 DIAGNOSIS — G44.86 CERVICOGENIC HEADACHE: Primary | ICD-10-CM

## 2025-07-15 PROCEDURE — 97110 THERAPEUTIC EXERCISES: CPT | Mod: GP | Performed by: PHYSICAL THERAPIST

## 2025-07-15 NOTE — PROGRESS NOTES
[facility]  Department of Neurosurgery  [facility] for Sk[facility] Base [facility] Pit[facility]    Name: Leidy Ascencio  MRN: [id number]  Age: 65 year old  : 1960  2025     Chief Complaint:   Recurrent right occipital solitary fibrous tumor (WHO grade 2) s/p right craniotomy 2015 and SRS 2020 now s/p [id number] craniotomy for resection with gamma tile placement to the falx and resection cavity 2024, follow up visit     History of Present Illness:   Leidy Ascencio is a 65 year old right handed female with a history of a right occipital solitary fibrous tumor, WHO grade 2, resected initially in  and status post GammaKnife treatment in . She had recurrence noted on surveillance imaging in  and underwent repeat resection with GammaTile placement on 2024; she's here today for follow up. She met with Dr. Zhang yesterday in NeuroOncology with plans for repeat MRI in 3 months after discussion of MRI results at tumor conference (please see note 2025). Today she is here with her . She feels generally well. Her headaches have improved with PT for her neck. She has a small area of swelling still on the back of the head, this waxes and wanes in size and is not painful. She has 3 month follow up scheduled with Dr. Zhang in October.     Review of Systems:   Pertinent items are noted in HPI or as in patient entered ROS below, remainder of complete ROS is negative.     Physical Exam:   /81 (BP Location: Right arm, Patient Position: Sitting)   Pulse 67   Resp 16   SpO2 100%   General: No acute distress.    Eyes: Conjunctivae are normal.  MSK: Moves all extremities.  No obvious deformity.  Neuro: The patient is fully oriented. Speech is normal.  Facial nerve function is normal, rated as a [place]ack[place] 1. Gait is normal.   Psych: Normal mood and affect. Behavior is normal.    Incision: well healed. Small, soft pseudomeningocele present, no overlying skin  changes.     Imaging:  MRI reviewed:    IMPRESSION:  In this patient with history of solitary fibrous neoplasm status post  resection and gamma knife treatment:   There is increasing nodular enhancement with associated vasogenic  edema along the medial resection margin, currently measuring up to 9  mm. No definite associated increased cerebral blood volume. Findings  could represent posttreatment change versus tumor progression.  Attention is recommended on follow-up imaging.     I have personally reviewed the examination and initial interpretation  and I agree with the findings.     MARILOU MCMAHON MD      Assessment:  Recurrent right occipital solitary fibrous tumor (WHO grade 2) s/p right craniotomy 4/22/2015 and SRS 1/14/2020 now s/p [id number] craniotomy for resection with gamma tile placement to the falx and resection cavity 9/5/2024, follow up visit     Plan:  Discussed with Dr. Samayoa who met with the patient today as well and was reviewed in tumor board recently. We will continue to follow peripherally and Priyanka was encouraged to reach out as needed. Should she need Neurosurgical intervention we'll see her back at any time in clinic.       Katherine Hood PA-C  Department of Neurosurgery

## 2025-07-16 ENCOUNTER — OFFICE VISIT (OUTPATIENT)
Dept: NEUROSURGERY | Facility: CLINIC | Age: 65
End: 2025-07-16
Payer: MEDICARE

## 2025-07-16 VITALS
OXYGEN SATURATION: 100 % | DIASTOLIC BLOOD PRESSURE: 81 MMHG | RESPIRATION RATE: 16 BRPM | HEART RATE: 67 BPM | SYSTOLIC BLOOD PRESSURE: 134 MMHG

## 2025-07-16 DIAGNOSIS — D49.2 SOLITARY FIBROUS TUMOR: Primary | ICD-10-CM

## 2025-07-16 RX ORDER — VALACYCLOVIR HYDROCHLORIDE 1 G/1
TABLET, FILM COATED ORAL
COMMUNITY

## 2025-07-16 RX ORDER — VALACYCLOVIR HYDROCHLORIDE 1 G/1
TABLET, FILM COATED ORAL
COMMUNITY
Start: 2024-10-28

## 2025-07-16 RX ORDER — VALACYCLOVIR HYDROCHLORIDE 1 G/1
TABLET, FILM COATED ORAL
COMMUNITY
Start: 2024-12-30

## 2025-07-16 RX ORDER — VALACYCLOVIR HYDROCHLORIDE 1 G/1
TABLET, FILM COATED ORAL
COMMUNITY
Start: 2025-05-23

## 2025-07-16 RX ORDER — VALACYCLOVIR HYDROCHLORIDE 1 G/1
TABLET, FILM COATED ORAL
COMMUNITY
Start: 2024-11-21

## 2025-07-16 NOTE — PROGRESS NOTES
07/15/25 0500   Appointment Info   Signing clinician's name / credentials David Easley, PT   Total/Authorized Visits eval and treat 16   Visits Used 7   Medical Diagnosis cervicogenic headache   PT Tx Diagnosis cervicogenic headache   Progress Note/Certification   Start of Care Date 05/21/25   Onset of illness/injury or Date of Surgery 05/19/25  (PT referral date; symptoms began November 2024, worsening of symptoms)   Therapy Frequency 1 x / wk   Predicted Duration 8 weeks   Certification date from 07/15/25   Certification date to 09/09/25   Progress Note Due Date 07/15/25   Progress Note Completed Date 05/21/25       Present No   GOALS   PT Goals 2;3;4;5   PT Goal 1   Goal Identifier headache   Goal Description symptoms will go from constant to intermittent   Rationale to maximize safety and independence with performance of ADLs and functional tasks;to maximize safety and independence within the home;to maximize safety and independence with self cares   Goal Progress symptoms are intermittent and on the R side, temporal area with taking Gabapentin and doing exercises   Target Date 06/18/25   Date Met 06/17/25   PT Goal 2   Goal Identifier sleep   Goal Description patient will be able to wake w/o a headache 5 of 7 days a week   Rationale to maximize safety and independence with self cares   Goal Progress waking about 3 times a week with mild HA, able to get up and move around then symptoms will go away   Target Date 07/02/25   Date Met 06/17/25   PT Goal 3   Goal Identifier sleep   Goal Description patient will be able to sleep through the night without waking due to her neck or head pain, 4 of 7 nights a week   Rationale   (restorative sleep pattern)   Goal Progress now sleeping through the night without waking due to HA   Target Date 07/16/25   Date Met 06/17/25   PT Goal 4   Goal Identifier HA   Goal Description patient will be able to have intermittent HA without use of Gabapentin    Rationale to maximize safety and independence with performance of ADLs and functional tasks;to maximize safety and independence within the home;to maximize safety and independence within the community;to maximize safety and independence with self cares   Goal Progress HA is intermittent with gabapentin, with sym mainly occipital.  (did go off gabapentin for 3-4 days and mild HA was present)   Target Date 09/09/25   PT Goal 5   Goal Identifier HA/sleep   Goal Description Patient will be able to wake without a headache 6-7 days a week   Rationale to maximize safety and independence with performance of ADLs and functional tasks;to maximize safety and independence within the home;to maximize safety and independence with self cares   Goal Progress waking w HA 2 of 7 days a week, mild; resolve when gets up and moves about   Target Date 09/09/25   Subjective Report   Subjective Report Patient notes that she is better.  she has not been feeling her symptoms on the R side of her head as much, more so on the back of the head.  She has been experiencing more central/ R neck symptoms, into the R UT.  She continues to have intermittent tingling in the R UE.  The couple bad days that she had since her last PT session (6/23/2025) she did feel the R UE symptoms more and R sided neck/UT.  The worse days were when she was traveling (passenger in the vehicle)  She did have a really good week over the 4th of July week and did not have symptoms for her head that week.  Neck was still stiff and had tingling for the R UE.  Today, stiffness of the neck on the R, moderate tingling for the R UE with some hand sym but not sure if due to arthritis or her neck.  symptoms mainly felt in the  index and ring finger.   Objective Measures   Objective Measures Objective Measure 1;Objective Measure 2   Objective Measure 1   Objective Measure CROM   Details 60 rot R, 64 rot L   Objective Measure 2   Objective Measure functional tool   Details NDI 40%    Treatment Interventions (PT)   Interventions Therapeutic Procedure/Exercise;Therapeutic Activity;Manual Therapy   Therapeutic Procedure/Exercise   Therapeutic Procedures: strength, endurance, ROM, flexibility minutes (69511) 40   Therapeutic Procedures Ther Proc 2;Ther Proc 3;Ther Proc 4;Ther Proc 5   Ther Proc 1 sustained retraction   Ther Proc 1 - Details 30 sec hold x 2 - better after, inc B rot   Ther Proc 2 clinic only - repeated retraction with self o/p   Ther Proc 2 - Details 10 reps - inc PDM NW - ISQ ROM   Ther Proc 3 neck ret ext   Ther Proc 3 - Details 5 reps - NE during, NE after, inc rot a little   Ther Proc 4 thoracic extension   Ther Proc 4 - Details 5 reps, 3 sets  - stretch for upper back feels good, NE on arm during, NE after; inc neck rotation ROM  (1st set neck went into ext, 2nd set without neck ext (better with first set))   Ther Proc 5 Clinic only - neck SB R   Ther Proc 5 - Details 10 reps - NE during, NE after, dec rotation   PTRx Ther Proc 1 Cervical Rotation   PTRx Ther Proc 1 - Details 10 to the R then 10 to the L and again 10 to the R (78 deg B after)   PTRx Ther Proc 3 Deep Neck Extensor Strength-Prone   PTRx Ther Proc 3 - Details 5 reps with 10 sec hold - good technique   Skilled Intervention will add thoracic ext to determine impact on neck ROM and R UE symptoms.  If HA moves back to the R then discontinue the thoracic extension   Patient Response/Progress sym more local to R side of neck with the rot. Arm better with sustained hold for neck retraction (dec NB); no worse with thx ext   Therapeutic Activity   PTRx Ther Act 1 Sleeping Postures for the Neck   PTRx Ther Act 1 - Details use of rolled towel to support her neck   PTRx Ther Act 2 Posture Correction with Lumbar Roll   PTRx Ther Act 2 - Details discussed working on posture to avoid forward head position, roll vertical to support thoracic spine   Manual Therapy   Manual Therapy 1 supine   Manual Therapy 1 - Details soft tissue  work to the neck and UTs, PA mobs and translation from R to L (grade II-III)   Skilled Intervention Resume next - altered exercise and has been better over past 3 wks with HEP; thus, if no change or hypermobility continues, then add back in (did not have time today with reassessment and progression of exercise)   Education   Learner/Method Patient;No Barriers to Learning;Pictures/Video;Demonstration;Reading;Listening   Education Comments printed PTRx and put on phone   Plan   Home program per PTRx and see above   Updates to plan of care added thoracic extension   Plan for next session dependent upon response to repeated movements for the cervical and thoracic regions, and posture correction.   Comments   Comments patient has had a decrease in intensity and frequency of headaches since starting PT.  Her symptoms are now felt more occipitally when headache present.  R UE symptoms were improved during the neck retraction exercise sustained, but did not remain better.  Posture has improved since starting PT   Total Session Time   Timed Code Treatment Minutes 40   Total Treatment Time (sum of timed and untimed services) 40       [facility]                                                                                   OUTPATIENT PHYSICAL THERAPY    PLAN OF TREATMENT FOR OUTPATIENT REHABILITATION   Patient's Last Name, First Name, Leidy Aldana YOB: 1960   Provider's Name   [facility]   Medical Record No.  [id number]     Onset Date: 05/19/25 (PT referral datte; symptoms began November 2024, worsening of symptoms)  Start of Care Date: 05/21/25     Medical Diagnosis:  cevicogenic headache      PT Treatment Diagnosis:  cervicogenic headache Plan of Treatment  Frequency/Duration: 1 x / wk/ 8 weeks    Certification date from 07/15/25 to 09/09/25         See note for plan of treatment details and functional goals     Betty Easley,  PT                         I CERTIFY THE NEED FOR THESE SERVICES FURNISHED UNDER        THIS PLAN OF TREATMENT AND WHILE UNDER MY CARE     (Physician attestation of this document indicates review and certification of the therapy plan).              Referring Provider:  Estella Sebastian    Initial Assessment  See Epic Evaluation- Start of Care Date: 05/21/25      PLAN  Continue therapy per current plan of care.    Beginning/End Dates of Progress Note Reporting Period:  05/21/25 to 07/15/2025    Referring Provider:  Estella Sebastian

## 2025-07-16 NOTE — LETTER
2025       RE: Leidy Ascencio  [address] [place] [address]     Dear Colleague,    Thank you for referring your patient, Leidy Ascencio, to the [facility] NEUROSURGERY CLINIC [place] at [facility]. Please see a copy of my visit note below.      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 She had recurrence noted on surveillance imaging in  and underwent repeat resection with GammaTile placement on 2024; she's here today for follow up. She met with Dr. Zhang yesterday in NeuroOncology with plans for repeat MRI in 3 months after discussion of MRI results at tumor conference (please see note 2025). Today she is here with her . She feels generally well. Her headaches have improved with PT for her neck. She has a small area of swelling still on the back of the head, this waxes and wanes in size and is not painful. She has 3 month follow up scheduled with Dr. Zhang in October.     Review of Systems:   Pertinent items are noted in HPI or as in patient entered ROS below, remainder of complete ROS is negative.     Physical Exam:   /81 (BP Location: Right arm, Patient Position: Sitting)   Pulse 67   Resp 16   SpO2 100%   General: No acute distress.    Eyes: Conjunctivae are normal.  MSK: Moves all  extremities.  No obvious deformity.  Neuro: The patient is fully oriented. Speech is normal.  Facial nerve function is normal, rated as a House Brackmann 1. Gait is normal.   Psych: Normal mood and affect. Behavior is normal.    Incision: well healed. Small, soft pseudomeningocele present, no overlying skin changes.     Imaging:  MRI reviewed:    IMPRESSION:  In this patient with history of solitary fibrous neoplasm status post  resection and gamma knife treatment:   There is increasing nodular enhancement with associated vasogenic  edema along the medial resection margin, currently measuring up to 9  mm. No definite associated increased cerebral blood volume. Findings  could represent posttreatment change versus tumor progression.  Attention is recommended on follow-up imaging.     I have personally reviewed the examination and initial interpretation  and I agree with the findings.     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 Samayoa who met with the patient today as well and was reviewed in tumor board recently. We will continue to follow peripherally and Priyanka was encouraged to reach out as needed. Should she need Neurosurgical intervention we'll see her back at any time in clinic.       Katherine Hood PA-C  Department of Neurosurgery      Again, thank you for allowing me to participate in the care of your patient.      Sincerely,    Katherine Hood PA-C

## 2025-07-21 ENCOUNTER — OFFICE VISIT (OUTPATIENT)
Dept: NEUROLOGY | Facility: CLINIC | Age: 65
End: 2025-07-21
Payer: MEDICARE

## 2025-07-21 VITALS — DIASTOLIC BLOOD PRESSURE: 84 MMHG | HEART RATE: 66 BPM | SYSTOLIC BLOOD PRESSURE: 131 MMHG | OXYGEN SATURATION: 95 %

## 2025-07-21 DIAGNOSIS — G44.86 CERVICOGENIC HEADACHE: ICD-10-CM

## 2025-07-21 DIAGNOSIS — M54.2 NECK PAIN: Primary | ICD-10-CM

## 2025-07-21 PROCEDURE — 99214 OFFICE O/P EST MOD 30 MIN: CPT | Performed by: PSYCHIATRY & NEUROLOGY

## 2025-07-21 PROCEDURE — 3079F DIAST BP 80-89 MM HG: CPT | Performed by: PSYCHIATRY & NEUROLOGY

## 2025-07-21 PROCEDURE — 3075F SYST BP GE 130 - 139MM HG: CPT | Performed by: PSYCHIATRY & NEUROLOGY

## 2025-07-21 RX ORDER — TIZANIDINE 2 MG/1
2 TABLET ORAL 2 TIMES DAILY PRN
Qty: 30 TABLET | Refills: 3 | Status: SHIPPED | OUTPATIENT
Start: 2025-07-21

## 2025-07-21 NOTE — LETTER
2025      Leidy Ascencio  [address] [place] [address]      Dear Colleague,    Thank you for referring your patient, Leidy Ascencio, to the [facility] NEUROLOGY CLINICS [facility]. Please see a copy of my visit note below.      Neurology Consultation    Patient Name:  Leidy Ascencio  MRN:  [id number]    :  1960  Date of Service:  2025  Primary care provider:  Madelin Castro        Chief Complaint: Follow-up     History of Present Illness:     Leiyd Ascencio is a 65 year old female who presents for routine follow-up concerning headaches.     She has appreciated about 50% improvement in the headaches with PT and tizanidine. She take tizanidine most nights. Has a difficult time taking it during the day due to somnolence.  She did not feel Gabapentin helped and was maybe causing anxiety. She just stopped it recently and not sure if it has changed the anxiety.  Pain has settled more into her neck. Radiates into right arm, does not go into the fingers, stops around the elbow.  No weakness.     MRI brain perfusion W/WO: IMPRESSION:  In this patient with history of solitary fibrous neoplasm status post  resection and gamma knife treatment:   There is increasing nodular enhancement with associated vasogenic  edema along the medial resection margin, currently measuring up to 9  mm. No definite associated increased cerebral blood volume. Findings  could represent posttreatment change versus tumor progression.    Prior History from 2025:     - History: Denies prior history of headaches.  Started having headaches in  and that when imaging was done that showed R occipital mass. After first surgery, she felt like the headaches improved.  Flared up after radiation.  Had them regularly 4-6 months after this.  Headaches then were intermittent until after surgery in 2024.  Headache interestingly resolved for a week when she went down to [place] to take care of her mom who had shingles.   Stress level was increased at that time.  No other differences.    - Location: Varies and can come anywhere, especially in the occipital area, neck, vertex   - Quality: Can be achy or throbbing   - Duration: Near constant but waxes and wanes in intensity, can reach 10/10 pain level   - Frequency: Daily, bad headache 2-4 times/week.     - Associated symptoms: Denies photophobia, sometimes phonophobia.  Denies nausea and vomiting.  Denies allodynia. Sometimes head feels too heavy.  Maybe mild improvement with laying down but overall no significant change with position.  Sometimes worse with movement/activity.  Denies associated weakness, tingling.  Has peripheral vision loss related to tumor/resection.  Denies changes in vision related to her headaches.  Denies fever, unexplained weight loss, or jaw claudication.    - Alleviating Factors: Has not appreciated any major alleviating factors   - Triggers: Has not identified any specific triggers maybe being on computer too much   - Birth Control/Menses: Not on hormone replacement   - Co-morbidities: + Depression, anxiety has gotten worse as she has gotten older, takes sertraline.  Denies history of sleep apnea.   - Risk Factors: Denies family history of headaches.   - Prior Workup: See below   - Headache hygiene: Typically denies problems with insomnia, although can sometimes has difficulty following asleep. Has been taking OTC pain relievers. 1 cup of coffee/day.  Stays well hydrated.       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 S/p GammaKnife in 1/2020. Surveillance imaging stable until 4/2024 showing increased enhancement and in 7/2024 increased size of enhancing extra-axial nodular mass.  S/p resection and placement of GammaTiles on 9/5/2024. Repeat imaging in 10/2024 and 1/2025 stable. Repeat imaging in 4/2025 showed evolving postoperative changes related to parieto-occipital craniotomy for mass resection. Two subcentimeter foci of enhancement along the medial surface of the left occipital lobe have increased in conspicuity compared to recent prior exams. Repeat imaging in 7/2025 is already scheduled. Whole body CT and PET MRI have been unrevealing in the past for any other involvement. Most recent CT body in 4/2025 reassuring.      Past Medical History:  - Right occipital solitary fibrous tumor   - Hyperlipidemia, depression, anxiety, asthma/COPD, osteoarthritis     Physical Exam:  /84 (BP Location: Left arm, Patient Position: Sitting, Cuff Size: Adult Regular)   Pulse 66   SpO2 95%     General:  No acute distress  Cardiovascular: Normal rate.  Lung: Respirations are non-labored.     Neurologic:  Mental status : alert, fund of knowledge appropriate for visit     LANGUAGE: Speech fluent, no dysarthria      CN:  II- pupils equal and reactive, decreased lower/altitudinal visual field deficit   III, IV, VI- extraocular movements intact  V- sensation intact bilaterally  VII- face symmetric  VIII- hearing intact, no nystagmus  IX, X- palate elevates symmetrically  XI- sternocleidomastoid 5/5  XII- tongue midline     MOTOR : intact bulk and tone  5/5 strength in all ext     SENSORY : focused exam shows intact pp in all fingers both hands      REFLEXES: Cook absent      MOVEMENT/COORDINATION: finger to nose, finger taps, and heel to shin intact bilaterally     Cognition and Speech: Speech clear and coherent.     Psychiatric:  Cooperative, Appropriate mood & affect.        Assessment/Plan:   Leidy Ascencio is a 65 year old female who presents for routine follow-up concerning headaches. Suspect a major musculoskeletal component to her headaches. They seem more cervicogenic which is not uncommon to get headaches with intracranial surgery and radiation but especially cervicogenic type headaches for occipital surgeries. No major migrainous features. Her tumor is being closely monitor by oncology, oncology radiation, and neurosurgery. Repeat imaging in 7/2025 was reviewed extensively in oncology/tumor board and lesion that has increased in size is felt to be related to injury from GammaTile. She has had about 50% reduction in headaches with PT and tizanidine. Did not appreciate much benefit with Gabapentin and was concerned it was causing anxiety.  Discussed that I'd be surprised if Gabapentin was causing anxiety but asked her to reach out if in a few weeks no change in anxiety, we could start higher dose.  Having more neck pain with radicular pain despite PT. Will obtain MRI C-spine. Consider referral to pain management or non-op spine for consideration of injections.     Plan  > MRI C-spine W/WO  > Continue PT  > Continue Tizanidine 2 mg BID PRN  > Restart Gabapentin 300 mg BID if no change to anxiety off of it  > Follow-up in 3 months     I spent 33 minutes providing care for this patient on the date of service, including reviewing imaging, labs, and notes as well as providing counseling and coordination of care for the above issues.      Again, thank you for allowing me to participate in the care of your patient.        Sincerely,        Estella Sebastian, DO    Electronically signed

## 2025-07-21 NOTE — NURSING NOTE
"Leidy Ascencio is a 65 year old female who presents for:  Chief Complaint   Patient presents with    RECHECK     HA are less intense and frequent; no relief noticed from gabapentin; tizanidine and PT have been helpful        Initial Vitals:  /84 (BP Location: Left arm, Patient Position: Sitting, Cuff Size: Adult Regular)   Pulse 66   SpO2 95%  Estimated body mass index is 34.2 kg/m  as calculated from the following:    Height as of 9/5/24: 1.575 m (5' 2\").    Weight as of 7/14/25: 84.8 kg (187 lb).. There is no height or weight on file to calculate BSA. BP completed using cuff size: regular    Mohan Teresa    "

## 2025-07-21 NOTE — PROGRESS NOTES
Neurology Consultation    Patient Name:  Leidy Ascencio  MRN:  [id number]    :  1960  Date of Service:  2025  Primary care provider:  Madelin Castro        Chief Complaint: Follow-up     History of Present Illness:     Leidy Ascencio is a 65 year old female who presents for routine follow-up concerning headaches.     She has appreciated about 50% improvement in the headaches with PT and tizanidine. She take tizanidine most nights. Has a difficult time taking it during the day due to somnolence.  She did not feel Gabapentin helped and was maybe causing anxiety. She just stopped it recently and not sure if it has changed the anxiety.  Pain has settled more into her neck. Radiates into right arm, does not go into the fingers, stops around the elbow.  No weakness.     MRI brain perfusion W/WO: IMPRESSION:  In this patient with history of solitary fibrous neoplasm status post  resection and gamma knife treatment:   There is increasing nodular enhancement with associated vasogenic  edema along the medial resection margin, currently measuring up to 9  mm. No definite associated increased cerebral blood volume. Findings  could represent posttreatment change versus tumor progression.    Prior History from 2025:     - History: Denies prior history of headaches.  Started having headaches in  and that when imaging was done that showed R occipital mass. After first surgery, she felt like the headaches improved.  Flared up after radiation.  Had them regularly 4-6 months after this.  Headaches then were intermittent until after surgery in 2024.  Headache interestingly resolved for a week when she went down to [place] to take care of her mom who had shingles.  Stress level was increased at that time.  No other differences.    - Location: Varies and can come anywhere, especially in the occipital area, neck, vertex   - Quality: Can be achy or throbbing   - Duration: Near constant but waxes and wanes in  intensity, can reach 10/10 pain level   - Frequency: Daily, bad headache 2-4 times/week.     - Associated symptoms: Denies photophobia, sometimes phonophobia.  Denies nausea and vomiting.  Denies allodynia. Sometimes head feels too heavy.  Maybe mild improvement with laying down but overall no significant change with position.  Sometimes worse with movement/activity.  Denies associated weakness, tingling.  Has peripheral vision loss related to tumor/resection.  Denies changes in vision related to her headaches.  Denies fever, unexplained weight loss, or jaw claudication.    - Alleviating Factors: Has not appreciated any major alleviating factors   - Triggers: Has not identified any specific triggers maybe being on computer too much   - Birth Control/Menses: Not on hormone replacement   - Co-morbidities: + Depression, anxiety has gotten worse as she has gotten older, takes sertraline.  Denies history of sleep apnea.   - Risk Factors: Denies family history of headaches.   - Prior Workup: See below   - Headache hygiene: Typically denies problems with insomnia, although can sometimes has difficulty following asleep. Has been taking OTC pain relievers. 1 cup of coffee/day.  Stays well hydrated.       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 S/p GammaKnife in 1/2020. Surveillance imaging stable until 4/2024 showing increased enhancement and in 7/2024 increased size of enhancing extra-axial  nodular mass.  S/p resection and placement of GammaTiles on 9/5/2024. Repeat imaging in 10/2024 and 1/2025 stable. Repeat imaging in 4/2025 showed evolving postoperative changes related to parieto-occipital craniotomy for mass resection. Two subcentimeter foci of enhancement along the medial surface of the left occipital lobe have increased in conspicuity compared to recent prior exams. Repeat imaging in 7/2025 is already scheduled. Whole body CT and PET MRI have been unrevealing in the past for any other involvement. Most recent CT body in 4/2025 reassuring.      Past Medical History:  - Right occipital solitary fibrous tumor   - Hyperlipidemia, depression, anxiety, asthma/COPD, osteoarthritis     Physical Exam:  /84 (BP Location: Left arm, Patient Position: Sitting, Cuff Size: Adult Regular)   Pulse 66   SpO2 95%     General:  No acute distress  Cardiovascular: Normal rate.  Lung: Respirations are non-labored.     Neurologic:  Mental status : alert, fund of knowledge appropriate for visit     LANGUAGE: Speech fluent, no dysarthria      CN:  II- pupils equal and reactive, decreased lower/altitudinal visual field deficit   III, IV, VI- extraocular movements intact  V- sensation intact bilaterally  VII- face symmetric  VIII- hearing intact, no nystagmus  IX, X- palate elevates symmetrically  XI- sternocleidomastoid 5/5  XII- tongue midline     MOTOR : intact bulk and tone  5/5 strength in all ext     SENSORY : focused exam shows intact pp in all fingers both hands      REFLEXES: Cook absent      MOVEMENT/COORDINATION: finger to nose, finger taps, and heel to shin intact bilaterally     Cognition and Speech: Speech clear and coherent.     Psychiatric: Cooperative, Appropriate mood & affect.        Assessment/Plan:   Leidy Ascencio is a 65 year old female who presents for routine follow-up concerning headaches. Suspect a major musculoskeletal component to her headaches. They seem more cervicogenic which is  not uncommon to get headaches with intracranial surgery and radiation but especially cervicogenic type headaches for occipital surgeries. No major migrainous features. Her tumor is being closely monitor by oncology, oncology radiation, and neurosurgery. Repeat imaging in 7/2025 was reviewed extensively in oncology/tumor board and lesion that has increased in size is felt to be related to injury from GammaTile. She has had about 50% reduction in headaches with PT and tizanidine. Did not appreciate much benefit with Gabapentin and was concerned it was causing anxiety.  Discussed that I'd be surprised if Gabapentin was causing anxiety but asked her to reach out if in a few weeks no change in anxiety, we could start higher dose.  Having more neck pain with radicular pain despite PT. Will obtain MRI C-spine. Consider referral to pain management or non-op spine for consideration of injections.     Plan  > MRI C-spine W/WO  > Continue PT  > Continue Tizanidine 2 mg BID PRN  > Restart Gabapentin 300 mg BID if no change to anxiety off of it  > Follow-up in 3 months     I spent 33 minutes providing care for this patient on the date of service, including reviewing imaging, labs, and notes as well as providing counseling and coordination of care for the above issues.

## 2025-07-24 ENCOUNTER — THERAPY VISIT (OUTPATIENT)
Dept: PHYSICAL THERAPY | Facility: CLINIC | Age: 65
End: 2025-07-24
Payer: MEDICARE

## 2025-07-24 DIAGNOSIS — G44.86 CERVICOGENIC HEADACHE: Primary | ICD-10-CM

## 2025-08-11 ENCOUNTER — THERAPY VISIT (OUTPATIENT)
Dept: PHYSICAL THERAPY | Facility: CLINIC | Age: 65
End: 2025-08-11
Payer: MEDICARE

## 2025-08-11 DIAGNOSIS — G44.86 CERVICOGENIC HEADACHE: Primary | ICD-10-CM

## 2025-08-11 PROCEDURE — 97110 THERAPEUTIC EXERCISES: CPT | Mod: GP | Performed by: PHYSICAL THERAPIST

## 2025-08-11 ASSESSMENT — ANXIETY QUESTIONNAIRES
7. FEELING AFRAID AS IF SOMETHING AWFUL MIGHT HAPPEN: NOT AT ALL
5. BEING SO RESTLESS THAT IT IS HARD TO SIT STILL: NOT AT ALL
IF YOU CHECKED OFF ANY PROBLEMS ON THIS QUESTIONNAIRE, HOW DIFFICULT HAVE THESE PROBLEMS MADE IT FOR YOU TO DO YOUR WORK, TAKE CARE OF THINGS AT HOME, OR GET ALONG WITH OTHER PEOPLE: SOMEWHAT DIFFICULT
3. WORRYING TOO MUCH ABOUT DIFFERENT THINGS: SEVERAL DAYS
4. TROUBLE RELAXING: SEVERAL DAYS
GAD7 TOTAL SCORE: 5
GAD7 TOTAL SCORE: 5
8. IF YOU CHECKED OFF ANY PROBLEMS, HOW DIFFICULT HAVE THESE MADE IT FOR YOU TO DO YOUR WORK, TAKE CARE OF THINGS AT HOME, OR GET ALONG WITH OTHER PEOPLE?: SOMEWHAT DIFFICULT
GAD7 TOTAL SCORE: 5
2. NOT BEING ABLE TO STOP OR CONTROL WORRYING: SEVERAL DAYS
6. BECOMING EASILY ANNOYED OR IRRITABLE: NOT AT ALL
1. FEELING NERVOUS, ANXIOUS, OR ON EDGE: MORE THAN HALF THE DAYS
7. FEELING AFRAID AS IF SOMETHING AWFUL MIGHT HAPPEN: NOT AT ALL

## 2025-08-12 ASSESSMENT — PATIENT HEALTH QUESTIONNAIRE - PHQ9
SUM OF ALL RESPONSES TO PHQ QUESTIONS 1-9: 8
SUM OF ALL RESPONSES TO PHQ QUESTIONS 1-9: 8
10. IF YOU CHECKED OFF ANY PROBLEMS, HOW DIFFICULT HAVE THESE PROBLEMS MADE IT FOR YOU TO DO YOUR WORK, TAKE CARE OF THINGS AT HOME, OR GET ALONG WITH OTHER PEOPLE: SOMEWHAT DIFFICULT

## 2025-08-13 ENCOUNTER — OFFICE VISIT (OUTPATIENT)
Dept: FAMILY MEDICINE | Facility: CLINIC | Age: 65
End: 2025-08-13
Payer: MEDICARE

## 2025-08-13 VITALS
WEIGHT: 185 LBS | BODY MASS INDEX: 34.04 KG/M2 | HEART RATE: 89 BPM | RESPIRATION RATE: 17 BRPM | DIASTOLIC BLOOD PRESSURE: 83 MMHG | SYSTOLIC BLOOD PRESSURE: 143 MMHG | OXYGEN SATURATION: 94 % | HEIGHT: 62 IN | TEMPERATURE: 98.3 F

## 2025-08-13 DIAGNOSIS — F41.9 ANXIETY: Primary | ICD-10-CM

## 2025-08-13 PROCEDURE — 3078F DIAST BP <80 MM HG: CPT | Performed by: FAMILY MEDICINE

## 2025-08-13 PROCEDURE — 1126F AMNT PAIN NOTED NONE PRSNT: CPT | Performed by: FAMILY MEDICINE

## 2025-08-13 PROCEDURE — 99213 OFFICE O/P EST LOW 20 MIN: CPT | Performed by: FAMILY MEDICINE

## 2025-08-13 PROCEDURE — G2211 COMPLEX E/M VISIT ADD ON: HCPCS | Performed by: FAMILY MEDICINE

## 2025-08-13 PROCEDURE — 3077F SYST BP >= 140 MM HG: CPT | Performed by: FAMILY MEDICINE

## 2025-08-13 RX ORDER — BUSPIRONE HYDROCHLORIDE 5 MG/1
5 TABLET ORAL 2 TIMES DAILY
Qty: 60 TABLET | Refills: 1 | Status: SHIPPED | OUTPATIENT
Start: 2025-08-13

## 2025-08-13 ASSESSMENT — PAIN SCALES - GENERAL: PAINLEVEL_OUTOF10: NO PAIN (0)

## 2025-08-18 ENCOUNTER — THERAPY VISIT (OUTPATIENT)
Dept: PHYSICAL THERAPY | Facility: CLINIC | Age: 65
End: 2025-08-18
Payer: MEDICARE

## 2025-08-18 DIAGNOSIS — G44.86 CERVICOGENIC HEADACHE: Primary | ICD-10-CM

## 2025-08-18 PROCEDURE — 97140 MANUAL THERAPY 1/> REGIONS: CPT | Mod: GP | Performed by: PHYSICAL THERAPIST

## 2025-08-18 PROCEDURE — 97110 THERAPEUTIC EXERCISES: CPT | Mod: GP | Performed by: PHYSICAL THERAPIST

## 2025-08-26 DIAGNOSIS — G44.86 CERVICOGENIC HEADACHE: ICD-10-CM

## 2025-08-27 RX ORDER — GABAPENTIN 300 MG/1
300 CAPSULE ORAL 2 TIMES DAILY
Qty: 180 CAPSULE | Refills: 1 | Status: SHIPPED | OUTPATIENT
Start: 2025-08-27

## 2025-09-02 ENCOUNTER — THERAPY VISIT (OUTPATIENT)
Dept: PHYSICAL THERAPY | Facility: CLINIC | Age: 65
End: 2025-09-02
Payer: MEDICARE

## 2025-09-02 DIAGNOSIS — G44.86 CERVICOGENIC HEADACHE: Primary | ICD-10-CM

## 2025-09-02 PROCEDURE — 97110 THERAPEUTIC EXERCISES: CPT | Mod: GP | Performed by: PHYSICAL THERAPIST

## 2025-09-02 PROCEDURE — 97140 MANUAL THERAPY 1/> REGIONS: CPT | Mod: GP | Performed by: PHYSICAL THERAPIST

## (undated) DEVICE — DRAPE CORETEMP FLUID WARM SYSTEM 62X56IN CTD200

## (undated) DEVICE — DRAPE CRANIOTOMY W/POUCH 9450

## (undated) DEVICE — PROBE ABLATION 13GX25CM CERTUS 140 NWSR25

## (undated) DEVICE — LUBRICATING JELLY 4.25OZ

## (undated) DEVICE — ESU CORD BIPOLAR GREEN 10-4000

## (undated) DEVICE — HOLSTER ELTRD PNCL STRL LF DISP

## (undated) DEVICE — STRAP UNIVERSAL POSITIONING 2-PIECE 4X47X76" 91-287

## (undated) DEVICE — SPONGE SURGIFOAM 01GM POWDER 1978

## (undated) DEVICE — NDL BX 18GAX25CM MC1825

## (undated) DEVICE — PREP CHLORAPREP 26ML TINTED HI-LITE ORANGE 930815

## (undated) DEVICE — GLOVE PROTEXIS MICRO 7.5  2D73PM75

## (undated) DEVICE — SU MONOCRYL 4-0 PS-2 27" UND Y426H

## (undated) DEVICE — Device

## (undated) DEVICE — SPONGE COTTONOID 1X3" 20-10S

## (undated) DEVICE — NDL COUNTER 20CT 31142493

## (undated) DEVICE — SU SILK 2-0 SH CR 8X18" C012D

## (undated) DEVICE — OINTMENT ANTIBIOTIC BACITRACIN ZINC .9 G 1171

## (undated) DEVICE — ENDO TROCAR SLEEVE KII Z-THREADED 05X100MM CTS02

## (undated) DEVICE — NDL BX MONOPTY 18GAX16CM 121816

## (undated) DEVICE — SOL RINGERS LACTATED 1000ML BAG 07953-09

## (undated) DEVICE — DRAPE SHEET REV FOLD 3/4 9349

## (undated) DEVICE — ESU GROUND PAD ADULT W/CORD E7507

## (undated) DEVICE — BLADE KNIFE BEAVER MICROSHARP GREEN 377515

## (undated) DEVICE — ENDO CLOSING KIT ENDOCLOSE 173022

## (undated) DEVICE — ESU FCP CODMAN 9"X1.0MM VERSATRU 9009100SL

## (undated) DEVICE — ESU FCP CODMAN 8"X1.0MM SLIM TIP 9008100SL

## (undated) DEVICE — NDL ANGIOCATH 14GA 1.25" 4048

## (undated) DEVICE — SU VICRYL 0 TIE 54" J608H

## (undated) DEVICE — ESU ENDO SILS HOOK 5MM ARTICULATING MONO SILSHOOK36

## (undated) DEVICE — SUCTION IRR STRYKERFLOW II W/TIP 250-070-520

## (undated) DEVICE — SU SILK 2-0 TIE 12X30" A305H

## (undated) DEVICE — LABEL MEDICATION SYSTEM 3303-P

## (undated) DEVICE — DRAPE IOBAN INCISE 23X17" 6650EZ

## (undated) DEVICE — PROBE ABLATION NEUWAVE CERTUS 140 25CM 13GA SR25

## (undated) DEVICE — SU VICRYL 3-0 SH 27" J316H

## (undated) DEVICE — PACK CRANIOTOMY

## (undated) DEVICE — SU SILK 3-0 TIE 12X30" A304H

## (undated) DEVICE — SOL WATER IRRIG 1000ML BOTTLE 2F7114

## (undated) DEVICE — LINEN TOWEL PACK X30 5481

## (undated) DEVICE — BUR STRK 2.3MM TAPERED ROUTER - FA2 5407-FA2-023

## (undated) DEVICE — ANTIFOG SOLUTION W/FOAM PAD 31142527

## (undated) DEVICE — BURR ELITE PRECISION ROUND 5MM 5820009050

## (undated) DEVICE — SPONGE SURGIFOAM 100 1974

## (undated) DEVICE — SUCTION MANIFOLD DORNOCH ULTRA CART UL-CL500

## (undated) DEVICE — DECANTER VIAL 2006S

## (undated) DEVICE — TUBING INSUFFLATION W/FILTER CPC TO LUER 620-030-301

## (undated) DEVICE — SU SILK 4-0 TIE 12X30" A303H

## (undated) DEVICE — NDL BIOPSY TEMNO 18GAX15CM ACT1815

## (undated) DEVICE — PREP POVIDONE-IODINE 7.5% SCRUB 4OZ BOTTLE MDS093945

## (undated) DEVICE — BLADE CLIPPER SGL USE 9680

## (undated) DEVICE — PREP CHLORAPREP 26ML TINTED ORANGE  260815

## (undated) DEVICE — DECANTER BAG 2002S

## (undated) DEVICE — GLOVE PROTEXIS BLUE W/NEU-THERA 8.0  2D73EB80

## (undated) DEVICE — PAD CHUX UNDERPAD 23X24" 7136

## (undated) DEVICE — SU NUROLON 4-0 TF CR 8X18" C584D

## (undated) DEVICE — LINEN TOWEL PACK X6 WHITE 5487

## (undated) DEVICE — DRSG PRIMAPORE 03 1/8X6" 66000318

## (undated) DEVICE — ENDO TROCAR FIRST ENTRY KII FIOS Z-THRD 12X100MM CTF73

## (undated) DEVICE — SPONGE COTTONOID 1/2X3" 20-07S

## (undated) DEVICE — TUBING SMOKE EVAC 3/8"X10' 0702-045-023

## (undated) DEVICE — GLOVE BIOGEL PI MICRO SZ 7.0 48570

## (undated) DEVICE — PROTECTOR ARM ONE-STEP TRENDELENBURG 40418 (COI)

## (undated) DEVICE — DRAPE POUCH INSTRUMENT 1018

## (undated) DEVICE — MARKER SPHERES PASSIVE MEDT PACK 5 8801075

## (undated) DEVICE — CATH TRAY TEMP SURESTEP 16FR NEURO LF

## (undated) DEVICE — SYR 10ML LL W/O NDL 302995

## (undated) DEVICE — KIT ENDO TURNOVER/PROCEDURE CARRY-ON 101822

## (undated) DEVICE — SURGICEL HEMOSTAT 4X8" 1952

## (undated) DEVICE — BUR STRK CARBIDE MATCH HEAD 3.0MM 5820-107-530C

## (undated) DEVICE — SPONGE COTTONOID 1/2X1 1/2" 20-06S

## (undated) DEVICE — SU VICRYL 2-0 CT-2 CR 8X18" J726D

## (undated) DEVICE — SU VICRYL 1 CT-1 27" UND J261H

## (undated) DEVICE — SYR 10ML FINGER CONTROL W/O NDL 309695

## (undated) DEVICE — DRSG TELFA 3X8" 1238

## (undated) DEVICE — ESU HOLSTER PLASTIC DISP E2400

## (undated) DEVICE — NDL INSUFFLATION 13GA 120MM C2201

## (undated) DEVICE — PIN SKULL MAYFIELD ADULT TITANIUM 3/PK A1120

## (undated) DEVICE — ESU CORD BIPOLAR AND IRR TUBING AESCULAP US355

## (undated) DEVICE — PENCIL PRESHARPENED SOFT #2 1/PK  17701/50

## (undated) DEVICE — PREP CHLORAPREP CLEAR 3ML 930400

## (undated) DEVICE — SPONGE COTTONOID 1/2X1/2" 20-04S

## (undated) DEVICE — DRAPE MICROSCOPE LEICA 54X120" 09-MK653

## (undated) DEVICE — SOL WATER IRRIG 1000ML BOTTLE 07139-09

## (undated) DEVICE — WIPES FOLEY CARE SURESTEP PROVON DFC100

## (undated) DEVICE — SU ETHILON 3-0 PS-1 18" 1663H

## (undated) DEVICE — CLIP RANEY

## (undated) DEVICE — GLOVE EXAM NITRILE LG

## (undated) DEVICE — ENDO TROCAR FIRST ENTRY KII FIOS Z-THRD 05X100MM CTF03

## (undated) DEVICE — SUCTION MANIFOLD NEPTUNE 2 SYS 4 PORT 0702-020-000

## (undated) DEVICE — SOL NACL 0.9% IRRIG 1000ML BOTTLE 2F7124

## (undated) DEVICE — COVER CAMERA VIDEO LASER 9X96" 04-CC227

## (undated) DEVICE — ADH SKIN CLOSURE PREMIERPRO EXOFIN 1.0ML 3470

## (undated) DEVICE — SYR 30ML LL W/O NDL 302832

## (undated) DEVICE — TUBING SUCTION 12"X1/4" N612

## (undated) DEVICE — SPONGE COTTONOID 1/4X1/4" 20-01S

## (undated) DEVICE — PREP POVIDONE-IODINE 10% SOLUTION 4OZ BOTTLE MDS093944

## (undated) RX ORDER — PHENYLEPHRINE HCL IN 0.9% NACL 1 MG/10 ML
SYRINGE (ML) INTRAVENOUS
Status: DISPENSED
Start: 2020-01-15

## (undated) RX ORDER — HYDROMORPHONE HCL IN WATER/PF 6 MG/30 ML
PATIENT CONTROLLED ANALGESIA SYRINGE INTRAVENOUS
Status: DISPENSED
Start: 2024-09-05

## (undated) RX ORDER — FENTANYL CITRATE 50 UG/ML
INJECTION, SOLUTION INTRAMUSCULAR; INTRAVENOUS
Status: DISPENSED
Start: 2020-01-15

## (undated) RX ORDER — ONDANSETRON 2 MG/ML
INJECTION INTRAMUSCULAR; INTRAVENOUS
Status: DISPENSED
Start: 2024-09-05

## (undated) RX ORDER — ACETAMINOPHEN 325 MG/1
TABLET ORAL
Status: DISPENSED
Start: 2024-09-05

## (undated) RX ORDER — HYDROMORPHONE HYDROCHLORIDE 1 MG/ML
INJECTION, SOLUTION INTRAMUSCULAR; INTRAVENOUS; SUBCUTANEOUS
Status: DISPENSED
Start: 2020-01-15

## (undated) RX ORDER — ONDANSETRON 2 MG/ML
INJECTION INTRAMUSCULAR; INTRAVENOUS
Status: DISPENSED
Start: 2020-01-15

## (undated) RX ORDER — LABETALOL HYDROCHLORIDE 5 MG/ML
INJECTION, SOLUTION INTRAVENOUS
Status: DISPENSED
Start: 2024-09-05

## (undated) RX ORDER — OXYCODONE HYDROCHLORIDE 5 MG/1
TABLET ORAL
Status: DISPENSED
Start: 2024-09-05

## (undated) RX ORDER — EPHEDRINE SULFATE 50 MG/ML
INJECTION, SOLUTION INTRAMUSCULAR; INTRAVENOUS; SUBCUTANEOUS
Status: DISPENSED
Start: 2024-09-05

## (undated) RX ORDER — PROPOFOL 10 MG/ML
INJECTION, EMULSION INTRAVENOUS
Status: DISPENSED
Start: 2022-11-29

## (undated) RX ORDER — HYDRALAZINE HYDROCHLORIDE 20 MG/ML
INJECTION INTRAMUSCULAR; INTRAVENOUS
Status: DISPENSED
Start: 2024-09-05

## (undated) RX ORDER — INDOCYANINE GREEN AND WATER 25 MG
KIT INJECTION
Status: DISPENSED
Start: 2024-09-05

## (undated) RX ORDER — ACETAMINOPHEN 325 MG/1
TABLET ORAL
Status: DISPENSED
Start: 2020-01-15

## (undated) RX ORDER — LIDOCAINE HYDROCHLORIDE 10 MG/ML
INJECTION, SOLUTION EPIDURAL; INFILTRATION; INTRACAUDAL; PERINEURAL
Status: DISPENSED
Start: 2022-11-29

## (undated) RX ORDER — SODIUM CHLORIDE, SODIUM LACTATE, POTASSIUM CHLORIDE, CALCIUM CHLORIDE 600; 310; 30; 20 MG/100ML; MG/100ML; MG/100ML; MG/100ML
INJECTION, SOLUTION INTRAVENOUS
Status: DISPENSED
Start: 2020-01-15

## (undated) RX ORDER — METHOCARBAMOL 500 MG/1
TABLET, FILM COATED ORAL
Status: DISPENSED
Start: 2024-09-05

## (undated) RX ORDER — SODIUM CHLORIDE, SODIUM LACTATE, POTASSIUM CHLORIDE, CALCIUM CHLORIDE 600; 310; 30; 20 MG/100ML; MG/100ML; MG/100ML; MG/100ML
INJECTION, SOLUTION INTRAVENOUS
Status: DISPENSED
Start: 2024-09-05

## (undated) RX ORDER — ESMOLOL HYDROCHLORIDE 10 MG/ML
INJECTION INTRAVENOUS
Status: DISPENSED
Start: 2020-01-15

## (undated) RX ORDER — CEFAZOLIN SODIUM/WATER 2 G/20 ML
SYRINGE (ML) INTRAVENOUS
Status: DISPENSED
Start: 2024-09-05

## (undated) RX ORDER — VERAPAMIL HYDROCHLORIDE 2.5 MG/ML
INJECTION, SOLUTION INTRAVENOUS
Status: DISPENSED
Start: 2024-08-14

## (undated) RX ORDER — GLYCOPYRROLATE 0.2 MG/ML
INJECTION, SOLUTION INTRAMUSCULAR; INTRAVENOUS
Status: DISPENSED
Start: 2020-01-15

## (undated) RX ORDER — FENTANYL CITRATE 50 UG/ML
INJECTION, SOLUTION INTRAMUSCULAR; INTRAVENOUS
Status: DISPENSED
Start: 2024-08-14

## (undated) RX ORDER — CLINDAMYCIN PHOSPHATE 900 MG/50ML
INJECTION, SOLUTION INTRAVENOUS
Status: DISPENSED
Start: 2020-01-15

## (undated) RX ORDER — FENTANYL CITRATE 50 UG/ML
INJECTION, SOLUTION INTRAMUSCULAR; INTRAVENOUS
Status: DISPENSED
Start: 2024-09-05

## (undated) RX ORDER — SODIUM CHLORIDE 9 MG/ML
INJECTION, SOLUTION INTRAVENOUS
Status: DISPENSED
Start: 2024-09-05

## (undated) RX ORDER — LIDOCAINE HYDROCHLORIDE 20 MG/ML
INJECTION, SOLUTION EPIDURAL; INFILTRATION; INTRACAUDAL; PERINEURAL
Status: DISPENSED
Start: 2020-01-15

## (undated) RX ORDER — SODIUM CHLORIDE 9 MG/ML
INJECTION, SOLUTION INTRAVENOUS
Status: DISPENSED
Start: 2024-08-14

## (undated) RX ORDER — LIDOCAINE HYDROCHLORIDE 10 MG/ML
INJECTION, SOLUTION EPIDURAL; INFILTRATION; INTRACAUDAL; PERINEURAL
Status: DISPENSED
Start: 2024-08-14

## (undated) RX ORDER — OXYCODONE HYDROCHLORIDE 5 MG/1
TABLET ORAL
Status: DISPENSED
Start: 2020-01-15

## (undated) RX ORDER — HYDROMORPHONE HYDROCHLORIDE 1 MG/ML
INJECTION, SOLUTION INTRAMUSCULAR; INTRAVENOUS; SUBCUTANEOUS
Status: DISPENSED
Start: 2024-09-05

## (undated) RX ORDER — NITROGLYCERIN 5 MG/ML
VIAL (ML) INTRAVENOUS
Status: DISPENSED
Start: 2024-08-14

## (undated) RX ORDER — BUPIVACAINE HYDROCHLORIDE AND EPINEPHRINE 2.5; 5 MG/ML; UG/ML
INJECTION, SOLUTION EPIDURAL; INFILTRATION; INTRACAUDAL; PERINEURAL
Status: DISPENSED
Start: 2024-09-05

## (undated) RX ORDER — PROPOFOL 10 MG/ML
INJECTION, EMULSION INTRAVENOUS
Status: DISPENSED
Start: 2020-01-15

## (undated) RX ORDER — EPHEDRINE SULFATE 50 MG/ML
INJECTION, SOLUTION INTRAMUSCULAR; INTRAVENOUS; SUBCUTANEOUS
Status: DISPENSED
Start: 2020-01-15

## (undated) RX ORDER — HEPARIN SODIUM 1000 [USP'U]/ML
INJECTION, SOLUTION INTRAVENOUS; SUBCUTANEOUS
Status: DISPENSED
Start: 2024-08-14